# Patient Record
Sex: FEMALE | Race: WHITE | Employment: UNEMPLOYED | URBAN - METROPOLITAN AREA
[De-identification: names, ages, dates, MRNs, and addresses within clinical notes are randomized per-mention and may not be internally consistent; named-entity substitution may affect disease eponyms.]

---

## 2018-08-24 ENCOUNTER — APPOINTMENT (OUTPATIENT)
Dept: CT IMAGING | Age: 53
End: 2018-08-24
Payer: COMMERCIAL

## 2018-08-24 ENCOUNTER — HOSPITAL ENCOUNTER (EMERGENCY)
Age: 53
Discharge: HOME | End: 2018-08-25
Attending: EMERGENCY MEDICINE
Payer: COMMERCIAL

## 2018-08-24 DIAGNOSIS — I10 ESSENTIAL HYPERTENSION: ICD-10-CM

## 2018-08-24 DIAGNOSIS — N10 ACUTE PYELONEPHRITIS: Primary | ICD-10-CM

## 2018-08-24 DIAGNOSIS — R11.2 NON-INTRACTABLE VOMITING WITH NAUSEA, UNSPECIFIED VOMITING TYPE: ICD-10-CM

## 2018-08-24 DIAGNOSIS — Q60.3 HYPOPLASIA OF ONE KIDNEY: ICD-10-CM

## 2018-08-24 DIAGNOSIS — E27.8 ADRENAL CYST (HCC): ICD-10-CM

## 2018-08-24 LAB
A/G RATIO: 0.8 (ref 1.1–2.2)
ALBUMIN SERPL-MCNC: 3.5 G/DL (ref 3.4–5)
ALP BLD-CCNC: 112 U/L (ref 40–129)
ALT SERPL-CCNC: 12 U/L (ref 10–40)
ANION GAP SERPL CALCULATED.3IONS-SCNC: 14 MMOL/L (ref 3–16)
AST SERPL-CCNC: 14 U/L (ref 15–37)
BACTERIA: ABNORMAL /HPF
BASOPHILS ABSOLUTE: 0 K/UL (ref 0–0.2)
BASOPHILS RELATIVE PERCENT: 0.1 %
BILIRUB SERPL-MCNC: 0.4 MG/DL (ref 0–1)
BILIRUBIN URINE: NEGATIVE
BLOOD, URINE: ABNORMAL
BUN BLDV-MCNC: 15 MG/DL (ref 7–20)
CALCIUM SERPL-MCNC: 9.2 MG/DL (ref 8.3–10.6)
CHLORIDE BLD-SCNC: 98 MMOL/L (ref 99–110)
CLARITY: CLEAR
CO2: 24 MMOL/L (ref 21–32)
COLOR: YELLOW
CREAT SERPL-MCNC: 0.7 MG/DL (ref 0.6–1.1)
EOSINOPHILS ABSOLUTE: 0 K/UL (ref 0–0.6)
EOSINOPHILS RELATIVE PERCENT: 0.2 %
EPITHELIAL CELLS, UA: ABNORMAL /HPF
GFR AFRICAN AMERICAN: >60
GFR NON-AFRICAN AMERICAN: >60
GLOBULIN: 4.3 G/DL
GLUCOSE BLD-MCNC: 127 MG/DL (ref 70–99)
GLUCOSE URINE: NEGATIVE MG/DL
HCT VFR BLD CALC: 37.5 % (ref 36–48)
HEMOGLOBIN: 12.5 G/DL (ref 12–16)
KETONES, URINE: NEGATIVE MG/DL
LEUKOCYTE ESTERASE, URINE: ABNORMAL
LIPASE: 15 U/L (ref 13–60)
LYMPHOCYTES ABSOLUTE: 0.9 K/UL (ref 1–5.1)
LYMPHOCYTES RELATIVE PERCENT: 8.2 %
MAGNESIUM: 2.2 MG/DL (ref 1.8–2.4)
MCH RBC QN AUTO: 26.8 PG (ref 26–34)
MCHC RBC AUTO-ENTMCNC: 33.2 G/DL (ref 31–36)
MCV RBC AUTO: 80.6 FL (ref 80–100)
MICROSCOPIC EXAMINATION: YES
MONOCYTES ABSOLUTE: 1.2 K/UL (ref 0–1.3)
MONOCYTES RELATIVE PERCENT: 11.4 %
NEUTROPHILS ABSOLUTE: 8.4 K/UL (ref 1.7–7.7)
NEUTROPHILS RELATIVE PERCENT: 80.1 %
NITRITE, URINE: POSITIVE
PDW BLD-RTO: 17.5 % (ref 12.4–15.4)
PH UA: 7
PLATELET # BLD: 179 K/UL (ref 135–450)
PMV BLD AUTO: 9.3 FL (ref 5–10.5)
POTASSIUM REFLEX MAGNESIUM: 3.1 MMOL/L (ref 3.5–5.1)
PROTEIN UA: ABNORMAL MG/DL
RBC # BLD: 4.66 M/UL (ref 4–5.2)
RBC UA: ABNORMAL /HPF (ref 0–2)
SODIUM BLD-SCNC: 136 MMOL/L (ref 136–145)
SPECIFIC GRAVITY UA: <=1.005
TOTAL PROTEIN: 7.8 G/DL (ref 6.4–8.2)
URINE REFLEX TO CULTURE: YES
URINE TYPE: ABNORMAL
UROBILINOGEN, URINE: 1 E.U./DL
WBC # BLD: 10.5 K/UL (ref 4–11)
WBC UA: ABNORMAL /HPF (ref 0–5)

## 2018-08-24 PROCEDURE — 87086 URINE CULTURE/COLONY COUNT: CPT

## 2018-08-24 PROCEDURE — 6360000002 HC RX W HCPCS: Performed by: EMERGENCY MEDICINE

## 2018-08-24 PROCEDURE — S0028 INJECTION, FAMOTIDINE, 20 MG: HCPCS | Performed by: PHYSICIAN ASSISTANT

## 2018-08-24 PROCEDURE — 6360000002 HC RX W HCPCS: Performed by: PHYSICIAN ASSISTANT

## 2018-08-24 PROCEDURE — 87077 CULTURE AEROBIC IDENTIFY: CPT

## 2018-08-24 PROCEDURE — 2580000003 HC RX 258: Performed by: EMERGENCY MEDICINE

## 2018-08-24 PROCEDURE — 83690 ASSAY OF LIPASE: CPT

## 2018-08-24 PROCEDURE — 96375 TX/PRO/DX INJ NEW DRUG ADDON: CPT

## 2018-08-24 PROCEDURE — 2580000003 HC RX 258: Performed by: PHYSICIAN ASSISTANT

## 2018-08-24 PROCEDURE — 81001 URINALYSIS AUTO W/SCOPE: CPT

## 2018-08-24 PROCEDURE — 96365 THER/PROPH/DIAG IV INF INIT: CPT

## 2018-08-24 PROCEDURE — 85025 COMPLETE CBC W/AUTO DIFF WBC: CPT

## 2018-08-24 PROCEDURE — 80053 COMPREHEN METABOLIC PANEL: CPT

## 2018-08-24 PROCEDURE — 99284 EMERGENCY DEPT VISIT MOD MDM: CPT

## 2018-08-24 PROCEDURE — 2500000003 HC RX 250 WO HCPCS: Performed by: PHYSICIAN ASSISTANT

## 2018-08-24 PROCEDURE — 87186 SC STD MICRODIL/AGAR DIL: CPT

## 2018-08-24 PROCEDURE — 6370000000 HC RX 637 (ALT 250 FOR IP): Performed by: EMERGENCY MEDICINE

## 2018-08-24 PROCEDURE — 6360000004 HC RX CONTRAST MEDICATION: Performed by: EMERGENCY MEDICINE

## 2018-08-24 PROCEDURE — 74177 CT ABD & PELVIS W/CONTRAST: CPT

## 2018-08-24 PROCEDURE — 96361 HYDRATE IV INFUSION ADD-ON: CPT

## 2018-08-24 PROCEDURE — 83735 ASSAY OF MAGNESIUM: CPT

## 2018-08-24 RX ORDER — LISINOPRIL 10 MG/1
10 TABLET ORAL ONCE
Status: COMPLETED | OUTPATIENT
Start: 2018-08-24 | End: 2018-08-24

## 2018-08-24 RX ORDER — LISINOPRIL 10 MG/1
10 TABLET ORAL DAILY
Qty: 30 TABLET | Refills: 0 | Status: SHIPPED | OUTPATIENT
Start: 2018-08-24 | End: 2020-08-20

## 2018-08-24 RX ORDER — HYDROXYZINE PAMOATE 50 MG/1
50 CAPSULE ORAL ONCE
Status: COMPLETED | OUTPATIENT
Start: 2018-08-24 | End: 2018-08-24

## 2018-08-24 RX ORDER — ONDANSETRON 4 MG/1
4 TABLET, ORALLY DISINTEGRATING ORAL EVERY 8 HOURS PRN
Qty: 15 TABLET | Refills: 0 | Status: SHIPPED | OUTPATIENT
Start: 2018-08-24 | End: 2020-08-20

## 2018-08-24 RX ORDER — 0.9 % SODIUM CHLORIDE 0.9 %
1000 INTRAVENOUS SOLUTION INTRAVENOUS ONCE
Status: COMPLETED | OUTPATIENT
Start: 2018-08-24 | End: 2018-08-24

## 2018-08-24 RX ORDER — CEFDINIR 300 MG/1
300 CAPSULE ORAL 2 TIMES DAILY
Qty: 20 CAPSULE | Refills: 0 | Status: SHIPPED | OUTPATIENT
Start: 2018-08-24 | End: 2018-09-03

## 2018-08-24 RX ORDER — ONDANSETRON 2 MG/ML
4 INJECTION INTRAMUSCULAR; INTRAVENOUS ONCE
Status: COMPLETED | OUTPATIENT
Start: 2018-08-24 | End: 2018-08-24

## 2018-08-24 RX ADMIN — ONDANSETRON HYDROCHLORIDE 4 MG: 2 INJECTION, SOLUTION INTRAMUSCULAR; INTRAVENOUS at 21:44

## 2018-08-24 RX ADMIN — LISINOPRIL 10 MG: 10 TABLET ORAL at 23:36

## 2018-08-24 RX ADMIN — HYDROXYZINE PAMOATE 50 MG: 50 CAPSULE ORAL at 21:52

## 2018-08-24 RX ADMIN — SODIUM CHLORIDE 1000 ML: 9 INJECTION, SOLUTION INTRAVENOUS at 21:41

## 2018-08-24 RX ADMIN — IOPAMIDOL 75 ML: 755 INJECTION, SOLUTION INTRAVENOUS at 22:13

## 2018-08-24 RX ADMIN — CEFEPIME 2 G: 2 INJECTION, POWDER, FOR SOLUTION INTRAVENOUS at 23:33

## 2018-08-24 RX ADMIN — FAMOTIDINE 20 MG: 10 INJECTION, SOLUTION INTRAVENOUS at 21:44

## 2018-08-24 ASSESSMENT — PAIN DESCRIPTION - DESCRIPTORS: DESCRIPTORS: ACHING

## 2018-08-24 ASSESSMENT — PAIN SCALES - GENERAL: PAINLEVEL_OUTOF10: 9

## 2018-08-24 ASSESSMENT — PAIN DESCRIPTION - LOCATION: LOCATION: GENERALIZED

## 2018-08-24 ASSESSMENT — PAIN DESCRIPTION - PAIN TYPE: TYPE: ACUTE PAIN

## 2018-08-25 VITALS
SYSTOLIC BLOOD PRESSURE: 124 MMHG | DIASTOLIC BLOOD PRESSURE: 107 MMHG | BODY MASS INDEX: 25.11 KG/M2 | WEIGHT: 160 LBS | HEIGHT: 67 IN | RESPIRATION RATE: 16 BRPM | TEMPERATURE: 98.1 F | HEART RATE: 79 BPM | OXYGEN SATURATION: 100 %

## 2018-08-25 ASSESSMENT — ENCOUNTER SYMPTOMS
SORE THROAT: 0
DIARRHEA: 0
EYE DISCHARGE: 0
COUGH: 0
BACK PAIN: 0
ABDOMINAL PAIN: 0
VOMITING: 1
NAUSEA: 1

## 2018-08-25 NOTE — ED NOTES
Discharge instructions reviewed with pt. Pt verbalized understanding. Pt ambulated out of ED in stable condition. Pt left with a friend.       Mack Vieira, [place]  08/25/18 0708

## 2018-08-25 NOTE — ED NOTES
Pt ambulated to and from the bathroom in stable condition.       Lisa Bahena, [place]  08/24/18 05

## 2018-08-25 NOTE — ED PROVIDER NOTES
motion. Neck supple. Cardiovascular: Normal rate, regular rhythm, normal heart sounds and intact distal pulses. Exam reveals no gallop and no friction rub. No murmur heard. Pulmonary/Chest: Effort normal and breath sounds normal. No respiratory distress. She has no wheezes. Abdominal: Soft. Bowel sounds are normal. She exhibits no distension. There is no tenderness. There is no rebound and no guarding. Musculoskeletal: Normal range of motion. She exhibits no edema or tenderness. Lymphadenopathy:     She has no cervical adenopathy. Neurological: She is alert and oriented to person, place, and time. No cranial nerve deficit. Skin: Skin is warm and dry. No rash noted. Psychiatric: She has a normal mood and affect.        DIAGNOSTIC RESULTS   LABS:    Results for orders placed or performed during the hospital encounter of 08/24/18   CBC auto differential   Result Value Ref Range    WBC 10.5 4.0 - 11.0 K/uL    RBC 4.66 4.00 - 5.20 M/uL    Hemoglobin 12.5 12.0 - 16.0 g/dL    Hematocrit 37.5 36.0 - 48.0 %    MCV 80.6 80.0 - 100.0 fL    MCH 26.8 26.0 - 34.0 pg    MCHC 33.2 31.0 - 36.0 g/dL    RDW 17.5 (H) 12.4 - 15.4 %    Platelets 175 533 - 314 K/uL    MPV 9.3 5.0 - 10.5 fL    Neutrophils % 80.1 %    Lymphocytes % 8.2 %    Monocytes % 11.4 %    Eosinophils % 0.2 %    Basophils % 0.1 %    Neutrophils # 8.4 (H) 1.7 - 7.7 K/uL    Lymphocytes # 0.9 (L) 1.0 - 5.1 K/uL    Monocytes # 1.2 0.0 - 1.3 K/uL    Eosinophils # 0.0 0.0 - 0.6 K/uL    Basophils # 0.0 0.0 - 0.2 K/uL   Comprehensive Metabolic Panel w/ Reflex to MG   Result Value Ref Range    Sodium 136 136 - 145 mmol/L    Potassium reflex Magnesium 3.1 (L) 3.5 - 5.1 mmol/L    Chloride 98 (L) 99 - 110 mmol/L    CO2 24 21 - 32 mmol/L    Anion Gap 14 3 - 16    Glucose 127 (H) 70 - 99 mg/dL    BUN 15 7 - 20 mg/dL    CREATININE 0.7 0.6 - 1.1 mg/dL    GFR Non-African American >60 >60    GFR African American >60 >60    Calcium 9.2 8.3 - 10.6 mg/dL    Total Protein increased   urothelial enhancement and multifocal renal parenchymal hypo attenuation;   findings are most compatible with pyelonephritis. Follow-up to resolution is   recommended. Left nephrolithiasis. Simple appearing 3.5 cm right adnexal cyst.  Follow-up is recommended as   below. Morphology liver worrisome for chronic disease as above. There are also   findings worrisome for portal venous hypertension including splenomegaly and   venous collaterals. RECOMMENDATIONS:   Managing Incidental Adnexal Cystic Mass by CT or MR      Benign cyst:      Premenopausal (< or equal to 50 years if LMP unknown)      < or equal to 5 cm: no follow up      >5 cm: US in 6-12 weeks      > or equal to 10 cm: US promptly      Early postmenopausal (0-5 years after LMP)      < or equal to 3 cm: no follow up      3-5 cm: US in 6-12 weeks      >5 cm: US promptly      Late postmenopausal      < or equal to 3 cm: no follow up      >3 cm: US promptly      Reference:      Cammie Ayala et al. Managing Incidental Findings on Abdominal CT: White Paper of   the ACR Incidental Findings Committee. J Am Omar Radiol 2010;7:754-773               PROCEDURES   Unless otherwise noted below, none     Procedures    CRITICAL CARE TIME   Total critical care time today provided was 47 minutes. This excludes seperately billable procedures and family discussion time. Provided for acute pyelonephritis requiring intervention with concern for potential decompensation.       CONSULTS:  None      EMERGENCY DEPARTMENT COURSE and DIFFERENTIAL DIAGNOSIS/MDM:   Vitals:    Vitals:    08/24/18 2250 08/24/18 2310 08/24/18 2339 08/25/18 0014   BP:  (!) 198/106 (!) 196/150 (!) 124/107   Pulse: 83   79   Resp: 16   16   Temp:       TempSrc:       SpO2:  95% 99% 100%   Weight:       Height:           Patient was given the following medications:  Medications   0.9 % sodium chloride bolus (0 mLs Intravenous Stopped 8/24/18 2250)   ondansetron (ZOFRAN) injection 4 agree with discharging home to follow-up with their primary doctor. We also discussed returning to the Emergency Department immediately if new or worsening symptoms occur. We have discussed the symptoms which are most concerning (e.g., bloody stool, fever, changing or worsening pain, vomiting) that necessitate immediate return. The patient understands the importance of follow up and reasons to return. FINAL IMPRESSION      1. Acute pyelonephritis    2. Non-intractable vomiting with nausea, unspecified vomiting type    3. Essential hypertension    4. Adrenal cyst ([address]    5.  Hypoplasia of one kidney          DISPOSITION/PLAN   DISPOSITION Decision To Discharge 08/24/2018 11:41:03 PM      PATIENT REFERRED TO:  [address] ED  [address] [facility]  [phone]  Go to   If symptoms worsen      DISCHARGE MEDICATIONS:  Discharge Medication List as of 8/24/2018 11:53 PM      START taking these medications    Details   lisinopril (PRINIVIL;ZESTRIL) 10 MG tablet Take 1 tablet by mouth daily, Disp-30 tablet, R-0Normal      cefdinir (OMNICEF) 300 MG capsule Take 1 capsule by mouth 2 times daily for 10 days, Disp-20 capsule, R-0Normal      ondansetron (ZOFRAN ODT) 4 MG disintegrating tablet Take 1 tablet by mouth every 8 hours as needed for Nausea or Vomiting, Disp-15 tablet, R-0Normal             DISCONTINUED MEDICATIONS:  Discharge Medication List as of 8/24/2018 11:53 PM      STOP taking these medications       hydrochlorothiazide (HYDRODIURIL) 25 MG tablet Comments:   Reason for Stopping:         propranolol (INDERAL) 40 MG tablet Comments:   Reason for Stopping:         naproxen (NAPROSYN) 375 MG tablet Comments:   Reason for Stopping:                      (Please note that portions of this note were completed with a voice recognition program.  Efforts were made to edit the dictations but occasionally words are mis-transcribed.)    Isaiah Ruffin Julian Staley MD (electronically signed)              Giovanna West MD  08/25/18 2204

## 2018-08-27 LAB
ORGANISM: ABNORMAL
URINE CULTURE, ROUTINE: ABNORMAL
URINE CULTURE, ROUTINE: ABNORMAL

## 2018-11-05 ENCOUNTER — HOSPITAL ENCOUNTER (OUTPATIENT)
Dept: ULTRASOUND IMAGING | Age: 53
Discharge: HOME | End: 2018-11-05
Payer: COMMERCIAL

## 2018-11-05 DIAGNOSIS — N94.9 ADNEXAL CYST: ICD-10-CM

## 2018-11-05 PROCEDURE — 76830 TRANSVAGINAL US NON-OB: CPT

## 2020-08-20 ENCOUNTER — APPOINTMENT (OUTPATIENT)
Dept: GENERAL RADIOLOGY | Age: 55
DRG: 720 | End: 2020-08-20
Payer: COMMERCIAL

## 2020-08-20 ENCOUNTER — HOSPITAL ENCOUNTER (INPATIENT)
Age: 55
LOS: 4 days | Discharge: HOME | DRG: 720 | End: 2020-08-24
Attending: EMERGENCY MEDICINE | Admitting: UROLOGY
Payer: COMMERCIAL

## 2020-08-20 ENCOUNTER — ANESTHESIA (OUTPATIENT)
Dept: OPERATING ROOM | Age: 55
DRG: 720 | End: 2020-08-20
Payer: COMMERCIAL

## 2020-08-20 ENCOUNTER — ANESTHESIA EVENT (OUTPATIENT)
Dept: OPERATING ROOM | Age: 55
DRG: 720 | End: 2020-08-20
Payer: COMMERCIAL

## 2020-08-20 ENCOUNTER — APPOINTMENT (OUTPATIENT)
Dept: CT IMAGING | Age: 55
DRG: 720 | End: 2020-08-20
Payer: COMMERCIAL

## 2020-08-20 VITALS — DIASTOLIC BLOOD PRESSURE: 62 MMHG | OXYGEN SATURATION: 100 % | SYSTOLIC BLOOD PRESSURE: 116 MMHG

## 2020-08-20 PROBLEM — N39.0 SEPSIS DUE TO URINARY TRACT INFECTION (HCC): Status: ACTIVE | Noted: 2020-08-20

## 2020-08-20 PROBLEM — A41.9 SEPSIS DUE TO URINARY TRACT INFECTION (HCC): Status: ACTIVE | Noted: 2020-08-20

## 2020-08-20 LAB
A/G RATIO: 1 (ref 1.1–2.2)
ALBUMIN SERPL-MCNC: 4.2 G/DL (ref 3.4–5)
ALP BLD-CCNC: 163 U/L (ref 40–129)
ALT SERPL-CCNC: 43 U/L (ref 10–40)
ANION GAP SERPL CALCULATED.3IONS-SCNC: 16 MMOL/L (ref 3–16)
AST SERPL-CCNC: 56 U/L (ref 15–37)
BACTERIA: ABNORMAL /HPF
BASOPHILS ABSOLUTE: 0.1 K/UL (ref 0–0.2)
BASOPHILS RELATIVE PERCENT: 0.4 %
BILIRUB SERPL-MCNC: 0.6 MG/DL (ref 0–1)
BILIRUBIN URINE: NEGATIVE
BLOOD, URINE: ABNORMAL
BUN BLDV-MCNC: 22 MG/DL (ref 7–20)
CALCIUM SERPL-MCNC: 9.8 MG/DL (ref 8.3–10.6)
CHLORIDE BLD-SCNC: 100 MMOL/L (ref 99–110)
CLARITY: ABNORMAL
CO2: 20 MMOL/L (ref 21–32)
COLOR: YELLOW
CREAT SERPL-MCNC: 1.4 MG/DL (ref 0.6–1.1)
EOSINOPHILS ABSOLUTE: 0 K/UL (ref 0–0.6)
EOSINOPHILS RELATIVE PERCENT: 0 %
EPITHELIAL CELLS, UA: ABNORMAL /HPF (ref 0–5)
GFR AFRICAN AMERICAN: 47
GFR NON-AFRICAN AMERICAN: 39
GLOBULIN: 4.2 G/DL
GLUCOSE BLD-MCNC: 141 MG/DL (ref 70–99)
GLUCOSE URINE: NEGATIVE MG/DL
HCG QUALITATIVE: NEGATIVE
HCT VFR BLD CALC: 41.3 % (ref 36–48)
HEMOGLOBIN: 13 G/DL (ref 12–16)
KETONES, URINE: ABNORMAL MG/DL
LACTIC ACID, SEPSIS: 2.1 MMOL/L (ref 0.4–1.9)
LACTIC ACID, SEPSIS: 3.3 MMOL/L (ref 0.4–1.9)
LACTIC ACID, SEPSIS: 4.1 MMOL/L (ref 0.4–1.9)
LEUKOCYTE ESTERASE, URINE: ABNORMAL
LIPASE: 25 U/L (ref 13–60)
LYMPHOCYTES ABSOLUTE: 0.6 K/UL (ref 1–5.1)
LYMPHOCYTES RELATIVE PERCENT: 3.1 %
MCH RBC QN AUTO: 25.6 PG (ref 26–34)
MCHC RBC AUTO-ENTMCNC: 31.5 G/DL (ref 31–36)
MCV RBC AUTO: 81.2 FL (ref 80–100)
MICROSCOPIC EXAMINATION: YES
MONOCYTES ABSOLUTE: 1 K/UL (ref 0–1.3)
MONOCYTES RELATIVE PERCENT: 5.1 %
NEUTROPHILS ABSOLUTE: 17.1 K/UL (ref 1.7–7.7)
NEUTROPHILS RELATIVE PERCENT: 91.4 %
NITRITE, URINE: POSITIVE
PDW BLD-RTO: 17.1 % (ref 12.4–15.4)
PH UA: 5.5 (ref 5–8)
PLATELET # BLD: 141 K/UL (ref 135–450)
PMV BLD AUTO: 9.9 FL (ref 5–10.5)
POTASSIUM REFLEX MAGNESIUM: 3.6 MMOL/L (ref 3.5–5.1)
PROTEIN UA: 30 MG/DL
RBC # BLD: 5.08 M/UL (ref 4–5.2)
RBC UA: ABNORMAL /HPF (ref 0–4)
SODIUM BLD-SCNC: 136 MMOL/L (ref 136–145)
SPECIFIC GRAVITY UA: 1.01 (ref 1–1.03)
TOTAL PROTEIN: 8.4 G/DL (ref 6.4–8.2)
URINE REFLEX TO CULTURE: YES
URINE TYPE: ABNORMAL
UROBILINOGEN, URINE: 0.2 E.U./DL
WBC # BLD: 18.7 K/UL (ref 4–11)
WBC UA: ABNORMAL /HPF (ref 0–5)

## 2020-08-20 PROCEDURE — 83690 ASSAY OF LIPASE: CPT

## 2020-08-20 PROCEDURE — 74420 UROGRAPHY RTRGR +-KUB: CPT

## 2020-08-20 PROCEDURE — 7100000001 HC PACU RECOVERY - ADDTL 15 MIN: Performed by: UROLOGY

## 2020-08-20 PROCEDURE — 87088 URINE BACTERIA CULTURE: CPT

## 2020-08-20 PROCEDURE — 2580000003 HC RX 258: Performed by: INTERNAL MEDICINE

## 2020-08-20 PROCEDURE — 81001 URINALYSIS AUTO W/SCOPE: CPT

## 2020-08-20 PROCEDURE — 87086 URINE CULTURE/COLONY COUNT: CPT

## 2020-08-20 PROCEDURE — 3700000000 HC ANESTHESIA ATTENDED CARE: Performed by: UROLOGY

## 2020-08-20 PROCEDURE — 3600000014 HC SURGERY LEVEL 4 ADDTL 15MIN: Performed by: UROLOGY

## 2020-08-20 PROCEDURE — 6360000002 HC RX W HCPCS: Performed by: PHYSICIAN ASSISTANT

## 2020-08-20 PROCEDURE — 2709999900 HC NON-CHARGEABLE SUPPLY: Performed by: UROLOGY

## 2020-08-20 PROCEDURE — 0T778DZ DILATION OF LEFT URETER WITH INTRALUMINAL DEVICE, VIA NATURAL OR ARTIFICIAL OPENING ENDOSCOPIC: ICD-10-PCS | Performed by: UROLOGY

## 2020-08-20 PROCEDURE — 6370000000 HC RX 637 (ALT 250 FOR IP): Performed by: UROLOGY

## 2020-08-20 PROCEDURE — C1758 CATHETER, URETERAL: HCPCS | Performed by: UROLOGY

## 2020-08-20 PROCEDURE — 2700000000 HC OXYGEN THERAPY PER DAY

## 2020-08-20 PROCEDURE — 80053 COMPREHEN METABOLIC PANEL: CPT

## 2020-08-20 PROCEDURE — 3700000001 HC ADD 15 MINUTES (ANESTHESIA): Performed by: UROLOGY

## 2020-08-20 PROCEDURE — 2580000003 HC RX 258: Performed by: EMERGENCY MEDICINE

## 2020-08-20 PROCEDURE — 94761 N-INVAS EAR/PLS OXIMETRY MLT: CPT

## 2020-08-20 PROCEDURE — C1769 GUIDE WIRE: HCPCS | Performed by: UROLOGY

## 2020-08-20 PROCEDURE — 6360000002 HC RX W HCPCS: Performed by: ANESTHESIOLOGY

## 2020-08-20 PROCEDURE — 6360000002 HC RX W HCPCS: Performed by: EMERGENCY MEDICINE

## 2020-08-20 PROCEDURE — 2060000000 HC ICU INTERMEDIATE R&B

## 2020-08-20 PROCEDURE — 74176 CT ABD & PELVIS W/O CONTRAST: CPT

## 2020-08-20 PROCEDURE — 99285 EMERGENCY DEPT VISIT HI MDM: CPT

## 2020-08-20 PROCEDURE — 6360000002 HC RX W HCPCS: Performed by: INTERNAL MEDICINE

## 2020-08-20 PROCEDURE — C2617 STENT, NON-COR, TEM W/O DEL: HCPCS | Performed by: UROLOGY

## 2020-08-20 PROCEDURE — 84703 CHORIONIC GONADOTROPIN ASSAY: CPT

## 2020-08-20 PROCEDURE — 96365 THER/PROPH/DIAG IV INF INIT: CPT

## 2020-08-20 PROCEDURE — 87186 SC STD MICRODIL/AGAR DIL: CPT

## 2020-08-20 PROCEDURE — 93005 ELECTROCARDIOGRAM TRACING: CPT | Performed by: PHYSICIAN ASSISTANT

## 2020-08-20 PROCEDURE — 2500000003 HC RX 250 WO HCPCS: Performed by: PHYSICIAN ASSISTANT

## 2020-08-20 PROCEDURE — 3600000004 HC SURGERY LEVEL 4 BASE: Performed by: UROLOGY

## 2020-08-20 PROCEDURE — 36415 COLL VENOUS BLD VENIPUNCTURE: CPT

## 2020-08-20 PROCEDURE — 83605 ASSAY OF LACTIC ACID: CPT

## 2020-08-20 PROCEDURE — 2580000003 HC RX 258: Performed by: UROLOGY

## 2020-08-20 PROCEDURE — 96375 TX/PRO/DX INJ NEW DRUG ADDON: CPT

## 2020-08-20 PROCEDURE — 85025 COMPLETE CBC W/AUTO DIFF WBC: CPT

## 2020-08-20 PROCEDURE — 7100000000 HC PACU RECOVERY - FIRST 15 MIN: Performed by: UROLOGY

## 2020-08-20 PROCEDURE — 2580000003 HC RX 258: Performed by: ANESTHESIOLOGY

## 2020-08-20 PROCEDURE — 2580000003 HC RX 258: Performed by: PHYSICIAN ASSISTANT

## 2020-08-20 PROCEDURE — 87040 BLOOD CULTURE FOR BACTERIA: CPT

## 2020-08-20 PROCEDURE — 87150 DNA/RNA AMPLIFIED PROBE: CPT

## 2020-08-20 DEVICE — URETERAL STENT
Type: IMPLANTABLE DEVICE | Site: URETER | Status: FUNCTIONAL
Brand: CONTOUR™

## 2020-08-20 RX ORDER — MEPERIDINE HYDROCHLORIDE 25 MG/ML
12.5 INJECTION INTRAMUSCULAR; INTRAVENOUS; SUBCUTANEOUS EVERY 5 MIN PRN
Status: DISCONTINUED | OUTPATIENT
Start: 2020-08-20 | End: 2020-08-20 | Stop reason: HOSPADM

## 2020-08-20 RX ORDER — SODIUM CHLORIDE 9 MG/ML
INJECTION, SOLUTION INTRAVENOUS CONTINUOUS PRN
Status: DISCONTINUED | OUTPATIENT
Start: 2020-08-20 | End: 2020-08-20 | Stop reason: SDUPTHER

## 2020-08-20 RX ORDER — MORPHINE SULFATE 10 MG/ML
2 INJECTION, SOLUTION INTRAMUSCULAR; INTRAVENOUS EVERY 5 MIN PRN
Status: DISCONTINUED | OUTPATIENT
Start: 2020-08-20 | End: 2020-08-20 | Stop reason: HOSPADM

## 2020-08-20 RX ORDER — MAGNESIUM HYDROXIDE 1200 MG/15ML
LIQUID ORAL PRN
Status: DISCONTINUED | OUTPATIENT
Start: 2020-08-20 | End: 2020-08-20 | Stop reason: ALTCHOICE

## 2020-08-20 RX ORDER — OXYCODONE HYDROCHLORIDE AND ACETAMINOPHEN 5; 325 MG/1; MG/1
2 TABLET ORAL PRN
Status: DISCONTINUED | OUTPATIENT
Start: 2020-08-20 | End: 2020-08-20 | Stop reason: HOSPADM

## 2020-08-20 RX ORDER — SODIUM CHLORIDE 9 MG/ML
INJECTION, SOLUTION INTRAVENOUS CONTINUOUS
Status: DISCONTINUED | OUTPATIENT
Start: 2020-08-20 | End: 2020-08-23

## 2020-08-20 RX ORDER — 0.9 % SODIUM CHLORIDE 0.9 %
1000 INTRAVENOUS SOLUTION INTRAVENOUS ONCE
Status: COMPLETED | OUTPATIENT
Start: 2020-08-20 | End: 2020-08-20

## 2020-08-20 RX ORDER — OXYCODONE HYDROCHLORIDE AND ACETAMINOPHEN 5; 325 MG/1; MG/1
1 TABLET ORAL PRN
Status: DISCONTINUED | OUTPATIENT
Start: 2020-08-20 | End: 2020-08-20 | Stop reason: HOSPADM

## 2020-08-20 RX ORDER — LABETALOL HYDROCHLORIDE 5 MG/ML
5 INJECTION, SOLUTION INTRAVENOUS EVERY 10 MIN PRN
Status: DISCONTINUED | OUTPATIENT
Start: 2020-08-20 | End: 2020-08-20 | Stop reason: HOSPADM

## 2020-08-20 RX ORDER — ONDANSETRON 2 MG/ML
4 INJECTION INTRAMUSCULAR; INTRAVENOUS PRN
Status: DISCONTINUED | OUTPATIENT
Start: 2020-08-20 | End: 2020-08-20 | Stop reason: HOSPADM

## 2020-08-20 RX ORDER — PROMETHAZINE HYDROCHLORIDE 25 MG/ML
6.25 INJECTION, SOLUTION INTRAMUSCULAR; INTRAVENOUS
Status: DISCONTINUED | OUTPATIENT
Start: 2020-08-20 | End: 2020-08-20 | Stop reason: HOSPADM

## 2020-08-20 RX ORDER — PROPOFOL 10 MG/ML
INJECTION, EMULSION INTRAVENOUS PRN
Status: DISCONTINUED | OUTPATIENT
Start: 2020-08-20 | End: 2020-08-20 | Stop reason: SDUPTHER

## 2020-08-20 RX ORDER — SODIUM CHLORIDE 0.9 % (FLUSH) 0.9 %
10 SYRINGE (ML) INJECTION PRN
Status: DISCONTINUED | OUTPATIENT
Start: 2020-08-20 | End: 2020-08-24 | Stop reason: HOSPADM

## 2020-08-20 RX ORDER — MORPHINE SULFATE 10 MG/ML
1 INJECTION, SOLUTION INTRAMUSCULAR; INTRAVENOUS EVERY 5 MIN PRN
Status: DISCONTINUED | OUTPATIENT
Start: 2020-08-20 | End: 2020-08-20 | Stop reason: HOSPADM

## 2020-08-20 RX ORDER — 0.9 % SODIUM CHLORIDE 0.9 %
1500 INTRAVENOUS SOLUTION INTRAVENOUS ONCE
Status: COMPLETED | OUTPATIENT
Start: 2020-08-20 | End: 2020-08-20

## 2020-08-20 RX ORDER — ACETAMINOPHEN 325 MG/1
650 TABLET ORAL EVERY 6 HOURS PRN
Status: DISCONTINUED | OUTPATIENT
Start: 2020-08-20 | End: 2020-08-24 | Stop reason: HOSPADM

## 2020-08-20 RX ORDER — LIDOCAINE HYDROCHLORIDE 20 MG/ML
JELLY TOPICAL PRN
Status: DISCONTINUED | OUTPATIENT
Start: 2020-08-20 | End: 2020-08-20 | Stop reason: ALTCHOICE

## 2020-08-20 RX ORDER — ACETAMINOPHEN 650 MG/1
650 SUPPOSITORY RECTAL EVERY 6 HOURS PRN
Status: DISCONTINUED | OUTPATIENT
Start: 2020-08-20 | End: 2020-08-24 | Stop reason: HOSPADM

## 2020-08-20 RX ORDER — HEPARIN SODIUM 5000 [USP'U]/ML
5000 INJECTION, SOLUTION INTRAVENOUS; SUBCUTANEOUS EVERY 8 HOURS SCHEDULED
Status: DISCONTINUED | OUTPATIENT
Start: 2020-08-20 | End: 2020-08-24 | Stop reason: HOSPADM

## 2020-08-20 RX ORDER — MORPHINE SULFATE 4 MG/ML
4 INJECTION, SOLUTION INTRAMUSCULAR; INTRAVENOUS ONCE
Status: COMPLETED | OUTPATIENT
Start: 2020-08-20 | End: 2020-08-20

## 2020-08-20 RX ORDER — KETOROLAC TROMETHAMINE 30 MG/ML
15 INJECTION, SOLUTION INTRAMUSCULAR; INTRAVENOUS ONCE
Status: DISCONTINUED | OUTPATIENT
Start: 2020-08-20 | End: 2020-08-20

## 2020-08-20 RX ORDER — LORAZEPAM 2 MG/ML
0.5 INJECTION INTRAMUSCULAR ONCE
Status: COMPLETED | OUTPATIENT
Start: 2020-08-20 | End: 2020-08-20

## 2020-08-20 RX ORDER — ONDANSETRON 2 MG/ML
4 INJECTION INTRAMUSCULAR; INTRAVENOUS ONCE
Status: COMPLETED | OUTPATIENT
Start: 2020-08-20 | End: 2020-08-20

## 2020-08-20 RX ORDER — SODIUM CHLORIDE 0.9 % (FLUSH) 0.9 %
10 SYRINGE (ML) INJECTION EVERY 12 HOURS SCHEDULED
Status: DISCONTINUED | OUTPATIENT
Start: 2020-08-20 | End: 2020-08-24 | Stop reason: HOSPADM

## 2020-08-20 RX ORDER — DIPHENHYDRAMINE HYDROCHLORIDE 50 MG/ML
12.5 INJECTION INTRAMUSCULAR; INTRAVENOUS
Status: DISCONTINUED | OUTPATIENT
Start: 2020-08-20 | End: 2020-08-20 | Stop reason: HOSPADM

## 2020-08-20 RX ORDER — FENTANYL CITRATE 50 UG/ML
INJECTION, SOLUTION INTRAMUSCULAR; INTRAVENOUS PRN
Status: DISCONTINUED | OUTPATIENT
Start: 2020-08-20 | End: 2020-08-20 | Stop reason: SDUPTHER

## 2020-08-20 RX ORDER — HYDRALAZINE HYDROCHLORIDE 20 MG/ML
5 INJECTION INTRAMUSCULAR; INTRAVENOUS EVERY 10 MIN PRN
Status: DISCONTINUED | OUTPATIENT
Start: 2020-08-20 | End: 2020-08-20 | Stop reason: HOSPADM

## 2020-08-20 RX ADMIN — MORPHINE SULFATE 4 MG: 4 INJECTION INTRAVENOUS at 17:55

## 2020-08-20 RX ADMIN — SODIUM CHLORIDE 1000 ML: 9 INJECTION, SOLUTION INTRAVENOUS at 17:58

## 2020-08-20 RX ADMIN — HYDROMORPHONE HYDROCHLORIDE 0.5 MG: 1 INJECTION, SOLUTION INTRAMUSCULAR; INTRAVENOUS; SUBCUTANEOUS at 20:11

## 2020-08-20 RX ADMIN — SODIUM CHLORIDE: 9 INJECTION, SOLUTION INTRAVENOUS at 20:47

## 2020-08-20 RX ADMIN — ONDANSETRON HYDROCHLORIDE 4 MG: 2 INJECTION, SOLUTION INTRAMUSCULAR; INTRAVENOUS at 17:55

## 2020-08-20 RX ADMIN — SODIUM CHLORIDE 1500 ML: 9 INJECTION, SOLUTION INTRAVENOUS at 19:18

## 2020-08-20 RX ADMIN — Medication 10 ML: at 22:29

## 2020-08-20 RX ADMIN — PROPOFOL 200 MG: 10 INJECTION, EMULSION INTRAVENOUS at 20:52

## 2020-08-20 RX ADMIN — FENTANYL CITRATE 100 MCG: 50 INJECTION INTRAMUSCULAR; INTRAVENOUS at 20:50

## 2020-08-20 RX ADMIN — CEFEPIME HYDROCHLORIDE 2 G: 2 INJECTION, POWDER, FOR SOLUTION INTRAVENOUS at 19:21

## 2020-08-20 RX ADMIN — HEPARIN SODIUM 5000 UNITS: 5000 INJECTION INTRAVENOUS; SUBCUTANEOUS at 22:29

## 2020-08-20 RX ADMIN — SODIUM CHLORIDE: 9 INJECTION, SOLUTION INTRAVENOUS at 22:28

## 2020-08-20 RX ADMIN — LORAZEPAM 0.5 MG: 2 INJECTION INTRAMUSCULAR; INTRAVENOUS at 19:39

## 2020-08-20 ASSESSMENT — PAIN DESCRIPTION - FREQUENCY: FREQUENCY: CONTINUOUS

## 2020-08-20 ASSESSMENT — ENCOUNTER SYMPTOMS
DIARRHEA: 0
SHORTNESS OF BREATH: 0
CONSTIPATION: 0
NAUSEA: 1
EYE REDNESS: 0
SINUS PRESSURE: 0
RHINORRHEA: 0
SORE THROAT: 0
CHEST TIGHTNESS: 0
EYE DISCHARGE: 0
ABDOMINAL PAIN: 0
VOMITING: 0
SINUS PAIN: 0
COUGH: 0

## 2020-08-20 ASSESSMENT — PAIN DESCRIPTION - PAIN TYPE: TYPE: ACUTE PAIN;CHRONIC PAIN

## 2020-08-20 ASSESSMENT — PAIN DESCRIPTION - LOCATION: LOCATION: FLANK

## 2020-08-20 ASSESSMENT — PAIN SCALES - GENERAL
PAINLEVEL_OUTOF10: 0
PAINLEVEL_OUTOF10: 10
PAINLEVEL_OUTOF10: 10

## 2020-08-20 ASSESSMENT — PULMONARY FUNCTION TESTS
PIF_VALUE: 0
PIF_VALUE: 4
PIF_VALUE: 2
PIF_VALUE: 4
PIF_VALUE: 2
PIF_VALUE: 3
PIF_VALUE: 4
PIF_VALUE: 4
PIF_VALUE: 0
PIF_VALUE: 1
PIF_VALUE: 4
PIF_VALUE: 1
PIF_VALUE: 0
PIF_VALUE: 4
PIF_VALUE: 4
PIF_VALUE: 5
PIF_VALUE: 3
PIF_VALUE: 5
PIF_VALUE: 4
PIF_VALUE: 3
PIF_VALUE: 6
PIF_VALUE: 8
PIF_VALUE: 7
PIF_VALUE: 0

## 2020-08-20 ASSESSMENT — PAIN DESCRIPTION - PROGRESSION: CLINICAL_PROGRESSION: GRADUALLY WORSENING

## 2020-08-20 ASSESSMENT — PAIN DESCRIPTION - DESCRIPTORS: DESCRIPTORS: SHARP;STABBING

## 2020-08-20 ASSESSMENT — PAIN DESCRIPTION - ORIENTATION: ORIENTATION: LEFT

## 2020-08-20 ASSESSMENT — PAIN DESCRIPTION - ONSET: ONSET: PROGRESSIVE

## 2020-08-20 NOTE — ED PROVIDER NOTES
I independently examined and evaluated Farrukh Renteria. In brief, patient presenting for evaluation of flank pain. She denies any significant abdominal pain. She is nauseated. She states that she is very anxious has a history of anxiety. .    Focused exam revealed patient is in no acute distress. GCS 15. Abdomen soft, nontender nondistended. .    The Ekg interpreted by me shows  sinus tachycardia, ddnu=338  Axis is   Normal  QTc is  normal  Intervals and Durations are unremarkable. ST Segments: nonspecific changes  No significant change from prior EKG dated 7/28/15    Imaging:  Ct Abdomen Pelvis Wo Contrast    Result Date: 8/20/2020  EXAMINATION: CT OF THE ABDOMEN AND PELVIS WITHOUT CONTRAST 8/20/2020 5:40 pm TECHNIQUE: CT of the abdomen and pelvis was performed without the administration of intravenous contrast. Multiplanar reformatted images are provided for review. Dose modulation, iterative reconstruction, and/or weight based adjustment of the mA/kV was utilized to reduce the radiation dose to as low as reasonably achievable. COMPARISON: August 24, 2018 HISTORY: ORDERING SYSTEM PROVIDED HISTORY: flank pain TECHNOLOGIST PROVIDED HISTORY: If patient is on cardiac monitor and/or pulse ox, they may be taken off cardiac monitor and pulse ox, left on O2 if currently on. All monitors reattached when patient returns to room. Reason for exam:->flank pain Is the patient pregnant?->No Reason for Exam: Flank Pain (Pt states that her left kidney doesnt work and has not been able to control her pain at home. Pt states that pain started getting worse yesterday. Pt also endorses nausea.) FINDINGS: Lower Chest: No evidence of pneumonia or other acute findings. Organs: There is a 7 mm stone at the left UPJ. This causes moderate hydronephrosis. Perinephric stranding also present. GI/Bowel: No bowel obstruction or other non contrast acute process demonstrated. No evidence of colitis, diverticulitis or appendicitis. Sigmoid diverticulosis noted. Pelvis: Within the left side of the pelvis there is a mass still present, unchanged. This measures 6.2 by 4.4 cm transverse diameter on axial image 175. Along the cranial margin of this is a cystic oval-shaped 4.0 x 1.9 cm focus, also unchanged. Peritoneum/Retroperitoneum: No free air, free fluid or abscess. Bones/Soft Tissues: No fracture or other acute osseous process. 7 mm stone at the left UPJ causing moderate obstructive hydronephrosis     ED course: Patient presenting for evaluation of flank pain and noted to have a 7 mm proximal stone with obstruction as well as evidence of the infection with significant white blood cells in the urine, leukocytosis and a slightly elevated lactic at 2.1. Sepsis orders initiated including 30 mL/kg fluid bolus. She has mild MARY. Pain medicine given and she was feeling quite anxious about possibly staying and/or procedure therefore I did give a small amount of Ativan as well. Patient has essentially one functioning kidney. After discussion with urology, Dr. Anastasia Martin, decision made to take the patient to the OR for emergent stent placement. Patient will be admitted to the hospitalist for overall management and care was discussed with Dr. Susy العراقي who was in agreement of plan and will assume care for the patient as an inpatient following the OR. All diagnostic, treatment, and disposition decisions were made by myself in conjunction with the advanced practice provider. For all further details of the patient's emergency department visit, please see the advanced practice provider's documentation. Comment: Please note this report has been produced using speech recognition software and may contain errors related to that system including errors in grammar, punctuation, and spelling, as well as words and phrases that may be inappropriate.  If there are any questions or concerns please feel free to contact the dictating provider for

## 2020-08-20 NOTE — H&P
Hospital Medicine History & Physical      PCP: Monico Ewing MD    Date of Admission: 8/20/2020    Date of Service: Pt seen/examined on 8/20/2020 and Admitted to Inpatient with expected LOS greater than two midnights due to medical therapy. Chief Complaint: Left flank pain and nausea      History Of Present Illness:     48 y.o. female history of hypertension, UTI, hyperplasia right kidney who presented with left flank pain and nausea. The patient states yesterday she started experience worsening left flank pain radiating to lower abdomen. She had no relief with Tylenol, ibuprofen and BC powder. She denied fever/chills or diarrhea. She is very emotional at the bedside. She states she is anxious and stressed. The patient has not followed up with a PCP for 1 year. She stopped taking her blood pressure medication. She also stopped taking Prozac for depression. In ER, patient is afebrile, tachycardic at 144 bpm and hypertensive /112. Lab work was pertinent for creatinine 1.4, lactic acid 2.1, glucose 141, elevated LFTs, WBC 18.7.  UA consistent with UTI. CT Abdo/pelvis without contrast showed 7 mm stone at the left UPJ causing moderate obstructive hydronephrosis. Patient was taken emergently to the OR. Past Medical History:          Diagnosis Date    Depression     Headache(784.0)     Hypertension        Past Surgical History:          Procedure Laterality Date    OTHER SURGICAL HISTORY      CYSTOSCOPY URETERAL STENT INSERTION (Left Bladder)       Medications Prior to Admission:      Prior to Admission medications    Not on File       Allergies:  Patient has no known allergies. Social History:      The patient currently lives at home    TOBACCO:   reports that she has quit smoking. Her smoking use included cigarettes. She smoked 0.50 packs per day. She has never used smokeless tobacco.  ETOH:   reports no history of alcohol use.   E-Cigarettes Vaping or Juuling     Questions Responses Vaping Use     Start Date     Does device contain nicotine? Quit Date     Vaping Type             Family History:       Reviewed in detail and negative for DM, CAD, Cancer, CVA. Positive as follows:        Problem Relation Age of Onset    Diabetes Mother     Heart Disease Mother     High Blood Pressure Sister     Diabetes Sister     Heart Attack Sister        REVIEW OF SYSTEMS:   Pertinent positives as noted in the HPI. All other systems reviewed and negative. PHYSICAL EXAM PERFORMED:    /68   Pulse 94   Temp 97.9 °F (36.6 °C) (Oral)   Resp 18   Ht 5' 7\" (1.702 m)   Wt 165 lb (74.8 kg)   SpO2 97%   Breastfeeding No   BMI 25.84 kg/m²     General appearance:  No apparent distress, appears stated age and cooperative. HEENT:  Normal cephalic, atraumatic without obvious deformity. Pupils equal, round, and reactive to light. Extra ocular muscles intact. Conjunctivae/corneas clear. Neck: Supple, with full range of motion. No jugular venous distention. Trachea midline. Respiratory:  Normal respiratory effort. Scattered crackles at bases. No Wheezes/Rhonchi. Cardiovascular:  Regular rate and rhythm with normal S1/S2 without murmurs, rubs or gallops. Abdomen: Soft, non-tender, non-distended with normal bowel sounds. Musculoskeletal:  No clubbing, cyanosis or edema bilaterally. Full range of motion without deformity. Skin: Skin color, texture, turgor normal.  No rashes or lesions. Neurologic:  Neurovascularly intact without any focal sensory/motor deficits.  Cranial nerves: II-XII intact, grossly non-focal.  Psychiatric:  Alert and oriented, emotional  Capillary Refill: Brisk,< 3 seconds   Peripheral Pulses: +2 palpable, equal bilaterally       Labs:     Recent Labs     08/20/20  1706   WBC 18.7*   HGB 13.0   HCT 41.3        Recent Labs     08/20/20  1706      K 3.6      CO2 20*   BUN 22*   CREATININE 1.4*   CALCIUM 9.8     Recent Labs     08/20/20  1706   AST 56* ALT 43*   BILITOT 0.6   ALKPHOS 163*     No results for input(s): INR in the last 72 hours. No results for input(s): Nat Watson in the last 72 hours. Urinalysis:      Lab Results   Component Value Date    NITRU POSITIVE 08/20/2020    WBCUA  08/20/2020    BACTERIA 3+ 08/20/2020    RBCUA 3-4 08/20/2020    BLOODU LARGE 08/20/2020    SPECGRAV 1.015 08/20/2020    GLUCOSEU Negative 08/20/2020       Radiology:     EKG: Sinus tachycardia at 145 beats per minutes, no ST elevation or depression    FL RETROGRADE PYELOGRAM LEFT   Final Result   Intraprocedural fluoroscopic spot images as above. See separate procedure   report for more information. CT ABDOMEN PELVIS WO CONTRAST   Final Result   7 mm stone at the left UPJ causing moderate obstructive hydronephrosis             ASSESSMENT:    #Sepsis POA-Due to UTI  -Antibiotics for UTI  -IV hydration  -Antipyretics  -Follow-up blood and urine culture    #Complicated UTI  -IV ceftriaxone  -Follow-up urine culture    #7 mm UPJ kidney stone-associated with hydronephrosis  Status post stent placement by Urology 8/20/2020  -continue IV hydration  -Start Flomax  -Await stone analysis. Counseled patient on stone prevention diet.  -Appreciate Urology recommendations    #Acute kidney injury-due to obstruction of single functioning kidney  -Continue IV hydration  -Avoid NSAIDs  -Obstruction relieved by stent    #Anion gap metabolic acidosis-due to lactic acidosis  -Continue IV hydration and improvement of renal function  -Trend lactic acid    #Elevated LFTs-no mention of any liver pathology on CT Abdo/pelvis  -Check hepatitis panel and trend LFTs    #Hypertension  -BP initially elevated likely due to pain, improved postop. Initiate antihypertensives as needed.     #Postop hypoxia-likely due to anesthesia in patient with history of tobacco use  -Wean supplemental oxygen  -Repeat chest x-ray if hypoxia is not resolved by morning    #Anxiety  -Offered to start

## 2020-08-20 NOTE — ED PROVIDER NOTES
[address] ED  EMERGENCY DEPARTMENT ENCOUNTER        Pt Name: Altaf Hahn  MRN: [id number]  [place] 1965  Date of evaluation: 8/20/2020  Provider: Monet Main PA-C  PCP: Shweta Nelson MD  ED Attending: Robert Gorman      This patient was seen and evaluated by the attending physician. I have not independently evaluated this patient. CHIEF COMPLAINT       Chief Complaint   Patient presents with    Flank Pain     Pt states that her left kidney doesnt work and has not been able to control her pain at home. Pt states that pain started getting worse yesterday. Pt also endorses nausea. HISTORY OF PRESENT ILLNESS   (Location/Symptom, Timing/Onset, Context/Setting, Quality, Duration, Modifying Factors, Severity)  Note limiting factors. Altaf Hahn is a 48 y.o. 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 Onset was sudden yesterday evening. Pain is sharp and constant. Patient states pain radiates to lower abdomen and left lumbar region. Patient has tried tylenol, ibuprofen, and BC powder for pain with minimal relief. Patient denies fever, dysuria, hematuria, or increased frequency. 10/10 pain. Patient states she stopped taking her home meds for HTN because she wants to lower her BP in a \"healthy way and does not want to take medication for the rest of her life. \"    Nursing Notes were all reviewed and agreed with or any disagreements were addressed  in the HPI. REVIEW OF SYSTEMS  (2-9 systems for level 4, 10 or more for level 5)     Review of Systems   Constitutional: Positive for chills. Negative for fever. HENT: Negative. Negative for congestion, rhinorrhea, sinus pressure, sinus pain and sore throat. Eyes: Negative for discharge, redness and visual disturbance.    Respiratory: Negative for cough, chest tightness and shortness of breath. Cardiovascular: Negative for chest pain and palpitations. Gastrointestinal: Positive for nausea. Negative for abdominal pain, constipation, diarrhea and vomiting. Genitourinary: Negative for difficulty urinating, dysuria and frequency. Musculoskeletal: Negative. Left flank pain that radiates to lower abdomen and left lumbar region   Skin: Negative. Neurological: Negative. Negative for dizziness, weakness, numbness and headaches. Psychiatric/Behavioral: Negative. All other systems reviewed and are negative. Positivesand Pertinent negatives as per HPI. Except as noted above in the ROS, all other systems were reviewed and negative. PAST MEDICAL HISTORY     Past Medical History:   Diagnosis Date    Depression     Headache(784.0)     Hypertension          SURGICAL HISTORY     History reviewed. No pertinent surgical history. CURRENT MEDICATIONS       Previous Medications    No medications on file         ALLERGIES     Patient has no known allergies. FAMILY HISTORY     History reviewed. No pertinent family history.       SOCIAL HISTORY       Social History     Socioeconomic History    Marital status: Single     Spouse name: None    Number of children: None    Years of education: None    Highest education level: None   Occupational History    None   Social Needs    Financial resource strain: None    Food insecurity     Worry: None     Inability: None    Transportation needs     Medical: None     Non-medical: None   Tobacco Use    Smoking status: Former Smoker     Packs/day: 0.50     Types: Cigarettes    Smokeless tobacco: Never Used   Substance and Sexual Activity    Alcohol use: No    Drug use: No    Sexual activity: None   Lifestyle    Physical activity     Days per week: None     Minutes per session: None    Stress: None   Relationships    Social connections     Talks on phone: None     Gets together: None     Attends [employer] service: [phone]   LACTATE, SEPSIS - Abnormal; Notable for the following components:    Lactic Acid, Sepsis 2.1 (*)     All other components within normal limits    Narrative:     Performed at:  [facility] [address],  [place], [facility]   Phone [phone]   CULTURE, URINE   CULTURE, BLOOD 1   CULTURE, BLOOD 2   HCG, SERUM, QUALITATIVE    Narrative:     Performed at:  [facility] [address],  [place], [facility]   Phone [phone]   LIPASE    Narrative:     Performed at:  [facility] [address],  [place], [facility]   Phone [phone]   LACTATE, SEPSIS       All other labs were within normal range or notreturned as of this dictation. EKG: All EKG's are interpreted by the Emergency Department Physician who either signs or Co-signs this chart in the absence of a cardiologist.  Please see their note for interpretation of EKG.       RADIOLOGY:   Interpretation per the Radiologist below, if available at the time of this note:    CT ABDOMEN PELVIS WO CONTRAST   Final Result   7 mm stone at the left UPJ causing moderate obstructive hydronephrosis               CONSULTS:  IP CONSULT TO HOSPITALIST  IP CONSULT TO UROLOGY      EMERGENCY DEPARTMENT COURSE and DIFFERENTIAL DIAGNOSIS/MDM:   Vitals:    Vitals:    08/20/20 1733 08/20/20 1804 08/20/20 1834 08/20/20 1904   BP: (!) 145/105 (!) 161/115 (!) 163/101 (!) 181/110   Pulse: 134 123 117 119   Resp: 21 10 19 24   Temp:       TempSrc:       SpO2: 96% 98% 95% 96%   Weight:       Height:           Patient was given the following medications:  Medications   0.9 % sodium chloride bolus (1,000 mLs Intravenous New Bag 8/20/20 1758)   morphine sulfate (PF) injection 4 mg (4 mg Intravenous Given 8/20/20 1755)   ondansetron (ZOFRAN) injection 4 mg (4 mg Intravenous Given 8/20/20 1755)   cefepime (MAXIPIME) 2 g IVPB minibag (2 g Intravenous New Bag 8/20/20

## 2020-08-20 NOTE — ED TRIAGE NOTES
Pt states that she was born with a kidney condition and her right kidney doesn't work at all. Pt states that she \"has something wrong with the left kidney that should all be documented and the kidney barely works. \"  Pt states that the pain started last night and she has not been able to control the pain at home.

## 2020-08-21 LAB
A/G RATIO: 0.9 (ref 1.1–2.2)
ALBUMIN SERPL-MCNC: 2.9 G/DL (ref 3.4–5)
ALP BLD-CCNC: 96 U/L (ref 40–129)
ALT SERPL-CCNC: 29 U/L (ref 10–40)
ANION GAP SERPL CALCULATED.3IONS-SCNC: 12 MMOL/L (ref 3–16)
AST SERPL-CCNC: 39 U/L (ref 15–37)
BANDED NEUTROPHILS RELATIVE PERCENT: 16 % (ref 0–7)
BASOPHILS ABSOLUTE: 0 K/UL (ref 0–0.2)
BASOPHILS RELATIVE PERCENT: 0 %
BILIRUB SERPL-MCNC: 0.4 MG/DL (ref 0–1)
BUN BLDV-MCNC: 21 MG/DL (ref 7–20)
CALCIUM SERPL-MCNC: 8.1 MG/DL (ref 8.3–10.6)
CHLORIDE BLD-SCNC: 107 MMOL/L (ref 99–110)
CO2: 19 MMOL/L (ref 21–32)
CREAT SERPL-MCNC: 1.4 MG/DL (ref 0.6–1.1)
EKG ATRIAL RATE: 145 BPM
EKG DIAGNOSIS: NORMAL
EKG P AXIS: 69 DEGREES
EKG P-R INTERVAL: 130 MS
EKG Q-T INTERVAL: 282 MS
EKG QRS DURATION: 76 MS
EKG QTC CALCULATION (BAZETT): 438 MS
EKG R AXIS: 12 DEGREES
EKG T AXIS: 41 DEGREES
EKG VENTRICULAR RATE: 145 BPM
EOSINOPHILS ABSOLUTE: 0 K/UL (ref 0–0.6)
EOSINOPHILS RELATIVE PERCENT: 0 %
GFR AFRICAN AMERICAN: 47
GFR NON-AFRICAN AMERICAN: 39
GLOBULIN: 3.1 G/DL
GLUCOSE BLD-MCNC: 109 MG/DL (ref 70–99)
HAV IGM SER IA-ACNC: ABNORMAL
HCT VFR BLD CALC: 34.9 % (ref 36–48)
HEMOGLOBIN: 10.7 G/DL (ref 12–16)
HEPATITIS B CORE IGM ANTIBODY: ABNORMAL
HEPATITIS B SURFACE ANTIGEN INTERPRETATION: ABNORMAL
HEPATITIS C ANTIBODY INTERPRETATION: REACTIVE
LACTIC ACID, SEPSIS: 4.1 MMOL/L (ref 0.4–1.9)
LACTIC ACID: 2.1 MMOL/L (ref 0.4–2)
LACTIC ACID: 2.6 MMOL/L (ref 0.4–2)
LACTIC ACID: 2.7 MMOL/L (ref 0.4–2)
LYMPHOCYTES ABSOLUTE: 0.2 K/UL (ref 1–5.1)
LYMPHOCYTES RELATIVE PERCENT: 1 %
MCH RBC QN AUTO: 25.3 PG (ref 26–34)
MCHC RBC AUTO-ENTMCNC: 30.6 G/DL (ref 31–36)
MCV RBC AUTO: 82.9 FL (ref 80–100)
MONOCYTES ABSOLUTE: 0.5 K/UL (ref 0–1.3)
MONOCYTES RELATIVE PERCENT: 3 %
NEUTROPHILS ABSOLUTE: 14.4 K/UL (ref 1.7–7.7)
NEUTROPHILS RELATIVE PERCENT: 80 %
PDW BLD-RTO: 17.6 % (ref 12.4–15.4)
PLATELET # BLD: 87 K/UL (ref 135–450)
PLATELET SLIDE REVIEW: ABNORMAL
PMV BLD AUTO: 10.3 FL (ref 5–10.5)
POTASSIUM REFLEX MAGNESIUM: 3.7 MMOL/L (ref 3.5–5.1)
RBC # BLD: 4.21 M/UL (ref 4–5.2)
REPORT: NORMAL
SLIDE REVIEW: ABNORMAL
SODIUM BLD-SCNC: 138 MMOL/L (ref 136–145)
TOTAL PROTEIN: 6 G/DL (ref 6.4–8.2)
WBC # BLD: 15 K/UL (ref 4–11)

## 2020-08-21 PROCEDURE — 6370000000 HC RX 637 (ALT 250 FOR IP): Performed by: INTERNAL MEDICINE

## 2020-08-21 PROCEDURE — 93010 ELECTROCARDIOGRAM REPORT: CPT | Performed by: INTERNAL MEDICINE

## 2020-08-21 PROCEDURE — 80074 ACUTE HEPATITIS PANEL: CPT

## 2020-08-21 PROCEDURE — 2060000000 HC ICU INTERMEDIATE R&B

## 2020-08-21 PROCEDURE — 6360000002 HC RX W HCPCS: Performed by: PHYSICIAN ASSISTANT

## 2020-08-21 PROCEDURE — 99232 SBSQ HOSP IP/OBS MODERATE 35: CPT | Performed by: INTERNAL MEDICINE

## 2020-08-21 PROCEDURE — 2580000003 HC RX 258: Performed by: PHYSICIAN ASSISTANT

## 2020-08-21 PROCEDURE — 80053 COMPREHEN METABOLIC PANEL: CPT

## 2020-08-21 PROCEDURE — 85025 COMPLETE CBC W/AUTO DIFF WBC: CPT

## 2020-08-21 PROCEDURE — 6370000000 HC RX 637 (ALT 250 FOR IP): Performed by: PHYSICIAN ASSISTANT

## 2020-08-21 PROCEDURE — 83605 ASSAY OF LACTIC ACID: CPT

## 2020-08-21 PROCEDURE — 2580000003 HC RX 258: Performed by: INTERNAL MEDICINE

## 2020-08-21 PROCEDURE — 36415 COLL VENOUS BLD VENIPUNCTURE: CPT

## 2020-08-21 PROCEDURE — 6360000002 HC RX W HCPCS: Performed by: INTERNAL MEDICINE

## 2020-08-21 RX ORDER — NICOTINE 21 MG/24HR
1 PATCH, TRANSDERMAL 24 HOURS TRANSDERMAL DAILY
Status: DISCONTINUED | OUTPATIENT
Start: 2020-08-21 | End: 2020-08-24 | Stop reason: HOSPADM

## 2020-08-21 RX ORDER — ACETAMINOPHEN, ASPIRIN AND CAFFEINE 250; 250; 65 MG/1; MG/1; MG/1
1 TABLET, FILM COATED ORAL EVERY 6 HOURS PRN
Status: DISCONTINUED | OUTPATIENT
Start: 2020-08-21 | End: 2020-08-24 | Stop reason: HOSPADM

## 2020-08-21 RX ORDER — HYDROCODONE BITARTRATE AND ACETAMINOPHEN 5; 325 MG/1; MG/1
1 TABLET ORAL EVERY 6 HOURS PRN
Status: DISCONTINUED | OUTPATIENT
Start: 2020-08-21 | End: 2020-08-21

## 2020-08-21 RX ORDER — HYDROCODONE BITARTRATE AND ACETAMINOPHEN 5; 325 MG/1; MG/1
1 TABLET ORAL EVERY 6 HOURS PRN
Status: DISCONTINUED | OUTPATIENT
Start: 2020-08-21 | End: 2020-08-24 | Stop reason: HOSPADM

## 2020-08-21 RX ORDER — NICOTINE 21 MG/24HR
1 PATCH, TRANSDERMAL 24 HOURS TRANSDERMAL DAILY
Status: DISCONTINUED | OUTPATIENT
Start: 2020-08-21 | End: 2020-08-21

## 2020-08-21 RX ORDER — HYDROXYZINE PAMOATE 25 MG/1
25 CAPSULE ORAL 3 TIMES DAILY PRN
Status: DISCONTINUED | OUTPATIENT
Start: 2020-08-21 | End: 2020-08-24

## 2020-08-21 RX ORDER — TAMSULOSIN HYDROCHLORIDE 0.4 MG/1
0.4 CAPSULE ORAL DAILY
Status: DISCONTINUED | OUTPATIENT
Start: 2020-08-21 | End: 2020-08-24 | Stop reason: HOSPADM

## 2020-08-21 RX ORDER — ONDANSETRON 2 MG/ML
4 INJECTION INTRAMUSCULAR; INTRAVENOUS EVERY 6 HOURS PRN
Status: DISCONTINUED | OUTPATIENT
Start: 2020-08-21 | End: 2020-08-24 | Stop reason: HOSPADM

## 2020-08-21 RX ADMIN — ACETAMINOPHEN, ASPIRIN AND CAFFEINE 1 TABLET: 250; 250; 65 TABLET, FILM COATED ORAL at 22:27

## 2020-08-21 RX ADMIN — MEROPENEM 1 G: 1 INJECTION, POWDER, FOR SOLUTION INTRAVENOUS at 15:45

## 2020-08-21 RX ADMIN — HYDROCODONE BITARTRATE AND ACETAMINOPHEN 1 TABLET: 5; 325 TABLET ORAL at 03:56

## 2020-08-21 RX ADMIN — ONDANSETRON HYDROCHLORIDE 4 MG: 2 INJECTION, SOLUTION INTRAMUSCULAR; INTRAVENOUS at 02:16

## 2020-08-21 RX ADMIN — SODIUM CHLORIDE: 9 INJECTION, SOLUTION INTRAVENOUS at 05:24

## 2020-08-21 RX ADMIN — HYDROCODONE BITARTRATE AND ACETAMINOPHEN 1 TABLET: 5; 325 TABLET ORAL at 18:32

## 2020-08-21 RX ADMIN — ACETAMINOPHEN 650 MG: 325 TABLET ORAL at 08:38

## 2020-08-21 RX ADMIN — CEFTRIAXONE SODIUM 1 G: 1 INJECTION, POWDER, FOR SOLUTION INTRAMUSCULAR; INTRAVENOUS at 03:56

## 2020-08-21 RX ADMIN — ACETAMINOPHEN 650 MG: 325 TABLET ORAL at 21:04

## 2020-08-21 RX ADMIN — HEPARIN SODIUM 5000 UNITS: 5000 INJECTION INTRAVENOUS; SUBCUTANEOUS at 05:21

## 2020-08-21 RX ADMIN — MEROPENEM 1 G: 1 INJECTION, POWDER, FOR SOLUTION INTRAVENOUS at 21:04

## 2020-08-21 RX ADMIN — HYDROCODONE BITARTRATE AND ACETAMINOPHEN 1 TABLET: 5; 325 TABLET ORAL at 11:43

## 2020-08-21 RX ADMIN — SODIUM CHLORIDE: 9 INJECTION, SOLUTION INTRAVENOUS at 21:06

## 2020-08-21 RX ADMIN — Medication 10 ML: at 08:27

## 2020-08-21 RX ADMIN — HEPARIN SODIUM 5000 UNITS: 5000 INJECTION INTRAVENOUS; SUBCUTANEOUS at 15:45

## 2020-08-21 RX ADMIN — TAMSULOSIN HYDROCHLORIDE 0.4 MG: 0.4 CAPSULE ORAL at 02:16

## 2020-08-21 RX ADMIN — ONDANSETRON HYDROCHLORIDE 4 MG: 2 INJECTION, SOLUTION INTRAMUSCULAR; INTRAVENOUS at 08:26

## 2020-08-21 ASSESSMENT — PAIN DESCRIPTION - LOCATION
LOCATION: FLANK
LOCATION: FLANK

## 2020-08-21 ASSESSMENT — PAIN - FUNCTIONAL ASSESSMENT
PAIN_FUNCTIONAL_ASSESSMENT: ACTIVITIES ARE NOT PREVENTED
PAIN_FUNCTIONAL_ASSESSMENT: ACTIVITIES ARE NOT PREVENTED

## 2020-08-21 ASSESSMENT — PAIN DESCRIPTION - ORIENTATION
ORIENTATION: LEFT
ORIENTATION: LEFT

## 2020-08-21 ASSESSMENT — PAIN SCALES - GENERAL
PAINLEVEL_OUTOF10: 10
PAINLEVEL_OUTOF10: 7
PAINLEVEL_OUTOF10: 9
PAINLEVEL_OUTOF10: 8
PAINLEVEL_OUTOF10: 5
PAINLEVEL_OUTOF10: 7
PAINLEVEL_OUTOF10: 3
PAINLEVEL_OUTOF10: 7

## 2020-08-21 ASSESSMENT — PAIN DESCRIPTION - DESCRIPTORS
DESCRIPTORS: SHARP;SHOOTING
DESCRIPTORS: SHARP;SHOOTING

## 2020-08-21 ASSESSMENT — PAIN DESCRIPTION - PAIN TYPE
TYPE: ACUTE PAIN
TYPE: ACUTE PAIN

## 2020-08-21 ASSESSMENT — PAIN DESCRIPTION - PROGRESSION
CLINICAL_PROGRESSION: GRADUALLY WORSENING
CLINICAL_PROGRESSION: NOT CHANGED

## 2020-08-21 ASSESSMENT — PAIN DESCRIPTION - ONSET
ONSET: ON-GOING
ONSET: ON-GOING

## 2020-08-21 ASSESSMENT — PAIN DESCRIPTION - FREQUENCY
FREQUENCY: CONTINUOUS
FREQUENCY: CONTINUOUS

## 2020-08-21 NOTE — PROGRESS NOTES
Patient admitted to room 315 from Surgery. Patient oriented to room, call light, bed rails, phone, lights and bathroom. Patient instructed about the schedule of the day including: vital sign frequency, lab draws, possible tests, frequency of MD and staff rounds, daily weights, I &O's and prescribed diet. bed alarm in place, patient aware of placement and reason. Telemetry box in place, patient aware of placement and reason. Bed locked, in lowest position, side rails up 2/4, call light within reach. Recliner Assessment  Patient is able to demonstrated the ability to move from a reclining position to an upright position within the recliner. 4 Eyes Skin Assessment     The patient is being assess for   Admission    I agree that 2 RN's have performed a thorough Head to Toe Skin Assessment on the patient. ALL assessment sites listed below have been assessed. Areas assessed by both nurses:   [x]   Head, Face, and Ears   [x]   Shoulders, Back, and Chest, Abdomen  [x]   Arms, Elbows, and Hands   [x]   Coccyx, Sacrum, and Ischium  [x]   Legs, Feet, and Heels        Generalized rash on upper and lower body. Blanchable redness/ scabs on sacrum. Scabs scattered on lower back. Scabbed areas on toes. **SHARE this note so that the co-signing nurse is able to place an eSignature**    Co-signer eSignature: Electronically signed by Alma Ulrich RN on 8/21/20 at 1:04 AM EDT    Does the Patient have Skin Breakdown?   No          Emeka Prevention initiated:  Yes   Wound Care Orders initiated:  No      [facility] nurse consulted for Pressure Injury (Stage 3,4, Unstageable, DTI, NWPT, Complex wounds)and New or Established Ostomies:  No      Primary Nurse eSignature: Electronically signed by Paco Gómez RN on 8/21/20 at 12:59 AM EDT

## 2020-08-21 NOTE — PROGRESS NOTES
Shift assessment complete see doc flow sheet respirations easy and even. Patient denies any needs at this time.

## 2020-08-21 NOTE — PROGRESS NOTES
Progress Note    Admit Date:  8/20/2020    Subjective:  Ms. Vimal Aguirre feels better overall, still has left sided abdominal pain    Blood cx + E Coli     Objective:   Patient Vitals for the past 4 hrs:   BP Temp Temp src Pulse Resp SpO2   08/21/20 0838 (!) 148/62 98.4 °F (36.9 °C) Oral 93 19 98 %            Intake/Output Summary (Last 24 hours) at 8/21/2020 1210  Last data filed at 8/21/2020 1137  Gross per 24 hour   Intake 2846 ml   Output 1200 ml   Net 1646 ml       Physical Exam:    Gen: No distress. Alert. Eyes: PERRL. No sclera icterus. No conjunctival injection. ENT: No discharge. Pharynx clear. Neck: No JVD. Trachea midline. Resp: No accessory muscle use. No crackles. No wheezes. No rhonchi. CV: Regular rate. Regular rhythm. No murmur. No rub. No edema. Capillary Refill: Brisk,< 3 seconds   Peripheral Pulses: +2 palpable, equal bilaterally   GI: Non-tender. Non-distended. Normal bowel sounds. Skin: Warm and dry. No nodule on exposed extremities. No rash on exposed extremities. M/S: No cyanosis. No joint deformity. No clubbing. Neuro: Awake. Grossly nonfocal    Psych: Oriented x 3. No anxiety or agitation. I Moises Healy have reviewed the chart on [address] and personally interviewed and examined patient, reviewed the data (labs and imaging) and after discussion with my PA formulated the plan. Agree with note with the following edits. Physical exam:    BP (!) 148/62   Pulse 93   Temp 98.4 °F (36.9 °C) (Oral)   Resp 19   Ht 5' 7\" (1.702 m)   Wt 198 lb 4.8 oz (89.9 kg)   SpO2 98%   Breastfeeding No   BMI 31.06 kg/m²     Gen: No distress. Alert. Eyes: PERRL. No sclera icterus. No conjunctival injection. ENT: No discharge. Pharynx clear. Neck: Trachea midline. Normal thyroid. Resp: No accessory muscle use. No crackles. No wheezes. No rhonchi. No dullness on percussion. CV: Regular rate. Regular rhythm. No murmur or rub. No edema. GI: Non-tender. Non-distended. No masses. No organomegaly. Normal bowel sounds. No hernia. Left flank and CVA tenderness   Skin: Warm and dry. No nodule on exposed extremities. No rash on exposed extremities. Lymph: No cervical LAD. No supraclavicular LAD. M/S: No cyanosis. No joint deformity. No clubbing. Neuro: Awake. Moves all 4 extremities, non focal  Psych: Oriented x 3. No anxiety or agitation. ELIZABETH Mcleod      Scheduled Meds:   nicotine  1 patch Transdermal Daily    tamsulosin  0.4 mg Oral Daily    sodium chloride flush  10 mL Intravenous 2 times per day    heparin (porcine)  5,000 Units Subcutaneous 3 times per day    cefTRIAXone (ROCEPHIN) IV  1 g Intravenous Q24H       Continuous Infusions:   sodium chloride 150 mL/hr at 08/21/20 0524       PRN Meds:  HYDROcodone 5 mg - acetaminophen, ondansetron, hydrOXYzine, sodium chloride flush, acetaminophen **OR** acetaminophen      Data:  CBC:   Recent Labs     08/20/20  1706 08/21/20  0616   WBC 18.7* 15.0*   HGB 13.0 10.7*   HCT 41.3 34.9*   MCV 81.2 82.9    87*     BMP:   Recent Labs     08/20/20  1706 08/21/20  0616    138   K 3.6 3.7    107   CO2 20* 19*   BUN 22* 21*   CREATININE 1.4* 1.4*     LIVER PROFILE:   Recent Labs     08/20/20  1706 08/21/20  0616   AST 56* 39*   ALT 43* 29   LIPASE 25.0  --    BILITOT 0.6 0.4   ALKPHOS 163* 96     PT/INR: No results for input(s): PROTIME, INR in the last 72 hours. CULTURES  Blood 8/20: E coli, final sensitivity pending   Urine: pending        RADIOLOGY  FL RETROGRADE PYELOGRAM LEFT   Final Result   Intraprocedural fluoroscopic spot images as above. See separate procedure   report for more information.          CT ABDOMEN PELVIS WO CONTRAST   Final Result   7 mm stone at the left UPJ causing moderate obstructive hydronephrosis               Assessment/Plan:    #Sepsis POA-Due to complicated E Coli UTI and bacteremia   -Antibiotics for UTI  -IV hydration  -Antipyretics  - improving

## 2020-08-21 NOTE — BRIEF OP NOTE
Brief Postoperative Note      Patient: Homero Angel  YOB: 1965  MRN: [id number]    Date of Procedure: 8/20/2020    Pre-Op Diagnosis: left ureteral stone    Post-Op Diagnosis: Same       Procedure(s):  CYSTOSCOPY URETERAL STENT INSERTION    Surgeon(s):  Shital Love MD    Assistant:  * No surgical staff found *    Anesthesia: General    Estimated Blood Loss (mL): Minimal    Complications: None    Specimens:   * No specimens in log *    Implants:  Implant Name Type Inv. Item Serial No.  Lot No. LRB No. Used Action   STENT URET 2001 C3DNA PIGTL MULTI W/O [id number] - [id number] Stent:Urological STENT URET [facility] PIGTL MULTI W/O [id number] [place] SCI: [address] [place] [address] Left 1 Implanted         Drains: * No LDAs found *    Findings: UPJ stone, 6 X 24 JJ placed. PLAN:  Patient to be admitted to medicine, discharge when stable. She will need follow up in the office to schedule definitive care.     Electronically signed by Monalisa Jefferson MD on 8/20/2020 at 9:08 PM

## 2020-08-21 NOTE — PROGRESS NOTES
Urology Progress Note      Subjective:   Pt c/o left flank discomfort    Vitals:  BP (!) 148/62   Pulse 93   Temp 98.4 °F (36.9 °C) (Oral)   Resp 19   Ht 5' 7\" (1.702 m)   Wt 198 lb 4.8 oz (89.9 kg)   SpO2 98%   Breastfeeding No   BMI 31.06 kg/m²   Temp  Av.4 °F (36.9 °C)  Min: 97.2 °F (36.2 °C)  Max: 101.2 °F (38.4 °C)    Intake/Output Summary (Last 24 hours) at 2020 1126  Last data filed at 2020 2353  Gross per 24 hour   Intake 2846 ml   Output 800 ml   Net 2046 ml       Exam:     Labs:  WBC:    Lab Results   Component Value Date    WBC 15.0 2020     Hemoglobin/Hematocrit:    Lab Results   Component Value Date    HGB 10.7 2020    HCT 34.9 2020     BMP:    Lab Results   Component Value Date     2020    K 3.7 2020     2020    CO2 19 2020    BUN 21 2020    LABALBU 2.9 2020    CREATININE 1.4 2020    CALCIUM 8.1 2020    GFRAA 47 2020    LABGLOM 39 2020     PT/INR:  No results found for: PROTIME, INR  PTT:  No results found for: APTT[APTT    Urinalysis:     Urine Culture:  pending    Blood Culture:  pending    Antibiotic Therapy:  cefepime    Imaging:       Impression/Plan:   S/p left ureteral stent for solitary kidney  Cultures pending  Continue abx, supportive care    Nabil Zavala PA-C

## 2020-08-21 NOTE — ANESTHESIA PRE PROCEDURE
Department of Anesthesiology  Preprocedure Note       Name:  Caity Morrison   Age:  48 y.o.  :  1965                                          MRN:  [id number]         Date:  2020      Surgeon: Genny Briceño):  Denys Toure MD    Procedure: Procedure(s):  CYSTOSCOPY URETEROSCOPY POSSIBLE STONE MANIPULATION    Medications prior to admission:   Prior to Admission medications    Not on File       Current medications:    No current facility-administered medications for this encounter. No current outpatient medications on file. Allergies:  No Known Allergies    Problem List:    Patient Active Problem List   Diagnosis Code    Sepsis due to urinary tract infection ([facility] [address] A41.9, N39.0       Past Medical History:        Diagnosis Date    Depression     Headache(784.0)     Hypertension        Past Surgical History:  History reviewed. No pertinent surgical history. Social History:    Social History     Tobacco Use    Smoking status: Former Smoker     Packs/day: 0.50     Types: Cigarettes    Smokeless tobacco: Never Used   Substance Use Topics    Alcohol use:  No                                Counseling given: Not Answered      Vital Signs (Current):   Vitals:    20 1733 20 1804 20 1834 20 1904   BP: (!) 145/105 (!) 161/115 (!) 163/101 (!) 181/110   Pulse: 134 123 117 119   Resp: 21 10 19 24   Temp:       TempSrc:       SpO2: 96% 98% 95% 96%   Weight:       Height:                                                  BP Readings from Last 3 Encounters:   20 (!) 181/110   18 (!) 124/107       NPO Status: Time of last liquid consumption: 1714                        Time of last solid consumption: 1700                        Date of last liquid consumption: 20                        Date of last solid food consumption: 20    BMI:   Wt Readings from Last 3 Encounters:   20 165 lb (74.8 kg)   18 160 lb (72.6 kg)     Body mass index is 25.84 kg/m². CBC:   Lab Results   Component Value Date    WBC 18.7 08/20/2020    RBC 5.08 08/20/2020    HGB 13.0 08/20/2020    HCT 41.3 08/20/2020    MCV 81.2 08/20/2020    RDW 17.1 08/20/2020     08/20/2020       CMP:   Lab Results   Component Value Date     08/20/2020    K 3.6 08/20/2020     08/20/2020    CO2 20 08/20/2020    BUN 22 08/20/2020    CREATININE 1.4 08/20/2020    GFRAA 47 08/20/2020    AGRATIO 1.0 08/20/2020    LABGLOM 39 08/20/2020    GLUCOSE 141 08/20/2020    PROT 8.4 08/20/2020    CALCIUM 9.8 08/20/2020    BILITOT 0.6 08/20/2020    ALKPHOS 163 08/20/2020    AST 56 08/20/2020    ALT 43 08/20/2020       POC Tests: No results for input(s): POCGLU, POCNA, POCK, POCCL, POCBUN, POCHEMO, POCHCT in the last 72 hours. Coags: No results found for: PROTIME, INR, APTT    HCG (If Applicable): No results found for: PREGTESTUR, PREGSERUM, HCG, HCGQUANT     ABGs: No results found for: PHART, PO2ART, CRP5ARB, XER0OJT, BEART, M2OHRWIB     Type & Screen (If Applicable):  No results found for: LABABO, LABRH    Drug/Infectious Status (If Applicable):  No results found for: HIV, HEPCAB    COVID-19 Screening (If Applicable): No results found for: COVID19      Anesthesia Evaluation  Patient summary reviewed and Nursing notes reviewed  Airway: Mallampati: II  TM distance: >3 FB   Neck ROM: full  Mouth opening: < 3 FB Dental: normal exam         Pulmonary:Negative Pulmonary ROS and normal exam  breath sounds clear to auscultation                             Cardiovascular:    (+) hypertension:,         Rhythm: regular  Rate: normal                    Neuro/Psych:   (+) psychiatric history:depression/anxiety             GI/Hepatic/Renal:            ROS comment: Congenital single kidney. Endo/Other: Negative Endo/Other ROS                    Abdominal:           Vascular: negative vascular ROS.                                        Anesthesia Plan      general     ASA 2       Induction: intravenous. MIPS: Postoperative opioids intended and Prophylactic antiemetics administered. Anesthetic plan and risks discussed with patient. Plan discussed with CRNA.                   Celestino Molina MD   8/20/2020

## 2020-08-21 NOTE — H&P
PREOP H&P/ER CONSULT    8/20/2020  Isabel Choe    Reason for Consult:  Left UPJ stone, solitary kidney  Requesting Physician:  ER Physician      History Obtained From:  patient, electronic medical record    HISTORY OF PRESENT ILLNESS:                The patient is a 48 y.o. female who presents with left flank pain. She was seen in the ER and a CT showed a left UPJ stone and a small atrophic right kidney  There is a possibility of sepsis. Past Medical History:        Diagnosis Date    Depression     Headache(784.0)     Hypertension      Past Surgical History:    History reviewed. No pertinent surgical history. Current Medications:   No current facility-administered medications for this encounter. Allergies:  No Known Allergies  Social History:  Reviewed, non contributory    Family History:  Reviewed, non contributory      REVIEW OF SYSTEMS:    12 point ROS has been completed and reviewed    PHYSICAL EXAM:    VITALS:  BP (!) 143/72   Pulse 94   Temp 97.5 °F (36.4 °C) (Oral)   Resp 24   Ht 5' 7\" (1.702 m)   Wt 165 lb (74.8 kg)   SpO2 98%   BMI 25.84 kg/m²   H&N: Sclera normal, no masses, trachea midline, no bruit  CVS: Normal rate and rhythm, no murmurs or rubs, peripheral pulses equal, no clubbing or cyanosis. RESP: Breath sounds equal bilateral, few rhonchi. ABDO: Soft, non-tender, bowel sounds active, no organomegaly, no hernias. LYMPH:  No lymphadenopathy. Skin: Warm dry and intact. : No CVAT, normal external genitalia, no discharge. MSK: Grossly normal for patient  LYNNETTE: Grossly normal for patient  PSY: No acute changes noted in psychosocial assessment.     DATA:    CBC:   Lab Results   Component Value Date    WBC 18.7 08/20/2020    RBC 5.08 08/20/2020    HGB 13.0 08/20/2020    HCT 41.3 08/20/2020    MCV 81.2 08/20/2020    MCH 25.6 08/20/2020    MCHC 31.5 08/20/2020    RDW 17.1 08/20/2020     08/20/2020    MPV 9.9

## 2020-08-21 NOTE — FLOWSHEET NOTE
08/20/20 2312   Vitals   Temp 98 °F (36.7 °C)   Temp Source Oral   Pulse 91   Heart Rate Source Monitor   Resp 18   /75   MAP (mmHg) 88   BP Location Left Arm   BP Upper/Lower Upper   Level of Consciousness 0   MEWS Score 1   Oxygen Therapy   SpO2 96 %   O2 Device None (Room air)   Shift assessment completed-see flow sheet. Patient in bed awake,alert and oriented x4. Vitals WNL. 96% on 3L/min. Patient was 87-88% on RA, continuous pulse Ox placed and 3L placed on patient. Lung sounds diminished. HR Sinus tach on monitor. Medications given per order. Notified MD of Panic Lactic 4.1 and second one 3.3. Fluids infusing @ 150ml, will monitor. Strainer in bath room to strain urine. Patient denies any further needs at this time,will continue to monitor,call light within reach.

## 2020-08-22 LAB
A/G RATIO: 1 (ref 1.1–2.2)
ALBUMIN SERPL-MCNC: 2.7 G/DL (ref 3.4–5)
ALP BLD-CCNC: 86 U/L (ref 40–129)
ALT SERPL-CCNC: 24 U/L (ref 10–40)
ANION GAP SERPL CALCULATED.3IONS-SCNC: 9 MMOL/L (ref 3–16)
AST SERPL-CCNC: 26 U/L (ref 15–37)
BASOPHILS ABSOLUTE: 0 K/UL (ref 0–0.2)
BASOPHILS RELATIVE PERCENT: 0.1 %
BILIRUB SERPL-MCNC: 0.4 MG/DL (ref 0–1)
BUN BLDV-MCNC: 21 MG/DL (ref 7–20)
CALCIUM SERPL-MCNC: 7.7 MG/DL (ref 8.3–10.6)
CHLORIDE BLD-SCNC: 111 MMOL/L (ref 99–110)
CO2: 20 MMOL/L (ref 21–32)
CREAT SERPL-MCNC: 1.2 MG/DL (ref 0.6–1.1)
EOSINOPHILS ABSOLUTE: 0.1 K/UL (ref 0–0.6)
EOSINOPHILS RELATIVE PERCENT: 1.3 %
GFR AFRICAN AMERICAN: 57
GFR NON-AFRICAN AMERICAN: 47
GLOBULIN: 2.6 G/DL
GLUCOSE BLD-MCNC: 125 MG/DL (ref 70–99)
HCT VFR BLD CALC: 32.2 % (ref 36–48)
HEMOGLOBIN: 10.1 G/DL (ref 12–16)
LACTIC ACID: 1.1 MMOL/L (ref 0.4–2)
LACTIC ACID: 1.5 MMOL/L (ref 0.4–2)
LACTIC ACID: 1.6 MMOL/L (ref 0.4–2)
LACTIC ACID: 1.9 MMOL/L (ref 0.4–2)
LYMPHOCYTES ABSOLUTE: 0.5 K/UL (ref 1–5.1)
LYMPHOCYTES RELATIVE PERCENT: 5.8 %
MCH RBC QN AUTO: 25.8 PG (ref 26–34)
MCHC RBC AUTO-ENTMCNC: 31.4 G/DL (ref 31–36)
MCV RBC AUTO: 82 FL (ref 80–100)
MONOCYTES ABSOLUTE: 0.6 K/UL (ref 0–1.3)
MONOCYTES RELATIVE PERCENT: 7.3 %
NEUTROPHILS ABSOLUTE: 7.5 K/UL (ref 1.7–7.7)
NEUTROPHILS RELATIVE PERCENT: 85.5 %
ORGANISM: ABNORMAL
PDW BLD-RTO: 17.4 % (ref 12.4–15.4)
PLATELET # BLD: 70 K/UL (ref 135–450)
PMV BLD AUTO: 10.9 FL (ref 5–10.5)
POTASSIUM REFLEX MAGNESIUM: 3.9 MMOL/L (ref 3.5–5.1)
RBC # BLD: 3.92 M/UL (ref 4–5.2)
SODIUM BLD-SCNC: 140 MMOL/L (ref 136–145)
TOTAL PROTEIN: 5.3 G/DL (ref 6.4–8.2)
URINE CULTURE, ROUTINE: ABNORMAL
WBC # BLD: 8.8 K/UL (ref 4–11)

## 2020-08-22 PROCEDURE — 6360000002 HC RX W HCPCS: Performed by: PHYSICIAN ASSISTANT

## 2020-08-22 PROCEDURE — 85025 COMPLETE CBC W/AUTO DIFF WBC: CPT

## 2020-08-22 PROCEDURE — 2700000000 HC OXYGEN THERAPY PER DAY

## 2020-08-22 PROCEDURE — 87522 HEPATITIS C REVRS TRNSCRPJ: CPT

## 2020-08-22 PROCEDURE — 6370000000 HC RX 637 (ALT 250 FOR IP): Performed by: INTERNAL MEDICINE

## 2020-08-22 PROCEDURE — 94761 N-INVAS EAR/PLS OXIMETRY MLT: CPT

## 2020-08-22 PROCEDURE — 6370000000 HC RX 637 (ALT 250 FOR IP): Performed by: PHYSICIAN ASSISTANT

## 2020-08-22 PROCEDURE — 2580000003 HC RX 258: Performed by: INTERNAL MEDICINE

## 2020-08-22 PROCEDURE — 2060000000 HC ICU INTERMEDIATE R&B

## 2020-08-22 PROCEDURE — 6370000000 HC RX 637 (ALT 250 FOR IP): Performed by: HOSPITALIST

## 2020-08-22 PROCEDURE — 2580000003 HC RX 258: Performed by: PHYSICIAN ASSISTANT

## 2020-08-22 PROCEDURE — 6360000002 HC RX W HCPCS: Performed by: INTERNAL MEDICINE

## 2020-08-22 PROCEDURE — 80053 COMPREHEN METABOLIC PANEL: CPT

## 2020-08-22 PROCEDURE — 36415 COLL VENOUS BLD VENIPUNCTURE: CPT

## 2020-08-22 PROCEDURE — 2500000003 HC RX 250 WO HCPCS: Performed by: HOSPITALIST

## 2020-08-22 PROCEDURE — 6360000002 HC RX W HCPCS: Performed by: HOSPITALIST

## 2020-08-22 PROCEDURE — 83605 ASSAY OF LACTIC ACID: CPT

## 2020-08-22 RX ORDER — HYDRALAZINE HYDROCHLORIDE 20 MG/ML
10 INJECTION INTRAMUSCULAR; INTRAVENOUS EVERY 6 HOURS PRN
Status: DISCONTINUED | OUTPATIENT
Start: 2020-08-22 | End: 2020-08-24 | Stop reason: HOSPADM

## 2020-08-22 RX ORDER — CARVEDILOL 6.25 MG/1
6.25 TABLET ORAL 2 TIMES DAILY WITH MEALS
Status: DISCONTINUED | OUTPATIENT
Start: 2020-08-22 | End: 2020-08-23

## 2020-08-22 RX ADMIN — ONDANSETRON HYDROCHLORIDE 4 MG: 2 INJECTION, SOLUTION INTRAMUSCULAR; INTRAVENOUS at 10:09

## 2020-08-22 RX ADMIN — CARVEDILOL 6.25 MG: 6.25 TABLET, FILM COATED ORAL at 11:47

## 2020-08-22 RX ADMIN — SODIUM CHLORIDE: 9 INJECTION, SOLUTION INTRAVENOUS at 17:10

## 2020-08-22 RX ADMIN — MEROPENEM 1 G: 1 INJECTION, POWDER, FOR SOLUTION INTRAVENOUS at 04:44

## 2020-08-22 RX ADMIN — Medication 10 ML: at 22:25

## 2020-08-22 RX ADMIN — ONDANSETRON HYDROCHLORIDE 4 MG: 2 INJECTION, SOLUTION INTRAMUSCULAR; INTRAVENOUS at 22:25

## 2020-08-22 RX ADMIN — CARVEDILOL 6.25 MG: 6.25 TABLET, FILM COATED ORAL at 17:10

## 2020-08-22 RX ADMIN — HYDROCODONE BITARTRATE AND ACETAMINOPHEN 1 TABLET: 5; 325 TABLET ORAL at 11:19

## 2020-08-22 RX ADMIN — HYDRALAZINE HYDROCHLORIDE 10 MG: 20 INJECTION INTRAMUSCULAR; INTRAVENOUS at 13:25

## 2020-08-22 RX ADMIN — ACETAMINOPHEN 650 MG: 325 TABLET ORAL at 09:53

## 2020-08-22 RX ADMIN — MEROPENEM 1 G: 1 INJECTION, POWDER, FOR SOLUTION INTRAVENOUS at 22:25

## 2020-08-22 RX ADMIN — HYDROCODONE BITARTRATE AND ACETAMINOPHEN 1 TABLET: 5; 325 TABLET ORAL at 22:24

## 2020-08-22 RX ADMIN — ONDANSETRON HYDROCHLORIDE 4 MG: 2 INJECTION, SOLUTION INTRAMUSCULAR; INTRAVENOUS at 04:15

## 2020-08-22 RX ADMIN — MEROPENEM 1 G: 1 INJECTION, POWDER, FOR SOLUTION INTRAVENOUS at 13:27

## 2020-08-22 RX ADMIN — HYDROMORPHONE HYDROCHLORIDE 1 MG: 1 INJECTION, SOLUTION INTRAMUSCULAR; INTRAVENOUS; SUBCUTANEOUS at 17:07

## 2020-08-22 RX ADMIN — HYDROCODONE BITARTRATE AND ACETAMINOPHEN 1 TABLET: 5; 325 TABLET ORAL at 04:43

## 2020-08-22 RX ADMIN — HYDRALAZINE HYDROCHLORIDE 10 MG: 20 INJECTION INTRAMUSCULAR; INTRAVENOUS at 22:25

## 2020-08-22 RX ADMIN — TAMSULOSIN HYDROCHLORIDE 0.4 MG: 0.4 CAPSULE ORAL at 09:53

## 2020-08-22 RX ADMIN — SODIUM CHLORIDE: 9 INJECTION, SOLUTION INTRAVENOUS at 22:26

## 2020-08-22 ASSESSMENT — PAIN DESCRIPTION - DIRECTION
RADIATING_TOWARDS: NON

## 2020-08-22 ASSESSMENT — PAIN DESCRIPTION - ORIENTATION
ORIENTATION: LEFT
ORIENTATION: INNER
ORIENTATION: INNER
ORIENTATION: LEFT
ORIENTATION: LEFT

## 2020-08-22 ASSESSMENT — PAIN DESCRIPTION - FREQUENCY
FREQUENCY: CONTINUOUS

## 2020-08-22 ASSESSMENT — PAIN SCALES - GENERAL
PAINLEVEL_OUTOF10: 9
PAINLEVEL_OUTOF10: 7
PAINLEVEL_OUTOF10: 6
PAINLEVEL_OUTOF10: 7
PAINLEVEL_OUTOF10: 3
PAINLEVEL_OUTOF10: 3
PAINLEVEL_OUTOF10: 4
PAINLEVEL_OUTOF10: 7
PAINLEVEL_OUTOF10: 10
PAINLEVEL_OUTOF10: 7

## 2020-08-22 ASSESSMENT — PAIN - FUNCTIONAL ASSESSMENT

## 2020-08-22 ASSESSMENT — PAIN DESCRIPTION - PROGRESSION
CLINICAL_PROGRESSION: NOT CHANGED

## 2020-08-22 ASSESSMENT — PAIN DESCRIPTION - LOCATION
LOCATION: ABDOMEN
LOCATION: FLANK;ABDOMEN
LOCATION: ABDOMEN

## 2020-08-22 ASSESSMENT — PAIN DESCRIPTION - DESCRIPTORS
DESCRIPTORS: ACHING;SHARP
DESCRIPTORS: SHARP
DESCRIPTORS: SHARP;ACHING

## 2020-08-22 ASSESSMENT — PAIN DESCRIPTION - PAIN TYPE
TYPE: ACUTE PAIN

## 2020-08-22 ASSESSMENT — PAIN DESCRIPTION - ONSET
ONSET: ON-GOING

## 2020-08-22 NOTE — PROGRESS NOTES
Hospitalist Progress Note      PCP: Karla Renee MD    Date of Admission: 8/20/2020    Hospital Course: 48 y.o. female history of hypertension, UTI, hypoplasia right kidney who presented with left flank pain and nausea. sepsis      Subjective:       Medications:  Reviewed    Infusion Medications    sodium chloride 150 mL/hr at 08/21/20 2106     Scheduled Medications    carvedilol  6.25 mg Oral BID WC    nicotine  1 patch Transdermal Daily    tamsulosin  0.4 mg Oral Daily    meropenem  1 g Intravenous Q8H    sodium chloride flush  10 mL Intravenous 2 times per day    heparin (porcine)  5,000 Units Subcutaneous 3 times per day     PRN Meds: HYDROmorphone, hydrALAZINE, ondansetron, hydrOXYzine, HYDROcodone 5 mg - acetaminophen, aspirin-acetaminophen-caffeine, sodium chloride flush, acetaminophen **OR** acetaminophen      Intake/Output Summary (Last 24 hours) at 8/22/2020 1540  Last data filed at 8/22/2020 1249  Gross per 24 hour   Intake 0 ml   Output 1775 ml   Net -1775 ml       Physical Exam Performed:    BP (!) 176/91   Pulse 94   Temp 98.1 °F (36.7 °C) (Oral)   Resp 18   Ht 5' 7\" (1.702 m)   Wt 206 lb 4.8 oz (93.6 kg)   SpO2 96%   Breastfeeding No   BMI 32.31 kg/m²     General appearance: No apparent distress, appears stated age and cooperative. HEENT: Pupils equal, round, and reactive to light. Conjunctivae/corneas clear. Neck: Supple, with full range of motion. No jugular venous distention. Trachea midline. Respiratory:  Normal respiratory effort. Clear to auscultation, bilaterally without Rales/Wheezes/Rhonchi. Cardiovascular: Regular rate and rhythm with normal S1/S2 without murmurs, rubs or gallops. Abdomen: Soft, non-tender, non-distended with normal bowel sounds. Musculoskeletal: No clubbing, cyanosis or edema bilaterally. Full range of motion without deformity. Skin: Skin color, texture, turgor normal.  No rashes or lesions.   Neurologic:  Neurovascularly intact without any focal sensory/motor deficits. Cranial nerves: II-XII intact, grossly non-focal.  Psychiatric: Alert and oriented, thought content appropriate, normal insight  Capillary Refill: Brisk,< 3 seconds   Peripheral Pulses: +2 palpable, equal bilaterally       Labs:   Recent Labs     08/20/20  1706 08/21/20  0616 08/22/20  0656   WBC 18.7* 15.0* 8.8   HGB 13.0 10.7* 10.1*   HCT 41.3 34.9* 32.2*    87* 70*     Recent Labs     08/20/20  1706 08/21/20  0616 08/22/20  0656    138 140   K 3.6 3.7 3.9    107 111*   CO2 20* 19* 20*   BUN 22* 21* 21*   CREATININE 1.4* 1.4* 1.2*   CALCIUM 9.8 8.1* 7.7*     Recent Labs     08/20/20  1706 08/21/20  0616 08/22/20  0656   AST 56* 39* 26   ALT 43* 29 24   BILITOT 0.6 0.4 0.4   ALKPHOS 163* 96 86     Urinalysis:      Lab Results   Component Value Date    NITRU POSITIVE 08/20/2020    WBCUA  08/20/2020    BACTERIA 3+ 08/20/2020    RBCUA 3-4 08/20/2020    BLOODU LARGE 08/20/2020    SPECGRAV 1.015 08/20/2020    GLUCOSEU Negative 08/20/2020       Radiology:  FL RETROGRADE PYELOGRAM LEFT   Final Result   Intraprocedural fluoroscopic spot images as above. See separate procedure   report for more information. CT ABDOMEN PELVIS WO CONTRAST   Final Result   7 mm stone at the left UPJ causing moderate obstructive hydronephrosis                 Assessment/Plan:    Active Hospital Problems    Diagnosis    Complicated UTI (urinary tract infection) [N39.0]    MARY (acute kidney injury) ([facility] [address] [N17.9]    Bacteremia [R78.81]    Sepsis ([facility] [address] [A41.9]     Sepsis ut I due to obstructive stone s/p stone   atb  Fluid pain mx    htn    Pt was on bp med but has not taken  Or seen a doctor for 2 yrs  Start prn       ? hepc    RNA sent     Pelvic mass?  Fibroid ?presnt from last image on 2018     DVT Prophylaxis: lov  Diet: DIET CARDIAC;  Code Status: Full Code      Dispo - pcu    June Bernal MD

## 2020-08-22 NOTE — ANESTHESIA POSTPROCEDURE EVALUATION
Department of Anesthesiology  Postprocedure Note    Patient: Aaron Connolly  MRN: [id number]  YOB: 1965  Date of evaluation: 8/22/2020  Time:  10:01 AM     Procedure Summary     Date:  08/20/20 Room / Location:  [facility]    Anesthesia Start:  2047 Anesthesia Stop:  2119    Procedure:  CYSTOSCOPY URETERAL STENT INSERTION (Left Bladder) Diagnosis:  (left ureteral stone)    Surgeon:  Austen Tate MD Responsible Provider:  Marlon Neri MD    Anesthesia Type:  general ASA Status:  2          Anesthesia Type: general    Denise Phase I: Denise Score: 10    Denise Phase II:      Last vitals: Reviewed and per EMR flowsheets.        Anesthesia Post Evaluation    Patient location during evaluation: PACU  Patient participation: complete - patient participated  Level of consciousness: awake and alert  Pain score: 0  Airway patency: patent  Nausea & Vomiting: no nausea and no vomiting  Complications: no  Cardiovascular status: blood pressure returned to baseline  Respiratory status: acceptable  Hydration status: stable

## 2020-08-22 NOTE — PROGRESS NOTES
BP continues to be high, PRN hydralazine given, heparin held for platelets 84,670, coreg added for increased BP control.  Bob Loepz RN

## 2020-08-22 NOTE — PROGRESS NOTES
Pt states headache was not relieved with tylenol, believes she is getting a migraine, has had many before. MD notified. New order for fioricet. Medication given as ordered. Will reevaluate for effectiveness.

## 2020-08-22 NOTE — FLOWSHEET NOTE
08/21/20 1953   Vital Signs   Temp 100.6 °F (38.1 °C)   Temp Source Oral   Pulse 93   Heart Rate Source Monitor   Resp 20   /81   BP Location Right upper arm   BP Upper/Lower Upper   Oxygen Therapy   SpO2 96 %   O2 Device None (Room air)   VSS. When RN entered room to recheck temp temp was 98.2, patient given tylenol for headache 7/10. Nasal cannula added at 2L for desat to 86 when sleeping. No needs at this time. Will continue to monitor.

## 2020-08-22 NOTE — PROGRESS NOTES
Shift assessment complete, morning medications given, patient resting with  complaints of pain, Tylenol given and Zofran for nausea, rewrapped IV site and will administer stronger pain medication when soonest available, /95 will address with physician, patient reports stopping home dose of BP medication, will continue to monitor.  Ashwin Cowan RN

## 2020-08-22 NOTE — PLAN OF CARE
Problem: Skin Integrity:  Goal: Will show no infection signs and symptoms  Description: Will show no infection signs and symptoms  Outcome: Ongoing  Goal: Absence of new skin breakdown  Description: Absence of new skin breakdown  Outcome: Ongoing     Problem: Infection:  Goal: Will remain free from infection  Description: Will remain free from infection  Outcome: Ongoing     Problem: Safety:  Goal: Free from accidental physical injury  Description: Free from accidental physical injury  Outcome: Ongoing  Goal: Free from intentional harm  Description: Free from intentional harm  Outcome: Ongoing     Problem: Daily Care:  Goal: Daily care needs are met  Description: Daily care needs are met  Outcome: Ongoing     Problem: Pain:  Goal: Patient's pain/discomfort is manageable  Description: Patient's pain/discomfort is manageable  Outcome: Ongoing     Problem: Skin Integrity:  Goal: Skin integrity will stabilize  Description: Skin integrity will stabilize  Outcome: Ongoing     Problem: Discharge Planning:  Goal: Patients continuum of care needs are met  Description: Patients continuum of care needs are met  Outcome: Ongoing     Problem: Falls - Risk of:  Goal: Will remain free from falls  Description: Will remain free from falls  Outcome: Ongoing  Goal: Absence of physical injury  Description: Absence of physical injury  Outcome: Ongoing

## 2020-08-22 NOTE — PROGRESS NOTES
Report received from [facility], [facility]. Pt expresses no needs at this time. Care transferred.

## 2020-08-23 LAB
A/G RATIO: 0.8 (ref 1.1–2.2)
ALBUMIN SERPL-MCNC: 2.4 G/DL (ref 3.4–5)
ALP BLD-CCNC: 81 U/L (ref 40–129)
ALT SERPL-CCNC: 19 U/L (ref 10–40)
ANION GAP SERPL CALCULATED.3IONS-SCNC: 7 MMOL/L (ref 3–16)
AST SERPL-CCNC: 19 U/L (ref 15–37)
BASOPHILS ABSOLUTE: 0 K/UL (ref 0–0.2)
BASOPHILS RELATIVE PERCENT: 0.2 %
BILIRUB SERPL-MCNC: 0.4 MG/DL (ref 0–1)
BLOOD CULTURE, ROUTINE: ABNORMAL
BUN BLDV-MCNC: 15 MG/DL (ref 7–20)
CALCIUM SERPL-MCNC: 8 MG/DL (ref 8.3–10.6)
CHLORIDE BLD-SCNC: 106 MMOL/L (ref 99–110)
CO2: 23 MMOL/L (ref 21–32)
CREAT SERPL-MCNC: 0.9 MG/DL (ref 0.6–1.1)
CULTURE, BLOOD 2: ABNORMAL
CULTURE, BLOOD 2: ABNORMAL
EOSINOPHILS ABSOLUTE: 0.1 K/UL (ref 0–0.6)
EOSINOPHILS RELATIVE PERCENT: 1.1 %
GFR AFRICAN AMERICAN: >60
GFR NON-AFRICAN AMERICAN: >60
GLOBULIN: 3 G/DL
GLUCOSE BLD-MCNC: 120 MG/DL (ref 70–99)
HCT VFR BLD CALC: 32.5 % (ref 36–48)
HEMOGLOBIN: 10.2 G/DL (ref 12–16)
LACTIC ACID: 1.2 MMOL/L (ref 0.4–2)
LACTIC ACID: 1.6 MMOL/L (ref 0.4–2)
LYMPHOCYTES ABSOLUTE: 0.6 K/UL (ref 1–5.1)
LYMPHOCYTES RELATIVE PERCENT: 9.1 %
MAGNESIUM: 1.8 MG/DL (ref 1.8–2.4)
MCH RBC QN AUTO: 25.8 PG (ref 26–34)
MCHC RBC AUTO-ENTMCNC: 31.5 G/DL (ref 31–36)
MCV RBC AUTO: 81.7 FL (ref 80–100)
MONOCYTES ABSOLUTE: 0.6 K/UL (ref 0–1.3)
MONOCYTES RELATIVE PERCENT: 8.5 %
NEUTROPHILS ABSOLUTE: 5.6 K/UL (ref 1.7–7.7)
NEUTROPHILS RELATIVE PERCENT: 81.1 %
ORGANISM: ABNORMAL
PDW BLD-RTO: 17.3 % (ref 12.4–15.4)
PLATELET # BLD: 73 K/UL (ref 135–450)
PMV BLD AUTO: 9.9 FL (ref 5–10.5)
POTASSIUM REFLEX MAGNESIUM: 3.4 MMOL/L (ref 3.5–5.1)
RBC # BLD: 3.98 M/UL (ref 4–5.2)
SODIUM BLD-SCNC: 136 MMOL/L (ref 136–145)
TOTAL PROTEIN: 5.4 G/DL (ref 6.4–8.2)
WBC # BLD: 6.9 K/UL (ref 4–11)

## 2020-08-23 PROCEDURE — 83605 ASSAY OF LACTIC ACID: CPT

## 2020-08-23 PROCEDURE — 2580000003 HC RX 258: Performed by: INTERNAL MEDICINE

## 2020-08-23 PROCEDURE — 6360000002 HC RX W HCPCS: Performed by: HOSPITALIST

## 2020-08-23 PROCEDURE — 2500000003 HC RX 250 WO HCPCS: Performed by: HOSPITALIST

## 2020-08-23 PROCEDURE — 80053 COMPREHEN METABOLIC PANEL: CPT

## 2020-08-23 PROCEDURE — 85025 COMPLETE CBC W/AUTO DIFF WBC: CPT

## 2020-08-23 PROCEDURE — 6370000000 HC RX 637 (ALT 250 FOR IP): Performed by: PHYSICIAN ASSISTANT

## 2020-08-23 PROCEDURE — 36415 COLL VENOUS BLD VENIPUNCTURE: CPT

## 2020-08-23 PROCEDURE — 83735 ASSAY OF MAGNESIUM: CPT

## 2020-08-23 PROCEDURE — 2580000003 HC RX 258: Performed by: HOSPITALIST

## 2020-08-23 PROCEDURE — 6370000000 HC RX 637 (ALT 250 FOR IP): Performed by: INTERNAL MEDICINE

## 2020-08-23 PROCEDURE — 6370000000 HC RX 637 (ALT 250 FOR IP): Performed by: HOSPITALIST

## 2020-08-23 PROCEDURE — 6360000002 HC RX W HCPCS: Performed by: INTERNAL MEDICINE

## 2020-08-23 PROCEDURE — 2060000000 HC ICU INTERMEDIATE R&B

## 2020-08-23 PROCEDURE — 2580000003 HC RX 258: Performed by: PHYSICIAN ASSISTANT

## 2020-08-23 PROCEDURE — 6360000002 HC RX W HCPCS: Performed by: PHYSICIAN ASSISTANT

## 2020-08-23 RX ORDER — CARVEDILOL 6.25 MG/1
12.5 TABLET ORAL 2 TIMES DAILY WITH MEALS
Status: DISCONTINUED | OUTPATIENT
Start: 2020-08-23 | End: 2020-08-24 | Stop reason: HOSPADM

## 2020-08-23 RX ADMIN — SODIUM CHLORIDE: 9 INJECTION, SOLUTION INTRAVENOUS at 08:25

## 2020-08-23 RX ADMIN — CEFTRIAXONE 2 G: 2 INJECTION, POWDER, FOR SOLUTION INTRAMUSCULAR; INTRAVENOUS at 15:23

## 2020-08-23 RX ADMIN — CARVEDILOL 12.5 MG: 6.25 TABLET, FILM COATED ORAL at 17:46

## 2020-08-23 RX ADMIN — HYDROMORPHONE HYDROCHLORIDE 0.5 MG: 1 INJECTION, SOLUTION INTRAMUSCULAR; INTRAVENOUS; SUBCUTANEOUS at 10:44

## 2020-08-23 RX ADMIN — CARVEDILOL 6.25 MG: 6.25 TABLET, FILM COATED ORAL at 08:25

## 2020-08-23 RX ADMIN — HYDROMORPHONE HYDROCHLORIDE 1 MG: 1 INJECTION, SOLUTION INTRAMUSCULAR; INTRAVENOUS; SUBCUTANEOUS at 01:48

## 2020-08-23 RX ADMIN — ONDANSETRON HYDROCHLORIDE 4 MG: 2 INJECTION, SOLUTION INTRAMUSCULAR; INTRAVENOUS at 10:44

## 2020-08-23 RX ADMIN — Medication 10 ML: at 01:48

## 2020-08-23 RX ADMIN — HYDRALAZINE HYDROCHLORIDE 10 MG: 20 INJECTION INTRAMUSCULAR; INTRAVENOUS at 15:23

## 2020-08-23 RX ADMIN — HYDROMORPHONE HYDROCHLORIDE 0.5 MG: 1 INJECTION, SOLUTION INTRAMUSCULAR; INTRAVENOUS; SUBCUTANEOUS at 18:49

## 2020-08-23 RX ADMIN — Medication 10 ML: at 22:50

## 2020-08-23 RX ADMIN — TAMSULOSIN HYDROCHLORIDE 0.4 MG: 0.4 CAPSULE ORAL at 08:25

## 2020-08-23 RX ADMIN — MEROPENEM 1 G: 1 INJECTION, POWDER, FOR SOLUTION INTRAVENOUS at 06:39

## 2020-08-23 RX ADMIN — HYDRALAZINE HYDROCHLORIDE 10 MG: 20 INJECTION INTRAMUSCULAR; INTRAVENOUS at 23:02

## 2020-08-23 RX ADMIN — HYDROCODONE BITARTRATE AND ACETAMINOPHEN 1 TABLET: 5; 325 TABLET ORAL at 06:39

## 2020-08-23 RX ADMIN — HYDROCODONE BITARTRATE AND ACETAMINOPHEN 1 TABLET: 5; 325 TABLET ORAL at 15:19

## 2020-08-23 RX ADMIN — HYDROCODONE BITARTRATE AND ACETAMINOPHEN 1 TABLET: 5; 325 TABLET ORAL at 23:02

## 2020-08-23 ASSESSMENT — PAIN DESCRIPTION - PROGRESSION
CLINICAL_PROGRESSION: NOT CHANGED
CLINICAL_PROGRESSION: GRADUALLY WORSENING

## 2020-08-23 ASSESSMENT — PAIN DESCRIPTION - PAIN TYPE
TYPE: ACUTE PAIN

## 2020-08-23 ASSESSMENT — PAIN - FUNCTIONAL ASSESSMENT
PAIN_FUNCTIONAL_ASSESSMENT: PREVENTS OR INTERFERES SOME ACTIVE ACTIVITIES AND ADLS

## 2020-08-23 ASSESSMENT — PAIN DESCRIPTION - LOCATION
LOCATION: ABDOMEN;FLANK
LOCATION: FLANK;ABDOMEN
LOCATION: ABDOMEN;FLANK
LOCATION: ABDOMEN;FLANK

## 2020-08-23 ASSESSMENT — PAIN DESCRIPTION - ONSET
ONSET: ON-GOING

## 2020-08-23 ASSESSMENT — PAIN SCALES - GENERAL
PAINLEVEL_OUTOF10: 10
PAINLEVEL_OUTOF10: 0
PAINLEVEL_OUTOF10: 7
PAINLEVEL_OUTOF10: 3
PAINLEVEL_OUTOF10: 7
PAINLEVEL_OUTOF10: 10
PAINLEVEL_OUTOF10: 0
PAINLEVEL_OUTOF10: 7
PAINLEVEL_OUTOF10: 8

## 2020-08-23 ASSESSMENT — PAIN DESCRIPTION - ORIENTATION
ORIENTATION: LEFT

## 2020-08-23 ASSESSMENT — PAIN DESCRIPTION - FREQUENCY
FREQUENCY: CONTINUOUS
FREQUENCY: CONTINUOUS
FREQUENCY: INTERMITTENT
FREQUENCY: INTERMITTENT

## 2020-08-23 ASSESSMENT — PAIN DESCRIPTION - DESCRIPTORS
DESCRIPTORS: SHARP
DESCRIPTORS: ACHING;SHARP
DESCRIPTORS: ACHING;SHARP
DESCRIPTORS: SHARP;ACHING

## 2020-08-23 NOTE — PROGRESS NOTES
Patient:  Margy Sotelo  YOB: 1965   Date of Service:  08/23/20      Urology Attending Daily Progress Note    HPI: admitted with left flank pain. UTI, CT showed a left UPJ stone and a small atrophic right kidney   Chief complaint: \"I still get sharp pains at times but i'm better\"    ROS:   Pain is controlled with medications. Tolerating diet. No fevers. HTN issues still. Past Medical History:   Diagnosis Date    Depression     Headache(784.0)     Hypertension        No Known Allergies    Objective:    Patient Vitals for the past 8 hrs:   BP Temp Temp src Pulse Resp SpO2   08/23/20 1041 (!) 165/62 -- -- 77 -- --   08/23/20 0802 (!) 171/87 97.2 °F (36.2 °C) Oral 83 20 93 %     I/O last 3 completed shifts:   In: 6235 [P.O.:662; I.V.:5473; IV Piggyback:100]  Out: 2350 [Urine:2350]     Physical Exam:   Constitutional: pleasant patient in no acute distress, normal body habitus  Musculoskeletal: no digital cyanosis, head normocephalic  Psych: normal mood and affect, appropriately answers questions  Skin: exposed skin, stable, intact  Abdomen: soft, nondistended, no guarding   Genitourinary: stable bladder, nontender  Labs:     No results found for: PSA  No results found for: PSA, PSADIA  Recent Labs     08/23/20  0637 08/22/20  0656 08/21/20  0616   WBC 6.9 8.8 15.0*   HGB 10.2* 10.1* 10.7*   HCT 32.5* 32.2* 34.9*   MCV 81.7 82.0 82.9   PLT 73* 70* 87*     No results found for: LABA1C  Lab Results   Component Value Date    LABMICR YES 08/20/2020    CREATININE 0.9 08/23/2020     Lab Results   Component Value Date     08/23/2020    K 3.4 (L) 08/23/2020     08/23/2020    CO2 23 08/23/2020    BUN 15 08/23/2020    CREATININE 0.9 08/23/2020    GLUCOSE 120 (H) 08/23/2020    CALCIUM 8.0 (L) 08/23/2020    PROT 5.4 (L) 08/23/2020    LABALBU 2.4 (L) 08/23/2020    BILITOT 0.4 08/23/2020    ALKPHOS 81 08/23/2020    AST 19 08/23/2020    ALT 19 08/23/2020    LABGLOM >60 08/23/2020    GFRAA >60 08/23/2020    AGRATIO 0.8 (L) 08/23/2020    GLOB 3.0 08/23/2020     Lab Results   Component Value Date    CREATININE 0.9 08/23/2020    CREATININE 1.2 (H) 08/22/2020    CREATININE 1.4 (H) 08/21/2020     Lab Results   Component Value Date    COLORU Yellow 08/20/2020    NITRU POSITIVE 08/20/2020    GLUCOSEU Negative 08/20/2020    KETUA TRACE 08/20/2020    UROBILINOGEN 0.2 08/20/2020    BILIRUBINUR Negative 08/20/2020        Radiology:  \"Imaging was independently reviewed by myself and I agree with the radiology interpretation  CT showed a left UPJ stone and a small atrophic right kidney     Assessment:  48 y.o. female who is admitted with left flank pain andnephrolithiasis s/p cysto/stent Thursday 8/20  MARY  UTI prior to admit - e.coli (resist to bactrim and quinolone)    Plan:  -cont abx, supportive care  -outpatient stone surgery in 1-2 weeks  -encourage incentive spirometer use   -increase level of ambulation   -sequential compression devices for DVT prophylaxis  -stool softeners to minimize post-operative constipation      Yusef Lau MD  Office: [phone]

## 2020-08-23 NOTE — PLAN OF CARE
Problem: Skin Integrity:  Goal: Will show no infection signs and symptoms  Description: Will show no infection signs and symptoms  Outcome: Ongoing  Note: Pt remain free of skin breakdown during hospital stay. Pt encouraged to self turn frequently. Will assess skin per shift or as needed. Problem: Infection:  Goal: Will remain free from infection  Description: Will remain free from infection  Outcome: Ongoing     Problem: Safety:  Goal: Free from accidental physical injury  Description: Free from accidental physical injury  Outcome: Ongoing  Goal: Free from intentional harm  Description: Free from intentional harm  Outcome: Ongoing     Problem: Daily Care:  Goal: Daily care needs are met  Description: Daily care needs are met  Outcome: Ongoing     Problem: Pain:  Goal: Patient's pain/discomfort is manageable  Description: Patient's pain/discomfort is manageable  Outcome: Ongoing  Note: Pt will use pain scale 0-10 appropriately. Will assess for pain frequently. Will medicate with pain meds as ordered.         Problem: Falls - Risk of:  Goal: Will remain free from falls  Description: Will remain free from falls  Outcome: Ongoing

## 2020-08-23 NOTE — PROGRESS NOTES
Hospitalist Progress Note      PCP: Jacqueline Segundo MD    Date of Admission: 8/20/2020    Hospital Course: 48 y.o. female history of hypertension, UTI, hypoplasia right kidney who presented with left flank pain and nausea. sepsis      Subjective:       Medications:  Reviewed    Infusion Medications     Scheduled Medications    carvedilol  12.5 mg Oral BID WC    nicotine  1 patch Transdermal Daily    tamsulosin  0.4 mg Oral Daily    meropenem  1 g Intravenous Q8H    sodium chloride flush  10 mL Intravenous 2 times per day    heparin (porcine)  5,000 Units Subcutaneous 3 times per day     PRN Meds: HYDROmorphone, hydrALAZINE, ondansetron, hydrOXYzine, HYDROcodone 5 mg - acetaminophen, aspirin-acetaminophen-caffeine, sodium chloride flush, acetaminophen **OR** acetaminophen      Intake/Output Summary (Last 24 hours) at 8/23/2020 1458  Last data filed at 8/23/2020 4350  Gross per 24 hour   Intake 6235 ml   Output 2150 ml   Net 4085 ml       Physical Exam Performed:    BP (!) 165/62   Pulse 77   Temp 97.2 °F (36.2 °C) (Oral)   Resp 20   Ht 5' 7\" (1.702 m)   Wt 206 lb 1.6 oz (93.5 kg)   SpO2 93%   Breastfeeding No   BMI 32.28 kg/m²     General appearance: No apparent distress, appears stated age and cooperative. HEENT: Pupils equal, round, and reactive to light. Conjunctivae/corneas clear. Neck: Supple, with full range of motion. No jugular venous distention. Trachea midline. Respiratory:  Normal respiratory effort. Clear to auscultation, bilaterally without Rales/Wheezes/Rhonchi. Cardiovascular: Regular rate and rhythm with normal S1/S2 without murmurs, rubs or gallops. Abdomen: Soft, non-tender, non-distended with normal bowel sounds. Musculoskeletal: No clubbing, cyanosis or edema bilaterally. Full range of motion without deformity. Skin: Skin color, texture, turgor normal.  No rashes or lesions. Neurologic:  Neurovascularly intact without any focal sensory/motor deficits.  Cranial nerves: Expect dc soon     Marcus Storm MD

## 2020-08-23 NOTE — PROGRESS NOTES
Shift assessment complete. See flow sheet. Patient complains of continued flank/abdominal pain; norco given with scheduled meds. Hydralazine given for /96. Respirations easy and even. Pt states no additional needs. Call light and bedside table in reach. Will follow.

## 2020-08-23 NOTE — PLAN OF CARE
Problem: Safety:  Goal: Free from accidental physical injury  Description: Free from accidental physical injury  8/23/2020 1130 by Fransico Campos RN  Outcome: Ongoing  8/23/2020 0439 by Gabriella Gonzalez RN  Outcome: Ongoing     Problem: Pain:  Goal: Patient's pain/discomfort is manageable  Description: Patient's pain/discomfort is manageable  8/23/2020 1130 by Fransico Campos RN  Outcome: Ongoing  8/23/2020 0439 by Gabriella Gonzalez RN  Outcome: Ongoing  Note: Pt will use pain scale 0-10 appropriately. Will assess for pain frequently. Will medicate with pain meds as ordered.

## 2020-08-23 NOTE — PROGRESS NOTES
Spoke with DR Tonio Lawler r/t platelets and heparin order received  Hold heparin until platelets above 212 k notified pharmacy.

## 2020-08-24 VITALS
SYSTOLIC BLOOD PRESSURE: 148 MMHG | BODY MASS INDEX: 32.35 KG/M2 | WEIGHT: 206.1 LBS | RESPIRATION RATE: 16 BRPM | HEART RATE: 80 BPM | HEIGHT: 67 IN | DIASTOLIC BLOOD PRESSURE: 68 MMHG | OXYGEN SATURATION: 90 % | TEMPERATURE: 98.4 F

## 2020-08-24 LAB
A/G RATIO: 0.8 (ref 1.1–2.2)
ALBUMIN SERPL-MCNC: 2.4 G/DL (ref 3.4–5)
ALP BLD-CCNC: 85 U/L (ref 40–129)
ALT SERPL-CCNC: 17 U/L (ref 10–40)
ANION GAP SERPL CALCULATED.3IONS-SCNC: 8 MMOL/L (ref 3–16)
ANISOCYTOSIS: ABNORMAL
AST SERPL-CCNC: 17 U/L (ref 15–37)
BANDED NEUTROPHILS RELATIVE PERCENT: 1 % (ref 0–7)
BASOPHILS ABSOLUTE: 0 K/UL (ref 0–0.2)
BASOPHILS RELATIVE PERCENT: 0 %
BILIRUB SERPL-MCNC: 0.3 MG/DL (ref 0–1)
BUN BLDV-MCNC: 13 MG/DL (ref 7–20)
CALCIUM SERPL-MCNC: 8 MG/DL (ref 8.3–10.6)
CHLORIDE BLD-SCNC: 107 MMOL/L (ref 99–110)
CO2: 22 MMOL/L (ref 21–32)
CREAT SERPL-MCNC: 0.9 MG/DL (ref 0.6–1.1)
EOSINOPHILS ABSOLUTE: 0.1 K/UL (ref 0–0.6)
EOSINOPHILS RELATIVE PERCENT: 1 %
GFR AFRICAN AMERICAN: >60
GFR NON-AFRICAN AMERICAN: >60
GLOBULIN: 3 G/DL
GLUCOSE BLD-MCNC: 107 MG/DL (ref 70–99)
HCT VFR BLD CALC: 32.1 % (ref 36–48)
HEMOGLOBIN: 10.1 G/DL (ref 12–16)
HYPOCHROMIA: ABNORMAL
LYMPHOCYTES ABSOLUTE: 1.1 K/UL (ref 1–5.1)
LYMPHOCYTES RELATIVE PERCENT: 19 %
MAGNESIUM: 1.9 MG/DL (ref 1.8–2.4)
MCH RBC QN AUTO: 24.9 PG (ref 26–34)
MCHC RBC AUTO-ENTMCNC: 31.4 G/DL (ref 31–36)
MCV RBC AUTO: 79.3 FL (ref 80–100)
MONOCYTES ABSOLUTE: 0.2 K/UL (ref 0–1.3)
MONOCYTES RELATIVE PERCENT: 4 %
NEUTROPHILS ABSOLUTE: 4.3 K/UL (ref 1.7–7.7)
NEUTROPHILS RELATIVE PERCENT: 75 %
PDW BLD-RTO: 16.8 % (ref 12.4–15.4)
PLATELET # BLD: 88 K/UL (ref 135–450)
PLATELET SLIDE REVIEW: ABNORMAL
PMV BLD AUTO: 10.2 FL (ref 5–10.5)
POTASSIUM REFLEX MAGNESIUM: 3 MMOL/L (ref 3.5–5.1)
RBC # BLD: 4.04 M/UL (ref 4–5.2)
SLIDE REVIEW: ABNORMAL
SODIUM BLD-SCNC: 137 MMOL/L (ref 136–145)
TOTAL PROTEIN: 5.4 G/DL (ref 6.4–8.2)
WBC # BLD: 5.6 K/UL (ref 4–11)

## 2020-08-24 PROCEDURE — 6360000002 HC RX W HCPCS: Performed by: HOSPITALIST

## 2020-08-24 PROCEDURE — 6360000002 HC RX W HCPCS: Performed by: INTERNAL MEDICINE

## 2020-08-24 PROCEDURE — 6370000000 HC RX 637 (ALT 250 FOR IP): Performed by: INTERNAL MEDICINE

## 2020-08-24 PROCEDURE — 6370000000 HC RX 637 (ALT 250 FOR IP): Performed by: PHYSICIAN ASSISTANT

## 2020-08-24 PROCEDURE — 2580000003 HC RX 258: Performed by: INTERNAL MEDICINE

## 2020-08-24 PROCEDURE — 80053 COMPREHEN METABOLIC PANEL: CPT

## 2020-08-24 PROCEDURE — 36415 COLL VENOUS BLD VENIPUNCTURE: CPT

## 2020-08-24 PROCEDURE — 6370000000 HC RX 637 (ALT 250 FOR IP): Performed by: HOSPITALIST

## 2020-08-24 PROCEDURE — 99238 HOSP IP/OBS DSCHRG MGMT 30/<: CPT | Performed by: INTERNAL MEDICINE

## 2020-08-24 PROCEDURE — 85025 COMPLETE CBC W/AUTO DIFF WBC: CPT

## 2020-08-24 PROCEDURE — 83735 ASSAY OF MAGNESIUM: CPT

## 2020-08-24 RX ORDER — CARVEDILOL 12.5 MG/1
12.5 TABLET ORAL 2 TIMES DAILY WITH MEALS
Qty: 60 TABLET | Refills: 0 | Status: ON HOLD | OUTPATIENT
Start: 2020-08-24 | End: 2020-11-22

## 2020-08-24 RX ORDER — TAMSULOSIN HYDROCHLORIDE 0.4 MG/1
0.4 CAPSULE ORAL DAILY
Qty: 15 CAPSULE | Refills: 0 | Status: ON HOLD | OUTPATIENT
Start: 2020-08-24 | End: 2020-11-22

## 2020-08-24 RX ORDER — SULFAMETHOXAZOLE AND TRIMETHOPRIM 800; 160 MG/1; MG/1
1 TABLET ORAL 2 TIMES DAILY
Qty: 20 TABLET | Refills: 0 | Status: SHIPPED | OUTPATIENT
Start: 2020-08-24 | End: 2020-09-03

## 2020-08-24 RX ORDER — AMLODIPINE BESYLATE 5 MG/1
5 TABLET ORAL DAILY
Status: DISCONTINUED | OUTPATIENT
Start: 2020-08-24 | End: 2020-08-24 | Stop reason: HOSPADM

## 2020-08-24 RX ORDER — ONDANSETRON 4 MG/1
4 TABLET, ORALLY DISINTEGRATING ORAL EVERY 8 HOURS PRN
Qty: 15 TABLET | Refills: 0 | Status: ON HOLD | OUTPATIENT
Start: 2020-08-24 | End: 2020-11-22

## 2020-08-24 RX ORDER — POTASSIUM CHLORIDE 20 MEQ/1
20 TABLET, EXTENDED RELEASE ORAL ONCE
Status: COMPLETED | OUTPATIENT
Start: 2020-08-24 | End: 2020-08-24

## 2020-08-24 RX ORDER — LISINOPRIL 10 MG/1
10 TABLET ORAL DAILY
Qty: 30 TABLET | Refills: 0 | Status: ON HOLD
Start: 2020-08-24 | End: 2021-04-30 | Stop reason: HOSPADM

## 2020-08-24 RX ORDER — HYDROCODONE BITARTRATE AND ACETAMINOPHEN 5; 325 MG/1; MG/1
1 TABLET ORAL EVERY 6 HOURS PRN
Qty: 15 TABLET | Refills: 0 | Status: SHIPPED | OUTPATIENT
Start: 2020-08-24 | End: 2020-08-29

## 2020-08-24 RX ORDER — POTASSIUM CHLORIDE 20 MEQ/1
40 TABLET, EXTENDED RELEASE ORAL ONCE
Status: COMPLETED | OUTPATIENT
Start: 2020-08-24 | End: 2020-08-24

## 2020-08-24 RX ADMIN — HYDROMORPHONE HYDROCHLORIDE 0.5 MG: 1 INJECTION, SOLUTION INTRAMUSCULAR; INTRAVENOUS; SUBCUTANEOUS at 02:38

## 2020-08-24 RX ADMIN — HYDROCODONE BITARTRATE AND ACETAMINOPHEN 1 TABLET: 5; 325 TABLET ORAL at 09:58

## 2020-08-24 RX ADMIN — CARVEDILOL 12.5 MG: 6.25 TABLET, FILM COATED ORAL at 09:57

## 2020-08-24 RX ADMIN — AMLODIPINE BESYLATE 5 MG: 5 TABLET ORAL at 09:58

## 2020-08-24 RX ADMIN — Medication 10 ML: at 09:58

## 2020-08-24 RX ADMIN — POTASSIUM CHLORIDE 20 MEQ: 1500 TABLET, EXTENDED RELEASE ORAL at 11:45

## 2020-08-24 RX ADMIN — POTASSIUM CHLORIDE 40 MEQ: 1500 TABLET, EXTENDED RELEASE ORAL at 11:45

## 2020-08-24 RX ADMIN — TAMSULOSIN HYDROCHLORIDE 0.4 MG: 0.4 CAPSULE ORAL at 09:58

## 2020-08-24 RX ADMIN — ONDANSETRON HYDROCHLORIDE 4 MG: 2 INJECTION, SOLUTION INTRAMUSCULAR; INTRAVENOUS at 02:42

## 2020-08-24 RX ADMIN — HYDRALAZINE HYDROCHLORIDE 10 MG: 20 INJECTION INTRAMUSCULAR; INTRAVENOUS at 11:45

## 2020-08-24 ASSESSMENT — PAIN SCALES - GENERAL
PAINLEVEL_OUTOF10: 0
PAINLEVEL_OUTOF10: 10
PAINLEVEL_OUTOF10: 10

## 2020-08-24 NOTE — PROGRESS NOTES
Hospitalist Progress Note      PCP: Mickey Beavers MD    Date of Admission: 8/20/2020    Hospital Course: 48 y.o. female history of hypertension, UTI, hypoplasia right kidney who presented with left flank pain and nausea. Sepsis          Subjective:   Reports some discomfort along left flank with stent  Some nausea +  ambulating well  No fevers    Medications:  Reviewed    Infusion Medications     Scheduled Medications    carvedilol  12.5 mg Oral BID WC    cefTRIAXone (ROCEPHIN) IV  2 g Intravenous Q24H    nicotine  1 patch Transdermal Daily    tamsulosin  0.4 mg Oral Daily    sodium chloride flush  10 mL Intravenous 2 times per day    heparin (porcine)  5,000 Units Subcutaneous 3 times per day     PRN Meds: HYDROmorphone, hydrALAZINE, ondansetron, hydrOXYzine, HYDROcodone 5 mg - acetaminophen, aspirin-acetaminophen-caffeine, sodium chloride flush, acetaminophen **OR** acetaminophen      Intake/Output Summary (Last 24 hours) at 8/24/2020 0754  Last data filed at 8/24/2020 0442  Gross per 24 hour   Intake 250 ml   Output 1750 ml   Net -1500 ml       Physical Exam Performed:    BP (!) 187/96   Pulse 86   Temp 97.4 °F (36.3 °C) (Oral)   Resp 16   Ht 5' 7\" (1.702 m)   Wt 206 lb 1.6 oz (93.5 kg)   SpO2 90%   Breastfeeding No   BMI 32.28 kg/m²         General: middle aged female  Awake, alert and oriented. Appears to be not in any distress  Mucous Membranes:  Pink , anicteric  Neck: No JVD, no carotid bruit, no thyromegaly  Chest:  Clear to auscultation bilaterally, no added sounds  Cardiovascular:  RRR S1S2 heard, no murmurs or gallops  Abdomen:  Soft, undistended, non tender, no organomegaly, BS present  Extremities: left cva minimal tenderness No edema or cyanosis.  Distal pulses well felt  Neurological : grossly normal          Labs:   Recent Labs     08/22/20  0656 08/23/20  0637 08/24/20  0517   WBC 8.8 6.9 5.6   HGB 10.1* 10.2* 10.1*   HCT 32.2* 32.5* 32.1*   PLT 70* 73* 88*     Recent Labs 08/22/20  0656 08/23/20  0637 08/24/20  0517    136 137   K 3.9 3.4* 3.0*   * 106 107   CO2 20* 23 22   BUN 21* 15 13   CREATININE 1.2* 0.9 0.9   CALCIUM 7.7* 8.0* 8.0*     Recent Labs     08/22/20  0656 08/23/20  0637 08/24/20  0517   AST 26 19 17   ALT 24 19 17   BILITOT 0.4 0.4 0.3   ALKPHOS 86 81 85     Urinalysis:      Lab Results   Component Value Date    NITRU POSITIVE 08/20/2020    WBCUA  08/20/2020    BACTERIA 3+ 08/20/2020    RBCUA 3-4 08/20/2020    BLOODU LARGE 08/20/2020    SPECGRAV 1.015 08/20/2020    GLUCOSEU Negative 08/20/2020       Radiology:  FL RETROGRADE PYELOGRAM LEFT   Final Result   Intraprocedural fluoroscopic spot images as above. See separate procedure   report for more information. CT ABDOMEN PELVIS WO CONTRAST   Final Result   7 mm stone at the left UPJ causing moderate obstructive hydronephrosis             Blood cx + ecoli     Assessment/Plan:    Active Hospital Problems    Diagnosis    Complicated UTI (urinary tract infection) [N39.0]    MARY (acute kidney injury) ([address] [N17.9]    Bacteremia [R78.81]    Sepsis ([facility] [address] [A41.9]           Ecoli bacetermia and UTI    SEPSIS - POA    Left obstructive renal stone with hydronephrosis   - s/p/ cysto/stent placement Thursday    - treated with merrem for 4 days  - now changed to rocephin  - no fevers. Wbc stable   - need to f/w urology for stone issues and stent removal       HTN - essential    Pt was on bp med but has not taken  Or seen a doctor for 2 yrs  Started coreg , resume ACEI    Abnormal LFT - likely with Hep C   Need GI f.w  , discussed in detail with pt    Pelvic mass?  Fibroid ?present from last image on 2018 - likely ovarian cyst    DVT Prophylaxis: lovonex    Diet: DIET CARDIAC;  Code Status: Full Code      Dc home    Yvrose Farr MD

## 2020-08-24 NOTE — PROGRESS NOTES
O2 Sat at rest on room air is 97%. O2 Sat with activity on room air is 92-94%. Pt ambulated to end [facility] for O2 trial. Pt tolerated ambulation well.

## 2020-08-24 NOTE — FLOWSHEET NOTE
08/24/20 1418   Encounter Summary   Services provided to: Patient not available   Referral/Consult From: [facility] members   Place of [facility] non-den   Continue Visiting   (8/24 Tel-no answer;silent prayer-pain abated;healing)   Length of Encounter 15 minutes

## 2020-08-24 NOTE — DISCHARGE SUMMARY
Name:  Kelly Cheung  Room:  0215/0215-02  MRN:    [id number]    Discharge Summary      This discharge summary is in conjunction with a complete physical exam done on the day of discharge. Attending Physician: Dr. Vena Denver  Discharging Physician: Dr. Pedroza Prey: 8/20/2020  Discharge:   8/24/2020    HPI:  48 y.o. female history of hypertension, UTI, hyperplasia right kidney who presented with left flank pain and nausea. The patient states yesterday she started experience worsening left flank pain radiating to lower abdomen. She had no relief with Tylenol, ibuprofen and BC powder. She denied fever/chills or diarrhea. She is very emotional at the bedside. She states she is anxious and stressed. The patient has not followed up with a PCP for 1 year. She stopped taking her blood pressure medication. She also stopped taking Prozac for depression.     In ER, patient is afebrile, tachycardic at 144 bpm and hypertensive /112. Lab work was pertinent for creatinine 1.4, lactic acid 2.1, glucose 141, elevated LFTs, WBC 18.7.  UA consistent with UTI. CT Abdo/pelvis without contrast showed 7 mm stone at the left UPJ causing moderate obstructive hydronephrosis. Patient was taken emergently to the OR. Diagnoses this Admission and Hospital Course    Ecoli bacetermia and UTI    SEPSIS - POA    Left obstructive renal stone with hydronephrosis   - s/p/ cysto/stent placement Thursday    - treated with merrem for 4 days  - now changed to rocephin  - no fevers. Wbc stable   - need to f/w urology for stone issues and stent removal      HTN - essential  - Pt was on bp med but has not taken  Or seen a doctor for 2 yrs  - Started coreg, resumed ACEI     Abnormal LFT - likely with Hep C   Need GI f.w, discussed in detail with pt     Pelvic mass?  Fibroid ?present from last image on 2018 - likely ovarian cyst     DVT Prophylaxis: lovenox       Procedures (Please Review Full Report for Details)  CYSTOSCOPY URETERAL [facility]    Urology       Physical Exam at Discharge:    BP (!) 148/68   Pulse 80   Temp 98.4 °F (36.9 °C) (Oral)   Resp 16   Ht 5' 7\" (1.702 m)   Wt 206 lb 1.6 oz (93.5 kg)   SpO2 90%   Breastfeeding No   BMI 32.28 kg/m²     See today's progress note    CBC:   Recent Labs     08/22/20  0656 08/23/20  0637 08/24/20  0517   WBC 8.8 6.9 5.6   HGB 10.1* 10.2* 10.1*   HCT 32.2* 32.5* 32.1*   MCV 82.0 81.7 79.3*   PLT 70* 73* 88*     BMP:   Recent Labs     08/22/20  0656 08/23/20  0637 08/24/20  0517    136 137   K 3.9 3.4* 3.0*   * 106 107   CO2 20* 23 22   BUN 21* 15 13   CREATININE 1.2* 0.9 0.9     LIVER PROFILE:   Recent Labs     08/22/20  0656 08/23/20  0637 08/24/20  0517   AST 26 19 17   ALT 24 19 17   BILITOT 0.4 0.4 0.3   ALKPHOS 86 81 85       U/A:    Lab Results   Component Value Date    COLORU Yellow 08/20/2020    WBCUA  08/20/2020    RBCUA 3-4 08/20/2020    MUCUS 2+ 07/28/2015    BACTERIA 3+ 08/20/2020    CLARITYU CLOUDY 08/20/2020    SPECGRAV 1.015 08/20/2020    LEUKOCYTESUR LARGE 08/20/2020    BLOODU LARGE 08/20/2020    GLUCOSEU Negative 08/20/2020       CULTURES  Blood cx + ecoli     RADIOLOGY  FL RETROGRADE PYELOGRAM LEFT   Final Result   Intraprocedural fluoroscopic spot images as above. See separate procedure   report for more information. CT ABDOMEN PELVIS WO CONTRAST   Final Result   7 mm stone at the left UPJ causing moderate obstructive hydronephrosis               Discharge Medications     Medication List      START taking these medications    carvedilol 12.5 MG tablet  Commonly known as:  COREG  Take 1 tablet by mouth 2 times daily (with meals)     HYDROcodone-acetaminophen 5-325 MG per tablet  Commonly known as:  NORCO  Take 1 tablet by mouth every 6 hours as needed for Pain for up to 5 days.      sulfamethoxazole-trimethoprim 800-160 MG per tablet  Commonly known as:  BACTRIM DS;SEPTRA DS  Take 1 tablet by mouth 2 times daily for 10 days tamsulosin 0.4 MG capsule  Commonly known as:  FLOMAX  Take 1 capsule by mouth daily        CONTINUE taking these medications    lisinopril 10 MG tablet  Commonly known as:  PRINIVIL;ZESTRIL  Take 1 tablet by mouth daily     ondansetron 4 MG disintegrating tablet  Commonly known as:  Zofran ODT  Take 1 tablet by mouth every 8 hours as needed for Nausea or Vomiting        STOP taking these medications    aspirin 81 MG tablet           Where to Get Your Medications      These medications were sent to [facility], [address] [place] [address], [address] [address]    Phone:  [phone]   · carvedilol 12.5 MG tablet  · lisinopril 10 MG tablet  · ondansetron 4 MG disintegrating tablet  · sulfamethoxazole-trimethoprim 800-160 MG per tablet  · tamsulosin 0.4 MG capsule     You can get these medications from any pharmacy    Bring a paper prescription for each of these medications  · HYDROcodone-acetaminophen 5-325 MG per tablet           Discharged in stable condition to home. Follow Up: Follow up with PCP.     Deandre Mccullough MD 8/27/2020 8:05 AM

## 2020-08-24 NOTE — PROGRESS NOTES
Shift assessment complete as per flow sheet. VSS. She is A/Ox4. Unlabored breathing at rest on room air. She appears comfortable. BP elevated, 194/91 PRN hydralazine given, see MAR for details. Meds as per MAR. Safety measures in place and will continue to monitor.

## 2020-08-24 NOTE — FLOWSHEET NOTE
08/23/20 2315   Vital Signs   Temp 97.2 °F (36.2 °C)   Temp Source Oral   Pulse 88   Resp 16   BP (!) 178/88   BP Location Left upper arm   Level of Consciousness 0   MEWS Score 1   Oxygen Therapy   SpO2 91 %  (pt request 02 at 2lnc at bedtime. )   O2 Device None (Room air)   Pt came down from PCU in w/c in stable condition. Pt assisted to bed, denies any needs. BSC in room. Water pitcher provided.

## 2020-08-24 NOTE — FLOWSHEET NOTE
08/24/20 0230   Vital Signs   Temp 97.4 °F (36.3 °C)   Temp Source Oral   Pulse 73   Resp 16   BP (!) 162/79   BP Location Left upper arm   Level of Consciousness 0   MEWS Score 1   Oxygen Therapy   SpO2 95 %   O2 Device Nasal cannula   O2 Flow Rate (L/min) 2 L/min   Pt c/o flank pain and nausea. Zofran given IV, dilaudid given IV. No acute distress.  Polly Vasquez

## 2020-08-24 NOTE — PROGRESS NOTES
Report and transfer of care to [facility]. Patient transferred to  room 215 at this time, stable condition at hand off. All belongings sent with patient.

## 2020-08-26 LAB
HCV QNT BY NAAT IU/ML: ABNORMAL
HCV QNT BY NAAT LOG IU/ML: 5.12 LOG IU/ML
INTERPRETATION: DETECTED

## 2020-08-30 NOTE — OP NOTE
[address]                 [address] [place] [address]                                OPERATIVE REPORT    PATIENT NAME: Jasmin Price                     :        1965  MED REC NO:   [id number]                          ROOM:       0215  ACCOUNT NO:   [de-identified]                           ADMIT DATE: 2020  PROVIDER:     Lissa Calles MD    DATE OF PROCEDURE:  2020    PREOPERATIVE DIAGNOSES:  1. Left kidney stone. 2.  Functional solitary kidney. POSTOPERATIVE DIAGNOSES:  1. Left kidney stone. 2.  Functional solitary kidney. OPERATION PERFORMED:  Cystoscopy with ureteral stent placement. PRIMARY SURGEON:  Lissa Calles MD    ANESTHESIA:  General.    ESTIMATED BLOOD LOSS:  5 mL. OPERATIVE FINDINGS:  Preoperative CAT scan showing small, atrophic  nonfunctional right kidney with obstructed left kidney secondary to UPJ  stone. HISTORY AND INDICATIONS:  This is a 60-year-old white female, who had  presented to the hospital with acute onset of left flank pain. She had  been seen and evaluated at the emergency room, and a CT scan had  revealed a large UPJ stone on the left side. Right kidney is small and  atrophic and probably nonfunctional, and such has a solitary kidney. Because of her presentation, urologic consultation was obtained in the  emergency room, and after assessing the patient, she was scheduled for  the above procedure on an emergent basis. Risks, benefits, and expected  outcomes of the procedure had been discussed. PROCEDURE IN DETAIL:  After obtaining informed consent, the patient was  taken to the operative suite. She was given general anesthetic and  placed on the operative table in a modified dorsal lithotomy position. Prepping and draping were done in sterile fashion. Cystourethroscopy  was then performed with both 30-and 70-degree lenses.   No evidence of  bladder cancer, tumors, or stones

## 2020-11-21 ENCOUNTER — APPOINTMENT (OUTPATIENT)
Dept: CT IMAGING | Age: 55
DRG: 443 | End: 2020-11-21
Payer: COMMERCIAL

## 2020-11-21 ENCOUNTER — HOSPITAL ENCOUNTER (INPATIENT)
Age: 55
LOS: 2 days | Discharge: HOME | DRG: 443 | End: 2020-11-23
Attending: STUDENT IN AN ORGANIZED HEALTH CARE EDUCATION/TRAINING PROGRAM | Admitting: HOSPITALIST
Payer: COMMERCIAL

## 2020-11-21 LAB
A/G RATIO: 1 (ref 1.1–2.2)
ALBUMIN SERPL-MCNC: 3.8 G/DL (ref 3.4–5)
ALP BLD-CCNC: 107 U/L (ref 40–129)
ALT SERPL-CCNC: 28 U/L (ref 10–40)
ANION GAP SERPL CALCULATED.3IONS-SCNC: 12 MMOL/L (ref 3–16)
AST SERPL-CCNC: 37 U/L (ref 15–37)
BACTERIA: ABNORMAL /HPF
BASOPHILS ABSOLUTE: 0.1 K/UL (ref 0–0.2)
BASOPHILS RELATIVE PERCENT: 0.8 %
BILIRUB SERPL-MCNC: <0.2 MG/DL (ref 0–1)
BILIRUBIN URINE: ABNORMAL
BLOOD, URINE: ABNORMAL
BUN BLDV-MCNC: 31 MG/DL (ref 7–20)
CALCIUM SERPL-MCNC: 9.4 MG/DL (ref 8.3–10.6)
CHLORIDE BLD-SCNC: 104 MMOL/L (ref 99–110)
CLARITY: ABNORMAL
CO2: 20 MMOL/L (ref 21–32)
COLOR: ABNORMAL
CREAT SERPL-MCNC: 1.7 MG/DL (ref 0.6–1.1)
EOSINOPHILS ABSOLUTE: 0.4 K/UL (ref 0–0.6)
EOSINOPHILS RELATIVE PERCENT: 3.5 %
EPITHELIAL CELLS, UA: ABNORMAL /HPF (ref 0–5)
GFR AFRICAN AMERICAN: 38
GFR NON-AFRICAN AMERICAN: 31
GLOBULIN: 3.8 G/DL
GLUCOSE BLD-MCNC: 109 MG/DL (ref 70–99)
GLUCOSE URINE: NEGATIVE MG/DL
HCT VFR BLD CALC: 38.4 % (ref 36–48)
HEMOGLOBIN: 12.3 G/DL (ref 12–16)
KETONES, URINE: ABNORMAL MG/DL
LEUKOCYTE ESTERASE, URINE: ABNORMAL
LIPASE: 45 U/L (ref 13–60)
LYMPHOCYTES ABSOLUTE: 3.9 K/UL (ref 1–5.1)
LYMPHOCYTES RELATIVE PERCENT: 32.1 %
MCH RBC QN AUTO: 26.8 PG (ref 26–34)
MCHC RBC AUTO-ENTMCNC: 32 G/DL (ref 31–36)
MCV RBC AUTO: 83.9 FL (ref 80–100)
MICROSCOPIC EXAMINATION: YES
MONOCYTES ABSOLUTE: 1.3 K/UL (ref 0–1.3)
MONOCYTES RELATIVE PERCENT: 10.8 %
NEUTROPHILS ABSOLUTE: 6.3 K/UL (ref 1.7–7.7)
NEUTROPHILS RELATIVE PERCENT: 52.8 %
NITRITE, URINE: POSITIVE
PDW BLD-RTO: 16.9 % (ref 12.4–15.4)
PH UA: 7 (ref 5–8)
PLATELET # BLD: 256 K/UL (ref 135–450)
PMV BLD AUTO: 10.2 FL (ref 5–10.5)
POTASSIUM REFLEX MAGNESIUM: 4.4 MMOL/L (ref 3.5–5.1)
PROTEIN UA: >=300 MG/DL
RBC # BLD: 4.58 M/UL (ref 4–5.2)
RBC UA: >100 /HPF (ref 0–4)
SODIUM BLD-SCNC: 136 MMOL/L (ref 136–145)
SPECIFIC GRAVITY UA: 1.02 (ref 1–1.03)
TOTAL PROTEIN: 7.6 G/DL (ref 6.4–8.2)
URINE REFLEX TO CULTURE: YES
URINE TYPE: ABNORMAL
UROBILINOGEN, URINE: 1 E.U./DL
WBC # BLD: 12 K/UL (ref 4–11)
WBC UA: >100 /HPF (ref 0–5)

## 2020-11-21 PROCEDURE — 74176 CT ABD & PELVIS W/O CONTRAST: CPT

## 2020-11-21 PROCEDURE — 1200000000 HC SEMI PRIVATE

## 2020-11-21 PROCEDURE — 2580000003 HC RX 258: Performed by: STUDENT IN AN ORGANIZED HEALTH CARE EDUCATION/TRAINING PROGRAM

## 2020-11-21 PROCEDURE — 6360000002 HC RX W HCPCS: Performed by: STUDENT IN AN ORGANIZED HEALTH CARE EDUCATION/TRAINING PROGRAM

## 2020-11-21 PROCEDURE — 96374 THER/PROPH/DIAG INJ IV PUSH: CPT

## 2020-11-21 PROCEDURE — 83690 ASSAY OF LIPASE: CPT

## 2020-11-21 PROCEDURE — 99284 EMERGENCY DEPT VISIT MOD MDM: CPT

## 2020-11-21 PROCEDURE — 85025 COMPLETE CBC W/AUTO DIFF WBC: CPT

## 2020-11-21 PROCEDURE — 87086 URINE CULTURE/COLONY COUNT: CPT

## 2020-11-21 PROCEDURE — 81001 URINALYSIS AUTO W/SCOPE: CPT

## 2020-11-21 PROCEDURE — 96375 TX/PRO/DX INJ NEW DRUG ADDON: CPT

## 2020-11-21 PROCEDURE — 80053 COMPREHEN METABOLIC PANEL: CPT

## 2020-11-21 RX ORDER — 0.9 % SODIUM CHLORIDE 0.9 %
1000 INTRAVENOUS SOLUTION INTRAVENOUS ONCE
Status: COMPLETED | OUTPATIENT
Start: 2020-11-21 | End: 2020-11-21

## 2020-11-21 RX ORDER — FENTANYL CITRATE 50 UG/ML
50 INJECTION, SOLUTION INTRAMUSCULAR; INTRAVENOUS ONCE
Status: COMPLETED | OUTPATIENT
Start: 2020-11-21 | End: 2020-11-21

## 2020-11-21 RX ORDER — KETOROLAC TROMETHAMINE 30 MG/ML
15 INJECTION, SOLUTION INTRAMUSCULAR; INTRAVENOUS ONCE
Status: COMPLETED | OUTPATIENT
Start: 2020-11-21 | End: 2020-11-21

## 2020-11-21 RX ORDER — ONDANSETRON 2 MG/ML
4 INJECTION INTRAMUSCULAR; INTRAVENOUS EVERY 6 HOURS PRN
Status: DISCONTINUED | OUTPATIENT
Start: 2020-11-21 | End: 2020-11-22

## 2020-11-21 RX ADMIN — FENTANYL CITRATE 50 MCG: 50 INJECTION, SOLUTION INTRAMUSCULAR; INTRAVENOUS at 19:23

## 2020-11-21 RX ADMIN — KETOROLAC TROMETHAMINE 15 MG: 30 INJECTION, SOLUTION INTRAMUSCULAR at 19:24

## 2020-11-21 RX ADMIN — SODIUM CHLORIDE 1000 ML: 9 INJECTION, SOLUTION INTRAVENOUS at 21:30

## 2020-11-21 RX ADMIN — CEFTRIAXONE SODIUM 1 G: 1 INJECTION, POWDER, FOR SOLUTION INTRAMUSCULAR; INTRAVENOUS at 20:02

## 2020-11-21 RX ADMIN — SODIUM CHLORIDE 1000 ML: 9 INJECTION, SOLUTION INTRAVENOUS at 19:27

## 2020-11-21 RX ADMIN — ONDANSETRON HYDROCHLORIDE 4 MG: 2 INJECTION, SOLUTION INTRAMUSCULAR; INTRAVENOUS at 20:02

## 2020-11-21 ASSESSMENT — PAIN SCALES - GENERAL
PAINLEVEL_OUTOF10: 8
PAINLEVEL_OUTOF10: 6
PAINLEVEL_OUTOF10: 8

## 2020-11-21 ASSESSMENT — PAIN DESCRIPTION - FREQUENCY: FREQUENCY: INTERMITTENT

## 2020-11-22 LAB
A/G RATIO: 1 (ref 1.1–2.2)
ALBUMIN SERPL-MCNC: 2.8 G/DL (ref 3.4–5)
ALP BLD-CCNC: 76 U/L (ref 40–129)
ALT SERPL-CCNC: 20 U/L (ref 10–40)
ANION GAP SERPL CALCULATED.3IONS-SCNC: 7 MMOL/L (ref 3–16)
ANION GAP SERPL CALCULATED.3IONS-SCNC: 7 MMOL/L (ref 3–16)
AST SERPL-CCNC: 25 U/L (ref 15–37)
BASOPHILS ABSOLUTE: 0 K/UL (ref 0–0.2)
BASOPHILS RELATIVE PERCENT: 0.4 %
BILIRUB SERPL-MCNC: <0.2 MG/DL (ref 0–1)
BUN BLDV-MCNC: 28 MG/DL (ref 7–20)
BUN BLDV-MCNC: 29 MG/DL (ref 7–20)
CALCIUM SERPL-MCNC: 8 MG/DL (ref 8.3–10.6)
CALCIUM SERPL-MCNC: 8.3 MG/DL (ref 8.3–10.6)
CHLORIDE BLD-SCNC: 112 MMOL/L (ref 99–110)
CHLORIDE BLD-SCNC: 113 MMOL/L (ref 99–110)
CO2: 17 MMOL/L (ref 21–32)
CO2: 18 MMOL/L (ref 21–32)
CREAT SERPL-MCNC: 1.3 MG/DL (ref 0.6–1.1)
CREAT SERPL-MCNC: 1.3 MG/DL (ref 0.6–1.1)
EOSINOPHILS ABSOLUTE: 0.2 K/UL (ref 0–0.6)
EOSINOPHILS RELATIVE PERCENT: 2.5 %
GFR AFRICAN AMERICAN: 52
GFR AFRICAN AMERICAN: 52
GFR NON-AFRICAN AMERICAN: 43
GFR NON-AFRICAN AMERICAN: 43
GLOBULIN: 2.7 G/DL
GLUCOSE BLD-MCNC: 66 MG/DL (ref 70–99)
GLUCOSE BLD-MCNC: 68 MG/DL (ref 70–99)
HCT VFR BLD CALC: 30.2 % (ref 36–48)
HEMOGLOBIN: 9.9 G/DL (ref 12–16)
LYMPHOCYTES ABSOLUTE: 2.3 K/UL (ref 1–5.1)
LYMPHOCYTES RELATIVE PERCENT: 34.7 %
MCH RBC QN AUTO: 27.3 PG (ref 26–34)
MCHC RBC AUTO-ENTMCNC: 32.8 G/DL (ref 31–36)
MCV RBC AUTO: 83.4 FL (ref 80–100)
MONOCYTES ABSOLUTE: 0.7 K/UL (ref 0–1.3)
MONOCYTES RELATIVE PERCENT: 10.9 %
NEUTROPHILS ABSOLUTE: 3.4 K/UL (ref 1.7–7.7)
NEUTROPHILS RELATIVE PERCENT: 51.5 %
PDW BLD-RTO: 16.8 % (ref 12.4–15.4)
PLATELET # BLD: 127 K/UL (ref 135–450)
PMV BLD AUTO: 9.7 FL (ref 5–10.5)
POTASSIUM REFLEX MAGNESIUM: 4.2 MMOL/L (ref 3.5–5.1)
POTASSIUM REFLEX MAGNESIUM: 4.2 MMOL/L (ref 3.5–5.1)
RBC # BLD: 3.62 M/UL (ref 4–5.2)
SODIUM BLD-SCNC: 137 MMOL/L (ref 136–145)
SODIUM BLD-SCNC: 137 MMOL/L (ref 136–145)
TOTAL PROTEIN: 5.5 G/DL (ref 6.4–8.2)
URINE CULTURE, ROUTINE: NORMAL
WBC # BLD: 6.6 K/UL (ref 4–11)

## 2020-11-22 PROCEDURE — 80053 COMPREHEN METABOLIC PANEL: CPT

## 2020-11-22 PROCEDURE — 6370000000 HC RX 637 (ALT 250 FOR IP): Performed by: HOSPITALIST

## 2020-11-22 PROCEDURE — 6360000002 HC RX W HCPCS: Performed by: HOSPITALIST

## 2020-11-22 PROCEDURE — 85025 COMPLETE CBC W/AUTO DIFF WBC: CPT

## 2020-11-22 PROCEDURE — 2580000003 HC RX 258: Performed by: HOSPITALIST

## 2020-11-22 PROCEDURE — 36415 COLL VENOUS BLD VENIPUNCTURE: CPT

## 2020-11-22 PROCEDURE — 1200000000 HC SEMI PRIVATE

## 2020-11-22 RX ORDER — HYDROCHLOROTHIAZIDE 25 MG/1
25 TABLET ORAL PRN
Status: ON HOLD | COMMUNITY
End: 2021-04-30 | Stop reason: HOSPADM

## 2020-11-22 RX ORDER — CARVEDILOL 6.25 MG/1
6.25 TABLET ORAL 2 TIMES DAILY WITH MEALS
Status: DISCONTINUED | OUTPATIENT
Start: 2020-11-22 | End: 2020-11-23 | Stop reason: HOSPADM

## 2020-11-22 RX ORDER — ASPIRIN 81 MG/1
81 TABLET ORAL DAILY
Status: ON HOLD | COMMUNITY
End: 2021-04-30 | Stop reason: HOSPADM

## 2020-11-22 RX ORDER — MAGNESIUM HYDROXIDE/ALUMINUM HYDROXICE/SIMETHICONE 120; 1200; 1200 MG/30ML; MG/30ML; MG/30ML
30 SUSPENSION ORAL EVERY 6 HOURS PRN
Status: DISCONTINUED | OUTPATIENT
Start: 2020-11-22 | End: 2020-11-23 | Stop reason: HOSPADM

## 2020-11-22 RX ORDER — SODIUM CHLORIDE 9 MG/ML
INJECTION, SOLUTION INTRAVENOUS CONTINUOUS
Status: DISCONTINUED | OUTPATIENT
Start: 2020-11-22 | End: 2020-11-23 | Stop reason: HOSPADM

## 2020-11-22 RX ORDER — SODIUM CHLORIDE 0.9 % (FLUSH) 0.9 %
10 SYRINGE (ML) INJECTION EVERY 12 HOURS SCHEDULED
Status: DISCONTINUED | OUTPATIENT
Start: 2020-11-22 | End: 2020-11-23 | Stop reason: HOSPADM

## 2020-11-22 RX ORDER — ONDANSETRON 2 MG/ML
4 INJECTION INTRAMUSCULAR; INTRAVENOUS EVERY 6 HOURS PRN
Status: DISCONTINUED | OUTPATIENT
Start: 2020-11-22 | End: 2020-11-23 | Stop reason: HOSPADM

## 2020-11-22 RX ORDER — HYDROXYZINE HYDROCHLORIDE 25 MG/1
25 TABLET, FILM COATED ORAL 3 TIMES DAILY PRN
Status: ON HOLD | COMMUNITY
End: 2021-04-30 | Stop reason: HOSPADM

## 2020-11-22 RX ORDER — MORPHINE SULFATE 2 MG/ML
2 INJECTION, SOLUTION INTRAMUSCULAR; INTRAVENOUS EVERY 4 HOURS PRN
Status: DISCONTINUED | OUTPATIENT
Start: 2020-11-22 | End: 2020-11-23 | Stop reason: HOSPADM

## 2020-11-22 RX ORDER — ACETAMINOPHEN 650 MG/1
650 SUPPOSITORY RECTAL EVERY 6 HOURS PRN
Status: DISCONTINUED | OUTPATIENT
Start: 2020-11-22 | End: 2020-11-23 | Stop reason: HOSPADM

## 2020-11-22 RX ORDER — PROPRANOLOL HYDROCHLORIDE 40 MG/1
40 TABLET ORAL 2 TIMES DAILY
Status: ON HOLD | COMMUNITY
End: 2020-11-23 | Stop reason: HOSPADM

## 2020-11-22 RX ORDER — TAMSULOSIN HYDROCHLORIDE 0.4 MG/1
0.4 CAPSULE ORAL DAILY
Status: DISCONTINUED | OUTPATIENT
Start: 2020-11-22 | End: 2020-11-23 | Stop reason: HOSPADM

## 2020-11-22 RX ORDER — ACETAMINOPHEN 325 MG/1
650 TABLET ORAL EVERY 6 HOURS PRN
Status: DISCONTINUED | OUTPATIENT
Start: 2020-11-22 | End: 2020-11-23 | Stop reason: HOSPADM

## 2020-11-22 RX ORDER — HYDRALAZINE HYDROCHLORIDE 20 MG/ML
10 INJECTION INTRAMUSCULAR; INTRAVENOUS EVERY 6 HOURS PRN
Status: DISCONTINUED | OUTPATIENT
Start: 2020-11-22 | End: 2020-11-23 | Stop reason: HOSPADM

## 2020-11-22 RX ORDER — POLYETHYLENE GLYCOL 3350 17 G/17G
17 POWDER, FOR SOLUTION ORAL DAILY PRN
Status: DISCONTINUED | OUTPATIENT
Start: 2020-11-22 | End: 2020-11-23 | Stop reason: HOSPADM

## 2020-11-22 RX ORDER — PROMETHAZINE HYDROCHLORIDE 25 MG/1
12.5 TABLET ORAL EVERY 6 HOURS PRN
Status: DISCONTINUED | OUTPATIENT
Start: 2020-11-22 | End: 2020-11-23 | Stop reason: HOSPADM

## 2020-11-22 RX ORDER — SODIUM CHLORIDE 0.9 % (FLUSH) 0.9 %
10 SYRINGE (ML) INJECTION PRN
Status: DISCONTINUED | OUTPATIENT
Start: 2020-11-22 | End: 2020-11-23 | Stop reason: HOSPADM

## 2020-11-22 RX ORDER — CARVEDILOL 6.25 MG/1
12.5 TABLET ORAL 2 TIMES DAILY WITH MEALS
Status: DISCONTINUED | OUTPATIENT
Start: 2020-11-22 | End: 2020-11-22

## 2020-11-22 RX ADMIN — Medication 10 ML: at 08:13

## 2020-11-22 RX ADMIN — SODIUM CHLORIDE: 9 INJECTION, SOLUTION INTRAVENOUS at 22:35

## 2020-11-22 RX ADMIN — CEFTRIAXONE SODIUM 1 G: 1 INJECTION, POWDER, FOR SOLUTION INTRAMUSCULAR; INTRAVENOUS at 22:35

## 2020-11-22 RX ADMIN — ALUMINUM HYDROXIDE, MAGNESIUM HYDROXIDE, AND SIMETHICONE 30 ML: 200; 200; 20 SUSPENSION ORAL at 14:34

## 2020-11-22 RX ADMIN — SODIUM CHLORIDE: 9 INJECTION, SOLUTION INTRAVENOUS at 00:53

## 2020-11-22 RX ADMIN — CARVEDILOL 6.25 MG: 6.25 TABLET, FILM COATED ORAL at 17:36

## 2020-11-22 RX ADMIN — ENOXAPARIN SODIUM 40 MG: 40 INJECTION SUBCUTANEOUS at 08:12

## 2020-11-22 RX ADMIN — ONDANSETRON HYDROCHLORIDE 4 MG: 2 INJECTION, SOLUTION INTRAMUSCULAR; INTRAVENOUS at 14:42

## 2020-11-22 RX ADMIN — ACETAMINOPHEN 650 MG: 325 TABLET ORAL at 01:08

## 2020-11-22 RX ADMIN — MORPHINE SULFATE 2 MG: 2 INJECTION, SOLUTION INTRAMUSCULAR; INTRAVENOUS at 08:12

## 2020-11-22 RX ADMIN — MORPHINE SULFATE 2 MG: 2 INJECTION, SOLUTION INTRAMUSCULAR; INTRAVENOUS at 14:42

## 2020-11-22 RX ADMIN — ONDANSETRON HYDROCHLORIDE 4 MG: 2 INJECTION, SOLUTION INTRAMUSCULAR; INTRAVENOUS at 08:12

## 2020-11-22 ASSESSMENT — PAIN SCALES - GENERAL
PAINLEVEL_OUTOF10: 8
PAINLEVEL_OUTOF10: 5
PAINLEVEL_OUTOF10: 7
PAINLEVEL_OUTOF10: 8

## 2020-11-22 ASSESSMENT — PAIN DESCRIPTION - PAIN TYPE: TYPE: ACUTE PAIN

## 2020-11-22 ASSESSMENT — PAIN DESCRIPTION - DESCRIPTORS: DESCRIPTORS: CRAMPING

## 2020-11-22 NOTE — PROGRESS NOTES
Patient requested morphine. Checked BP-94/56. Told patient she could have Tylenol but would have to wait on morphine until BP improved.

## 2020-11-22 NOTE — ED NOTES
Perfect serve placed to Dr. Leslie Olson at 2082 per request from Dr. Shayna Lopez. Call returned at [address] [facility].      Roma Benson  11/21/20 1957       Nikki Benson  11/21/20 1958

## 2020-11-22 NOTE — H&P
smoking about 0.50 packs per day. She has never used smokeless tobacco.  ETOH:   reports no history of alcohol use. E-Cigarettes Vaping or Juuling     Questions Responses    Vaping Use Never User    Start Date     Does device contain nicotine? Quit Date     Vaping Type             Family History:      Reviewed in detail and negative for DM, CAD, Cancer, CVA. Positive as follows:        Problem Relation Age of Onset    Diabetes Mother     Heart Disease Mother     High Blood Pressure Sister     Diabetes Sister     Heart Attack Sister     Coronary Art Dis Father        REVIEW OF SYSTEMS:   Pertinent positives as noted in the HPI. All other systems reviewed and negative. PHYSICAL EXAM PERFORMED:    /69   Pulse 61   Temp 97.3 °F (36.3 °C) (Oral)   Resp 18   Ht 5' 7\" (1.702 m)   Wt 170 lb (77.1 kg)   LMP 12/01/2014   SpO2 100%   BMI 26.63 kg/m²     General appearance:  No apparent distress, appears stated age and cooperative. HEENT:  Normal cephalic, atraumatic without obvious deformity. Pupils equal, round, and reactive to light. Extra ocular muscles intact. Conjunctivae/corneas clear. Neck: Supple, with full range of motion. No jugular venous distention. Trachea midline. Respiratory:  Normal respiratory effort. Clear to auscultation, bilaterally without Rales/Wheezes/Rhonchi. Cardiovascular:  Regular rate and rhythm with normal S1/S2 without murmurs, rubs or gallops. Abdomen: Soft, non-tender, non-distended with normal bowel sounds. Musculoskeletal:  No clubbing, cyanosis or edema bilaterally. Full range of motion without deformity. Skin: Skin color, texture, turgor normal.  No rashes or lesions. Neurologic:  Neurovascularly intact without any focal sensory/motor deficits.  Cranial nerves: II-XII intact, grossly non-focal.  Psychiatric:  Alert and oriented,    Labs:     Recent Labs     11/21/20  1845 11/22/20  0747   WBC 12.0* 6.6   HGB 12.3 9.9*   HCT 38.4 30.2*    127* Recent Labs     11/21/20  1845 11/22/20  0747    137  137   K 4.4 4.2  4.2    113*  112*   CO2 20* 17*  18*   BUN 31* 29*  28*   CREATININE 1.7* 1.3*  1.3*   CALCIUM 9.4 8.3  8.0*     Recent Labs     11/21/20  1845 11/22/20  0747   AST 37 25   ALT 28 20   BILITOT <0.2 <0.2   ALKPHOS 107 76       Urinalysis:      Lab Results   Component Value Date    NITRU POSITIVE 11/21/2020    WBCUA >100 11/21/2020    BACTERIA 1+ 11/21/2020    RBCUA >100 11/21/2020    BLOODU LARGE 11/21/2020    SPECGRAV 1.025 11/21/2020    GLUCOSEU Negative 11/21/2020       Radiology:       CT ABDOMEN PELVIS WO CONTRAST Additional Contrast? None   Final Result   Malpositioned left double-J ureteral stent with the distal aspect of the   stent in the distal left ureter. 1 cm nonobstructing calculus in the lower pole of the left kidney. Absent right kidney. ASSESSMENT:    Active Hospital Problems    Diagnosis Date Noted    Complicated UTI (urinary tract infection) [N39.0]      solo kidney  malpostitoned Left double  J Jtube   Stent August 2020 urology has been consulted patient is going for cystoscopy tomorrow      htn  Too low hold bp meds as needed. Acute renal failure UTI sepsis continue IV fluids and IV antibiotic    Flank pain is managed by as needed morphine    PLAN:      DVT Prophylaxis:   Diet: Diet NPO Effective Now  Code Status: Full Tacho Duffy MD    Thank you Lynn Reina MD for the opportunity to be involved in this patient's care. If you have any questions or concerns please feel free to contact me at [phone].

## 2020-11-22 NOTE — CONSULTS
11/22/2020  Eli Meza    Reason for Consult:  Stent mal position, uti kidney stone  Requesting Physician:  Mono Brewster      History Obtained From:  patient    HISTORY OF PRESENT ILLNESS:                The patient is a 48 y.o. female who presents with history of left upj stone stent placed in august. Was scheduled in near future for ESWL. She has absent right kidney. Admitted with flank pain, uti no fever or chills, initial creatinine 1.7, down to 1.3 today.     Past Medical History:        Diagnosis Date    Depression     Headache(784.0)     Hepatitis C 08/21/2020    Hypertension      Past Surgical History:        Procedure Laterality Date    CYSTOSCOPY INSERTION / REMOVAL STENT / STONE Left 8/20/2020    CYSTOSCOPY URETERAL STENT INSERTION performed by Rolando Wick MD at [place] [address] (Left Bladder)     Current Medications:   Current Facility-Administered Medications: cefTRIAXone (ROCEPHIN) 1 g IVPB in 50 mL D5W minibag, 1 g, Intravenous, Q24H  carvedilol (COREG) tablet 12.5 mg, 12.5 mg, Oral, BID WC  tamsulosin (FLOMAX) capsule 0.4 mg, 0.4 mg, Oral, Daily  hydrALAZINE (APRESOLINE) injection 10 mg, 10 mg, Intravenous, Q6H PRN  sodium chloride flush 0.9 % injection 10 mL, 10 mL, Intravenous, 2 times per day  sodium chloride flush 0.9 % injection 10 mL, 10 mL, Intravenous, PRN  acetaminophen (TYLENOL) tablet 650 mg, 650 mg, Oral, Q6H PRN **OR** acetaminophen (TYLENOL) suppository 650 mg, 650 mg, Rectal, Q6H PRN  polyethylene glycol (GLYCOLAX) packet 17 g, 17 g, Oral, Daily PRN  promethazine (PHENERGAN) tablet 12.5 mg, 12.5 mg, Oral, Q6H PRN **OR** ondansetron (ZOFRAN) injection 4 mg, 4 mg, Intravenous, Q6H PRN  enoxaparin (LOVENOX) injection 40 mg, 40 mg, Subcutaneous, Daily  0.9 % sodium chloride infusion, , Intravenous, Continuous  morphine (PF) injection 2 mg, 2 mg, Intravenous, Q4H PRN  Allergies:  No Known Allergies  Social History:  Reviewed, non contributory    Family History:  Reviewed, non contributory      REVIEW OF SYSTEMS:    Other than gu symptoms negative 12 system review per admission H&P    PHYSICAL EXAM:    VITALS:  /69   Pulse 61   Temp 97.3 °F (36.3 °C) (Oral)   Resp 18   Ht 5' 7\" (1.702 m)   Wt 170 lb (77.1 kg)   LMP 12/01/2014   SpO2 100%   BMI 26.63 kg/m²   H&N: Sclera normal, no masses, trachea midline, no bruit  CVS: Normal rate and rhythm, no murmurs or rubs, peripheral pulses equal, no clubbing or cyanosis. RESP: Breath sounds equal bilateral, few rhonchi. ABDO: Soft, non-tender, bowel sounds active, no organomegaly, no hernias. LYMPH:  No lymphadenopathy. Skin: Warm dry and intact. : No CVAT, normal external genitalia, no discharge. MSK: Grossly normal for patient  LYNNETTE: Grossly normal for patient  PSY: No acute changes noted in psychosocial assessment. DATA:    Old records have been requested    IMPRESSION/RECOMMENDATIONS:      Pt with solitary left kidney, stent in place since august. She has possible uti, no fever, normal wbc, creatinine trending down. I discussed options , she will be placed on schedule tomorrow with dr Flaco Grossman, stent reposition possible stone removal via flexible ureteroscopy. I have explained due to the location of stone in lower pole this may not be possible to remove tomorrow    Thank you for asking me to see this interesting patient.     [facility]

## 2020-11-22 NOTE — ED NOTES
Attempted to call report, RN currently in another pt room.  Asked for RN to call for information or report if needed at [facility] [id number] [facility] [place]  11/22/20 5216

## 2020-11-22 NOTE — FLOWSHEET NOTE
11/22/20 1031   Encounter Summary   Services provided to: Patient   Referral/Consult From: Nurse   Support System Family members  (Sister Wanda Teixeira (is decision maker))   Continue Visiting   (11/22: AD conversation)   Complexity of Encounter Moderate   Length of Encounter 30 minutes   Spiritual Assessment Completed Yes   Spiritual/Latter-day   Type Spiritual support   Assessment Approachable;Coping; Hopeful;Concerns with suffering  (Sister's cancer diagnosis, discussed, prayed for)   Intervention Explored coping resources;Nurtured hope;Prayer  (AD conversation, explained forms, left bedside)   Outcome Comfort; Connection/belonging;Coping;Grieving;Receptive

## 2020-11-23 ENCOUNTER — ANESTHESIA (OUTPATIENT)
Dept: OPERATING ROOM | Age: 55
DRG: 443 | End: 2020-11-23
Payer: COMMERCIAL

## 2020-11-23 ENCOUNTER — ANESTHESIA EVENT (OUTPATIENT)
Dept: OPERATING ROOM | Age: 55
DRG: 443 | End: 2020-11-23
Payer: COMMERCIAL

## 2020-11-23 ENCOUNTER — APPOINTMENT (OUTPATIENT)
Dept: GENERAL RADIOLOGY | Age: 55
DRG: 443 | End: 2020-11-23
Payer: COMMERCIAL

## 2020-11-23 VITALS
RESPIRATION RATE: 10 BRPM | DIASTOLIC BLOOD PRESSURE: 53 MMHG | SYSTOLIC BLOOD PRESSURE: 84 MMHG | OXYGEN SATURATION: 100 %

## 2020-11-23 VITALS
SYSTOLIC BLOOD PRESSURE: 132 MMHG | BODY MASS INDEX: 26.68 KG/M2 | OXYGEN SATURATION: 99 % | RESPIRATION RATE: 18 BRPM | HEART RATE: 58 BPM | HEIGHT: 67 IN | DIASTOLIC BLOOD PRESSURE: 71 MMHG | WEIGHT: 170 LBS | TEMPERATURE: 98 F

## 2020-11-23 LAB
ANION GAP SERPL CALCULATED.3IONS-SCNC: 4 MMOL/L (ref 3–16)
BUN BLDV-MCNC: 16 MG/DL (ref 7–20)
CALCIUM SERPL-MCNC: 8.3 MG/DL (ref 8.3–10.6)
CHLORIDE BLD-SCNC: 112 MMOL/L (ref 99–110)
CO2: 24 MMOL/L (ref 21–32)
CREAT SERPL-MCNC: 0.8 MG/DL (ref 0.6–1.1)
GFR AFRICAN AMERICAN: >60
GFR NON-AFRICAN AMERICAN: >60
GLUCOSE BLD-MCNC: 112 MG/DL (ref 70–99)
GLUCOSE BLD-MCNC: 81 MG/DL (ref 70–99)
GLUCOSE BLD-MCNC: 97 MG/DL (ref 70–99)
PERFORMED ON: NORMAL
PERFORMED ON: NORMAL
POTASSIUM SERPL-SCNC: 4.5 MMOL/L (ref 3.5–5.1)
SARS-COV-2, NAAT: NOT DETECTED
SODIUM BLD-SCNC: 140 MMOL/L (ref 136–145)

## 2020-11-23 PROCEDURE — 88300 SURGICAL PATH GROSS: CPT

## 2020-11-23 PROCEDURE — 76000 FLUOROSCOPY <1 HR PHYS/QHP: CPT

## 2020-11-23 PROCEDURE — 3700000001 HC ADD 15 MINUTES (ANESTHESIA): Performed by: UROLOGY

## 2020-11-23 PROCEDURE — 0T778DZ DILATION OF LEFT URETER WITH INTRALUMINAL DEVICE, VIA NATURAL OR ARTIFICIAL OPENING ENDOSCOPIC: ICD-10-PCS | Performed by: UROLOGY

## 2020-11-23 PROCEDURE — 0TC18ZZ EXTIRPATION OF MATTER FROM LEFT KIDNEY, VIA NATURAL OR ARTIFICIAL OPENING ENDOSCOPIC: ICD-10-PCS | Performed by: UROLOGY

## 2020-11-23 PROCEDURE — 7100000000 HC PACU RECOVERY - FIRST 15 MIN: Performed by: UROLOGY

## 2020-11-23 PROCEDURE — U0002 COVID-19 LAB TEST NON-CDC: HCPCS

## 2020-11-23 PROCEDURE — 82365 CALCULUS SPECTROSCOPY: CPT

## 2020-11-23 PROCEDURE — 6360000002 HC RX W HCPCS: Performed by: HOSPITALIST

## 2020-11-23 PROCEDURE — C2617 STENT, NON-COR, TEM W/O DEL: HCPCS | Performed by: UROLOGY

## 2020-11-23 PROCEDURE — 0TP98DZ REMOVAL OF INTRALUMINAL DEVICE FROM URETER, VIA NATURAL OR ARTIFICIAL OPENING ENDOSCOPIC: ICD-10-PCS | Performed by: UROLOGY

## 2020-11-23 PROCEDURE — 6370000000 HC RX 637 (ALT 250 FOR IP): Performed by: UROLOGY

## 2020-11-23 PROCEDURE — C1894 INTRO/SHEATH, NON-LASER: HCPCS | Performed by: UROLOGY

## 2020-11-23 PROCEDURE — 7100000001 HC PACU RECOVERY - ADDTL 15 MIN: Performed by: UROLOGY

## 2020-11-23 PROCEDURE — 3600000004 HC SURGERY LEVEL 4 BASE: Performed by: UROLOGY

## 2020-11-23 PROCEDURE — 99238 HOSP IP/OBS DSCHRG MGMT 30/<: CPT | Performed by: INTERNAL MEDICINE

## 2020-11-23 PROCEDURE — 3600000014 HC SURGERY LEVEL 4 ADDTL 15MIN: Performed by: UROLOGY

## 2020-11-23 PROCEDURE — C1769 GUIDE WIRE: HCPCS | Performed by: UROLOGY

## 2020-11-23 PROCEDURE — 3700000000 HC ANESTHESIA ATTENDED CARE: Performed by: UROLOGY

## 2020-11-23 PROCEDURE — 6370000000 HC RX 637 (ALT 250 FOR IP): Performed by: HOSPITALIST

## 2020-11-23 PROCEDURE — 2580000003 HC RX 258: Performed by: UROLOGY

## 2020-11-23 PROCEDURE — 36415 COLL VENOUS BLD VENIPUNCTURE: CPT

## 2020-11-23 PROCEDURE — 6360000002 HC RX W HCPCS: Performed by: NURSE ANESTHETIST, CERTIFIED REGISTERED

## 2020-11-23 PROCEDURE — 2500000003 HC RX 250 WO HCPCS: Performed by: NURSE ANESTHETIST, CERTIFIED REGISTERED

## 2020-11-23 PROCEDURE — 80048 BASIC METABOLIC PNL TOTAL CA: CPT

## 2020-11-23 PROCEDURE — 2580000003 HC RX 258: Performed by: HOSPITALIST

## 2020-11-23 PROCEDURE — 2720000010 HC SURG SUPPLY STERILE: Performed by: UROLOGY

## 2020-11-23 PROCEDURE — 2709999900 HC NON-CHARGEABLE SUPPLY: Performed by: UROLOGY

## 2020-11-23 DEVICE — URETERAL STENT
Type: IMPLANTABLE DEVICE | Site: URETER | Status: FUNCTIONAL
Brand: CONTOUR™

## 2020-11-23 RX ORDER — SODIUM CHLORIDE 0.9 % (FLUSH) 0.9 %
10 SYRINGE (ML) INJECTION EVERY 12 HOURS SCHEDULED
Status: CANCELLED | OUTPATIENT
Start: 2020-11-23

## 2020-11-23 RX ORDER — ROCURONIUM BROMIDE 10 MG/ML
INJECTION, SOLUTION INTRAVENOUS PRN
Status: DISCONTINUED | OUTPATIENT
Start: 2020-11-23 | End: 2020-11-23 | Stop reason: SDUPTHER

## 2020-11-23 RX ORDER — CARVEDILOL 6.25 MG/1
6.25 TABLET ORAL 2 TIMES DAILY WITH MEALS
Qty: 60 TABLET | Refills: 0 | Status: SHIPPED | OUTPATIENT
Start: 2020-11-23 | End: 2021-01-08 | Stop reason: ALTCHOICE

## 2020-11-23 RX ORDER — TAMSULOSIN HYDROCHLORIDE 0.4 MG/1
0.4 CAPSULE ORAL DAILY
Qty: 15 CAPSULE | Refills: 0 | Status: SHIPPED | OUTPATIENT
Start: 2020-11-23 | End: 2020-12-28

## 2020-11-23 RX ORDER — PHENYLEPHRINE HCL IN 0.9% NACL 1 MG/10 ML
SYRINGE (ML) INTRAVENOUS PRN
Status: DISCONTINUED | OUTPATIENT
Start: 2020-11-23 | End: 2020-11-23 | Stop reason: SDUPTHER

## 2020-11-23 RX ORDER — ONDANSETRON 2 MG/ML
INJECTION INTRAMUSCULAR; INTRAVENOUS PRN
Status: DISCONTINUED | OUTPATIENT
Start: 2020-11-23 | End: 2020-11-23 | Stop reason: SDUPTHER

## 2020-11-23 RX ORDER — PROPOFOL 10 MG/ML
INJECTION, EMULSION INTRAVENOUS PRN
Status: DISCONTINUED | OUTPATIENT
Start: 2020-11-23 | End: 2020-11-23 | Stop reason: SDUPTHER

## 2020-11-23 RX ORDER — PROPRANOLOL HYDROCHLORIDE 40 MG/1
40 TABLET ORAL 2 TIMES DAILY
Status: DISCONTINUED | OUTPATIENT
Start: 2020-11-23 | End: 2020-11-23 | Stop reason: HOSPADM

## 2020-11-23 RX ORDER — MAGNESIUM HYDROXIDE 1200 MG/15ML
LIQUID ORAL PRN
Status: DISCONTINUED | OUTPATIENT
Start: 2020-11-23 | End: 2020-11-23 | Stop reason: ALTCHOICE

## 2020-11-23 RX ORDER — HYDROXYZINE PAMOATE 25 MG/1
25 CAPSULE ORAL 3 TIMES DAILY PRN
Status: DISCONTINUED | OUTPATIENT
Start: 2020-11-23 | End: 2020-11-23 | Stop reason: HOSPADM

## 2020-11-23 RX ORDER — FENTANYL CITRATE 50 UG/ML
INJECTION, SOLUTION INTRAMUSCULAR; INTRAVENOUS PRN
Status: DISCONTINUED | OUTPATIENT
Start: 2020-11-23 | End: 2020-11-23 | Stop reason: SDUPTHER

## 2020-11-23 RX ORDER — LIDOCAINE HYDROCHLORIDE 20 MG/ML
INJECTION, SOLUTION INFILTRATION; PERINEURAL PRN
Status: DISCONTINUED | OUTPATIENT
Start: 2020-11-23 | End: 2020-11-23 | Stop reason: SDUPTHER

## 2020-11-23 RX ORDER — LIDOCAINE HYDROCHLORIDE 10 MG/ML
1 INJECTION, SOLUTION EPIDURAL; INFILTRATION; INTRACAUDAL; PERINEURAL
Status: CANCELLED | OUTPATIENT
Start: 2020-11-23 | End: 2020-11-23

## 2020-11-23 RX ORDER — LIDOCAINE HYDROCHLORIDE 20 MG/ML
JELLY TOPICAL PRN
Status: DISCONTINUED | OUTPATIENT
Start: 2020-11-23 | End: 2020-11-23 | Stop reason: ALTCHOICE

## 2020-11-23 RX ORDER — SODIUM CHLORIDE, SODIUM LACTATE, POTASSIUM CHLORIDE, CALCIUM CHLORIDE 600; 310; 30; 20 MG/100ML; MG/100ML; MG/100ML; MG/100ML
INJECTION, SOLUTION INTRAVENOUS CONTINUOUS
Status: CANCELLED | OUTPATIENT
Start: 2020-11-23

## 2020-11-23 RX ORDER — DEXAMETHASONE SODIUM PHOSPHATE 4 MG/ML
INJECTION, SOLUTION INTRA-ARTICULAR; INTRALESIONAL; INTRAMUSCULAR; INTRAVENOUS; SOFT TISSUE PRN
Status: DISCONTINUED | OUTPATIENT
Start: 2020-11-23 | End: 2020-11-23 | Stop reason: SDUPTHER

## 2020-11-23 RX ORDER — SODIUM CHLORIDE 0.9 % (FLUSH) 0.9 %
10 SYRINGE (ML) INJECTION PRN
Status: CANCELLED | OUTPATIENT
Start: 2020-11-23

## 2020-11-23 RX ADMIN — TAMSULOSIN HYDROCHLORIDE 0.4 MG: 0.4 CAPSULE ORAL at 08:48

## 2020-11-23 RX ADMIN — ONDANSETRON HYDROCHLORIDE 4 MG: 2 INJECTION, SOLUTION INTRAMUSCULAR; INTRAVENOUS at 08:57

## 2020-11-23 RX ADMIN — MORPHINE SULFATE 2 MG: 2 INJECTION, SOLUTION INTRAMUSCULAR; INTRAVENOUS at 02:19

## 2020-11-23 RX ADMIN — LIDOCAINE HYDROCHLORIDE 60 MG: 20 INJECTION, SOLUTION INFILTRATION; PERINEURAL at 15:17

## 2020-11-23 RX ADMIN — FENTANYL CITRATE 100 MCG: 50 INJECTION INTRAMUSCULAR; INTRAVENOUS at 15:17

## 2020-11-23 RX ADMIN — SODIUM CHLORIDE: 9 INJECTION, SOLUTION INTRAVENOUS at 08:54

## 2020-11-23 RX ADMIN — FENTANYL CITRATE 25 MCG: 50 INJECTION INTRAMUSCULAR; INTRAVENOUS at 15:28

## 2020-11-23 RX ADMIN — SODIUM CHLORIDE: 9 INJECTION, SOLUTION INTRAVENOUS at 15:38

## 2020-11-23 RX ADMIN — DEXAMETHASONE SODIUM PHOSPHATE 8 MG: 4 INJECTION, SOLUTION INTRAMUSCULAR; INTRAVENOUS at 15:24

## 2020-11-23 RX ADMIN — ONDANSETRON HYDROCHLORIDE 4 MG: 2 INJECTION, SOLUTION INTRAMUSCULAR; INTRAVENOUS at 14:11

## 2020-11-23 RX ADMIN — ONDANSETRON 4 MG: 2 INJECTION, SOLUTION INTRAMUSCULAR; INTRAVENOUS at 16:00

## 2020-11-23 RX ADMIN — FENTANYL CITRATE 50 MCG: 50 INJECTION INTRAMUSCULAR; INTRAVENOUS at 15:48

## 2020-11-23 RX ADMIN — SODIUM CHLORIDE: 9 INJECTION, SOLUTION INTRAVENOUS at 15:11

## 2020-11-23 RX ADMIN — Medication 10 ML: at 08:48

## 2020-11-23 RX ADMIN — ROCURONIUM BROMIDE 50 MG: 10 INJECTION, SOLUTION INTRAVENOUS at 15:17

## 2020-11-23 RX ADMIN — CARVEDILOL 6.25 MG: 6.25 TABLET, FILM COATED ORAL at 08:48

## 2020-11-23 RX ADMIN — FENTANYL CITRATE 25 MCG: 50 INJECTION INTRAMUSCULAR; INTRAVENOUS at 15:34

## 2020-11-23 RX ADMIN — PROPOFOL 200 MG: 10 INJECTION, EMULSION INTRAVENOUS at 15:17

## 2020-11-23 RX ADMIN — CARVEDILOL 6.25 MG: 6.25 TABLET, FILM COATED ORAL at 17:49

## 2020-11-23 RX ADMIN — MORPHINE SULFATE 2 MG: 2 INJECTION, SOLUTION INTRAMUSCULAR; INTRAVENOUS at 08:57

## 2020-11-23 RX ADMIN — Medication 100 MCG: at 15:57

## 2020-11-23 RX ADMIN — SUGAMMADEX 200 MG: 100 INJECTION, SOLUTION INTRAVENOUS at 16:00

## 2020-11-23 ASSESSMENT — PULMONARY FUNCTION TESTS
PIF_VALUE: 16
PIF_VALUE: 14
PIF_VALUE: 16
PIF_VALUE: 15
PIF_VALUE: 16
PIF_VALUE: 17
PIF_VALUE: 16
PIF_VALUE: 15
PIF_VALUE: 14
PIF_VALUE: 16
PIF_VALUE: 19
PIF_VALUE: 18
PIF_VALUE: 16
PIF_VALUE: 15
PIF_VALUE: 20
PIF_VALUE: 16
PIF_VALUE: 16
PIF_VALUE: 10
PIF_VALUE: 16
PIF_VALUE: 16
PIF_VALUE: 1
PIF_VALUE: 16
PIF_VALUE: 1
PIF_VALUE: 15
PIF_VALUE: 15
PIF_VALUE: 16
PIF_VALUE: 15
PIF_VALUE: 16
PIF_VALUE: 17
PIF_VALUE: 15
PIF_VALUE: 16
PIF_VALUE: 4
PIF_VALUE: 17
PIF_VALUE: 15
PIF_VALUE: 2
PIF_VALUE: 15
PIF_VALUE: 15
PIF_VALUE: 16
PIF_VALUE: 16
PIF_VALUE: 15

## 2020-11-23 ASSESSMENT — PAIN DESCRIPTION - PAIN TYPE
TYPE: ACUTE PAIN
TYPE: ACUTE PAIN

## 2020-11-23 ASSESSMENT — PAIN SCALES - GENERAL
PAINLEVEL_OUTOF10: 0
PAINLEVEL_OUTOF10: 8
PAINLEVEL_OUTOF10: 8
PAINLEVEL_OUTOF10: 0
PAINLEVEL_OUTOF10: 0

## 2020-11-23 ASSESSMENT — PAIN DESCRIPTION - LOCATION
LOCATION: BACK;FLANK
LOCATION: ABDOMEN

## 2020-11-23 ASSESSMENT — PAIN DESCRIPTION - ORIENTATION
ORIENTATION: RIGHT;LEFT
ORIENTATION: RIGHT;LEFT

## 2020-11-23 ASSESSMENT — PAIN DESCRIPTION - FREQUENCY: FREQUENCY: CONTINUOUS

## 2020-11-23 ASSESSMENT — PAIN DESCRIPTION - ONSET: ONSET: ON-GOING

## 2020-11-23 ASSESSMENT — PAIN DESCRIPTION - DESCRIPTORS: DESCRIPTORS: ACHING

## 2020-11-23 ASSESSMENT — PAIN DESCRIPTION - PROGRESSION: CLINICAL_PROGRESSION: GRADUALLY WORSENING

## 2020-11-23 ASSESSMENT — LIFESTYLE VARIABLES: SMOKING_STATUS: 1

## 2020-11-23 NOTE — PROGRESS NOTES
Resting in bed awake. C/o abdominal pain rate as 8/10. Am assessment complete. Alert and oriented. Call light in reach. Patient is able to demonstrate the ability to move from a reclining position to an upright position within the recliner.

## 2020-11-23 NOTE — PROGRESS NOTES
D/c instructions given to pt with prescriptions sent to home pharmacy. Verbalized understanding of instructions. Called for ride to come and . Will call nurse when ride arrives.

## 2020-11-23 NOTE — PROGRESS NOTES
Report given @ bedside to [facility] , for transferral of care. Pt resting in bed. Call light in reach.

## 2020-11-23 NOTE — PROGRESS NOTES
Spoke with Jean Kirkpatrick RN aware that patient needs rapid Covid test prior to surgery, order placed in Epic. Mike Ewing

## 2020-11-23 NOTE — PROGRESS NOTES
Spoke with Mara on [address], report given. Patient sitting up in bed. Vitals stable. Patient denies any pain.

## 2020-11-23 NOTE — FLOWSHEET NOTE
11/22/20 1916   Vital Signs   Temp 97.5 °F (36.4 °C)   Temp Source Oral   Pulse 60   Heart Rate Source Monitor   Resp 16   BP (!) 149/78   BP Location Left upper arm   BP Upper/Lower Upper   Patient Position Semi fowlers   Level of Consciousness 0   MEWS Score 1   Oxygen Therapy   SpO2 99 %   O2 Device None (Room air)   Pt A/O x 4 assessment completed. PM meds given. Pt denies pain or needs at this time. Pt informed of NPO status.  Call light within reach

## 2020-11-23 NOTE — ANESTHESIA PRE PROCEDURE
Department of Anesthesiology  Preprocedure Note       Name:  Stephanie Rodgers   Age:  48 y.o.  :  1965                                          MRN:  [id number]         Date:  2020      Surgeon:  Solomon Ellis MD    Procedure:  CYSTOSCOPY, LEFT  FLEXIBLE URETEROSCOPY , POSSIBLE STENT EXCHANGE    HPI:   48 y. o.female with hx of kidney stones, solitary left kidney presents following stent placement in August for diffuse low back and diffuse abdominal discomfort. Patient has a 1 cm nonobstructing left kidney stone. States her pain is being controlled with medication. Denies nausea, vomiting     CT ABD/Pelvis: The right kidney is absent. There is a nonobstructing 1 cm calculus in the lower pole of the left kidney. There is a left double-J ureteral stent. The proximal aspect of the stent is in the left lower pole collecting system. The distal aspect of the stent is coiled in the distal left ureter posterior to the uterus. [id number]  Sinus tachycardia 130; Nonspecific ST abnormality; When compared with ECG of 2015: Vent.  rate has increased BY 59 BPM    Medications prior to admission:    aspirin 81 MG EC tablet Take 81 mg by mouth daily   hydroCHLOROthiazide (HYDRODIURIL) 25 MG  Take 25 mg by mouth daily   hydrOXYzine (ATARAX) 25 MG tablet Take 25 mg by mouth 3 times daily as needed for Itching   propranolol (INDERAL) 40 MG tablet Take 40 mg by mouth 2 times daily   lisinopril (PRINIVIL;ZESTRIL) 10 MG tablet Take 1 tablet by mouth daily     Current medications:     cefTRIAXone (ROCEPHIN)   1 g Intravenous Q24H    tamsulosin (FLOMAX) capsule 0.4 mg  0.4 mg Oral Daily    hydrALAZINE (APRESOLINE) injection 10 mg  10 mg Intravenous Q6H PRN    acetaminophen (TYLENOL) tablet 650 mg  650 mg Oral Q6H PRN       acetaminophen (TYLENOL) suppository 650 mg  650 mg Rectal Q6H PRN    polyethylene glycol (GLYCOLAX) packet 17 g  17 g Oral Daily PRN    promethazine (PHENERGAN) tablet 12.5 mg  12.5 mg Oral Q6H PRN       ondansetron (ZOFRAN) injection 4 mg  4 mg Intravenous Q6H PRN    enoxaparin (LOVENOX) injection 40 mg  40 mg Subcutaneous Daily    morphine (PF) injection 2 mg  2 mg Intravenous Q4H PRN    carvedilol (COREG) tablet 6.25 mg  6.25 mg Oral BID WC    aluminum & magnesium hydroxide-simethicone (MAALOX) 200-200-20 MG/5ML suspension   30 mL Oral Q6H PRN     Allergies:  No Known Allergies    Problem List:     Septicemia (HCC) C12.8    Complicated UTI (urinary tract infection) N39.0    MARY (acute kidney injury) ([facility] [address] N17.9    Bacteremia R78.81    Kidney stone N20.0    Left flank pain R10.9     Past Medical History:     Depression     Headache(784.0)     Hepatitis C 08/21/2020    Hypertension      Past Surgical History:     CYSTOSCOPY INSERTION / REMOVAL STENT / STONE Left 8/20/2020    CYSTOSCOPY URETERAL STENT INSERTION performed by Alex Simental MD at [place] [address] (Left Bladder)     Social History:     Smoking status: Current Every Day Smoker     Packs/day: 0.50     Types: Cigarettes    Smokeless tobacco: Never Used   Substance Use Topics    Alcohol use: No     Vital Signs (Current):   BP: 146/81  Pulse: 67    Resp: 16  SpO2: 97    Temp: 97 °F (36.1 °C)    Height: 5' 7\" (1.702 m)  (11/21/20)  Weight: 170 lb (77.1 kg)  (11/21/20)    BMI: 26.7             11/22/20 1916 11/23/20 0219 11/23/20 0728   BP: (!) 149/78 136/62 129/69   Pulse: 60 55 60   Resp: 16 16 16   Temp: 97.5 °F (36.4 °C) 96.9 °F (36.1 °C) 97.6 °F (36.4 °C)   SpO2: 99% 99% 98%     BP Readings from Last 3 Encounters:   11/23/20 129/69   08/24/20 (!) 148/68   08/20/20 116/62     NPO Status: >8 hrs                         BMI:   Wt Readings from Last 3 Encounters:   11/21/20 170 lb (77.1 kg)   08/23/20 206 lb 1.6 oz (93.5 kg)   08/24/18 160 lb (72.6 kg)     Body mass index is 26.63 kg/m².     CBC:    WBC 6.6 11/22/2020    HGB 9.9 11/22/2020 HCT 30.2 11/22/2020     11/22/2020     CMP:     11/22/2020    K 4.2 11/22/2020     11/22/2020    CO2 18 11/22/2020    BUN 29 11/22/2020    CREATININE 1.3 11/22/2020    GFRAA 52 11/22/2020    GLUCOSE 68 11/22/2020    PROT 5.5 11/22/2020    CALCIUM 8.0 11/22/2020    BILITOT <0.2 11/22/2020    ALKPHOS 76 11/22/2020    AST 25 11/22/2020    ALT 20 11/22/2020     POC Tests:      11/23/20 0728   POCGLU 81     COVID-19 Screening (If Applicable): COVID19 Not Detected 11/23/2020     Anesthesia Evaluation  Patient summary reviewed and Nursing notes reviewed  Airway: Mallampati: II  TM distance: >3 FB   Neck ROM: full  Mouth opening: > = 3 FB Dental:          Pulmonary: breath sounds clear to auscultation  (+) current smoker    (-) COPD, asthma, recent URI, sleep apnea and wheezes                           Cardiovascular:  Exercise tolerance: good (>4 METS),   (+) hypertension:,     (-) past MI, CABG/stent,  angina,  GASTON and murmur    ECG reviewed  Rhythm: regular  Rate: normal                    Neuro/Psych:   (+) psychiatric history:depression/anxiety             GI/Hepatic/Renal:   (+) GERD: well controlled, hepatitis: C, liver disease:, renal disease: kidney stones and CRI,          ROS comment: Renal: See HPI. Endo/Other:        (-) diabetes mellitus, hypothyroidism, arthritis               Abdominal:           Vascular:     - DVT and PE. Anesthesia Plan      general     ASA 3       Induction: intravenous. MIPS: Prophylactic antiemetics administered. Anesthetic plan and risks discussed with patient. Plan discussed with CRNA.             Ce Hinds MD

## 2020-11-23 NOTE — PROGRESS NOTES
Returned to room via bed in stable condition. Alert and oriented. Up to bsc with standby assist. Pt voided 100 cc of blood tinged urine. Dinner ordered. No complaints or needs at present time. Call light in reach.

## 2020-11-23 NOTE — ANESTHESIA POSTPROCEDURE EVALUATION
Department of Anesthesiology  Postprocedure Note    Patient: Yoselin Curiel  MRN: [id number]  YOB: 1965  Date of evaluation: 11/23/2020    Procedure Summary     Date:  11/23/20 Room / Location:  [place] [address] / [facility]    Anesthesia Start:  1514 Anesthesia Stop:  1709    Procedure:  CYSTOSCOPY, LEFT RIGID AND FLEXIBLE URETEROSCOPY, HOLMIUM LASER LITHOTRIPSY, STONE EXTRACTION, STENT EXCHANGE, REMOVAL MIGRATED STENT (Left ) Diagnosis:       Left ureteral calculus      (Left ureteral calculus)    Surgeon:  Micheline Martinez MD Responsible Provider:  Kell Hrerera MD    Anesthesia Type:  general ASA Status:  3        Anesthesia Type: general    Denise Phase I: Denise Score: 10    Denise Phase II:      Last vitals: Reviewed and per EMR flowsheets.      Anesthesia Post Evaluation   Anesthetic Problems: no   Cardiovascular System Stable: yes  Respiratory Function: Airway Patent yes  ETT no  Ventilator no  Level of consciousness: awake, alert and oriented  Post-op pain: adequate analgesia  Hydration Adequate: yes  Nausea/Vomiting:no  Other Issues:     Giuseppe Lozoya MD

## 2020-11-23 NOTE — PROGRESS NOTES
Progress Note    Admit Date:  11/21/2020    55F with solitary left kidney with left ureteral stent in place presents with flank pain and abdominal discomfort, nausea and vomiting. CT showed a malpositioned left ureteral stent. Uro planning for cysto today    Subjective:  Ms. Bill Harper feels better overall. Objective:   Vitals:    11/22/20 1916 11/23/20 0219 11/23/20 0728 11/23/20 0850   BP: (!) 149/78 136/62 129/69    Pulse: 60 55 60 60   Resp: 16 16 16    Temp: 97.5 °F (36.4 °C) 96.9 °F (36.1 °C) 97.6 °F (36.4 °C)    TempSrc: Oral Oral Oral    SpO2: 99% 99% 98%    Weight:       Height:             Intake/Output Summary (Last 24 hours) at 11/23/2020 1205  Last data filed at 11/23/2020 0853  Gross per 24 hour   Intake 2821.96 ml   Output --   Net 2821.96 ml       Physical Exam:    Gen: No distress. Alert. Eyes: PERRL. No sclera icterus. No conjunctival injection. ENT: No discharge. Pharynx clear. Neck: No JVD. Trachea midline. Resp: No accessory muscle use. No crackles. No wheezes. No rhonchi. CV: Regular rate. Regular rhythm. No murmur. No rub. No edema. GI: Non-tender. Non-distended. Normal bowel sounds. Skin: Warm and dry. No nodule on exposed extremities. No rash on exposed extremities. M/S: No cyanosis. No joint deformity. No clubbing. Neuro: Awake. Grossly nonfocal    Psych: Oriented x 3. No anxiety or agitation. I Berenice Barreto have reviewed the chart on [employer] and personally interviewed and examined patient, reviewed the data (labs and imaging) and after discussion with my PA formulated the plan. Agree with note with the following edits. Physical exam:    /69   Pulse 60   Temp 97.6 °F (36.4 °C) (Oral)   Resp 16   Ht 5' 7\" (1.702 m)   Wt 170 lb (77.1 kg)   LMP 12/01/2014   SpO2 98%   BMI 26.63 kg/m²     Gen: No distress. Alert. Eyes: PERRL. No sclera icterus. No conjunctival injection. ENT: No discharge. Pharynx clear.    Neck: Trachea midline. Normal thyroid. Resp: No accessory muscle use. No crackles. No wheezes. No rhonchi. No dullness on percussion. CV: Regular rate. Regular rhythm. No murmur or rub. No edema. GI: Non-tender. Non-distended. No masses. No organomegaly. Normal bowel sounds. No hernia. Skin: Warm and dry. No nodule on exposed extremities. No rash on exposed extremities. Lymph: No cervical LAD. No supraclavicular LAD. M/S: No cyanosis. No joint deformity. No clubbing. Neuro: Awake. Moves all 4 extremities, non focal  Psych: Oriented x 3. No anxiety or agitation. ELIZABETH Mcleod        Scheduled Meds:   cefTRIAXone (ROCEPHIN) IV  1 g Intravenous Q24H    tamsulosin  0.4 mg Oral Daily    sodium chloride flush  10 mL Intravenous 2 times per day    enoxaparin  40 mg Subcutaneous Daily    carvedilol  6.25 mg Oral BID WC       Continuous Infusions:   sodium chloride 100 mL/hr at 11/23/20 0854       PRN Meds:  hydrALAZINE, sodium chloride flush, acetaminophen **OR** acetaminophen, polyethylene glycol, promethazine **OR** ondansetron, morphine, aluminum & magnesium hydroxide-simethicone      Data:  CBC:   Recent Labs     11/21/20 1845 11/22/20  0747   WBC 12.0* 6.6   HGB 12.3 9.9*   HCT 38.4 30.2*   MCV 83.9 83.4    127*     BMP:   Recent Labs     11/21/20 1845 11/22/20  0747    137  137   K 4.4 4.2  4.2    113*  112*   CO2 20* 17*  18*   BUN 31* 29*  28*   CREATININE 1.7* 1.3*  1.3*     LIVER PROFILE:   Recent Labs     11/21/20 1845 11/22/20  0747   AST 37 25   ALT 28 20   LIPASE 45.0  --    BILITOT <0.2 <0.2   ALKPHOS 107 76     PT/INR: No results for input(s): PROTIME, INR in the last 72 hours. CULTURES  Urine: NGTD     RADIOLOGY  CT ABDOMEN PELVIS WO CONTRAST Additional Contrast? None   Final Result   Malpositioned left double-J ureteral stent with the distal aspect of the   stent in the distal left ureter.       1 cm nonobstructing calculus in the lower pole of the left kidney. Absent right kidney. Assessment/Plan:    #Displaced left JJ ureteral stent  #Nonobstructing 1 cm stone left kidney  #Solitary left kidney   - seen by uro- to OR today for stent exchange   - planned for ESWL in December   - pain control  - urine cx is NG- continue Rocephin for now with procedure planned  - cont flomax     #MARY  - Baseline Crt ~0.9  - Suspect multifactorial related to misplaced ureteral stent, GI losses, low BP  - Crt 1.7 on admit-> 1.3  - Cont IVF and BP monitoring, URO for stent exchange as above   - HCTZ held    #NAGMA  - CO2 21-> 17  - she is on IV NS. Repeat BMP now, if remains acidotic will change fluids to add bicarb    #HTN  - coreg continued with holding parameters  - HCTZ held     #Normocytic anemia   - suspect h/h drop since admit is dilutional  - Hb 9.9 today consistent with CBC 8/2020  - needs OP anemia work up with PCP- she stated understanding     #Thrombocytopenia  - CBC daily     #Migraine Headaches  - home propranolol held    DVT Prophylaxis: Lovenox   Diet: Diet NPO, After Midnight  Code Status: Full Code    Bren Walter PA-C  11/23/2020 12:13 PM      1. Abdominal pain. Displaced left ureteral stent. Cystoscopy and stent exchange today. 2.  Acute kidney injury and non-anion gap metabolic acidosis. Continue IV fluids. Follow creatinine levels closely. KIRTI Mcleod.

## 2020-11-23 NOTE — PROGRESS NOTES
Urology Attending Progress Note      Subjective: Patient states feels \"fine today\"    48 y. o.female with hx of kidney stones, solitary left kidney presents following stent placement in August for diffuse low back and diffuse abdominal discomfort. Patient has a 1 cm nonobstructing left kidney stone. States her pain is being controlled with medication. Denies nausea, vomiting    Vitals:  /69   Pulse 60   Temp 97.6 °F (36.4 °C) (Oral)   Resp 16   Ht 5' 7\" (1.702 m)   Wt 170 lb (77.1 kg)   LMP 2014   SpO2 98%   BMI 26.63 kg/m²   Temp  Av.5 °F (36.4 °C)  Min: 96.9 °F (36.1 °C)  Max: 97.8 °F (36.6 °C)    Intake/Output Summary (Last 24 hours) at 2020 9406  Last data filed at 2020 1708  Gross per 24 hour   Intake 2185.96 ml   Output --   Net 2185.96 ml       Exam:    Well developed, well nourished in no acute distress   Orientated to time and place   Neck is supple, trachea is midline   Respiratory effort is normal without distress   Cardiovascular show no extremity swelling   Abdomen soft, nontender, nondistended, no guarding. No masses or hernias are palpated.     Skin warm and dry, exposed skin shows no abnormal lesions, rashes   Musculoskeletal no digital cyanosis, head normocephalic   Psych shows normal mood and affect, alert and appropriately answers questions   No CVAT    Labs:  WBC:    Lab Results   Component Value Date    WBC 6.6 2020     Hemoglobin/Hematocrit:    Lab Results   Component Value Date    HGB 9.9 2020    HCT 30.2 2020     BMP:    Lab Results   Component Value Date     2020     2020    K 4.2 2020    K 4.2 2020     2020     2020    CO2 18 2020    CO2 17 2020    BUN 28 2020    BUN 29 2020    LABALBU 2.8 2020    CREATININE 1.3 2020    CREATININE 1.3 2020    CALCIUM 8.0 2020    CALCIUM 8.3 2020    GFRAA 52 2020    GFRAA 52 11/22/2020    LABGLOM 43 11/22/2020    LABGLOM 43 11/22/2020     PT/INR:  No results found for: PROTIME, INR  PTT:  No results found for: APTT[APTT    Urine Culture:  No growth    Antibiotic Therapy:  Rocephin    Imaging:   Impression    Malpositioned left double-J ureteral stent with the distal aspect of the    stent in the distal left ureter.         1 cm nonobstructing calculus in the lower pole of the left kidney.         Absent right kidney. Impression/Plan:   48 y. o.female with hx of kidney stones, solitary kidney has 1 cm nonobstructing left kidney stone. Patient had stent placed in August. Returned to hospital for diffuse low back and abdominal pain that has been progressively becomign worse over the last month. Patient has been able to urinate. Creatinine was 1.3 yesterday down from 1.7 the day before.  CT abd/pelv showed left stent displacement.     -Patient is NPO  -Add on for uretero possible stent exchange  -She is scheduled to have ESWL in early December      [place], [place]

## 2020-11-23 NOTE — PROGRESS NOTES
Bedside report given to [facility]  pt in stable condition no needs at this time.  Call light within reach

## 2020-11-23 NOTE — PROGRESS NOTES
Dr Corina Cabrera at bedside talking with patient. Patient is alert and orientedx3, resp even and unlabored. Patient denies any pain at this time. Will continue to monitor.

## 2020-11-23 NOTE — BRIEF OP NOTE
Brief Postoperative Note      Patient: Man Soto  YOB: 1965  MRN: [id number]    Date of Procedure: 11/23/2020    Pre-Op Diagnosis: Left ureteral calculus, migrated left ureteral stent    Post-Op Diagnosis: Same       Procedure(s):  CYSTOSCOPY, LEFT RIGID AND FLEXIBLE URETEROSCOPY, HOLMIUM LASER LITHOTRIPSY, STONE EXTRACTION, STENT EXCHANGE, REMOVAL MIGRATED STENT    Surgeon(s):  Shady Nichole MD    Assistant:  * No surgical staff found *    Anesthesia: General    Estimated Blood Loss (mL): Minimal    Complications: None    Specimens:   ID Type Source Tests Collected by Time Destination   A :  Stone (Calculus) Kidney SURGICAL PATHOLOGY Shady Nichole MD 11/23/2020 1545        Implants:  Implant Name Type Inv.  Item Serial No.  Lot No. LRB No. Used Action   STENT URET 6FR L24CM PERCFLX HYDR+ SFT PGTL TAPR TIP W/O  STENT URET 6FR L24CM PERCFLX HYDR+ SFT PGTL TAPR TIP W/O  [employer] UROLOGY- [id number] Left 1 Implanted         Drains:   Ureteral Catheter Left ureter  (Active)       Findings: Migrated left ureteral stent, lower pole stone 9 mm     Electronically signed by Wilbert Young MD on 11/23/2020 at 4:06 PM

## 2020-11-24 NOTE — ED PROVIDER NOTES
Coronary Art Dis Father         Social History     Socioeconomic History    Marital status: Single     Spouse name: None    Number of children: None    Years of education: None    Highest education level: None   Occupational History    None   Social Needs    Financial resource strain: None    Food insecurity     Worry: None     Inability: None    Transportation needs     Medical: None     Non-medical: None   Tobacco Use    Smoking status: Current Every Day Smoker     Packs/day: 0.50     Types: Cigarettes    Smokeless tobacco: Never Used   Substance and Sexual Activity    Alcohol use: No    Drug use: No    Sexual activity: Not Currently   Lifestyle    Physical activity     Days per week: None     Minutes per session: None    Stress: None   Relationships    Social connections     Talks on phone: None     Gets together: None     Attends Jain service: None     Active member of club or organization: None     Attends meetings of clubs or organizations: None     Relationship status: None    Intimate partner violence     Fear of current or ex partner: None     Emotionally abused: None     Physically abused: None     Forced sexual activity: None   Other Topics Concern    None   Social History Narrative    None        Review of Systems   10 total systems reviewed and found to be negative unless otherwise noted in HPI     Physical Exam   /71   Pulse 58   Temp 98 °F (36.7 °C) (Oral)   Resp 18   Ht 5' 7\" (1.702 m)   Wt 170 lb (77.1 kg)   LMP 12/01/2014   SpO2 99%   BMI 26.63 kg/m²      CONSTITUTIONAL: Well appearing, in acute distress   HEAD: atraumatic, normocephalic   EYES: PERRL, No injection, discharge or scleral icterus. ENT: Moist mucous membranes. NECK: Normal ROM, NO LAD   CARDIOVASCULAR: Regular rate and rhythm. No murmurs or gallop. PULMONARY/CHEST: Airway patent. No retractions. Breath sounds clear with good air entry bilaterally.    ABDOMEN: Soft, Non-distended and non-tender, without guarding or rebound. SKIN: Acyanotic, warm, dry   MUSCULOSKELETAL: No swelling, tenderness or deformity   NEUROLOGICAL: Awake and oriented x 3. Pulses intact. Grossly nonfocal   Nursing note and vitals reviewed.      ED Course & Medical Decision Making   Medications   0.9 % sodium chloride bolus (0 mLs Intravenous Stopped 11/21/20 2111)   ketorolac (TORADOL) injection 15 mg (15 mg Intravenous Given 11/21/20 1924)   fentaNYL (SUBLIMAZE) injection 50 mcg (50 mcg Intravenous Given 11/21/20 1923)   0.9 % sodium chloride bolus (0 mLs Intravenous Stopped 11/21/20 2353)      Labs Reviewed   CBC WITH AUTO DIFFERENTIAL - Abnormal; Notable for the following components:       Result Value    WBC 12.0 (*)     RDW 16.9 (*)     All other components within normal limits    Narrative:     Performed at:  [facility],  [facility]   Phone [phone]   COMPREHENSIVE METABOLIC PANEL W/ REFLEX TO MG FOR LOW K - Abnormal; Notable for the following components:    CO2 20 (*)     Glucose 109 (*)     BUN 31 (*)     CREATININE 1.7 (*)     GFR Non- 31 (*)     GFR African American 38 (*)     Albumin/Globulin Ratio 1.0 (*)     All other components within normal limits    Narrative:     Performed at:  [facility],  [employer]   Phone [phone]   URINE RT REFLEX TO CULTURE - Abnormal; Notable for the following components:    Color, UA RED (*)     Clarity, UA SL CLOUDY (*)     Bilirubin Urine MODERATE (*)     Ketones, Urine TRACE (*)     Blood, Urine LARGE (*)     Protein, UA >=300 (*)     Nitrite, Urine POSITIVE (*)     Leukocyte Esterase, Urine LARGE (*)     All other components within normal limits    Narrative:     Performed at:  [facility],  [employer]   Phone [phone]   MICROSCOPIC URINALYSIS - Abnormal; Notable for the following components:    WBC, UA >100 (*)     RBC, UA >100 (*)     Epithelial Cells, UA 6-10 (*)     Bacteria, UA 1+ (*)     All other components within normal limits    Narrative:     Performed at:  [facility] [address],  [employer]   Phone [phone]   BASIC METABOLIC PANEL W/ REFLEX TO MG FOR LOW K - Abnormal; Notable for the following components:    Chloride 112 (*)     CO2 18 (*)     Glucose 68 (*)     BUN 28 (*)     CREATININE 1.3 (*)     GFR Non- 43 (*)     GFR  52 (*)     Calcium 8.0 (*)     All other components within normal limits    Narrative:     Performed at:  [facility] [address],  [employer]   Phone [phone]   CBC WITH AUTO DIFFERENTIAL - Abnormal; Notable for the following components:    RBC 3.62 (*)     Hemoglobin 9.9 (*)     Hematocrit 30.2 (*)     RDW 16.8 (*)     Platelets 291 (*)     All other components within normal limits    Narrative:     Performed at:  [facility] [address],  [employer]   Phone [phone]   COMPREHENSIVE METABOLIC PANEL W/ REFLEX TO MG FOR LOW K - Abnormal; Notable for the following components:    Chloride 113 (*)     CO2 17 (*)     Glucose 66 (*)     BUN 29 (*)     CREATININE 1.3 (*)     GFR Non- 43 (*)     GFR  52 (*)     Total Protein 5.5 (*)     Alb 2.8 (*)     Albumin/Globulin Ratio 1.0 (*)     All other components within normal limits    Narrative:     Performed at:  [facility]) - [facility],  [email or web address] [employer]   Phone [phone]   BASIC METABOLIC PANEL - Abnormal; Notable for the following components:    Chloride 112 (*)     Glucose 112 (*)     All other components within normal limits    Narrative:     Performed at:  [facility]) - [facility], [place], [facility]   Phone [phone]   CULTURE, URINE    Narrative:     ORDER#: [id number]                          ORDERED BY: Luana Dunn  SOURCE: Urine Clean Catch                  COLLECTED:  11/21/20 18:45  ANTIBIOTICS AT LATANYA.:                      RECEIVED :  11/21/20 19:06  Performed at:  [facility]  [address] [facility], [place] [address]   Phone [phone]   STONE ANALYSIS   LIPASE    Narrative:     Performed at:  [facility] [address],  [place], [facility]   Phone [phone]    Narrative:     Performed at:  [facility] [address],  [place], [facility]   Phone [phone]   CBC WITH AUTO DIFFERENTIAL   URINE RT REFLEX TO CULTURE   SURGICAL PATHOLOGY   SURGICAL PATHOLOGY   POCT GLUCOSE    Narrative:     Performed at:  [facility] [address],  [place], [facility]   Phone [phone]   POCT GLUCOSE    Narrative:     Performed at:  [facility] [address],  [place], [facility]   Phone [phone]      FL LESS THAN 1 HOUR   Final Result   Intraprocedural fluoroscopic spot images as above. Correlate with procedural report. CT ABDOMEN PELVIS WO CONTRAST Additional Contrast? None   Final Result   Malpositioned left double-J ureteral stent with the distal aspect of the   stent in the distal left ureter. 1 cm nonobstructing calculus in the lower pole of the left kidney. Absent right kidney. Ct Abdomen Pelvis Wo Contrast Additional Contrast? None    Result Date: 11/21/2020  EXAMINATION: CT OF THE ABDOMEN AND PELVIS WITHOUT CONTRAST 11/21/2020 7:07 pm TECHNIQUE: CT of the abdomen and pelvis was performed without the administration of intravenous contrast. Multiplanar reformatted images are provided for review.  Dose modulation, iterative reconstruction, 1 Hour    Result Date: 11/23/2020  EXAMINATION: SPOT FLUOROSCOPIC IMAGES 11/23/2020 4:16 pm TECHNIQUE: Fluoroscopy was provided by the radiology department for procedure. Radiologist was not present during examination. FLUOROSCOPY DOSE AND TYPE OR TIME AND EXPOSURES: 65 seconds fluoroscopy time, 28 spot images of the left flank COMPARISON: None HISTORY: ORDERING SYSTEM PROVIDED HISTORY: stent placement TECHNOLOGIST PROVIDED HISTORY: Reason for exam:->left stent placement Reason for Exam: left stent placement Acuity: Acute Type of Exam: Initial Intraprocedural imaging. FINDINGS: Presumed images left flank (no markers) demonstrate sequential left ureter guidewire placement followed by ureteroscopy, stone removal and double-J left ureter stent placement. Intraprocedural fluoroscopic spot images as above. Correlate with procedural report. PROCEDURES:   Procedures    ASSESSMENT AND PLAN:  Salty Zavala is a 48 y.o. female presenting with left flank pain. Exam was unremarkable apart from the fact that she was in discomfort. Vitals within normal limits. Nonetheless I did obtain a CT scan to evaluate for stent versus stone. CT showed mild placement of the stent. Labs also obtained with UA concerning for urinary tract infection. As of this findings I did consult with urology who recommended patient be admitted for possible surgical procedure the next day. He was consulted and patient was admitted to their service for further evaluation and treatment. She was given a dose of ceftriaxone in the emergency room. ClINICAL IMPRESSION:  1. Left ureteral calculus          DISPOSITION Admitted 11/21/2020 08:57:42 PM   -Findings and recommendations explained to patient. She expressed understanding and agreed with the plan.   Frandy Corral MD (electronically

## 2020-11-24 NOTE — OP NOTE
[address]                 [address] [address]                                OPERATIVE REPORT    PATIENT NAME: Tyler Martin                     :        1965  MED REC NO:   [id number]                          ROOM:       0221  ACCOUNT NO:   [de-identified]                           ADMIT DATE: 2020  PROVIDER:     Troy Francisco MD    DATE OF PROCEDURE:  2020    PREOPERATIVE DIAGNOSES:  1. Left renal calculus. 2.  Left hydronephrosis and renal colic. 3.  Migration of left double-J ureteral stent. POSTOPERATIVE DIAGNOSES:  1. Left renal calculus. 2.  Left hydronephrosis and renal colic. 3.  Migration of left double-J ureteral stent. OPERATIONS PERFORMED:  1. Flexible ureteroscopy with holmium laser lithotripsy and partial  stone extraction. 2.  Rigid ureteroscopy with extraction of migrated ureteral stent. 3.  Cystourethroscopy with placement of 6 x 24 double-J ureteral stent. PRIMARY SURGEON:  Troy Francisco MD    ANESTHESIA:  General.    OPERATIVE FINDINGS:  1.  Large stone in lower pole of left kidney. 2.  Migration of left ureteral stent into the distal ureter. ESTIMATED BLOOD LOSS:  5 mL. HISTORY AND INDICATIONS: A 60-year-old white female who presented to the  hospital with worsening abdominal and flank pain. She has a known  history of kidney stones and last August had undergone a cystoscopy with  stent placement. She had been seen in the office last week and was  scheduled for a trial of left ESWL. The patient has basically a  functional solitary kidney on the left with a small atrophic kidney on  the right. As part of her workup, she had undergone another CAT scan  prior to admission and this showed the stone to be in the lower pole of  the left kidney with some hydronephrosis, but also migration of the  ureteral stent into the distal ureter.   Options were discussed with the  patient and she elected to proceed forth with today's procedure. Risks,  benefits, and expected outcomes of the procedure have been discussed. DETAILS OF THE PROCEDURE:  After obtaining informed consent, the patient  was taken to the operative suite. She was given a general anesthetic  and placed on the operative table in a modified dorsal lithotomy  position. Prepping and draping was done in a sterile fashion. Cystourethroscopy was then performed. The bladder itself was intact  with no evidence of any bladder cancer, tumors, or stones. Both  ureteral orifices were orthotopic with good efflux from each ureteral  orifice identified. At this point under fluoroscopic guidance, a Glidewire was then placed  into the left ureteral orifice beyond the level of her stent and again  there was good efflux of urine. This was advanced into the patient's  renal pelvis. A rigid ureteroscope was then slid alongside the  Glidewire and the end of the double-J stent was identified. Using a  [employer] flat wire basket, the stent was engaged with the basket and then  extracted using this technique out of the patient's kidney. Over the top of the existing Glidewire, an access sheath was then placed  up into the patient's renal pelvis. The flexible ureteroscope was then  advanced through the access sheath into the patient's renal pelvis and  was able to be manipulated into the lower pole of the kidney where the  stone was identified. Using a holmium laser, the stone was then  fractured into multiple smaller pieces and a nitinol zero-tipped basket  used through the flexible ureteroscope was able to extract several of  these out of the patient's kidney with these fragments being sent for  pathologic analysis. At this point with the stone fractured, the Glidewire was then placed  back through the access sheath and confirmed to be in the patient's  renal pelvis.   A 6 x 24 double-J stent was then placed over top of the  Glidewire once it had been back loaded into the cystoscope. An adequate  curl was noted in the patient's renal pelvis as well as her bladder  indicating appropriate placement of the stent. The patient's bladder  was then drained and lidocaine jelly was applied. She was taken back to  the postop recovery room in stable condition. Postoperative consultation was done with the patient after she had  recovered. She understood that post discharge, she would need to follow  up in the office in the next 10-14 days and will need to be scheduled  for a cystourethroscopy and stent removal in the next few weeks. She  understands that this is important to maintain the integrity and  function of her left kidney, which per CAT scan is likely the majority  of her kidney function at this point. The patient voiced an  understanding of these issues and will call the office post discharge to  arrange followup visit, evaluation, and further surgical procedures.         Merna Banks MD    D: 11/24/2020 9:45:48       T: 11/24/2020 9:50:54     [email or web address]  Job#: [id number]     Doc#: [id number]    CC:

## 2020-11-28 LAB
CALCULI COMPOSITION: NORMAL
MASS: 26 MG
STONE DESCRIPTION: NORMAL
STONE NUMBER: 4
STONE SIZE: NORMAL MM

## 2020-11-30 NOTE — PROGRESS NOTES
Physician Progress Note      PATIENTDonjeanna Jurado  CSN #:                  [id number]  :                       1965  ADMIT DATE:       2020 6:31 PM  [address] [place] DATE:        2020 7:20 PM  RESPONDING  PROVIDER #:        Jared Peralta MD          QUERY TEXT:    Dear Dr. Artie Lozano,  Patient admitted with left hydronephrosis, renal colic, and migration of left   double-J ureteral stent. Documentation reflects Sepsis in the h/p. If   possible, please document in the progress notes and discharge summary if   Sepsis was: The medical record reflects the following:  Risk Factors: renal colic, stent migration  Clinical Indicators: per h/p: Patient is diagnosed to have acute renal failure   and sepsis, UTI, per PN on : urine cx is NG- continue Rocephin for now   with procedure planned, wbc-12  Treatment: Rocephin IV, 2000cc NS bolus    Thank you for your assistance,  Nicki Louise RN,BSN,CCDS,CRCR  Options provided:  -- Sepsis and UTI ruled out after study  -- Sepsis and UTI treated and resolved  -- Other - I will add my own diagnosis  -- Disagree - Not applicable / Not valid  -- Disagree - Clinically unable to determine / Unknown  -- Refer to Clinical Documentation Reviewer    PROVIDER RESPONSE TEXT:    Sepsis and UTI ruled out after study. Query created by: Te Nascimento on 2020 7:47 AM      QUERY TEXT:    Dear Dr. Artie Lozano,  Pt admitted with Left hydronephrosis and renal colic . Pt noted to have   migration of left double-J ureteral stent. If possible, please document in progress notes and discharge summary the   relationship, if any, between left hydronephrosis and migration of left   double-J stent.     The medical record reflects the following:  Risk Factors: Left renal calculus, Left hydronephrosis and renal colic  Clinical Indicators: Migration of left ureteral stent into the distal ureter  Treatment: holmium laser lithotripsy and partial stone

## 2020-11-30 NOTE — DISCHARGE SUMMARY
Name:  Mallory Lawson  Room:  0221/0221-02  MRN:    [id number]    Discharge Summary      This discharge summary is in conjunction with a complete physical exam done on the day of discharge. Attending Physician: ELIZABETH Mcleod Discharging Physician: KIRTI Mcleod. Admit: 11/21/2020  Discharge:  11/23/2020    Diagnoses this Admission    Active Problems:    Complicated UTI (urinary tract infection)    Renal calculi    Generalized abdominal pain  Resolved Problems:    * No resolved hospital problems. *          Procedures (Please Review Full Report for Details)     RADIOLOGY  CT ABDOMEN PELVIS WO CONTRAST Additional Contrast? None   Final Result   Malpositioned left double-J ureteral stent with the distal aspect of the   stent in the distal left ureter.       1 cm nonobstructing calculus in the lower pole of the left kidney.       Absent right kidney. CYSTOSCOPY, LEFT RIGID AND FLEXIBLE URETEROSCOPY, HOLMIUM LASER LITHOTRIPSY, STONE EXTRACTION, STENT EXCHANGE, REMOVAL MIGRATED STENT     Consults    Urology     HPI:  48 y. o. female history of hypertension, UTI, hypoplasia right kidney who presented with left flank pain and nausea.    Patient is diagnosed to have acute renal failure and sepsis  CT scan shows malposition of J stent ureter. Patient was admitted here in August with obstructing stone she had a stent placement at that time. For the last 1 month she has been having more pain and progressively gotten worse.   Past medical history significant for hypertension           Hospital Course  #Displaced left JJ ureteral stent  #Nonobstructing 1 cm stone left kidney  #Solitary left kidney   - seen by uro-  OR today for stent exchange - done   - planned for ESWL in December   - pain control  - urine cx is NG  - cont flomax      #MARY  - Baseline Crt ~0.9  - Suspect multifactorial related to misplaced ureteral stent, GI losses, low BP  - Crt 1.7 on admit-> 1.3  - Cont IVF and BP monitoring, URO for stent exchange as above   - HCTZ held     #NAGMA  - CO2 21-> 17  - she is on IV NS. Repeat BMP now, if remains acidotic will change fluids to add bicarb  Resolved      #HTN  - coreg continued with holding parameters  - HCTZ held      #Normocytic anemia   - suspect h/h drop since admit is dilutional  - Hb 9.9 today consistent with CBC 8/2020  - needs OP anemia work up with PCP- she stated understanding      #Thrombocytopenia  - CBC daily      #Migraine Headaches  - home propranolol held       Discharge Medications     Medication List      CHANGE how you take these medications    carvedilol 6.25 MG tablet  Commonly known as:  COREG  Take 1 tablet by mouth 2 times daily (with meals)  What changed:    · medication strength  · how much to take        CONTINUE taking these medications    aspirin 81 MG EC tablet     hydroCHLOROthiazide 25 MG tablet  Commonly known as:  HYDRODIURIL     hydrOXYzine 25 MG tablet  Commonly known as:  ATARAX     lisinopril 10 MG tablet  Commonly known as:  PRINIVIL;ZESTRIL  Take 1 tablet by mouth daily     tamsulosin 0.4 MG capsule  Commonly known as:  FLOMAX  Take 1 capsule by mouth daily        STOP taking these medications    propranolol 40 MG tablet  Commonly known as:  INDERAL           Where to Get Your Medications      These medications were sent to [id number], [place] - [address] [facility] - P [phone] - F [phone]  [address] [place] [address]    Phone:  [phone]   · carvedilol 6.25 MG tablet  · tamsulosin 0.4 MG capsule           Discharge Condition/Location: Stable    Follow Up: Follow up with PCP.     Total time spent on discharge is 35 minutes      [facility] [place] PAVITHRA 11/30/2020 11:45 AM

## 2020-12-28 ENCOUNTER — APPOINTMENT (OUTPATIENT)
Dept: CT IMAGING | Age: 55
End: 2020-12-28
Payer: COMMERCIAL

## 2020-12-28 ENCOUNTER — HOSPITAL ENCOUNTER (EMERGENCY)
Age: 55
Discharge: HOME | End: 2020-12-28
Payer: COMMERCIAL

## 2020-12-28 VITALS
WEIGHT: 170 LBS | OXYGEN SATURATION: 96 % | TEMPERATURE: 97.6 F | DIASTOLIC BLOOD PRESSURE: 77 MMHG | RESPIRATION RATE: 18 BRPM | SYSTOLIC BLOOD PRESSURE: 168 MMHG | BODY MASS INDEX: 26.63 KG/M2 | HEART RATE: 59 BPM

## 2020-12-28 LAB
ANION GAP SERPL CALCULATED.3IONS-SCNC: 7 MMOL/L (ref 3–16)
BACTERIA: ABNORMAL /HPF
BASOPHILS ABSOLUTE: 0.1 K/UL (ref 0–0.2)
BASOPHILS RELATIVE PERCENT: 1 %
BILIRUBIN URINE: NEGATIVE
BLOOD, URINE: ABNORMAL
BUN BLDV-MCNC: 12 MG/DL (ref 7–20)
CALCIUM SERPL-MCNC: 9.1 MG/DL (ref 8.3–10.6)
CHLORIDE BLD-SCNC: 103 MMOL/L (ref 99–110)
CLARITY: CLEAR
CO2: 25 MMOL/L (ref 21–32)
COLOR: YELLOW
CREAT SERPL-MCNC: 0.9 MG/DL (ref 0.6–1.1)
EOSINOPHILS ABSOLUTE: 0.1 K/UL (ref 0–0.6)
EOSINOPHILS RELATIVE PERCENT: 1.4 %
GFR AFRICAN AMERICAN: >60
GFR NON-AFRICAN AMERICAN: >60
GLUCOSE BLD-MCNC: 104 MG/DL (ref 70–99)
GLUCOSE URINE: NEGATIVE MG/DL
HCT VFR BLD CALC: 32.5 % (ref 36–48)
HEMOGLOBIN: 10.3 G/DL (ref 12–16)
KETONES, URINE: NEGATIVE MG/DL
LEUKOCYTE ESTERASE, URINE: ABNORMAL
LYMPHOCYTES ABSOLUTE: 1.7 K/UL (ref 1–5.1)
LYMPHOCYTES RELATIVE PERCENT: 19.3 %
MAGNESIUM: 2.1 MG/DL (ref 1.8–2.4)
MCH RBC QN AUTO: 25.4 PG (ref 26–34)
MCHC RBC AUTO-ENTMCNC: 31.6 G/DL (ref 31–36)
MCV RBC AUTO: 80.3 FL (ref 80–100)
MICROSCOPIC EXAMINATION: YES
MONOCYTES ABSOLUTE: 1.2 K/UL (ref 0–1.3)
MONOCYTES RELATIVE PERCENT: 14.4 %
NEUTROPHILS ABSOLUTE: 5.4 K/UL (ref 1.7–7.7)
NEUTROPHILS RELATIVE PERCENT: 63.9 %
NITRITE, URINE: POSITIVE
PDW BLD-RTO: 16.3 % (ref 12.4–15.4)
PH UA: 7 (ref 5–8)
PLATELET # BLD: 165 K/UL (ref 135–450)
PMV BLD AUTO: 10 FL (ref 5–10.5)
POTASSIUM REFLEX MAGNESIUM: 3.3 MMOL/L (ref 3.5–5.1)
PROTEIN UA: 100 MG/DL
RBC # BLD: 4.05 M/UL (ref 4–5.2)
RBC UA: >100 /HPF (ref 0–4)
SODIUM BLD-SCNC: 135 MMOL/L (ref 136–145)
SPECIFIC GRAVITY UA: 1.02 (ref 1–1.03)
URINE REFLEX TO CULTURE: YES
URINE TYPE: ABNORMAL
UROBILINOGEN, URINE: 1 E.U./DL
WBC # BLD: 8.5 K/UL (ref 4–11)
WBC UA: >100 /HPF (ref 0–5)

## 2020-12-28 PROCEDURE — 87077 CULTURE AEROBIC IDENTIFY: CPT

## 2020-12-28 PROCEDURE — 96365 THER/PROPH/DIAG IV INF INIT: CPT

## 2020-12-28 PROCEDURE — 83735 ASSAY OF MAGNESIUM: CPT

## 2020-12-28 PROCEDURE — 2580000003 HC RX 258: Performed by: PHYSICIAN ASSISTANT

## 2020-12-28 PROCEDURE — 96375 TX/PRO/DX INJ NEW DRUG ADDON: CPT

## 2020-12-28 PROCEDURE — 87086 URINE CULTURE/COLONY COUNT: CPT

## 2020-12-28 PROCEDURE — 85025 COMPLETE CBC W/AUTO DIFF WBC: CPT

## 2020-12-28 PROCEDURE — 87186 SC STD MICRODIL/AGAR DIL: CPT

## 2020-12-28 PROCEDURE — 99283 EMERGENCY DEPT VISIT LOW MDM: CPT

## 2020-12-28 PROCEDURE — 6360000002 HC RX W HCPCS: Performed by: PHYSICIAN ASSISTANT

## 2020-12-28 PROCEDURE — 74176 CT ABD & PELVIS W/O CONTRAST: CPT

## 2020-12-28 PROCEDURE — 81001 URINALYSIS AUTO W/SCOPE: CPT

## 2020-12-28 PROCEDURE — 80048 BASIC METABOLIC PNL TOTAL CA: CPT

## 2020-12-28 RX ORDER — ONDANSETRON 2 MG/ML
4 INJECTION INTRAMUSCULAR; INTRAVENOUS ONCE
Status: COMPLETED | OUTPATIENT
Start: 2020-12-28 | End: 2020-12-28

## 2020-12-28 RX ORDER — CEPHALEXIN 500 MG/1
500 CAPSULE ORAL 4 TIMES DAILY
Qty: 28 CAPSULE | Refills: 0 | Status: SHIPPED | OUTPATIENT
Start: 2020-12-28 | End: 2021-01-04

## 2020-12-28 RX ORDER — HYDROCODONE BITARTRATE AND ACETAMINOPHEN 5; 325 MG/1; MG/1
1 TABLET ORAL EVERY 6 HOURS PRN
Qty: 12 TABLET | Refills: 0 | Status: SHIPPED | OUTPATIENT
Start: 2020-12-28 | End: 2020-12-31

## 2020-12-28 RX ORDER — TAMSULOSIN HYDROCHLORIDE 0.4 MG/1
0.4 CAPSULE ORAL DAILY
Qty: 5 CAPSULE | Refills: 0 | Status: SHIPPED | OUTPATIENT
Start: 2020-12-28 | End: 2021-01-08 | Stop reason: ALTCHOICE

## 2020-12-28 RX ORDER — SODIUM CHLORIDE, SODIUM LACTATE, POTASSIUM CHLORIDE, CALCIUM CHLORIDE 600; 310; 30; 20 MG/100ML; MG/100ML; MG/100ML; MG/100ML
1000 INJECTION, SOLUTION INTRAVENOUS ONCE
Status: COMPLETED | OUTPATIENT
Start: 2020-12-28 | End: 2020-12-28

## 2020-12-28 RX ORDER — MORPHINE SULFATE 4 MG/ML
8 INJECTION, SOLUTION INTRAMUSCULAR; INTRAVENOUS ONCE
Status: COMPLETED | OUTPATIENT
Start: 2020-12-28 | End: 2020-12-28

## 2020-12-28 RX ADMIN — SODIUM CHLORIDE, POTASSIUM CHLORIDE, SODIUM LACTATE AND CALCIUM CHLORIDE 1000 ML: 600; 310; 30; 20 INJECTION, SOLUTION INTRAVENOUS at 17:34

## 2020-12-28 RX ADMIN — MORPHINE SULFATE 8 MG: 4 INJECTION INTRAVENOUS at 17:34

## 2020-12-28 RX ADMIN — CEFTRIAXONE SODIUM 1 G: 1 INJECTION, POWDER, FOR SOLUTION INTRAMUSCULAR; INTRAVENOUS at 17:52

## 2020-12-28 RX ADMIN — ONDANSETRON HYDROCHLORIDE 4 MG: 2 INJECTION, SOLUTION INTRAMUSCULAR; INTRAVENOUS at 17:34

## 2020-12-28 ASSESSMENT — ENCOUNTER SYMPTOMS
VOMITING: 0
NAUSEA: 0
EYE PAIN: 0
BACK PAIN: 0
SORE THROAT: 0
SHORTNESS OF BREATH: 0
ABDOMINAL PAIN: 0
COUGH: 0

## 2020-12-28 ASSESSMENT — PAIN SCALES - GENERAL
PAINLEVEL_OUTOF10: 0
PAINLEVEL_OUTOF10: 8

## 2020-12-28 NOTE — ED NOTES
1807- Perfect serve sent to Dr. rBianna Lutz      Call was returned by Dr. Brianna Lutz and spoke to [place] [address]  12/28/20 7613

## 2020-12-28 NOTE — ED TRIAGE NOTES
Chief Complaint   Patient presents with    Nephrolithiasis     has a kidney stone and a stent. feeling pressure in vagina and pelvis.      Preferred Pharmacy:  [id number] [facility] outpatient pharmacy  Primary Care Provider:  Dr. David Caruso person:  Celeste Mohr  Phone number:  [phone]  Relationship:  Sister

## 2020-12-29 NOTE — ED PROVIDER NOTES
[address] ED  EMERGENCY DEPARTMENT ENCOUNTER        Pt Name: Indigo Mason  MRN: [id number]  [place] 1965  Date of evaluation: 12/28/2020  Provider: JAGRUTI Medina  PCP: Willa Vasquez MD    ADY. I have evaluated this patient. My supervising physician was available for consultation. CHIEF COMPLAINT       Chief Complaint   Patient presents with    Nephrolithiasis     has a kidney stone and a stent. feeling pressure in vagina and pelvis. HISTORY OF PRESENT ILLNESS   (Location, Timing/Onset, Context/Setting, Quality, Duration, Modifying Factors, Severity, Associated Signs and Symptoms)  Note limiting factors. Indigo Mason is a 48 y.o. female presents the emergency department with concern for kidney stone. Patient reports that she has been treated for a left-sided kidney stone, had a stent placement in November by Dr. Janis Loera. She has continued to have pain of her left flank that will radiate to her lower abdomen, pelvis, vagina consistent with her prior kidney stones. She only has a solitary functional kidney, was born with a nonfunctional right kidney. She was scheduled to see her urologist on 12/22, however she was unable to make this appointment due to transportation issues. She is concerned that her pain persists, she is using Tylenol and ibuprofen over-the-counter. She denies fever, dysuria, urinary frequency, nausea, vomiting, chest pain, shortness of breath, vaginal bleeding or discharge. She would like to have her stent removed as she believes it is the cause of her pain. Nursing Notes were all reviewed and agreed with or any disagreements were addressed in the HPI. REVIEW OF SYSTEMS    (2-9 systems for level 4, 10 or more for level 5)     Review of Systems   Constitutional: Negative for fever. HENT: Negative for sore throat. Eyes: Negative for pain and visual disturbance. Respiratory: Negative for cough and shortness of breath. Cardiovascular: Negative for chest pain. Gastrointestinal: Negative for abdominal pain, nausea and vomiting. Genitourinary: Positive for flank pain. Negative for dysuria, frequency, vaginal bleeding and vaginal discharge. Musculoskeletal: Negative for back pain and neck pain. Skin: Negative for rash. Neurological: Negative for dizziness, weakness, numbness and headaches. Psychiatric/Behavioral: Negative for confusion. Positives and Pertinent negatives as per HPI. Except as noted above in the ROS, all other systems were reviewed and negative.        PAST MEDICAL HISTORY     Past Medical History:   Diagnosis Date    Depression     Headache(784.0)     Hepatitis C 08/21/2020    Hypertension          SURGICAL HISTORY     Past Surgical History:   Procedure Laterality Date    CYSTOSCOPY INSERTION / REMOVAL STENT / STONE Left 8/20/2020    CYSTOSCOPY URETERAL STENT INSERTION performed by Diandra Sandoval MD at [place] [address] (Left Bladder)    OTHER SURGICAL HISTORY  11/23/2020    CYSTOSCOPY, LEFT RIGID AND FLEXIBLE URETEROSCOPY, HOLMIUM LASER LITHOTRIPSY, STONE EXTRACTION, STENT EXCHANGE (Left )     URETEROSCOPY Left 11/23/2020    CYSTOSCOPY, LEFT RIGID AND FLEXIBLE URETEROSCOPY, HOLMIUM LASER LITHOTRIPSY, STONE EXTRACTION, STENT EXCHANGE, REMOVAL MIGRATED STENT performed by Diandra Sandoval MD at [address] [place] [address]       Discharge Medication List as of 12/28/2020  7:03 PM      CONTINUE these medications which have NOT CHANGED    Details   carvedilol (COREG) 6.25 MG tablet Take 1 tablet by mouth 2 times daily (with meals), Disp-60 tablet,R-0Normal      aspirin 81 MG EC tablet Take 81 mg by mouth dailyHistorical Med      hydroCHLOROthiazide (HYDRODIURIL) 25 MG tablet Take 25 mg by mouth dailyHistorical Med hydrOXYzine (ATARAX) 25 MG tablet Take 25 mg by mouth 3 times daily as needed for ItchingHistorical Med      lisinopril (PRINIVIL;ZESTRIL) 10 MG tablet Take 1 tablet by mouth daily, Disp-30 tablet,R-0Normal               ALLERGIES     Patient has no known allergies. FAMILYHISTORY       Family History   Problem Relation Age of Onset    Diabetes Mother     Heart Disease Mother     High Blood Pressure Sister     Diabetes Sister     Heart Attack Sister     Coronary Art Dis Father           SOCIAL HISTORY       Social History     Tobacco Use    Smoking status: Current Every Day Smoker     Packs/day: 0.50     Types: Cigarettes    Smokeless tobacco: Never Used   Substance Use Topics    Alcohol use: No    Drug use: No       SCREENINGS    Dunnell Coma Scale  Eye Opening: Spontaneous  Best Verbal Response: Oriented  Best Motor Response: Obeys commands  D[place]ell Coma Scale Score: 15        PHYSICAL EXAM    (up to 7 for level 4, 8 or more for level 5)     ED Triage Vitals [12/28/20 1622]   BP Temp Temp Source Pulse Resp SpO2 Height Weight   (!) 195/98 97.6 °F (36.4 °C) Oral 74 18 99 % -- 170 lb (77.1 kg)       Physical Exam  Vitals signs reviewed. Constitutional:       Appearance: She is not diaphoretic. HENT:      Nose: No congestion or rhinorrhea. Eyes:      General: No scleral icterus. Conjunctiva/sclera: Conjunctivae normal.   Neck:      Musculoskeletal: Normal range of motion and neck supple. Cardiovascular:      Rate and Rhythm: Normal rate and regular rhythm. Pulses: Normal pulses. Heart sounds: Normal heart sounds. No murmur. No friction rub. No gallop. Pulmonary:      Effort: Pulmonary effort is normal. No respiratory distress. Breath sounds: Normal breath sounds. No stridor. No wheezing, rhonchi or rales. Abdominal:      General: There is no distension. Palpations: Abdomen is soft. Tenderness: There is no abdominal tenderness. There is no right CVA tenderness, left CVA tenderness, guarding or rebound. Musculoskeletal: Normal range of motion. Skin:     General: Skin is warm and dry. Capillary Refill: Capillary refill takes less than 2 seconds. Neurological:      General: No focal deficit present. Mental Status: She is alert and oriented to person, place, and time. Sensory: No sensory deficit. Motor: No weakness.       Gait: Gait normal.   Psychiatric:         Mood and Affect: Mood normal.         Behavior: Behavior normal.         DIAGNOSTIC RESULTS   LABS:    Labs Reviewed   URINE RT REFLEX TO CULTURE - Abnormal; Notable for the following components:       Result Value    Blood, Urine LARGE (*)     Protein,  (*)     Nitrite, Urine POSITIVE (*)     Leukocyte Esterase, Urine LARGE (*)     All other components within normal limits    Narrative:     Performed at:  [facility],  [place] [facility]   Phone [phone]   CBC WITH AUTO DIFFERENTIAL - Abnormal; Notable for the following components:    Hemoglobin 10.3 (*)     Hematocrit 32.5 (*)     MCH 25.4 (*)     RDW 16.3 (*)     All other components within normal limits    Narrative:     Performed at:  [place] [address],  [place] [facility]   Phone [phone]   BASIC METABOLIC PANEL W/ REFLEX TO MG FOR LOW K - Abnormal; Notable for the following components:    Sodium 135 (*)     Potassium reflex Magnesium 3.3 (*)     Glucose 104 (*)     All other components within normal limits    Narrative:     Performed at:  [place] [address],  [employer] [facility]   Phone [phone]   MICROSCOPIC URINALYSIS - Abnormal; Notable for the following components:    WBC, UA >100 (*)     RBC, UA >100 (*)     Bacteria, UA 4+ (*)     All other components within normal limits EXAMINATION: CT OF THE ABDOMEN AND PELVIS WITHOUT CONTRAST 12/28/2020 4:58 pm TECHNIQUE: CT of the abdomen and pelvis was performed without the administration of intravenous contrast. Multiplanar reformatted images are provided for review. Dose modulation, iterative reconstruction, and/or weight based adjustment of the mA/kV was utilized to reduce the radiation dose to as low as reasonably achievable. COMPARISON: 11/21/2020 HISTORY: ORDERING SYSTEM PROVIDED HISTORY: Left nephrolithiasis. Solitary functional kidney TECHNOLOGIST PROVIDED HISTORY: Reason for exam:->Left nephrolithiasis. Solitary functional kidney Additional Contrast?->None Is the patient pregnant?->No Reason for Exam: left flank/LLQ pain. h/o kidney stones, had stent inserted on november 23rd. Additional signs and symptoms: right kidney not working FINDINGS: Lower Chest: The lung bases are clear. The visualized heart is unremarkable. Organs: The liver is homogeneous and normal in attenuation. The gallbladder, pancreas, spleen and adrenal glands are unremarkable. The right kidney is absent. There is a left ureteral stent with normal position. There is mild left pelvicaliectasis. 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 There is a nonobstructing 4 mm calculus in the inferior pole of the left kidney, previously measuring 10 mm. GI/Bowel: The stomach, small bowel and appendix are unremarkable. There is diverticulosis of the descending and sigmoid colon, with a acute inflammatory changes. Pelvis: The ureteral stent is normally positioned in the urinary bladder. No bladder stones are seen. The uterus is unremarkable. No pelvic masses are identified. Peritoneum/Retroperitoneum: There is moderate aortic calcification. No aneurysm or adenopathy is identified. Bones/Soft Tissues: No acute osseous injury is seen. The abdominal wall is unremarkable. Left ureteral stent is in situ. There is stable, mild pelvicaliectasis. Nonobstructing 4 mm stone in the inferior pole of the left kidney is decreased in size.            PROCEDURES   Unless otherwise noted below, none     Procedures    CRITICAL CARE TIME   N/A    CONSULTS:  None      EMERGENCY DEPARTMENT COURSE and DIFFERENTIAL DIAGNOSIS/MDM:   Vitals:    Vitals:    12/28/20 1622 12/28/20 1808 12/28/20 1906   BP: (!) 195/98 (!) 156/100 (!) 168/77   Pulse: 74 67 59   Resp: 18     Temp: 97.6 °F (36.4 °C)     TempSrc: Oral     SpO2: 99% 96% 96%   Weight: 170 lb (77.1 kg)         Patient was given the following medications:  Medications   lactated ringers infusion 1,000 mL (0 mLs Intravenous Stopped 12/28/20 1834)   morphine (PF) injection 8 mg (8 mg Intravenous Given 12/28/20 1734)   ondansetron (ZOFRAN) injection 4 mg (4 mg Intravenous Given 12/28/20 1734)   cefTRIAXone (ROCEPHIN) 1 g IVPB in 50 mL D5W minibag (0 g Intravenous Stopped 12/28/20 1822) 16-year-old female presents the emergency department for left flank pain consistent with prior kidney stones. As she has a solitary functional kidney, I did order CT abdomen pelvis without contrast for further assessment. Labs with no signs of leukocytosis or other symptoms concerning for sepsis. No renal impairment. CT notable for 4 mm stone inferior pole of left kidney. No signs of obstruction. UA with positive nitrates and leuk esterase, reviewed her previous urine cultures which are susceptible to both Rocephin and Keflex. Discussed with on-call urologist Dr. Joli Collet, was in agreement with plan for discharge with Keflex and recommended Flomax. He stated that she can call the office tomorrow to likely be seen by the PA in the next 1 to 2 days. Patient given prescription for [facility]. Instructed to return the emergency room for new or worsening symptoms including but not limited to fever, worsening flank or abdominal pain, severe nausea or vomiting, chest pain, shortness of breath, any other symptoms she is concerned about. Verbal and written discharge instructions and return precautions given. FINAL IMPRESSION      1. Kidney stone    2.  Acute cystitis with hematuria          DISPOSITION/PLAN   DISPOSITION Decision To Discharge 12/28/2020 07:03:57 PM      PAT[facility]:  [facility] [address]  [phone]  Call in 1 day  Call tomorrow to schedule follow-up appointment, ideally to be seen within 1 week      DISCHARGE MEDICATIONS:  Discharge Medication List as of 12/28/2020  7:03 PM      START taking these medications    Details   cephALEXin (KEFLEX) 500 MG capsule Take 1 capsule by mouth 4 times daily for 7 days, Disp-28 capsule, R-0Normal HYDROcodone-acetaminophen (NORCO) 5-325 MG per tablet Take 1 tablet by mouth every 6 hours as needed for Pain for up to 3 days. Intended supply: 3 days.  Take lowest dose possible to manage pain, Disp-12 tablet, R-0Normal             DISCONTINUED MEDICATIONS:  Discharge Medication List as of 12/28/2020  7:03 PM                 (Please note that portions of this note were completed with a voice recognition program.  Efforts were made to edit the dictations but occasionally words are mis-transcribed.)    JAGRUTI Lopez (electronically signed)         JAGRUTI Lopez  12/28/20 5129

## 2020-12-29 NOTE — ED NOTES
Saline lock removed intact. IV site looked unremarkable. Pressure dressing applied. Discharge instructions reviewed with Ms. Chacorta Dudley. She verbalized understanding. Copy of discharge instructions and prescriptions given. Ms. Chacorta Dudley was discharged to home in good condition per personal vehicle, friend/family driving. She exited the ED without difficulty.         Jarred Mayorga, RN  12/28/20 6068

## 2020-12-30 LAB
ORGANISM: ABNORMAL
URINE CULTURE, ROUTINE: ABNORMAL
URINE CULTURE, ROUTINE: ABNORMAL

## 2021-01-06 ENCOUNTER — HOSPITAL ENCOUNTER (OUTPATIENT)
Age: 56
Discharge: HOME | End: 2021-01-06
Payer: COMMERCIAL

## 2021-01-06 LAB — SARS-COV-2: NOT DETECTED

## 2021-01-06 PROCEDURE — U0003 INFECTIOUS AGENT DETECTION BY NUCLEIC ACID (DNA OR RNA); SEVERE ACUTE RESPIRATORY SYNDROME CORONAVIRUS 2 (SARS-COV-2) (CORONAVIRUS DISEASE [COVID-19]), AMPLIFIED PROBE TECHNIQUE, MAKING USE OF HIGH THROUGHPUT TECHNOLOGIES AS DESCRIBED BY CMS-2020-01-R: HCPCS

## 2021-01-08 RX ORDER — PROPRANOLOL HYDROCHLORIDE 40 MG/1
40 TABLET ORAL 2 TIMES DAILY
Status: ON HOLD | COMMUNITY
End: 2021-04-30 | Stop reason: HOSPADM

## 2021-01-08 NOTE — PROGRESS NOTES

## 2021-01-08 NOTE — PROGRESS NOTES
Preoperative Screening for Elective Surgery/Invasive Procedures While COVID-19 present in the community    ? Have you had any of the following symptoms?no  o Fever, chills  o Cough  o Shortness of breath  o Muscle aches/pain  o Diarrhea  o Abdominal pain, nausea, vomiting  o Loss or decrease in taste and / or smell  ? Risk of Exposure  o Have you recently been hospitalized for COVID-19 or flu-like illness, if so when?no  o Recently diagnosed with COVID-19, if so when?no  o Recently tested for COVID-19, if so when?yes 1/6/21 for surgery  o Have you been in close contact with a person or family member who currently has or recently had COVID-23? If yes, when and in what context?no  o Do you live with anybody who in the last 14 days has had fever, chills, shortness of breath, muscle aches, flu-like illness?no  o Do you have any close contacts or family members who are currently in the hospital for COVID-19 or flu-like illness? If yes, assess recent close contact with this person. no    Indicate if the patient has a positive screen by answering yes to one or more of the above questions. Patients who test positive or screen positive prior to surgery or on the day of surgery should be evaluated in conjunction with the surgeon/proceduralist/anesthesiologist to determine the urgency of the procedure.

## 2021-01-11 ENCOUNTER — HOSPITAL ENCOUNTER (OUTPATIENT)
Age: 56
Setting detail: OUTPATIENT SURGERY
Discharge: HOME | End: 2021-01-11
Attending: UROLOGY | Admitting: UROLOGY
Payer: COMMERCIAL

## 2021-01-11 ENCOUNTER — APPOINTMENT (OUTPATIENT)
Dept: GENERAL RADIOLOGY | Age: 56
End: 2021-01-11
Attending: UROLOGY
Payer: COMMERCIAL

## 2021-01-11 ENCOUNTER — ANESTHESIA (OUTPATIENT)
Dept: OPERATING ROOM | Age: 56
End: 2021-01-11
Payer: COMMERCIAL

## 2021-01-11 ENCOUNTER — ANESTHESIA EVENT (OUTPATIENT)
Dept: OPERATING ROOM | Age: 56
End: 2021-01-11
Payer: COMMERCIAL

## 2021-01-11 VITALS
WEIGHT: 172 LBS | TEMPERATURE: 97.6 F | DIASTOLIC BLOOD PRESSURE: 87 MMHG | SYSTOLIC BLOOD PRESSURE: 152 MMHG | RESPIRATION RATE: 18 BRPM | HEART RATE: 86 BPM | BODY MASS INDEX: 27 KG/M2 | HEIGHT: 67 IN | OXYGEN SATURATION: 100 %

## 2021-01-11 VITALS — SYSTOLIC BLOOD PRESSURE: 151 MMHG | OXYGEN SATURATION: 100 % | DIASTOLIC BLOOD PRESSURE: 76 MMHG

## 2021-01-11 DIAGNOSIS — N20.0 RENAL CALCULI: Primary | ICD-10-CM

## 2021-01-11 PROCEDURE — 6360000002 HC RX W HCPCS: Performed by: NURSE ANESTHETIST, CERTIFIED REGISTERED

## 2021-01-11 PROCEDURE — C1769 GUIDE WIRE: HCPCS | Performed by: UROLOGY

## 2021-01-11 PROCEDURE — 2580000003 HC RX 258: Performed by: ANESTHESIOLOGY

## 2021-01-11 PROCEDURE — 7100000011 HC PHASE II RECOVERY - ADDTL 15 MIN: Performed by: UROLOGY

## 2021-01-11 PROCEDURE — 88300 SURGICAL PATH GROSS: CPT

## 2021-01-11 PROCEDURE — 82365 CALCULUS SPECTROSCOPY: CPT

## 2021-01-11 PROCEDURE — 3600000014 HC SURGERY LEVEL 4 ADDTL 15MIN: Performed by: UROLOGY

## 2021-01-11 PROCEDURE — 7100000000 HC PACU RECOVERY - FIRST 15 MIN: Performed by: UROLOGY

## 2021-01-11 PROCEDURE — 2709999900 HC NON-CHARGEABLE SUPPLY: Performed by: UROLOGY

## 2021-01-11 PROCEDURE — C2617 STENT, NON-COR, TEM W/O DEL: HCPCS | Performed by: UROLOGY

## 2021-01-11 PROCEDURE — 7100000010 HC PHASE II RECOVERY - FIRST 15 MIN: Performed by: UROLOGY

## 2021-01-11 PROCEDURE — 3600000004 HC SURGERY LEVEL 4 BASE: Performed by: UROLOGY

## 2021-01-11 PROCEDURE — 2720000010 HC SURG SUPPLY STERILE: Performed by: UROLOGY

## 2021-01-11 PROCEDURE — 6360000002 HC RX W HCPCS: Performed by: ANESTHESIOLOGY

## 2021-01-11 PROCEDURE — 3700000000 HC ANESTHESIA ATTENDED CARE: Performed by: UROLOGY

## 2021-01-11 PROCEDURE — 6370000000 HC RX 637 (ALT 250 FOR IP): Performed by: UROLOGY

## 2021-01-11 PROCEDURE — 74420 UROGRAPHY RTRGR +-KUB: CPT

## 2021-01-11 PROCEDURE — 6360000002 HC RX W HCPCS: Performed by: UROLOGY

## 2021-01-11 PROCEDURE — 2500000003 HC RX 250 WO HCPCS: Performed by: NURSE ANESTHETIST, CERTIFIED REGISTERED

## 2021-01-11 PROCEDURE — 2500000003 HC RX 250 WO HCPCS: Performed by: ANESTHESIOLOGY

## 2021-01-11 PROCEDURE — 3700000001 HC ADD 15 MINUTES (ANESTHESIA): Performed by: UROLOGY

## 2021-01-11 DEVICE — URETERAL STENT
Type: IMPLANTABLE DEVICE | Status: FUNCTIONAL
Brand: CONTOUR™

## 2021-01-11 RX ORDER — LIDOCAINE HYDROCHLORIDE 20 MG/ML
INJECTION, SOLUTION INFILTRATION; PERINEURAL PRN
Status: DISCONTINUED | OUTPATIENT
Start: 2021-01-11 | End: 2021-01-11 | Stop reason: SDUPTHER

## 2021-01-11 RX ORDER — LIDOCAINE HYDROCHLORIDE 20 MG/ML
JELLY TOPICAL PRN
Status: DISCONTINUED | OUTPATIENT
Start: 2021-01-11 | End: 2021-01-11 | Stop reason: ALTCHOICE

## 2021-01-11 RX ORDER — PHENAZOPYRIDINE HYDROCHLORIDE 200 MG/1
200 TABLET, FILM COATED ORAL 3 TIMES DAILY PRN
Qty: 15 TABLET | Refills: 0 | Status: SHIPPED | OUTPATIENT
Start: 2021-01-11 | End: 2021-01-16

## 2021-01-11 RX ORDER — ONDANSETRON 2 MG/ML
INJECTION INTRAMUSCULAR; INTRAVENOUS PRN
Status: DISCONTINUED | OUTPATIENT
Start: 2021-01-11 | End: 2021-01-11 | Stop reason: SDUPTHER

## 2021-01-11 RX ORDER — LIDOCAINE HYDROCHLORIDE 10 MG/ML
0.3 INJECTION, SOLUTION EPIDURAL; INFILTRATION; INTRACAUDAL; PERINEURAL
Status: DISCONTINUED | OUTPATIENT
Start: 2021-01-11 | End: 2021-01-11 | Stop reason: HOSPADM

## 2021-01-11 RX ORDER — OXYCODONE HYDROCHLORIDE AND ACETAMINOPHEN 5; 325 MG/1; MG/1
0.5 TABLET ORAL EVERY 4 HOURS PRN
Qty: 10 TABLET | Refills: 0 | Status: SHIPPED | OUTPATIENT
Start: 2021-01-11 | End: 2021-01-16

## 2021-01-11 RX ORDER — METOCLOPRAMIDE HYDROCHLORIDE 5 MG/ML
10 INJECTION INTRAMUSCULAR; INTRAVENOUS ONCE
Status: COMPLETED | OUTPATIENT
Start: 2021-01-11 | End: 2021-01-11

## 2021-01-11 RX ORDER — FENTANYL CITRATE 50 UG/ML
INJECTION, SOLUTION INTRAMUSCULAR; INTRAVENOUS PRN
Status: DISCONTINUED | OUTPATIENT
Start: 2021-01-11 | End: 2021-01-11 | Stop reason: SDUPTHER

## 2021-01-11 RX ORDER — ONDANSETRON 2 MG/ML
4 INJECTION INTRAMUSCULAR; INTRAVENOUS ONCE
Status: COMPLETED | OUTPATIENT
Start: 2021-01-11 | End: 2021-01-11

## 2021-01-11 RX ORDER — SODIUM CHLORIDE, SODIUM LACTATE, POTASSIUM CHLORIDE, CALCIUM CHLORIDE 600; 310; 30; 20 MG/100ML; MG/100ML; MG/100ML; MG/100ML
INJECTION, SOLUTION INTRAVENOUS CONTINUOUS
Status: DISCONTINUED | OUTPATIENT
Start: 2021-01-11 | End: 2021-01-11 | Stop reason: HOSPADM

## 2021-01-11 RX ORDER — GLYCOPYRROLATE 0.2 MG/ML
INJECTION INTRAMUSCULAR; INTRAVENOUS PRN
Status: DISCONTINUED | OUTPATIENT
Start: 2021-01-11 | End: 2021-01-11 | Stop reason: SDUPTHER

## 2021-01-11 RX ORDER — SODIUM CHLORIDE 0.9 % (FLUSH) 0.9 %
10 SYRINGE (ML) INJECTION EVERY 12 HOURS SCHEDULED
Status: DISCONTINUED | OUTPATIENT
Start: 2021-01-11 | End: 2021-01-11 | Stop reason: HOSPADM

## 2021-01-11 RX ORDER — PROPOFOL 10 MG/ML
INJECTION, EMULSION INTRAVENOUS PRN
Status: DISCONTINUED | OUTPATIENT
Start: 2021-01-11 | End: 2021-01-11 | Stop reason: SDUPTHER

## 2021-01-11 RX ORDER — SULFAMETHOXAZOLE AND TRIMETHOPRIM 400; 80 MG/1; MG/1
1 TABLET ORAL 2 TIMES DAILY
Qty: 8 TABLET | Refills: 0 | Status: SHIPPED | OUTPATIENT
Start: 2021-01-11 | End: 2021-01-15

## 2021-01-11 RX ORDER — SODIUM CHLORIDE 0.9 % (FLUSH) 0.9 %
10 SYRINGE (ML) INJECTION PRN
Status: DISCONTINUED | OUTPATIENT
Start: 2021-01-11 | End: 2021-01-11 | Stop reason: HOSPADM

## 2021-01-11 RX ADMIN — PROPOFOL 200 MG: 10 INJECTION, EMULSION INTRAVENOUS at 13:43

## 2021-01-11 RX ADMIN — GLYCOPYRROLATE 0.2 MG: 0.2 INJECTION, SOLUTION INTRAMUSCULAR; INTRAVENOUS at 14:15

## 2021-01-11 RX ADMIN — Medication 2 G: at 13:30

## 2021-01-11 RX ADMIN — METOCLOPRAMIDE 10 MG: 5 INJECTION, SOLUTION INTRAMUSCULAR; INTRAVENOUS at 13:00

## 2021-01-11 RX ADMIN — SODIUM CHLORIDE, POTASSIUM CHLORIDE, SODIUM LACTATE AND CALCIUM CHLORIDE: 600; 310; 30; 20 INJECTION, SOLUTION INTRAVENOUS at 11:57

## 2021-01-11 RX ADMIN — FAMOTIDINE 20 MG: 10 INJECTION, SOLUTION INTRAVENOUS at 12:57

## 2021-01-11 RX ADMIN — FENTANYL CITRATE 100 MCG: 50 INJECTION INTRAMUSCULAR; INTRAVENOUS at 13:43

## 2021-01-11 RX ADMIN — ONDANSETRON 4 MG: 2 INJECTION, SOLUTION INTRAMUSCULAR; INTRAVENOUS at 13:43

## 2021-01-11 RX ADMIN — ONDANSETRON HYDROCHLORIDE 4 MG: 2 INJECTION, SOLUTION INTRAMUSCULAR; INTRAVENOUS at 12:58

## 2021-01-11 RX ADMIN — SODIUM CHLORIDE, POTASSIUM CHLORIDE, SODIUM LACTATE AND CALCIUM CHLORIDE: 600; 310; 30; 20 INJECTION, SOLUTION INTRAVENOUS at 13:41

## 2021-01-11 RX ADMIN — LIDOCAINE HYDROCHLORIDE 100 MG: 20 INJECTION, SOLUTION INFILTRATION; PERINEURAL at 13:43

## 2021-01-11 ASSESSMENT — PULMONARY FUNCTION TESTS
PIF_VALUE: 16
PIF_VALUE: 13
PIF_VALUE: 2
PIF_VALUE: 17
PIF_VALUE: 0
PIF_VALUE: 0
PIF_VALUE: 14
PIF_VALUE: 16
PIF_VALUE: 13
PIF_VALUE: 14
PIF_VALUE: 2
PIF_VALUE: 5
PIF_VALUE: 2
PIF_VALUE: 17
PIF_VALUE: 0
PIF_VALUE: 16
PIF_VALUE: 14
PIF_VALUE: 2
PIF_VALUE: 1
PIF_VALUE: 16
PIF_VALUE: 5
PIF_VALUE: 17
PIF_VALUE: 17
PIF_VALUE: 16
PIF_VALUE: 2

## 2021-01-11 NOTE — H&P
Dr. Jessica Rendon Same Day Surgery H&P     1/11/2021  Segundo Moulton    Reason for Surgery:  Left ureteral stone, left stent  Requesting Physician:  ER/Fam       History Obtained From:  patient, electronic medical record    HISTORY OF PRESENT ILLNESS:                The patient is a 48 y.o. female who presents with left flank pain and stones. I am taking the patient to surgery for evaluation and care of this problem. Past Medical History:        Diagnosis Date    Depression     Headache(784.0)     Hepatitis C 08/21/2020    Hypertension      Past Surgical History:        Procedure Laterality Date    CYSTOSCOPY INSERTION / REMOVAL STENT / STONE Left 8/20/2020    CYSTOSCOPY URETERAL STENT INSERTION performed by Drake Brooks MD at [place] [address] (Left Bladder)    OTHER SURGICAL HISTORY  11/23/2020    CYSTOSCOPY, LEFT RIGID AND FLEXIBLE URETEROSCOPY, HOLMIUM LASER LITHOTRIPSY, STONE EXTRACTION, STENT EXCHANGE (Left )     URETEROSCOPY Left 11/23/2020    CYSTOSCOPY, LEFT RIGID AND FLEXIBLE URETEROSCOPY, HOLMIUM LASER LITHOTRIPSY, STONE EXTRACTION, STENT EXCHANGE, REMOVAL MIGRATED STENT performed by Drake Brooks MD at [facility] OR     Current Medications:   Prior to Admission medications    Medication Sig Start Date End Date Taking?  Authorizing Provider   propranolol (INDERAL) 40 MG tablet Take 40 mg by mouth 2 times daily   Yes Historical Provider, MD   aspirin 81 MG EC tablet Take 81 mg by mouth daily   Yes Historical Provider, MD   hydroCHLOROthiazide (HYDRODIURIL) 25 MG tablet Take 25 mg by mouth as needed    Yes Historical Provider, MD   hydrOXYzine (ATARAX) 25 MG tablet Take 25 mg by mouth 3 times daily as needed for Itching   Yes Historical Provider, MD   lisinopril (PRINIVIL;ZESTRIL) 10 MG tablet Take 1 tablet by mouth daily 8/24/20  Yes Ginny Louis MD Allergies:  No Known Allergies  Social History:  Reviewed, non contributory    Family History:  Reviewed, non contributory      REVIEW OF SYSTEMS:    12 point ROS was already completed and this has been reviewed. The pertinent positive findings include left flank pain. PHYSICAL EXAM:    VITALS:  BP (!) 163/61   Pulse 82   Temp 97.3 °F (36.3 °C) (Temporal)   Resp 14   Ht 5' 7\" (1.702 m)   Wt 172 lb (78 kg)   LMP 12/01/2014   SpO2 100%   BMI 26.94 kg/m²   H&N: Sclera normal, no masses, trachea midline, no bruit  CVS: Normal rate and rhythm, no murmurs or rubs, peripheral pulses equal, no clubbing or cyanosis. RESP: Breath sounds equal bilateral, few rhonchi. ABDO: Soft, non-tender, bowel sounds active, no organomegaly, no hernias. LYMPH:  No lymphadenopathy. Skin: Warm dry and intact. : No CVAT, normal external genitalia, no discharge. MSK: Grossly normal for patient  LYNNETTE: Grossly normal for patient  PSY: No acute changes noted in psychosocial assessment.      DATA:    CBC:   Lab Results   Component Value Date    WBC 8.5 12/28/2020    RBC 4.05 12/28/2020    HGB 10.3 12/28/2020    HCT 32.5 12/28/2020    MCV 80.3 12/28/2020    MCH 25.4 12/28/2020    MCHC 31.6 12/28/2020    RDW 16.3 12/28/2020     12/28/2020    MPV 10.0 12/28/2020     BMP:    Lab Results   Component Value Date     12/28/2020    K 3.3 12/28/2020     12/28/2020    CO2 25 12/28/2020    BUN 12 12/28/2020    LABALBU 2.8 11/22/2020    CREATININE 0.9 12/28/2020    CALCIUM 9.1 12/28/2020    GFRAA >60 12/28/2020    LABGLOM >60 12/28/2020    GLUCOSE 104 12/28/2020     U/A:    Lab Results   Component Value Date    COLORU Yellow 12/28/2020    PROTEINU 100 12/28/2020    PHUR 7.0 12/28/2020    LABCAST 0-1 Hyaline 07/28/2015    WBCUA >100 12/28/2020    RBCUA >100 12/28/2020    MUCUS 2+ 07/28/2015    BACTERIA 4+ 12/28/2020    CLARITYU Clear 12/28/2020    SPECGRAV 1.025 12/28/2020    LEUKOCYTESUR LARGE 12/28/2020 UROBILINOGEN 1.0 12/28/2020    BILIRUBINUR Negative 12/28/2020    BLOODU LARGE 12/28/2020    GLUCOSEU Negative 12/28/2020       IMPRESSION/RECOMMENDATIONS:   Patient will be taken to surgery for definitive care for the presenting problem(s). The patient has been informed of the goals, risks and benefits of the procedure. Informed consent has been obtained and all of the patient's questions were answered to their satisfaction. The patient wishes to proceed with the planned surgery.       Manish Peters M.D.

## 2021-01-11 NOTE — ANESTHESIA PRE PROCEDURE
12/28/2020    CO2 25 12/28/2020    BUN 12 12/28/2020    CREATININE 0.9 12/28/2020    GFRAA >60 12/28/2020    AGRATIO 1.0 11/22/2020    LABGLOM >60 12/28/2020    GLUCOSE 104 12/28/2020    PROT 5.5 11/22/2020    CALCIUM 9.1 12/28/2020    BILITOT <0.2 11/22/2020    ALKPHOS 76 11/22/2020    AST 25 11/22/2020    ALT 20 11/22/2020       POC Tests: No results for input(s): POCGLU, POCNA, POCK, POCCL, POCBUN, POCHEMO, POCHCT in the last 72 hours. Coags: No results found for: PROTIME, INR, APTT    HCG (If Applicable): No results found for: PREGTESTUR, PREGSERUM, HCG, HCGQUANT     ABGs: No results found for: PHART, PO2ART, BYB3KHU, BEV4FPK, BEART, F0TCXHNZ     Type & Screen (If Applicable):  No results found for: LABABO, LABRH    Drug/Infectious Status (If Applicable):  No results found for: HIV, HEPCAB    COVID-19 Screening (If Applicable):   Lab Results   Component Value Date    COVID19 Not Detected 01/06/2021         Anesthesia Evaluation  Patient summary reviewed and Nursing notes reviewed no history of anesthetic complications:   Airway: Mallampati: II     Neck ROM: full   Dental:          Pulmonary:Negative Pulmonary ROS and normal exam                               Cardiovascular:Negative CV ROS    (+) hypertension:,                   Neuro/Psych:   Negative Neuro/Psych ROS  (+) headaches:, psychiatric history:            GI/Hepatic/Renal: Neg GI/Hepatic/Renal ROS  (+) hepatitis: C, liver disease:, renal disease: kidney stones,      (-) hiatal hernia and GERD       Endo/Other: Negative Endo/Other ROS                    Abdominal:           Vascular:                                        Anesthesia Plan      general     ASA 2     (I discussed with the patient the risks and benefits of PIV, general anesthesia, IV Narcotics, PACU. All questions were answered the patient agrees with the plan and wishes to proceed.  )  Induction: intravenous.                   Pre-Operative Diagnosis: KIDNEY STONE 48 y.o.   BMI:  Body mass index is 26.94 kg/m².      Vitals:    01/08/21 1033 01/11/21 1152   BP:  (!) 163/61   Pulse:  82   Resp:  14   Temp:  97.3 °F (36.3 °C)   TempSrc:  Temporal   SpO2:  100%   Weight: 172 lb (78 kg) 172 lb (78 kg)   Height: 5' 7\" (1.702 m) 5' 7\" (1.702 m)       No Known Allergies    Social History     Tobacco Use    Smoking status: Current Every Day Smoker     Packs/day: 0.50     Types: Cigarettes    Smokeless tobacco: Never Used   Substance Use Topics    Alcohol use: No       LABS:    CBC  Lab Results   Component Value Date/Time    WBC 8.5 12/28/2020 04:35 PM    HGB 10.3 (L) 12/28/2020 04:35 PM    HCT 32.5 (L) 12/28/2020 04:35 PM     12/28/2020 04:35 PM     RENAL  Lab Results   Component Value Date/Time     (L) 12/28/2020 04:35 PM    K 3.3 (L) 12/28/2020 04:35 PM     12/28/2020 04:35 PM    CO2 25 12/28/2020 04:35 PM    BUN 12 12/28/2020 04:35 PM    CREATININE 0.9 12/28/2020 04:35 PM    GLUCOSE 104 (H) 12/28/2020 04:35 PM     COAGS  No results found for: PROTIME, INR, APTT      Juan Askew MD   1/11/2021

## 2021-01-11 NOTE — PRE-PROCEDURE INSTRUCTIONS
22. Patient/Caregiver verbalizes understanding of Phase I and Phase II process. 23.  Patient pain level is established preoperatively using age appropriate pain scale. 24.  The patient will move to fall risk upon sedation- during and through the recovery phase. Interventions- orient the patient to the environment, especially the location of the bathroom; provide treaded socks/non-skid footwear; demonstrate and teach back use of the nurse's call system; instruct the patient to call for help before getting out of bed; lock all movable equipment before transferring patient; keep bed in lowest position possible.  25.  Other:

## 2021-01-11 NOTE — ANESTHESIA POSTPROCEDURE EVALUATION
Department of Anesthesiology  Postprocedure Note    Patient: Henry Fowler  MRN: [id number]  YOB: 1965  Date of evaluation: 1/11/2021  Time:  3:54 PM     Procedure Summary     Date: 01/11/21 Room / Location: [place] [address] / [facility]    Anesthesia Start: 4387 Anesthesia Stop: 4209    Procedure: CYSTOSCOPY, DIAGNOSTIC LEFT URETEROSCOPY WITH HOLMIUM LASER LITHOTRIPSY, LEFT STENT EXCHANGE (Left Bladder) Diagnosis: (KIDNEY STONE)    Surgeons: Fred Elizabeth MD Responsible Provider: Jodi Galloway MD    Anesthesia Type: general ASA Status: 2          Anesthesia Type: general    Denise Phase I: Denise Score: 10    Denise Phase II: Denise Score: 10    Last vitals: Reviewed and per EMR flowsheets.        Anesthesia Post Evaluation    Comments: Postoperative Anesthesia Note    Name:    Henry Fowler  MRN:      [id number]    Patient Vitals in the past 12 hrs:  01/11/21 1458, BP:(!) 152/87, Temp:97.6 °F (36.4 °C), Temp src:Infrared, Pulse:86, Resp:18, SpO2:100 %  01/11/21 1452, BP:(!) 152/84, Temp:97.4 °F (36.3 °C), Temp src:Infrared, Pulse:82, Resp:18, SpO2:100 %  01/11/21 1445, BP:(!) 155/82, Temp:97.3 °F (36.3 °C), Temp src:Infrared, Pulse:80, Resp:18, SpO2:100 %  01/11/21 1440, BP:(!) 145/83, Temp:97.3 °F (36.3 °C), Temp src:Infrared, Pulse:97, Resp:18, SpO2:100 %  01/11/21 1435, BP:(!) 144/80, Temp:97.4 °F (36.3 °C), Temp src:Infrared, Pulse:108, Resp:18, SpO2:100 %  01/11/21 1152, BP:(!) 163/61, Temp:97.3 °F (36.3 °C), Temp src:Temporal, Pulse:82, Resp:14, SpO2:100 %, Height:5' 7\" (1.702 m), Weight:172 lb (78 kg)     LABS:    CBC  Lab Results       Component                Value               Date/Time                  WBC                      8.5                 12/28/2020 04:35 PM        HGB                      10.3 (L)            12/28/2020 04:35 PM        HCT                      32.5 (L)            12/28/2020 04:35 PM PLT                      165                 12/28/2020 04:35 PM   RENAL  Lab Results       Component                Value               Date/Time                  NA                       135 (L)             12/28/2020 04:35 PM        K                        3.3 (L)             12/28/2020 04:35 PM        CL                       103                 12/28/2020 04:35 PM        CO2                      25                  12/28/2020 04:35 PM        BUN                      12                  12/28/2020 04:35 PM        CREATININE               0.9                 12/28/2020 04:35 PM        GLUCOSE                  104 (H)             12/28/2020 04:35 PM   COAGS  No results found for: PROTIME, INR, APTT    Intake & Output:  @54LRAR@    Nausea & Vomiting:  No    Level of Consciousness:  Awake    Pain Assessment:  Adequate analgesia    Anesthesia Complications:  No apparent anesthetic complications    SUMMARY      Vital signs stable  OK to discharge from Stage I post anesthesia care.   Care transferred from Anesthesiology department on discharge from perioperative area

## 2021-01-11 NOTE — PROGRESS NOTES
Spoke with friend ariel regarding discharge instructions. Patient dressing self, no signs or symptoms of distress noted. Patient tolerating oral fluids and crackers.

## 2021-01-11 NOTE — BRIEF OP NOTE
Brief Postoperative Note      Patient: Melissa Hanson  YOB: 1965  MRN: [id number]    Date of Procedure: 1/11/2021    Pre-Op Diagnosis: KIDNEY STONE    Post-Op Diagnosis: Same       Procedure(s):  CYSTOSCOPY, DIAGNOSTIC LEFT URETEROSCOPY WITH HOLMIUM LASER LITHOTRIPSY, LEFT STENT EXCHANGE    Surgeon(s):  Sofie Nicholson MD    Assistant:  * No surgical staff found *    Anesthesia: General    Estimated Blood Loss (mL): Minimal    Complications: None    Specimens:   ID Type Source Tests Collected by Time Destination   A : LEFT URETERAL STONES Tissue Tissue SURGICAL PATHOLOGY Sofie Nicholson MD 1/11/2021 1408        Implants:  Implant Name Type Inv. Item Serial No.  Lot No. LRB No. Used Action   STENT URET 6FR L24CM PERCFLX HYDR+ SFT PGTL TAPR TIP W/O  STENT URET 6FR L24CM PERCFLX HYDR+ SFT PGTL TAPR TIP W/O  [employer]- [id number] Left 1 Implanted         Drains:   Ureteral Catheter Left ureter  (Active)       Findings: Complete tube of calcified stone around left stent, 10 cm in length. PLAN:  Patient to have a cysto and stent removal with retrograde in 2 weeks.     Electronically signed by So Man MD on 1/11/2021 at 2:36 PM

## 2021-01-12 NOTE — OP NOTE
[address]                 [address] [place] [address]                                OPERATIVE REPORT    PATIENT NAME: Zahida Starr                     :        1965  MED REC NO:   [id number]                          ROOM:  ACCOUNT NO:   [de-identified]                           ADMIT DATE: 2021  PROVIDER:     Marcial Hatfield MD    DATE OF PROCEDURE:  2021    PREOPERATIVE DIAGNOSES:  Left renal colic, left hydronephrosis, left  double-J stent. POSTOPERATIVE DIAGNOSES:  1.  Large ureteral calculus. 2.  Impacted left ureteral stent. OPERATIONS PERFORMED:  1. Cystoscopy with left ureteroscopy and holmium laser  lithotripsy/stone extraction of large distal ureteral stone. 2.  Cystoscopy with stent change. PRIMARY SURGEON:  Marcial Hatfield MD    ANESTHESIA:  General.    ESTIMATED BLOOD LOSS:  10 mL. OPERATIVE FINDINGS:  1. Calcified left ureteral stent with secondary large calcification  encompassing distal left ureter. 2.  Hydronephrosis. Estimated degree of difficulty for this case greater than 50% of normal  ureteroscopy, laser lithotripsy and stone extraction. HISTORY AND INDICATIONS:  This is a 76-year-old white female who last  year had presented with a large stone obstructing the left kidney and  had undergone the surgical extraction and stent placement. She had been  lost to followup and had presented recently with worsening pain and  hydronephrosis. CT scan had been done in 2020 showing the stent to  be in good position with small stones identified in the lower pole of  the left kidney. Options were discussed with the patient concerning  treatment and she elected to proceed forth with today's procedure. The  risks, benefits and expected outcomes of the procedure have been  discussed.     PROCEDURE IN DETAIL:  After obtaining informed consent, the patient was 6 x 24 double-J stent. This was done using the cystoscope and  fluoroscopic guidance. With the stent in proper position, the strings  were removed. The patient's bladder was drained and sterile material  was sent for analysis. Overall, she tolerated the procedure well. She  was brought to recovery room in stable condition. Postoperative consultation was done with the patient directly after she  had recovered from her anesthesia. The issues of the side effects of  the indwelling stent, which had been in there for several months and the  need for extensive ureteroscopy and laser lithotripsy were discussed  with the patient. Along with this, it was the decision on my part to  place another double-J stent to allow her ureter to heel. The plan is  to see the patient back in approximately 1 week in the office, but also  to schedule her for a cystoscopy with stent removal in approximately 2-3  weeks.           Piyush Win MD    D: 01/11/2021 18:10:06       T: 01/11/2021 18:14:22     [email or web address]  Job#: [id number]     Doc#: [id number]    CC:

## 2021-01-13 LAB
CALCULI COMPOSITION: NORMAL
MASS: 508 MG
STONE DESCRIPTION: NORMAL
STONE NUMBER: NORMAL
STONE SIZE: NORMAL MM

## 2021-01-22 ENCOUNTER — HOSPITAL ENCOUNTER (OUTPATIENT)
Age: 56
Discharge: HOME | End: 2021-01-22
Payer: COMMERCIAL

## 2021-01-22 PROCEDURE — U0003 INFECTIOUS AGENT DETECTION BY NUCLEIC ACID (DNA OR RNA); SEVERE ACUTE RESPIRATORY SYNDROME CORONAVIRUS 2 (SARS-COV-2) (CORONAVIRUS DISEASE [COVID-19]), AMPLIFIED PROBE TECHNIQUE, MAKING USE OF HIGH THROUGHPUT TECHNOLOGIES AS DESCRIBED BY CMS-2020-01-R: HCPCS

## 2021-01-22 PROCEDURE — U0002 COVID-19 LAB TEST NON-CDC: HCPCS

## 2021-01-23 LAB — SARS-COV-2, PCR: NOT DETECTED

## 2021-01-25 NOTE — PROGRESS NOTES
PRE OP INSTRUCTION SHEET   1. Do not eat or drink anything after 12 midnight  prior to surgery. This includes no water, chewing gum or mints. 2. Take the following pills will a small sip of water (see MAR)                                        3. Aspirin, Ibuprofen, Advil, Naproxen, Vitamin E, fish oil and other Anti-inflammatory products should be stopped for 5 days before surgery or as directed by your physician. 4. Check with your Doctor regarding stopping Plavix, Coumadin, Lovenox, Fragmin or other blood thinners   5. Do not smoke, and do not drink any alcoholic beverages 24 hours prior to surgery. This includes NA Beer. 6. You may brush your teeth and gargle the morning of surgery. DO NOT SWALLOW WATER   7. You MUST make arrangements for a responsible adult to take you home after your surgery. You will not be allowed to leave alone or drive yourself home. It is strongly suggested someone stay with you the first 24 hrs. Your surgery will be cancelled if you do not have a ride home. 8. A parent/legal guardian must accompany a child scheduled for surgery and plan to stay at the hospital until the child is discharged. Please do not bring other children with you. 9. Please wear simple, loose fitting clothing to the hospital.  Дмитрий Liang not bring valuables (money, credit cards, checkbooks, etc.) Do not wear any makeup (including no eye makeup) or nail polish on your fingers or toes. 10. DO NOT wear any jewelry or piercings on day of surgery. All body piercing jewelry must be removed. 11. If you have dentures,glasses, or contacts they will be removed before going to the OR; we will provide you a container. 12. Please see your family doctor/and cardiologist for a history & physical and/or concerning medications. Bring any test results/reports from your physician's office.  Have history and labs faxed to [phone] 13. Remember to bring Blood Bank bracelet on the day of surgery. 14. If you have a Living Will and Durable Power of  for Healthcare, please bring in a copy. 13. Notify your Surgeon if you develop any illness between now and surgery  time, cough, cold, fever, sore throat, nausea, vomiting, etc.  Please notify your surgeon if you experience dizziness, shortness of breath or blurred vision between now & the time of your surgery   16. DO NOT shave your operative site 96 hours prior to surgery. For face & neck surgery, men may use an electric razor 48 hours prior to surgery. 17. Shower with _x__Antibacterial soap (x_chlorhexidine for total joint  Pt's) shower two times before surgery.(the morning of and the night before. 18. To provide excellent care visitors will be limited to one in the room at any given time.   Please call pre admission testing if you any further questions [phone] or [phone]

## 2021-01-26 ENCOUNTER — ANESTHESIA EVENT (OUTPATIENT)
Dept: OPERATING ROOM | Age: 56
End: 2021-01-26
Payer: COMMERCIAL

## 2021-01-27 ENCOUNTER — HOSPITAL ENCOUNTER (OUTPATIENT)
Age: 56
Setting detail: OUTPATIENT SURGERY
Discharge: HOME | End: 2021-01-27
Attending: UROLOGY | Admitting: UROLOGY
Payer: COMMERCIAL

## 2021-01-27 ENCOUNTER — ANESTHESIA (OUTPATIENT)
Dept: OPERATING ROOM | Age: 56
End: 2021-01-27
Payer: COMMERCIAL

## 2021-01-27 VITALS
RESPIRATION RATE: 19 BRPM | HEIGHT: 67 IN | TEMPERATURE: 97.3 F | HEART RATE: 54 BPM | SYSTOLIC BLOOD PRESSURE: 145 MMHG | BODY MASS INDEX: 27 KG/M2 | WEIGHT: 172 LBS | OXYGEN SATURATION: 100 % | DIASTOLIC BLOOD PRESSURE: 61 MMHG

## 2021-01-27 VITALS
OXYGEN SATURATION: 91 % | SYSTOLIC BLOOD PRESSURE: 128 MMHG | RESPIRATION RATE: 20 BRPM | DIASTOLIC BLOOD PRESSURE: 58 MMHG

## 2021-01-27 DIAGNOSIS — N20.0 RENAL CALCULI: Primary | ICD-10-CM

## 2021-01-27 PROCEDURE — 7100000010 HC PHASE II RECOVERY - FIRST 15 MIN: Performed by: UROLOGY

## 2021-01-27 PROCEDURE — 3600000004 HC SURGERY LEVEL 4 BASE: Performed by: UROLOGY

## 2021-01-27 PROCEDURE — 6360000002 HC RX W HCPCS: Performed by: ANESTHESIOLOGY

## 2021-01-27 PROCEDURE — 3700000001 HC ADD 15 MINUTES (ANESTHESIA): Performed by: UROLOGY

## 2021-01-27 PROCEDURE — 3600000014 HC SURGERY LEVEL 4 ADDTL 15MIN: Performed by: UROLOGY

## 2021-01-27 PROCEDURE — 2580000003 HC RX 258: Performed by: ANESTHESIOLOGY

## 2021-01-27 PROCEDURE — 6370000000 HC RX 637 (ALT 250 FOR IP): Performed by: UROLOGY

## 2021-01-27 PROCEDURE — 6360000002 HC RX W HCPCS: Performed by: NURSE ANESTHETIST, CERTIFIED REGISTERED

## 2021-01-27 PROCEDURE — 2709999900 HC NON-CHARGEABLE SUPPLY: Performed by: UROLOGY

## 2021-01-27 PROCEDURE — 3700000000 HC ANESTHESIA ATTENDED CARE: Performed by: UROLOGY

## 2021-01-27 PROCEDURE — 6360000002 HC RX W HCPCS: Performed by: UROLOGY

## 2021-01-27 PROCEDURE — 2500000003 HC RX 250 WO HCPCS: Performed by: NURSE ANESTHETIST, CERTIFIED REGISTERED

## 2021-01-27 PROCEDURE — 2580000003 HC RX 258: Performed by: UROLOGY

## 2021-01-27 PROCEDURE — 7100000011 HC PHASE II RECOVERY - ADDTL 15 MIN: Performed by: UROLOGY

## 2021-01-27 PROCEDURE — 2580000003 HC RX 258: Performed by: NURSE ANESTHETIST, CERTIFIED REGISTERED

## 2021-01-27 RX ORDER — SODIUM CHLORIDE 0.9 % (FLUSH) 0.9 %
10 SYRINGE (ML) INJECTION PRN
Status: DISCONTINUED | OUTPATIENT
Start: 2021-01-27 | End: 2021-01-27 | Stop reason: HOSPADM

## 2021-01-27 RX ORDER — SODIUM CHLORIDE 0.9 % (FLUSH) 0.9 %
10 SYRINGE (ML) INJECTION EVERY 12 HOURS SCHEDULED
Status: DISCONTINUED | OUTPATIENT
Start: 2021-01-27 | End: 2021-01-27 | Stop reason: HOSPADM

## 2021-01-27 RX ORDER — LABETALOL HYDROCHLORIDE 5 MG/ML
5 INJECTION, SOLUTION INTRAVENOUS EVERY 10 MIN PRN
Status: DISCONTINUED | OUTPATIENT
Start: 2021-01-27 | End: 2021-01-27 | Stop reason: HOSPADM

## 2021-01-27 RX ORDER — CEFAZOLIN SODIUM 2 G/50ML
SOLUTION INTRAVENOUS PRN
Status: DISCONTINUED | OUTPATIENT
Start: 2021-01-27 | End: 2021-01-27 | Stop reason: SDUPTHER

## 2021-01-27 RX ORDER — SODIUM CHLORIDE, SODIUM LACTATE, POTASSIUM CHLORIDE, CALCIUM CHLORIDE 600; 310; 30; 20 MG/100ML; MG/100ML; MG/100ML; MG/100ML
INJECTION, SOLUTION INTRAVENOUS CONTINUOUS PRN
Status: DISCONTINUED | OUTPATIENT
Start: 2021-01-27 | End: 2021-01-27 | Stop reason: SDUPTHER

## 2021-01-27 RX ORDER — OXYCODONE HYDROCHLORIDE AND ACETAMINOPHEN 5; 325 MG/1; MG/1
2 TABLET ORAL PRN
Status: DISCONTINUED | OUTPATIENT
Start: 2021-01-27 | End: 2021-01-27 | Stop reason: HOSPADM

## 2021-01-27 RX ORDER — LIDOCAINE HYDROCHLORIDE 20 MG/ML
JELLY TOPICAL PRN
Status: DISCONTINUED | OUTPATIENT
Start: 2021-01-27 | End: 2021-01-27 | Stop reason: ALTCHOICE

## 2021-01-27 RX ORDER — PROPOFOL 10 MG/ML
INJECTION, EMULSION INTRAVENOUS PRN
Status: DISCONTINUED | OUTPATIENT
Start: 2021-01-27 | End: 2021-01-27 | Stop reason: SDUPTHER

## 2021-01-27 RX ORDER — ONDANSETRON 2 MG/ML
4 INJECTION INTRAMUSCULAR; INTRAVENOUS PRN
Status: DISCONTINUED | OUTPATIENT
Start: 2021-01-27 | End: 2021-01-27 | Stop reason: HOSPADM

## 2021-01-27 RX ORDER — PHENAZOPYRIDINE HYDROCHLORIDE 200 MG/1
200 TABLET, FILM COATED ORAL 3 TIMES DAILY PRN
Qty: 15 TABLET | Refills: 0 | Status: SHIPPED | OUTPATIENT
Start: 2021-01-27 | End: 2021-02-01

## 2021-01-27 RX ORDER — PROMETHAZINE HYDROCHLORIDE 25 MG/ML
6.25 INJECTION, SOLUTION INTRAMUSCULAR; INTRAVENOUS
Status: DISCONTINUED | OUTPATIENT
Start: 2021-01-27 | End: 2021-01-27 | Stop reason: HOSPADM

## 2021-01-27 RX ORDER — OXYCODONE HYDROCHLORIDE AND ACETAMINOPHEN 5; 325 MG/1; MG/1
1 TABLET ORAL PRN
Status: DISCONTINUED | OUTPATIENT
Start: 2021-01-27 | End: 2021-01-27 | Stop reason: HOSPADM

## 2021-01-27 RX ORDER — SODIUM CHLORIDE, SODIUM LACTATE, POTASSIUM CHLORIDE, CALCIUM CHLORIDE 600; 310; 30; 20 MG/100ML; MG/100ML; MG/100ML; MG/100ML
INJECTION, SOLUTION INTRAVENOUS CONTINUOUS
Status: DISCONTINUED | OUTPATIENT
Start: 2021-01-27 | End: 2021-01-27 | Stop reason: HOSPADM

## 2021-01-27 RX ORDER — DIPHENHYDRAMINE HYDROCHLORIDE 50 MG/ML
12.5 INJECTION INTRAMUSCULAR; INTRAVENOUS
Status: DISCONTINUED | OUTPATIENT
Start: 2021-01-27 | End: 2021-01-27 | Stop reason: HOSPADM

## 2021-01-27 RX ORDER — MAGNESIUM HYDROXIDE 1200 MG/15ML
LIQUID ORAL PRN
Status: DISCONTINUED | OUTPATIENT
Start: 2021-01-27 | End: 2021-01-27 | Stop reason: ALTCHOICE

## 2021-01-27 RX ORDER — MORPHINE SULFATE 10 MG/ML
1 INJECTION, SOLUTION INTRAMUSCULAR; INTRAVENOUS EVERY 5 MIN PRN
Status: DISCONTINUED | OUTPATIENT
Start: 2021-01-27 | End: 2021-01-27 | Stop reason: HOSPADM

## 2021-01-27 RX ORDER — MEPERIDINE HYDROCHLORIDE 25 MG/ML
12.5 INJECTION INTRAMUSCULAR; INTRAVENOUS; SUBCUTANEOUS EVERY 5 MIN PRN
Status: DISCONTINUED | OUTPATIENT
Start: 2021-01-27 | End: 2021-01-27 | Stop reason: HOSPADM

## 2021-01-27 RX ORDER — MORPHINE SULFATE 10 MG/ML
2 INJECTION, SOLUTION INTRAMUSCULAR; INTRAVENOUS EVERY 5 MIN PRN
Status: DISCONTINUED | OUTPATIENT
Start: 2021-01-27 | End: 2021-01-27 | Stop reason: HOSPADM

## 2021-01-27 RX ORDER — HYDRALAZINE HYDROCHLORIDE 20 MG/ML
5 INJECTION INTRAMUSCULAR; INTRAVENOUS EVERY 10 MIN PRN
Status: DISCONTINUED | OUTPATIENT
Start: 2021-01-27 | End: 2021-01-27 | Stop reason: HOSPADM

## 2021-01-27 RX ORDER — SULFAMETHOXAZOLE AND TRIMETHOPRIM 400; 80 MG/1; MG/1
1 TABLET ORAL 2 TIMES DAILY
Qty: 8 TABLET | Refills: 0 | Status: SHIPPED | OUTPATIENT
Start: 2021-01-27 | End: 2021-01-31

## 2021-01-27 RX ORDER — LIDOCAINE HYDROCHLORIDE 10 MG/ML
1 INJECTION, SOLUTION EPIDURAL; INFILTRATION; INTRACAUDAL; PERINEURAL
Status: DISCONTINUED | OUTPATIENT
Start: 2021-01-27 | End: 2021-01-27 | Stop reason: HOSPADM

## 2021-01-27 RX ORDER — LIDOCAINE HYDROCHLORIDE 20 MG/ML
INJECTION, SOLUTION INFILTRATION; PERINEURAL PRN
Status: DISCONTINUED | OUTPATIENT
Start: 2021-01-27 | End: 2021-01-27 | Stop reason: SDUPTHER

## 2021-01-27 RX ORDER — OXYCODONE HYDROCHLORIDE AND ACETAMINOPHEN 5; 325 MG/1; MG/1
0.5 TABLET ORAL EVERY 4 HOURS PRN
Qty: 10 TABLET | Refills: 0 | Status: SHIPPED | OUTPATIENT
Start: 2021-01-27 | End: 2021-02-01

## 2021-01-27 RX ORDER — ONDANSETRON 2 MG/ML
4 INJECTION INTRAMUSCULAR; INTRAVENOUS ONCE
Status: COMPLETED | OUTPATIENT
Start: 2021-01-27 | End: 2021-01-27

## 2021-01-27 RX ADMIN — LIDOCAINE HYDROCHLORIDE 5 MG: 20 INJECTION, SOLUTION INFILTRATION; PERINEURAL at 14:04

## 2021-01-27 RX ADMIN — CEFAZOLIN SODIUM 2 G: 2 SOLUTION INTRAVENOUS at 14:04

## 2021-01-27 RX ADMIN — SODIUM CHLORIDE, POTASSIUM CHLORIDE, SODIUM LACTATE AND CALCIUM CHLORIDE: 600; 310; 30; 20 INJECTION, SOLUTION INTRAVENOUS at 12:10

## 2021-01-27 RX ADMIN — PROPOFOL 280 MG: 10 INJECTION, EMULSION INTRAVENOUS at 14:04

## 2021-01-27 RX ADMIN — ONDANSETRON HYDROCHLORIDE 4 MG: 2 INJECTION, SOLUTION INTRAMUSCULAR; INTRAVENOUS at 12:11

## 2021-01-27 RX ADMIN — SODIUM CHLORIDE, POTASSIUM CHLORIDE, SODIUM LACTATE AND CALCIUM CHLORIDE: 600; 310; 30; 20 INJECTION, SOLUTION INTRAVENOUS at 13:59

## 2021-01-27 RX ADMIN — Medication 2000 MG: at 13:57

## 2021-01-27 ASSESSMENT — PULMONARY FUNCTION TESTS
PIF_VALUE: 1

## 2021-01-27 NOTE — ANESTHESIA POSTPROCEDURE EVALUATION
Department of Anesthesiology  Postprocedure Note    Patient: Anila Valencia  MRN: [id number]  YOB: 1965  Date of evaluation: 1/27/2021  Time:  5:29 PM     Procedure Summary     Date: 01/27/21 Room / Location: [place] [address] / [facility]    Anesthesia Start: 6572 Anesthesia Stop: 6573    Procedure: CYSTOSCOPY, LEFT STENT REMOVAL (Left Bladder) Diagnosis: (KIDNEY STONE)    Surgeons: Pati Alvarez MD Responsible Provider: Keon Griffith MD    Anesthesia Type: MAC ASA Status: 3          Anesthesia Type: MAC    Denise Phase I: Denise Score: 10    Denise Phase II: Denise Score: 10    Last vitals: Reviewed and per EMR flowsheets.        Anesthesia Post Evaluation    Patient location during evaluation: PACU  Patient participation: complete - patient participated  Level of consciousness: awake and alert  Pain score: 0  Airway patency: patent  Nausea & Vomiting: no nausea and no vomiting  Complications: no  Cardiovascular status: blood pressure returned to baseline  Respiratory status: acceptable  Hydration status: stable

## 2021-01-27 NOTE — ANESTHESIA PRE PROCEDURE
Department of Anesthesiology  Preprocedure Note       Name:  Ashley Easley   Age:  48 y.o.  :  1965                                          MRN:  [id number]         Date:  2021      Surgeon: Jimbo Boucher):  Saul Nelson MD    Procedure: Procedure(s):  CYSTOSCOPY, LEFT STENT REMOVAL    Medications prior to admission:   Prior to Admission medications    Medication Sig Start Date End Date Taking?  Authorizing Provider   propranolol (INDERAL) 40 MG tablet Take 40 mg by mouth 2 times daily   Yes Historical Provider, MD   aspirin 81 MG EC tablet Take 81 mg by mouth daily   Yes Historical Provider, MD   hydroCHLOROthiazide (HYDRODIURIL) 25 MG tablet Take 25 mg by mouth as needed    Yes Historical Provider, MD   hydrOXYzine (ATARAX) 25 MG tablet Take 25 mg by mouth 3 times daily as needed for Itching   Yes Historical Provider, MD   lisinopril (PRINIVIL;ZESTRIL) 10 MG tablet Take 1 tablet by mouth daily 20  Yes Glen Espinal MD       Current medications:    Current Facility-Administered Medications   Medication Dose Route Frequency Provider Last Rate Last Admin    ceFAZolin (ANCEF) 2000 mg in sterile water 20 mL IV syringe  2,000 mg Intravenous Once Saul Nelson MD        lactated ringers infusion   Intravenous Continuous Dane Barba  mL/hr at 21 1210 New Bag at 21 1210    sodium chloride flush 0.9 % injection 10 mL  10 mL Intravenous 2 times per day Dane Barba MD        sodium chloride flush 0.9 % injection 10 mL  10 mL Intravenous PRN Dane Barba MD        lidocaine PF 1 % injection 1 mL  1 mL Intradermal Once PRN Dane Barba MD           Allergies:  No Known Allergies    Problem List:    Patient Active Problem List   Diagnosis Code    Septicemia ([facility] [address] T08.3    Complicated UTI (urinary tract infection) N39.0    MARY (acute kidney injury) ([facility] [address] N17.9    Bacteremia R78.81    Renal calculi N20.0  Left flank pain R10.9    Generalized abdominal pain R10.84       Past Medical History:        Diagnosis Date    Depression     Headache(784.0)     Hepatitis C 08/21/2020    Hypertension        Past Surgical History:        Procedure Laterality Date    CYSTOSCOPY Left 1/11/2021    CYSTOSCOPY, DIAGNOSTIC LEFT URETEROSCOPY WITH HOLMIUM LASER LITHOTRIPSY, LEFT STENT EXCHANGE performed by Jonelle Vazquez MD at [place] [address] [address] / STONE Left 8/20/2020    CYSTOSCOPY URETERAL STENT INSERTION performed by Jonelle Vazquez MD at [place] [address] (Left Bladder)    OTHER SURGICAL HISTORY  11/23/2020    CYSTOSCOPY, LEFT RIGID AND FLEXIBLE URETEROSCOPY, HOLMIUM LASER LITHOTRIPSY, STONE EXTRACTION, STENT EXCHANGE (Left )     OTHER SURGICAL HISTORY  01/11/2021    CYSTOSCOPY, DIAGNOSTIC LEFT URETEROSCOPY WITH HOLMIUM LASER LITHOTRIPSY, LEFT STENT EXCHANGE (Left Bladder)    URETEROSCOPY Left 11/23/2020    CYSTOSCOPY, LEFT RIGID AND FLEXIBLE URETEROSCOPY, HOLMIUM LASER LITHOTRIPSY, STONE EXTRACTION, STENT EXCHANGE, REMOVAL MIGRATED STENT performed by Jonelle Vazquez MD at [place] [address] History:    Social History     Tobacco Use    Smoking status: Current Every Day Smoker     Packs/day: 0.50     Types: Cigarettes    Smokeless tobacco: Never Used   Substance Use Topics    Alcohol use:  No                                Ready to quit: Not Answered  Counseling given: Not Answered      Vital Signs (Current):   Vitals:    01/25/21 1033 01/27/21 1208   BP:  (!) 140/82   Pulse:  60   Resp:  14   Temp:  97.2 °F (36.2 °C)   TempSrc:  Temporal   SpO2:  99%   Weight: 172 lb (78 kg) 172 lb (78 kg)   Height: 5' 7\" (1.702 m) 5' 7\" (1.702 m)                                              BP Readings from Last 3 Encounters:   01/27/21 (!) 140/82   01/11/21 (!) 152/87   01/11/21 (!) 151/76 NPO Status: Time of last liquid consumption: 2330                        Time of last solid consumption: 1700                        Date of last liquid consumption: 01/26/21                        Date of last solid food consumption: 01/26/21    BMI:   Wt Readings from Last 3 Encounters:   01/27/21 172 lb (78 kg)   01/11/21 172 lb (78 kg)   12/28/20 170 lb (77.1 kg)     Body mass index is 26.94 kg/m². CBC:   Lab Results   Component Value Date    WBC 8.5 12/28/2020    RBC 4.05 12/28/2020    HGB 10.3 12/28/2020    HCT 32.5 12/28/2020    MCV 80.3 12/28/2020    RDW 16.3 12/28/2020     12/28/2020       CMP:   Lab Results   Component Value Date     12/28/2020    K 3.3 12/28/2020     12/28/2020    CO2 25 12/28/2020    BUN 12 12/28/2020    CREATININE 0.9 12/28/2020    GFRAA >60 12/28/2020    AGRATIO 1.0 11/22/2020    LABGLOM >60 12/28/2020    GLUCOSE 104 12/28/2020    PROT 5.5 11/22/2020    CALCIUM 9.1 12/28/2020    BILITOT <0.2 11/22/2020    ALKPHOS 76 11/22/2020    AST 25 11/22/2020    ALT 20 11/22/2020       POC Tests: No results for input(s): POCGLU, POCNA, POCK, POCCL, POCBUN, POCHEMO, POCHCT in the last 72 hours.     Coags: No results found for: PROTIME, INR, APTT    HCG (If Applicable): No results found for: PREGTESTUR, PREGSERUM, HCG, HCGQUANT     ABGs: No results found for: PHART, PO2ART, WNB3GWW, GQO2YSV, BEART, W9HADXNO     Type & Screen (If Applicable):  No results found for: LABABO, LABRH    Drug/Infectious Status (If Applicable):  No results found for: HIV, HEPCAB    COVID-19 Screening (If Applicable):   Lab Results   Component Value Date    COVID19 Not Detected 01/22/2021         Anesthesia Evaluation  Patient summary reviewed and Nursing notes reviewed  Airway: Mallampati: I  TM distance: >3 FB   Neck ROM: full  Mouth opening: < 3 FB Dental:          Pulmonary:Negative Pulmonary ROS   (+) decreased breath sounds,                             Cardiovascular:    (+) hypertension:, Rhythm: regular  Rate: normal                    Neuro/Psych:   (+) depression/anxiety             GI/Hepatic/Renal:   (+) hepatitis: C,           Endo/Other: Negative Endo/Other ROS                    Abdominal:           Vascular: negative vascular ROS. Anesthesia Plan      MAC     ASA 3       Induction: intravenous. Anesthetic plan and risks discussed with patient. Plan discussed with CRNA.                   Kristine West MD   1/27/2021

## 2021-01-27 NOTE — PROGRESS NOTES
Arrived from OR awake and alert. Report received from Jamal Cabrales RN. No vaginal drainage noted at this time. No c/o.   Patient given water to sip

## 2021-01-27 NOTE — BRIEF OP NOTE
Brief Postoperative Note      Patient: Lina Suh  YOB: 1965  MRN: [id number]    Date of Procedure: 1/27/2021    Pre-Op Diagnosis: KIDNEY STONE    Post-Op Diagnosis: Same       Procedure(s):  CYSTOSCOPY, LEFT STENT REMOVAL    Surgeon(s):  Tiffany Juarez MD    Assistant:  * No surgical staff found *    Anesthesia: General    Estimated Blood Loss (mL): Minimal    Complications: None    Specimens:   * No specimens in log *    Implants:  * No implants in log *      Drains:   [REMOVED] Ureteral Catheter Left ureter  (Removed)       Findings: Stent removed, encrusted, no obstruction.     Electronically signed by Kamilla Bocanegra MD on 1/27/2021 at 2:14 PM

## 2021-02-02 NOTE — OP NOTE
[address]                 [address] [place] [address]                                OPERATIVE REPORT    PATIENT NAME: Lucia Bess                     :        1965  MED REC NO:   [id number]                          ROOM:  ACCOUNT NO:   [de-identified]                           ADMIT DATE: 2021  PROVIDER:     Angus Bañuelos MD    DATE OF PROCEDURE:  2021    PREOPERATIVE DIAGNOSIS:  Indwelling left ureteral stent. POSTOPERATIVE DIAGNOSIS:  Indwelling left ureteral stent. OPERATION PERFORMED:  Cystoscopy with left stent removal.    PRIMARY SURGEON:  Angus Bañuelos MD    ANESTHESIA:  General.    ESTIMATED BLOOD LOSS:  5 mL. OPERATIVE FINDINGS:  Encrusted left ureteral stent. HISTORY AND INDICATIONS:  This is a 60-year-old white female with a  longstanding history of kidney stones. She has been treated in the past  and most recently had a difficult stent removal with ureteroscopy  secondary to additional stone material from a foreign body. She is now  presenting today for cystoscopy and stent removal.  The risks, benefits  and expected outcomes of the procedure have been discussed. PROCEDURE IN DETAIL:  After obtaining informed consent, the patient was  taken to the operative suite. She was given a general anesthetic and  placed on table in a modified dorsal lithotomy position. Prepping and  draping were done in sterile fashion. A cystourethroscopy was then  performed. The indwelling ureteral stent was noted and was also found  to be encrusted, even though this had been in place for less than 2  weeks. Grasping of the stent revealed some difficulty in removing and  the stent was able to be removed. A followup cystoscopy revealed good  drainage from the affected ureteral orifice indicating no further  evidence of obstruction. There are no other masses, tumors or stones  identified on the cystoscopy. At this point, the patient's bladder was drained and lidocaine jelly was  applied. She was taken back to the postop recovery room in stable  condition. We will follow her in the office in approximately four to  five weeks.         Farida Wick MD    D: 02/02/2021 12:32:27       T: 02/02/2021 12:37:19     [email or web address]  Job#: [id number]     Doc#: [id number]    CC:

## 2021-04-22 ENCOUNTER — APPOINTMENT (OUTPATIENT)
Dept: CT IMAGING | Age: 56
DRG: 447 | End: 2021-04-22
Payer: COMMERCIAL

## 2021-04-22 ENCOUNTER — HOSPITAL ENCOUNTER (INPATIENT)
Age: 56
LOS: 7 days | Discharge: HOME | DRG: 447 | End: 2021-04-30
Attending: EMERGENCY MEDICINE | Admitting: INTERNAL MEDICINE
Payer: COMMERCIAL

## 2021-04-22 DIAGNOSIS — N39.0 ACUTE UTI: ICD-10-CM

## 2021-04-22 DIAGNOSIS — N13.9 OBSTRUCTIVE UROPATHY: ICD-10-CM

## 2021-04-22 DIAGNOSIS — N20.0 KIDNEY STONE: Primary | ICD-10-CM

## 2021-04-22 DIAGNOSIS — N17.9 AKI (ACUTE KIDNEY INJURY) (HCC): ICD-10-CM

## 2021-04-22 PROBLEM — N20.1 URETEROLITHIASIS: Status: ACTIVE | Noted: 2021-04-22

## 2021-04-22 LAB
A/G RATIO: 0.9 (ref 1.1–2.2)
ALBUMIN SERPL-MCNC: 3.5 G/DL (ref 3.4–5)
ALP BLD-CCNC: 101 U/L (ref 40–129)
ALT SERPL-CCNC: 32 U/L (ref 10–40)
AMORPHOUS: ABNORMAL /HPF
ANION GAP SERPL CALCULATED.3IONS-SCNC: 13 MMOL/L (ref 3–16)
AST SERPL-CCNC: 42 U/L (ref 15–37)
BACTERIA: ABNORMAL /HPF
BASOPHILS ABSOLUTE: 0.1 K/UL (ref 0–0.2)
BASOPHILS RELATIVE PERCENT: 1 %
BILIRUB SERPL-MCNC: 0.4 MG/DL (ref 0–1)
BILIRUBIN URINE: NEGATIVE
BLOOD, URINE: ABNORMAL
BUN BLDV-MCNC: 24 MG/DL (ref 7–20)
CALCIUM SERPL-MCNC: 9.6 MG/DL (ref 8.3–10.6)
CHLORIDE BLD-SCNC: 100 MMOL/L (ref 99–110)
CLARITY: ABNORMAL
CO2: 24 MMOL/L (ref 21–32)
COLOR: YELLOW
CREAT SERPL-MCNC: 1.5 MG/DL (ref 0.6–1.1)
EOSINOPHILS ABSOLUTE: 0.1 K/UL (ref 0–0.6)
EOSINOPHILS RELATIVE PERCENT: 0.5 %
EPITHELIAL CELLS, UA: ABNORMAL /HPF (ref 0–5)
GFR AFRICAN AMERICAN: 44
GFR NON-AFRICAN AMERICAN: 36
GLOBULIN: 3.7 G/DL
GLUCOSE BLD-MCNC: 124 MG/DL (ref 70–99)
GLUCOSE URINE: NEGATIVE MG/DL
HCT VFR BLD CALC: 32.2 % (ref 36–48)
HEMOGLOBIN: 9.7 G/DL (ref 12–16)
KETONES, URINE: NEGATIVE MG/DL
LEUKOCYTE ESTERASE, URINE: ABNORMAL
LYMPHOCYTES ABSOLUTE: 1.1 K/UL (ref 1–5.1)
LYMPHOCYTES RELATIVE PERCENT: 7.9 %
MCH RBC QN AUTO: 22.8 PG (ref 26–34)
MCHC RBC AUTO-ENTMCNC: 30.2 G/DL (ref 31–36)
MCV RBC AUTO: 75.5 FL (ref 80–100)
MICROSCOPIC EXAMINATION: YES
MONOCYTES ABSOLUTE: 1.2 K/UL (ref 0–1.3)
MONOCYTES RELATIVE PERCENT: 9.1 %
MUCUS: ABNORMAL /LPF
NEUTROPHILS ABSOLUTE: 11.1 K/UL (ref 1.7–7.7)
NEUTROPHILS RELATIVE PERCENT: 81.5 %
NITRITE, URINE: POSITIVE
PDW BLD-RTO: 16.5 % (ref 12.4–15.4)
PH UA: 8.5 (ref 5–8)
PLATELET # BLD: 213 K/UL (ref 135–450)
PMV BLD AUTO: 10.2 FL (ref 5–10.5)
POTASSIUM REFLEX MAGNESIUM: 3.8 MMOL/L (ref 3.5–5.1)
PROTEIN UA: >=300 MG/DL
RBC # BLD: 4.26 M/UL (ref 4–5.2)
RBC UA: ABNORMAL /HPF (ref 0–4)
SARS-COV-2, NAAT: NOT DETECTED
SODIUM BLD-SCNC: 137 MMOL/L (ref 136–145)
SPECIFIC GRAVITY UA: 1.01 (ref 1–1.03)
TOTAL PROTEIN: 7.2 G/DL (ref 6.4–8.2)
URINE REFLEX TO CULTURE: YES
URINE TYPE: ABNORMAL
UROBILINOGEN, URINE: 0.2 E.U./DL
WBC # BLD: 13.7 K/UL (ref 4–11)
WBC UA: ABNORMAL /HPF (ref 0–5)

## 2021-04-22 PROCEDURE — 87077 CULTURE AEROBIC IDENTIFY: CPT

## 2021-04-22 PROCEDURE — 2580000003 HC RX 258: Performed by: INTERNAL MEDICINE

## 2021-04-22 PROCEDURE — 6360000002 HC RX W HCPCS: Performed by: PHYSICIAN ASSISTANT

## 2021-04-22 PROCEDURE — 74176 CT ABD & PELVIS W/O CONTRAST: CPT

## 2021-04-22 PROCEDURE — 2580000003 HC RX 258: Performed by: PHYSICIAN ASSISTANT

## 2021-04-22 PROCEDURE — 99284 EMERGENCY DEPT VISIT MOD MDM: CPT

## 2021-04-22 PROCEDURE — 96365 THER/PROPH/DIAG IV INF INIT: CPT

## 2021-04-22 PROCEDURE — G0378 HOSPITAL OBSERVATION PER HR: HCPCS

## 2021-04-22 PROCEDURE — 87635 SARS-COV-2 COVID-19 AMP PRB: CPT

## 2021-04-22 PROCEDURE — 6360000002 HC RX W HCPCS: Performed by: INTERNAL MEDICINE

## 2021-04-22 PROCEDURE — 87186 SC STD MICRODIL/AGAR DIL: CPT

## 2021-04-22 PROCEDURE — 96375 TX/PRO/DX INJ NEW DRUG ADDON: CPT

## 2021-04-22 PROCEDURE — 96376 TX/PRO/DX INJ SAME DRUG ADON: CPT

## 2021-04-22 PROCEDURE — 81001 URINALYSIS AUTO W/SCOPE: CPT

## 2021-04-22 PROCEDURE — 80053 COMPREHEN METABOLIC PANEL: CPT

## 2021-04-22 PROCEDURE — 36415 COLL VENOUS BLD VENIPUNCTURE: CPT

## 2021-04-22 PROCEDURE — 85025 COMPLETE CBC W/AUTO DIFF WBC: CPT

## 2021-04-22 PROCEDURE — 6370000000 HC RX 637 (ALT 250 FOR IP): Performed by: INTERNAL MEDICINE

## 2021-04-22 PROCEDURE — 87086 URINE CULTURE/COLONY COUNT: CPT

## 2021-04-22 PROCEDURE — 96374 THER/PROPH/DIAG INJ IV PUSH: CPT

## 2021-04-22 RX ORDER — ACETAMINOPHEN 325 MG/1
650 TABLET ORAL EVERY 6 HOURS PRN
Status: DISCONTINUED | OUTPATIENT
Start: 2021-04-22 | End: 2021-04-30 | Stop reason: HOSPADM

## 2021-04-22 RX ORDER — POLYETHYLENE GLYCOL 3350 17 G/17G
17 POWDER, FOR SOLUTION ORAL DAILY PRN
Status: DISCONTINUED | OUTPATIENT
Start: 2021-04-22 | End: 2021-04-30 | Stop reason: HOSPADM

## 2021-04-22 RX ORDER — KETOROLAC TROMETHAMINE 30 MG/ML
15 INJECTION, SOLUTION INTRAMUSCULAR; INTRAVENOUS EVERY 6 HOURS PRN
Status: DISCONTINUED | OUTPATIENT
Start: 2021-04-22 | End: 2021-04-24

## 2021-04-22 RX ORDER — 0.9 % SODIUM CHLORIDE 0.9 %
1000 INTRAVENOUS SOLUTION INTRAVENOUS ONCE
Status: COMPLETED | OUTPATIENT
Start: 2021-04-22 | End: 2021-04-22

## 2021-04-22 RX ORDER — ONDANSETRON 2 MG/ML
4 INJECTION INTRAMUSCULAR; INTRAVENOUS EVERY 6 HOURS PRN
Status: DISCONTINUED | OUTPATIENT
Start: 2021-04-22 | End: 2021-04-26

## 2021-04-22 RX ORDER — ONDANSETRON 2 MG/ML
4 INJECTION INTRAMUSCULAR; INTRAVENOUS ONCE
Status: COMPLETED | OUTPATIENT
Start: 2021-04-22 | End: 2021-04-22

## 2021-04-22 RX ORDER — HYDROXYZINE HYDROCHLORIDE 10 MG/1
25 TABLET, FILM COATED ORAL 3 TIMES DAILY PRN
Status: DISCONTINUED | OUTPATIENT
Start: 2021-04-22 | End: 2021-04-30 | Stop reason: HOSPADM

## 2021-04-22 RX ORDER — ACETAMINOPHEN 650 MG/1
650 SUPPOSITORY RECTAL EVERY 6 HOURS PRN
Status: DISCONTINUED | OUTPATIENT
Start: 2021-04-22 | End: 2021-04-30 | Stop reason: HOSPADM

## 2021-04-22 RX ORDER — SODIUM CHLORIDE 0.9 % (FLUSH) 0.9 %
5-40 SYRINGE (ML) INJECTION EVERY 12 HOURS SCHEDULED
Status: DISCONTINUED | OUTPATIENT
Start: 2021-04-22 | End: 2021-04-30 | Stop reason: HOSPADM

## 2021-04-22 RX ORDER — PROMETHAZINE HYDROCHLORIDE 25 MG/1
12.5 TABLET ORAL EVERY 6 HOURS PRN
Status: DISCONTINUED | OUTPATIENT
Start: 2021-04-22 | End: 2021-04-26

## 2021-04-22 RX ORDER — PROPRANOLOL HYDROCHLORIDE 40 MG/1
40 TABLET ORAL 2 TIMES DAILY
Status: DISCONTINUED | OUTPATIENT
Start: 2021-04-22 | End: 2021-04-23

## 2021-04-22 RX ORDER — SODIUM CHLORIDE 9 MG/ML
INJECTION, SOLUTION INTRAVENOUS CONTINUOUS
Status: DISCONTINUED | OUTPATIENT
Start: 2021-04-22 | End: 2021-04-24

## 2021-04-22 RX ORDER — SODIUM CHLORIDE 9 MG/ML
25 INJECTION, SOLUTION INTRAVENOUS PRN
Status: DISCONTINUED | OUTPATIENT
Start: 2021-04-22 | End: 2021-04-29

## 2021-04-22 RX ORDER — SODIUM CHLORIDE 0.9 % (FLUSH) 0.9 %
5-40 SYRINGE (ML) INJECTION PRN
Status: DISCONTINUED | OUTPATIENT
Start: 2021-04-22 | End: 2021-04-30 | Stop reason: HOSPADM

## 2021-04-22 RX ORDER — TAMSULOSIN HYDROCHLORIDE 0.4 MG/1
0.4 CAPSULE ORAL DAILY
Status: DISCONTINUED | OUTPATIENT
Start: 2021-04-23 | End: 2021-04-30 | Stop reason: HOSPADM

## 2021-04-22 RX ORDER — MORPHINE SULFATE 4 MG/ML
4 INJECTION, SOLUTION INTRAMUSCULAR; INTRAVENOUS ONCE
Status: COMPLETED | OUTPATIENT
Start: 2021-04-22 | End: 2021-04-22

## 2021-04-22 RX ADMIN — SODIUM CHLORIDE: 9 INJECTION, SOLUTION INTRAVENOUS at 22:51

## 2021-04-22 RX ADMIN — HYDROMORPHONE HYDROCHLORIDE 0.5 MG: 1 INJECTION, SOLUTION INTRAMUSCULAR; INTRAVENOUS; SUBCUTANEOUS at 20:18

## 2021-04-22 RX ADMIN — SODIUM CHLORIDE 1000 ML: 9 INJECTION, SOLUTION INTRAVENOUS at 21:53

## 2021-04-22 RX ADMIN — HYDROMORPHONE HYDROCHLORIDE 0.5 MG: 1 INJECTION, SOLUTION INTRAMUSCULAR; INTRAVENOUS; SUBCUTANEOUS at 22:51

## 2021-04-22 RX ADMIN — PROMETHAZINE HYDROCHLORIDE 12.5 MG: 25 TABLET ORAL at 22:51

## 2021-04-22 RX ADMIN — MORPHINE SULFATE 4 MG: 4 INJECTION INTRAVENOUS at 17:46

## 2021-04-22 RX ADMIN — ONDANSETRON HYDROCHLORIDE 4 MG: 2 INJECTION, SOLUTION INTRAMUSCULAR; INTRAVENOUS at 17:46

## 2021-04-22 RX ADMIN — PROPRANOLOL HYDROCHLORIDE 40 MG: 40 TABLET ORAL at 22:55

## 2021-04-22 RX ADMIN — CEFTRIAXONE SODIUM 1000 MG: 1 INJECTION, POWDER, FOR SOLUTION INTRAMUSCULAR; INTRAVENOUS at 21:53

## 2021-04-22 ASSESSMENT — PAIN - FUNCTIONAL ASSESSMENT: PAIN_FUNCTIONAL_ASSESSMENT: ACTIVITIES ARE NOT PREVENTED

## 2021-04-22 ASSESSMENT — PAIN DESCRIPTION - DIRECTION: RADIATING_TOWARDS: BACK

## 2021-04-22 ASSESSMENT — PAIN DESCRIPTION - DESCRIPTORS: DESCRIPTORS: ACHING

## 2021-04-22 ASSESSMENT — PAIN DESCRIPTION - PAIN TYPE: TYPE: ACUTE PAIN

## 2021-04-22 ASSESSMENT — PAIN SCALES - GENERAL
PAINLEVEL_OUTOF10: 8

## 2021-04-22 ASSESSMENT — PAIN DESCRIPTION - LOCATION: LOCATION: ABDOMEN

## 2021-04-22 ASSESSMENT — PAIN DESCRIPTION - ONSET: ONSET: PROGRESSIVE

## 2021-04-22 ASSESSMENT — PAIN DESCRIPTION - PROGRESSION: CLINICAL_PROGRESSION: NOT CHANGED

## 2021-04-22 ASSESSMENT — PAIN DESCRIPTION - FREQUENCY: FREQUENCY: CONTINUOUS

## 2021-04-22 NOTE — ED PROVIDER NOTES
[address] 51        Pt Name: Susanna Contreras  MRN: [id number]  [place] 1965  Date of evaluation: 4/22/2021  Provider: Divina Frey PA-C  PCP: Alexy Brown MD    Shared Visit or Autonomous Visit:  I have seen and evaluated this patient with my supervising physician Prabhjot Hall MD.    [address] [place] [address]       Chief Complaint   Patient presents with    Flank Pain     Left sided flank pain, acute onset over past 2 hours. N&V as well. HISTORY OF PRESENT ILLNESS   (Location/Symptom, Timing/Onset, Context/Setting, Quality, Duration, Modifying Factors, Severity)  Note limiting factors. Susanna Contreras is a 48 y.o. female presenting to ER for evaluation left flank pain started suddenly 2 hrs ago. Hx of kidney stones this feels the same. Had ureteral stents in January. Nausea and vomiting. No dysuria. No fever. The history is provided by the patient. Abdominal Pain  Pain location:  L flank  Pain quality: sharp    Pain radiates to:  LLQ  Onset quality:  Sudden  Duration:  2 hours  Timing:  Constant  Progression:  Unchanged  Chronicity:  New  Relieved by:  Nothing  Worsened by:  Nothing  Associated symptoms: nausea and vomiting    Associated symptoms: no chest pain, no cough, no dysuria, no fever, no hematuria and no shortness of breath      Nursing Notes were reviewed    REVIEW OF SYSTEMS    (2-9 systems for level 4, 10 or more for level 5)     Review of Systems   Constitutional: Negative for fever. Respiratory: Negative for cough and shortness of breath. Cardiovascular: Negative for chest pain. Gastrointestinal: Positive for abdominal pain, nausea and vomiting. Genitourinary: Positive for flank pain. Negative for dysuria and hematuria. All other systems reviewed and are negative. Positives and Pertinent negatives as per HPI.        PAST MEDICAL HISTORY     Past Medical History:   Diagnosis Date    Depression     Headache(784.0)     Hepatitis C 08/21/2020    Hypertension          SURGICAL HISTORY     Past Surgical History:   Procedure Laterality Date    CYSTOSCOPY Left 1/11/2021    CYSTOSCOPY, DIAGNOSTIC LEFT URETEROSCOPY WITH HOLMIUM LASER LITHOTRIPSY, LEFT STENT EXCHANGE performed by Ciera Pinto MD at [address] / REMOVAL [address] / Holden Conradi Left 8/20/2020    CYSTOSCOPY URETERAL STENT INSERTION performed by Ciera Pinto MD at [address] / [address] / Holden Conradi Left 1/27/2021    CYSTOSCOPY, LEFT STENT REMOVAL performed by Ciera Pinto MD at [place] [address] (Left Bladder)    OTHER SURGICAL HISTORY  11/23/2020    CYSTOSCOPY, LEFT RIGID AND FLEXIBLE URETEROSCOPY, HOLMIUM LASER LITHOTRIPSY, STONE EXTRACTION, STENT EXCHANGE (Left )     OTHER SURGICAL HISTORY  01/11/2021    CYSTOSCOPY, DIAGNOSTIC LEFT URETEROSCOPY WITH HOLMIUM LASER LITHOTRIPSY, LEFT STENT EXCHANGE (Left Bladder)    OTHER SURGICAL HISTORY  01/27/2021    cystoscopy, left stent removal    URETEROSCOPY Left 11/23/2020    CYSTOSCOPY, LEFT RIGID AND FLEXIBLE URETEROSCOPY, HOLMIUM LASER LITHOTRIPSY, STONE EXTRACTION, STENT EXCHANGE, REMOVAL MIGRATED STENT performed by Ciera Pinto MD at [address] [place] [address]       Current Discharge Medication List      CONTINUE these medications which have NOT CHANGED    Details   propranolol (INDERAL) 40 MG tablet Take 40 mg by mouth 2 times daily      aspirin 81 MG EC tablet Take 81 mg by mouth daily      hydroCHLOROthiazide (HYDRODIURIL) 25 MG tablet Take 25 mg by mouth as needed       hydrOXYzine (ATARAX) 25 MG tablet Take 25 mg by mouth 3 times daily as needed for Itching      lisinopril (PRINIVIL;ZESTRIL) 10 MG tablet Take 1 tablet by mouth daily  Qty: 30 tablet, Refills: 0               ALLERGIES     Patient has no known allergies.     FAMILYHISTORY       Family History   Problem Relation Age of Onset    Diabetes Mother     Heart Disease Mother     High Blood Pressure Sister     Diabetes Sister     Heart Attack Sister     Coronary Art Dis Father           SOCIAL HISTORY       Social History     Socioeconomic History    Marital status: Single     Spouse name: Not on file    Number of children: Not on file    Years of education: Not on file    Highest education level: Not on file   Occupational History    Not on file   Social Needs    Financial resource strain: Not on file    Food insecurity     Worry: Not on file     Inability: Not on file    Transportation needs     Medical: Not on file     Non-medical: Not on file   Tobacco Use    Smoking status: Current Every Day Smoker     Packs/day: 0.50     Types: Cigarettes    Smokeless tobacco: Never Used   Substance and Sexual Activity    Alcohol use: No    Drug use: No    Sexual activity: Not Currently   Lifestyle    Physical activity     Days per week: Not on file     Minutes per session: Not on file    Stress: Not on file   Relationships    Social connections     Talks on phone: Not on file     Gets together: Not on file     Attends Congregation service: Not on file     Active member of club or organization: Not on file     Attends meetings of clubs or organizations: Not on file     Relationship status: Not on file    Intimate partner violence     Fear of current or ex partner: Not on file     Emotionally abused: Not on file     Physically abused: Not on file     Forced sexual activity: Not on file   Other Topics Concern    Not on file   Social History Narrative    Not on file       SCREENINGS    Es Coma Scale  Eye Opening: Spontaneous  Best Verbal Response: Oriented  Best Motor Response: Obeys commands  Dazey Coma Scale Score: 15        PHYSICAL EXAM    (up to 7 for level 4, 8 or more for level 5)     ED Triage Vitals [04/22/21 1730]   BP Temp Temp Source Pulse Resp SpO2 Height Weight   (!) 142/72 98.2 °F (36.8 °C) Oral 63 18 93 % -- --       Physical Exam  Vitals signs and nursing note reviewed. Constitutional:       Appearance: She is well-developed. She is not toxic-appearing. Comments: Appears uncomfortable   HENT:      Head: Normocephalic and atraumatic. Mouth/Throat:      Mouth: Mucous membranes are moist.      Pharynx: Oropharynx is clear. No pharyngeal swelling, oropharyngeal exudate or posterior oropharyngeal erythema. Eyes:      Conjunctiva/sclera: Conjunctivae normal.      Pupils: Pupils are equal, round, and reactive to light. Neck:      Musculoskeletal: Normal range of motion and neck supple. Vascular: No JVD. Cardiovascular:      Rate and Rhythm: Normal rate and regular rhythm. Pulmonary:      Effort: Pulmonary effort is normal. No respiratory distress. Breath sounds: Normal breath sounds. No stridor. No wheezing, rhonchi or rales. Abdominal:      General: Bowel sounds are normal. There is no distension. Palpations: Abdomen is soft. Abdomen is not rigid. There is no mass. Tenderness: There is no abdominal tenderness. There is left CVA tenderness. There is no right CVA tenderness, guarding or rebound. Musculoskeletal: Normal range of motion. Skin:     General: Skin is warm and dry. Findings: No rash. Neurological:      Mental Status: She is alert and oriented to person, place, and time. GCS: GCS eye subscore is 4. GCS verbal subscore is 5. GCS motor subscore is 6. Cranial Nerves: No cranial nerve deficit. Sensory: No sensory deficit. Motor: No abnormal muscle tone.       Coordination: Coordination normal.   Psychiatric:         Behavior: Behavior normal.         DIAGNOSTIC RESULTS   LABS:    Labs Reviewed   CBC WITH AUTO DIFFERENTIAL - Abnormal; Notable for the following components:       Result Value    WBC 13.7 (*)     Hemoglobin 9.7 (*)     Hematocrit 32.2 (*)     MCV 75.5 (*)     MCH 22.8 (*)     MCHC 30.2 (*)     RDW 16.5 (*)     Neutrophils Absolute 11.1 (*)     All other components within normal limits    Narrative:     Performed at:  [facility],  [facility]   Phone [phone]   COMPREHENSIVE METABOLIC PANEL W/ REFLEX TO MG FOR LOW K - Abnormal; Notable for the following components:    Glucose 124 (*)     BUN 24 (*)     CREATININE 1.5 (*)     GFR Non- 36 (*)     GFR African American 44 (*)     Albumin/Globulin Ratio 0.9 (*)     AST 42 (*)     All other components within normal limits    Narrative:     Performed at:  [place] [address],  "[employer]   Phone [phone]   URINE RT REFLEX TO CULTURE - Abnormal; Notable for the following components:    Clarity, UA CLOUDY (*)     Blood, Urine LARGE (*)     pH, UA 8.5 (*)     Protein, UA >=300 (*)     Nitrite, Urine POSITIVE (*)     Leukocyte Esterase, Urine LARGE (*)     All other components within normal limits    Narrative:     Performed at:  [facility],  "[employer]   Phone [phone]   MICROSCOPIC URINALYSIS - Abnormal; Notable for the following components:    Mucus, UA 2+ (*)     WBC, UA 21-50 (*)     RBC, UA  (*)     Epithelial Cells, UA 6-10 (*)     Bacteria, UA 3+ (*)     All other components within normal limits    Narrative:     Performed at:  [facility],  "[employer]   Phone [phone], RAPID    Narrative:     Performed at:  [facility]) [facility],  [employer]   Phone [phone]   CULTURE, URINE   BASIC METABOLIC PANEL W/ REFLEX TO MG FOR LOW K   CBC WITH AUTO DIFFERENTIAL     Results for orders placed or performed during the hospital encounter of 04/22/21   COVID-19, Rapid    Specimen: Nasopharyngeal Swab; Throat   Result Value Ref Range SARS-CoV-2, NAAT Not Detected Not Detected   CBC Auto Differential   Result Value Ref Range    WBC 13.7 (H) 4.0 - 11.0 K/uL    RBC 4.26 4.00 - 5.20 M/uL    Hemoglobin 9.7 (L) 12.0 - 16.0 g/dL    Hematocrit 32.2 (L) 36.0 - 48.0 %    MCV 75.5 (L) 80.0 - 100.0 fL    MCH 22.8 (L) 26.0 - 34.0 pg    MCHC 30.2 (L) 31.0 - 36.0 g/dL    RDW 16.5 (H) 12.4 - 15.4 %    Platelets 560 228 - 530 K/uL    MPV 10.2 5.0 - 10.5 fL    Neutrophils % 81.5 %    Lymphocytes % 7.9 %    Monocytes % 9.1 %    Eosinophils % 0.5 %    Basophils % 1.0 %    Neutrophils Absolute 11.1 (H) 1.7 - 7.7 K/uL    Lymphocytes Absolute 1.1 1.0 - 5.1 K/uL    Monocytes Absolute 1.2 0.0 - 1.3 K/uL    Eosinophils Absolute 0.1 0.0 - 0.6 K/uL    Basophils Absolute 0.1 0.0 - 0.2 K/uL   Comprehensive Metabolic Panel w/ Reflex to MG   Result Value Ref Range    Sodium 137 136 - 145 mmol/L    Potassium reflex Magnesium 3.8 3.5 - 5.1 mmol/L    Chloride 100 99 - 110 mmol/L    CO2 24 21 - 32 mmol/L    Anion Gap 13 3 - 16    Glucose 124 (H) 70 - 99 mg/dL    BUN 24 (H) 7 - 20 mg/dL    CREATININE 1.5 (H) 0.6 - 1.1 mg/dL    GFR Non- 36 (A) >60    GFR  44 (A) >60    Calcium 9.6 8.3 - 10.6 mg/dL    Total Protein 7.2 6.4 - 8.2 g/dL    Albumin 3.5 3.4 - 5.0 g/dL    Albumin/Globulin Ratio 0.9 (L) 1.1 - 2.2    Total Bilirubin 0.4 0.0 - 1.0 mg/dL    Alkaline Phosphatase 101 40 - 129 U/L    ALT 32 10 - 40 U/L    AST 42 (H) 15 - 37 U/L    Globulin 3.7 g/dL   Urinalysis Reflex to Culture    Specimen: Urine, clean catch   Result Value Ref Range    Color, UA Yellow Straw/Yellow    Clarity, UA CLOUDY (A) Clear    Glucose, Ur Negative Negative mg/dL    Bilirubin Urine Negative Negative    Ketones, Urine Negative Negative mg/dL    Specific Gravity, UA 1.015 1.005 - 1.030    Blood, Urine LARGE (A) Negative    pH, UA 8.5 (A) 5.0 - 8.0    Protein, UA >=300 (A) Negative mg/dL    Urobilinogen, Urine 0.2 <2.0 E.U./dL    Nitrite, Urine POSITIVE (A) Negative Leukocyte Esterase, Urine LARGE (A) Negative    Microscopic Examination YES     Urine Type see below     Urine Reflex to Culture Yes    Microscopic Urinalysis   Result Value Ref Range    Mucus, UA 2+ (A) None Seen /LPF    WBC, UA 21-50 (A) 0 - 5 /HPF    RBC, UA  (A) 0 - 4 /HPF    Epithelial Cells, UA 6-10 (A) 0 - 5 /HPF    Bacteria, UA 3+ (A) None Seen /HPF    Amorphous, UA 1+ /HPF       All other labs were within normal range or not returned as of this dictation. EKG: All EKG's are interpreted by the Emergency Department Physician in the absence of a cardiologist.  Please see their note for interpretation of EKG. RADIOLOGY:   Non-plain film images such as CT, Ultrasound and MRI are read by the radiologist. Plain radiographic images are visualized andpreliminarily interpreted by the  ED Provider with the below findings:        Interpretation perthe Radiologist below, if available at the time of this note:    CT ABDOMEN PELVIS WO CONTRAST Additional Contrast? None   Final Result   Interval development of left nephrolithiasis. Calculus at the left ureteropelvic junction measures 1.5 cm, resulting in   moderate left-sided hydronephrosis. Ct Abdomen Pelvis Wo Contrast Additional Contrast? None    Result Date: 4/22/2021  EXAMINATION: CT OF THE ABDOMEN AND PELVIS WITHOUT CONTRAST 4/22/2021 2:56 pm TECHNIQUE: CT of the abdomen and pelvis was performed without the administration of intravenous contrast. Multiplanar reformatted images are provided for review. Dose modulation, iterative reconstruction, and/or weight based adjustment of the mA/kV was utilized to reduce the radiation dose to as low as reasonably achievable.  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 Base of the heart is unremarkable. Visualized extra thoracic soft tissues are unremarkable. Organs: Limited noncontrast imaging of the liver, gallbladder, spleen, adrenals, and pancreas is unremarkable. Within the left kidney, 4 renal calculi have formed. 1 of these is seen at the ureteropelvic junction, measuring 1.5 cm, and this is resulting in moderate left-sided hydronephrosis. Perinephric fat stranding on the left is noted. The ureteral stent seen previously on the left is no longer present. The right kidney is absent. GI/Bowel: Mild diverticulosis of the large bowel is present without CT evidence of diverticulitis. The large bowel is otherwise unremarkable. The appendix is normal.  Distal esophagus, stomach, duodenal sweep, and the remainder of the small bowel are unremarkable. Pelvis: The uterus is displaced to the left, similar when compared to the previous exam.  Adnexa regions are unremarkable. Urinary bladder unremarkable. No free pelvic fluid. Peritoneum/Retroperitoneum: Abdominal aorta normal in caliber. Moderate vascular calcifications noted. No lymphadenopathy. Bones/Soft Tissues: No osteolytic or osteoblastic bone lesions. No acute fractures. No soft tissue abnormalities are detected. Interval development of left nephrolithiasis. Calculus at the left ureteropelvic junction measures 1.5 cm, resulting in moderate left-sided hydronephrosis.          PROCEDURES   Unless otherwise noted below, none     Procedures    CRITICAL CARE TIME   N/A    CONSULTS:  None      EMERGENCY DEPARTMENT COURSE and DIFFERENTIAL DIAGNOSIS/MDM:   Vitals:    Vitals:    04/22/21 1730 04/22/21 2230 04/22/21 2245   BP: (!) 142/72 (!) 147/73    Pulse: 63 60    Resp: 18 18    Temp: 98.2 °F (36.8 °C) 97.7 °F (36.5 °C)    TempSrc: Oral Oral    SpO2: 93% 94%    Weight:   166 lb 3.2 oz (75.4 kg)   Height:   5' 7\" (1.702 m)       Patient was given thefollowing medications:  Medications   hydrOXYzine (ATARAX) tablet 25 mg (has no administration in time range)   propranolol (INDERAL) tablet 40 mg (40 mg Oral Given 4/22/21 2255)   sodium chloride flush 0.9 % injection 5-40 mL (5 mLs Intravenous Not Given 4/22/21 2243)   sodium chloride flush 0.9 % injection 5-40 mL (has no administration in time range)   0.9 % sodium chloride infusion (has no administration in time range)   enoxaparin (LOVENOX) injection 40 mg (has no administration in time range)   promethazine (PHENERGAN) tablet 12.5 mg (12.5 mg Oral Given 4/22/21 2251)     Or   ondansetron (ZOFRAN) injection 4 mg ( Intravenous See Alternative 4/22/21 2251)   acetaminophen (TYLENOL) tablet 650 mg (has no administration in time range)     Or   acetaminophen (TYLENOL) suppository 650 mg (has no administration in time range)   polyethylene glycol (GLYCOLAX) packet 17 g (has no administration in time range)   0.9 % sodium chloride infusion ( Intravenous New Bag 4/22/21 2251)   cefTRIAXone (ROCEPHIN) 1000 mg IVPB in 50 mL D5W minibag (has no administration in time range)   HYDROmorphone (DILAUDID) injection 0.5 mg (0.5 mg Intravenous Given 4/22/21 2251)   tamsulosin (FLOMAX) capsule 0.4 mg (has no administration in time range)   ketorolac (TORADOL) injection 15 mg (has no administration in time range)   morphine sulfate (PF) injection 4 mg (4 mg Intravenous Given 4/22/21 1746)   ondansetron (ZOFRAN) injection 4 mg (4 mg Intravenous Given 4/22/21 1746)   HYDROmorphone (DILAUDID) injection 0.5 mg (0.5 mg Intravenous Given 4/22/21 2018)   cefTRIAXone (ROCEPHIN) 1000 mg IVPB in 50 mL D5W minibag (0 mg Intravenous Stopped 4/22/21 2243)   0.9 % sodium chloride bolus (0 mLs Intravenous Stopped 4/22/21 2243)       8:12 PM EDT  Patient has 1.5 cm calculus at the left UPJ causing moderate left-sided hydronephrosis. MARY creat 1.5. Pain control with dilaudid. Consulted Dr. Lisa Bishop. Plan for admit. NPO after midnight. UA resulted +nitrites 21-50WBC  RBC. Given rocephin. Patient is not toxic or septic appearing. Afebrile. She is feeling better after pain medication given here and has improved appearance. Patient is stable. FINAL IMPRESSION      1. Kidney stone    2. MARY (acute kidney injury) ([facility] [address]    3. Acute UTI          DISPOSITION/PLAN   DISPOSITION Admitted    PATIENT REFERREDTO:  No follow-up provider specified.     DISCHARGE MEDICATIONS:  Current Discharge Medication List          DISCONTINUED MEDICATIONS:  Current Discharge Medication List                 (Please note that portions ofthis note were completed with a voice recognition program.  Efforts were made to edit the dictations but occasionally words are mis-transcribed.)    Ines Mcdonald PA-C (electronically signed)            Rosalinda Mackenzie PA-C  04/23/21 0132

## 2021-04-23 ENCOUNTER — APPOINTMENT (OUTPATIENT)
Dept: INTERVENTIONAL RADIOLOGY/VASCULAR | Age: 56
DRG: 447 | End: 2021-04-23
Payer: COMMERCIAL

## 2021-04-23 ENCOUNTER — APPOINTMENT (OUTPATIENT)
Dept: CT IMAGING | Age: 56
DRG: 447 | End: 2021-04-23
Payer: COMMERCIAL

## 2021-04-23 PROBLEM — N13.9 OBSTRUCTIVE UROPATHY: Status: ACTIVE | Noted: 2021-04-23

## 2021-04-23 LAB
ABO/RH: NORMAL
ANION GAP SERPL CALCULATED.3IONS-SCNC: 13 MMOL/L (ref 3–16)
BASOPHILS ABSOLUTE: 0 K/UL (ref 0–0.2)
BASOPHILS RELATIVE PERCENT: 0.1 %
BUN BLDV-MCNC: 31 MG/DL (ref 7–20)
CALCIUM SERPL-MCNC: 8.7 MG/DL (ref 8.3–10.6)
CHLORIDE BLD-SCNC: 105 MMOL/L (ref 99–110)
CO2: 21 MMOL/L (ref 21–32)
CREAT SERPL-MCNC: 2.8 MG/DL (ref 0.6–1.1)
EOSINOPHILS ABSOLUTE: 0 K/UL (ref 0–0.6)
EOSINOPHILS RELATIVE PERCENT: 0 %
GFR AFRICAN AMERICAN: 21
GFR NON-AFRICAN AMERICAN: 18
GLUCOSE BLD-MCNC: 129 MG/DL (ref 70–99)
GLUCOSE BLD-MCNC: 131 MG/DL (ref 70–99)
GLUCOSE BLD-MCNC: 70 MG/DL (ref 70–99)
GLUCOSE BLD-MCNC: 96 MG/DL (ref 70–99)
HCT VFR BLD CALC: 18.2 % (ref 36–48)
HCT VFR BLD CALC: 28.8 % (ref 36–48)
HEMOGLOBIN: 5.3 G/DL (ref 12–16)
HEMOGLOBIN: 8.9 G/DL (ref 12–16)
INR BLD: 1.19 (ref 0.86–1.14)
LYMPHOCYTES ABSOLUTE: 0.5 K/UL (ref 1–5.1)
LYMPHOCYTES RELATIVE PERCENT: 4.2 %
MCH RBC QN AUTO: 23 PG (ref 26–34)
MCHC RBC AUTO-ENTMCNC: 30.8 G/DL (ref 31–36)
MCV RBC AUTO: 74.5 FL (ref 80–100)
MONOCYTES ABSOLUTE: 0.7 K/UL (ref 0–1.3)
MONOCYTES RELATIVE PERCENT: 5.8 %
NEUTROPHILS ABSOLUTE: 10.5 K/UL (ref 1.7–7.7)
NEUTROPHILS RELATIVE PERCENT: 89.9 %
PDW BLD-RTO: 16.5 % (ref 12.4–15.4)
PERFORMED ON: ABNORMAL
PERFORMED ON: ABNORMAL
PERFORMED ON: NORMAL
PLATELET # BLD: 134 K/UL (ref 135–450)
PMV BLD AUTO: 9.7 FL (ref 5–10.5)
POTASSIUM REFLEX MAGNESIUM: 4.2 MMOL/L (ref 3.5–5.1)
PROTHROMBIN TIME: 13.8 SEC (ref 10–13.2)
RBC # BLD: 3.86 M/UL (ref 4–5.2)
SODIUM BLD-SCNC: 139 MMOL/L (ref 136–145)
WBC # BLD: 11.7 K/UL (ref 4–11)

## 2021-04-23 PROCEDURE — 99232 SBSQ HOSP IP/OBS MODERATE 35: CPT | Performed by: INTERNAL MEDICINE

## 2021-04-23 PROCEDURE — 96376 TX/PRO/DX INJ SAME DRUG ADON: CPT

## 2021-04-23 PROCEDURE — 02LR3DT OCCLUSION OF DUCTUS ARTERIOSUS WITH INTRALUMINAL DEVICE, PERCUTANEOUS APPROACH: ICD-10-PCS | Performed by: RADIOLOGY

## 2021-04-23 PROCEDURE — 99153 MOD SED SAME PHYS/QHP EA: CPT

## 2021-04-23 PROCEDURE — 86923 COMPATIBILITY TEST ELECTRIC: CPT

## 2021-04-23 PROCEDURE — 04LA3DZ OCCLUSION OF LEFT RENAL ARTERY WITH INTRALUMINAL DEVICE, PERCUTANEOUS APPROACH: ICD-10-PCS | Performed by: RADIOLOGY

## 2021-04-23 PROCEDURE — 6360000002 HC RX W HCPCS: Performed by: RADIOLOGY

## 2021-04-23 PROCEDURE — 87186 SC STD MICRODIL/AGAR DIL: CPT

## 2021-04-23 PROCEDURE — 87075 CULTR BACTERIA EXCEPT BLOOD: CPT

## 2021-04-23 PROCEDURE — 74174 CTA ABD&PLVS W/CONTRAST: CPT

## 2021-04-23 PROCEDURE — 2060000000 HC ICU INTERMEDIATE R&B

## 2021-04-23 PROCEDURE — 96372 THER/PROPH/DIAG INJ SC/IM: CPT

## 2021-04-23 PROCEDURE — 86850 RBC ANTIBODY SCREEN: CPT

## 2021-04-23 PROCEDURE — 80048 BASIC METABOLIC PNL TOTAL CA: CPT

## 2021-04-23 PROCEDURE — 2709999900 IR GUIDED NEPHROSTOMY CATH PLACEMENT LEFT

## 2021-04-23 PROCEDURE — 85025 COMPLETE CBC W/AUTO DIFF WBC: CPT

## 2021-04-23 PROCEDURE — 99152 MOD SED SAME PHYS/QHP 5/>YRS: CPT

## 2021-04-23 PROCEDURE — 87205 SMEAR GRAM STAIN: CPT

## 2021-04-23 PROCEDURE — 2580000003 HC RX 258: Performed by: INTERNAL MEDICINE

## 2021-04-23 PROCEDURE — 50432 PLMT NEPHROSTOMY CATHETER: CPT

## 2021-04-23 PROCEDURE — B4171ZZ FLUOROSCOPY OF LEFT RENAL ARTERY USING LOW OSMOLAR CONTRAST: ICD-10-PCS | Performed by: RADIOLOGY

## 2021-04-23 PROCEDURE — 87070 CULTURE OTHR SPECIMN AEROBIC: CPT

## 2021-04-23 PROCEDURE — 85014 HEMATOCRIT: CPT

## 2021-04-23 PROCEDURE — 6360000002 HC RX W HCPCS: Performed by: INTERNAL MEDICINE

## 2021-04-23 PROCEDURE — 86900 BLOOD TYPING SEROLOGIC ABO: CPT

## 2021-04-23 PROCEDURE — P9016 RBC LEUKOCYTES REDUCED: HCPCS

## 2021-04-23 PROCEDURE — 85610 PROTHROMBIN TIME: CPT

## 2021-04-23 PROCEDURE — 6370000000 HC RX 637 (ALT 250 FOR IP): Performed by: INTERNAL MEDICINE

## 2021-04-23 PROCEDURE — 6360000004 HC RX CONTRAST MEDICATION: Performed by: INTERNAL MEDICINE

## 2021-04-23 PROCEDURE — 0T9130Z DRAINAGE OF LEFT KIDNEY WITH DRAINAGE DEVICE, PERCUTANEOUS APPROACH: ICD-10-PCS | Performed by: RADIOLOGY

## 2021-04-23 PROCEDURE — 2000000000 HC ICU R&B

## 2021-04-23 PROCEDURE — 86901 BLOOD TYPING SEROLOGIC RH(D): CPT

## 2021-04-23 PROCEDURE — 87077 CULTURE AEROBIC IDENTIFY: CPT

## 2021-04-23 PROCEDURE — 36415 COLL VENOUS BLD VENIPUNCTURE: CPT

## 2021-04-23 PROCEDURE — 0T913ZZ DRAINAGE OF LEFT KIDNEY, PERCUTANEOUS APPROACH: ICD-10-PCS | Performed by: RADIOLOGY

## 2021-04-23 PROCEDURE — 85018 HEMOGLOBIN: CPT

## 2021-04-23 RX ORDER — SODIUM CHLORIDE 9 MG/ML
INJECTION, SOLUTION INTRAVENOUS PRN
Status: DISCONTINUED | OUTPATIENT
Start: 2021-04-23 | End: 2021-04-29

## 2021-04-23 RX ORDER — 0.9 % SODIUM CHLORIDE 0.9 %
1000 INTRAVENOUS SOLUTION INTRAVENOUS ONCE
Status: COMPLETED | OUTPATIENT
Start: 2021-04-23 | End: 2021-04-23

## 2021-04-23 RX ORDER — MIDAZOLAM HYDROCHLORIDE 5 MG/ML
INJECTION INTRAMUSCULAR; INTRAVENOUS
Status: COMPLETED | OUTPATIENT
Start: 2021-04-23 | End: 2021-04-23

## 2021-04-23 RX ORDER — FENTANYL CITRATE 50 UG/ML
INJECTION, SOLUTION INTRAMUSCULAR; INTRAVENOUS
Status: COMPLETED | OUTPATIENT
Start: 2021-04-23 | End: 2021-04-23

## 2021-04-23 RX ORDER — SODIUM CHLORIDE 9 MG/ML
INJECTION, SOLUTION INTRAVENOUS
Status: DISPENSED
Start: 2021-04-23 | End: 2021-04-24

## 2021-04-23 RX ORDER — DIMETHICONE, OXYBENZONE, AND PADIMATE O 2; 2.5; 6.6 G/100G; G/100G; G/100G
STICK TOPICAL
Status: COMPLETED
Start: 2021-04-23 | End: 2021-04-24

## 2021-04-23 RX ADMIN — SODIUM CHLORIDE 1000 ML: 9 INJECTION, SOLUTION INTRAVENOUS at 13:58

## 2021-04-23 RX ADMIN — FENTANYL CITRATE 25 MCG: 50 INJECTION, SOLUTION INTRAMUSCULAR; INTRAVENOUS at 14:55

## 2021-04-23 RX ADMIN — ONDANSETRON HYDROCHLORIDE 4 MG: 2 INJECTION, SOLUTION INTRAMUSCULAR; INTRAVENOUS at 17:47

## 2021-04-23 RX ADMIN — MIDAZOLAM HYDROCHLORIDE 0.5 MG: 5 INJECTION INTRAMUSCULAR; INTRAVENOUS at 15:04

## 2021-04-23 RX ADMIN — ONDANSETRON HYDROCHLORIDE 4 MG: 2 INJECTION, SOLUTION INTRAMUSCULAR; INTRAVENOUS at 08:15

## 2021-04-23 RX ADMIN — SODIUM CHLORIDE 1000 ML: 9 INJECTION, SOLUTION INTRAVENOUS at 19:48

## 2021-04-23 RX ADMIN — IOPAMIDOL 75 ML: 755 INJECTION, SOLUTION INTRAVENOUS at 23:13

## 2021-04-23 RX ADMIN — CEFTRIAXONE SODIUM 1000 MG: 1 INJECTION, POWDER, FOR SOLUTION INTRAMUSCULAR; INTRAVENOUS at 22:27

## 2021-04-23 RX ADMIN — TAMSULOSIN HYDROCHLORIDE 0.4 MG: 0.4 CAPSULE ORAL at 08:09

## 2021-04-23 RX ADMIN — FENTANYL CITRATE 25 MCG: 50 INJECTION, SOLUTION INTRAMUSCULAR; INTRAVENOUS at 15:04

## 2021-04-23 RX ADMIN — SODIUM CHLORIDE, PRESERVATIVE FREE 10 ML: 5 INJECTION INTRAVENOUS at 08:09

## 2021-04-23 RX ADMIN — FENTANYL CITRATE 25 MCG: 50 INJECTION, SOLUTION INTRAMUSCULAR; INTRAVENOUS at 16:08

## 2021-04-23 RX ADMIN — MIDAZOLAM HYDROCHLORIDE 0.5 MG: 5 INJECTION INTRAMUSCULAR; INTRAVENOUS at 14:55

## 2021-04-23 RX ADMIN — MIDAZOLAM HYDROCHLORIDE 0.5 MG: 5 INJECTION INTRAMUSCULAR; INTRAVENOUS at 15:56

## 2021-04-23 RX ADMIN — SODIUM CHLORIDE: 9 INJECTION, SOLUTION INTRAVENOUS at 10:03

## 2021-04-23 RX ADMIN — PROPRANOLOL HYDROCHLORIDE 40 MG: 40 TABLET ORAL at 08:09

## 2021-04-23 RX ADMIN — MIDAZOLAM HYDROCHLORIDE 0.5 MG: 5 INJECTION INTRAMUSCULAR; INTRAVENOUS at 15:31

## 2021-04-23 RX ADMIN — HYDROMORPHONE HYDROCHLORIDE 0.5 MG: 1 INJECTION, SOLUTION INTRAMUSCULAR; INTRAVENOUS; SUBCUTANEOUS at 08:10

## 2021-04-23 RX ADMIN — ENOXAPARIN SODIUM 40 MG: 40 INJECTION SUBCUTANEOUS at 08:09

## 2021-04-23 RX ADMIN — FENTANYL CITRATE 25 MCG: 50 INJECTION, SOLUTION INTRAMUSCULAR; INTRAVENOUS at 15:31

## 2021-04-23 RX ADMIN — HYDROMORPHONE HYDROCHLORIDE 0.5 MG: 1 INJECTION, SOLUTION INTRAMUSCULAR; INTRAVENOUS; SUBCUTANEOUS at 17:23

## 2021-04-23 RX ADMIN — MIDAZOLAM HYDROCHLORIDE 0.5 MG: 5 INJECTION INTRAMUSCULAR; INTRAVENOUS at 15:21

## 2021-04-23 RX ADMIN — FENTANYL CITRATE 25 MCG: 50 INJECTION, SOLUTION INTRAMUSCULAR; INTRAVENOUS at 15:21

## 2021-04-23 RX ADMIN — SODIUM CHLORIDE: 9 INJECTION, SOLUTION INTRAVENOUS at 16:57

## 2021-04-23 RX ADMIN — SODIUM CHLORIDE 1000 ML: 9 INJECTION, SOLUTION INTRAVENOUS at 22:27

## 2021-04-23 RX ADMIN — FENTANYL CITRATE 25 MCG: 50 INJECTION, SOLUTION INTRAMUSCULAR; INTRAVENOUS at 15:56

## 2021-04-23 RX ADMIN — SODIUM CHLORIDE 1000 ML: 9 INJECTION, SOLUTION INTRAVENOUS at 21:18

## 2021-04-23 ASSESSMENT — PAIN SCALES - GENERAL
PAINLEVEL_OUTOF10: 7
PAINLEVEL_OUTOF10: 8
PAINLEVEL_OUTOF10: 5
PAINLEVEL_OUTOF10: 5
PAINLEVEL_OUTOF10: 6
PAINLEVEL_OUTOF10: 6

## 2021-04-23 ASSESSMENT — ENCOUNTER SYMPTOMS
VOMITING: 1
ABDOMINAL PAIN: 1
COUGH: 0
NAUSEA: 1
SHORTNESS OF BREATH: 0

## 2021-04-23 NOTE — FLOWSHEET NOTE
04/23/21 0746   Vital Signs   Temp 98.7 °F (37.1 °C)   Temp Source Oral   Pulse 86   Heart Rate Source Monitor   Resp 16   /69   BP Location Left upper arm   Patient Position Semi fowlers   Level of Consciousness Alert (0)   MEWS Score 1   Patient Currently in Pain Yes   Oxygen Therapy   O2 Device None (Room air)   AM assessment completed, see flow sheet. Pt is alert and oriented. Vital signs are WNL. Respirations are even & easy. No complaints voiced. Pt denies needs at this time. SR up x 2, and bed in low position. Call light is within reach.

## 2021-04-23 NOTE — ED PROVIDER NOTES
ED Attending Attestation Note    This patient was seen by the advance practice provider. I have seen and examined the patient. I agree with the workup, evaluation, management, and diagnosis. The care plan has been discussed. My assessment reveals 48 y.o. female presenting with left flank pain. First the focused exam notable for left CVA tenderness. No anterior abdominal tenderness, rebound, or guarding. Patient found to have very large left ureteral calculus, 1.5 cm, with resultant ipsilateral hydronephrosis. Urinalysis concerning for infection. Ceftriaxone ordered. Admit to hospital medicine for further evaluation and treatment. Urology consult. CT ABDOMEN PELVIS WO CONTRAST Additional Contrast? None   Final Result   Interval development of left nephrolithiasis. Calculus at the left ureteropelvic junction measures 1.5 cm, resulting in   moderate left-sided hydronephrosis. For further details of the patient's emergency department visit, please see the advanced practice provider's documentation. Arlene Mahoney MD     This report has been produced using speech recognition software and may contain errors related to that system including errors in grammar, punctuation, and spelling, as well as words and phrases that may be inappropriate. If there are any questions or concerns please feel free to contact the dictating provider for clarification.        Arlene Mahoney MD  04/22/21 1689

## 2021-04-23 NOTE — BRIEF OP NOTE
Brief Postoperative Note      Patient: Sofiya Huffman  YOB: 1965  MRN: [id number]    Date of Procedure:  4/23/2021    Pre-Op Diagnosis: Nephrolithiasis with obstruction, hypotension    Post-Op Diagnosis: Same    * Surgery not found *    Assistant:  * Surgery not found *    Anesthesia: Moderate sedation    Estimated Blood Loss (mL): 15 cc    Complications: None    Specimens:   Sent sample of urine for analysis    Implants:  * No surgical log found *      Drains:   Nephrostomy 1 Left 8 fr (Active)   Site Assessment Red 04/23/21 1606   Dressing Status Clean;Dry; Intact 04/23/21 1606   Dressing Type Split gauze 04/23/21 1606   Dressing Change Due 04/30/21 04/23/21 1606   Urine Color Yellow 04/23/21 1606   Urine Appearance Cloudy 04/23/21 1606       Findings: Left 8 Fr tube placed via upper pole intercostal approach. Draining serosanguinous urine. Pigtail in renal pelvis. Filling defects in left collecting system consistent with calculi see on on CT.     Electronically signed by Miguelito Singh MD on 4/23/2021 at 4:20 PM +suprapubic catheter with surrounding erythema

## 2021-04-23 NOTE — PROGRESS NOTES
4 Eyes Skin Assessment     The patient is being assess for   Admission    I agree that 2 RN's have performed a thorough Head to Toe Skin Assessment on the patient. ALL assessment sites listed below have been assessed. Areas assessed by both nurses:   [x]   Head, Face, and Ears   [x]   Shoulders, Back, and Chest, Abdomen  [x]   Arms, Elbows, and Hands   [x]   Coccyx, Sacrum, and Ischium  [x]   Legs, Feet, and Heels        No areas noted upon assessment. Co-signer eSignature: {Esignature:[id number]}    Does the Patient have Skin Breakdown?   No          Emeka Prevention initiated:  No   Wound Care Orders initiated:  No      WOC nurse consulted for Pressure Injury (Stage 3,4, Unstageable, DTI, NWPT, Complex wounds)and New or Established Ostomies:  No      Primary Nurse eSignature: Electronically signed by Yumiko Hamilton RN on 4/23/21 at 12:11 AM EDT

## 2021-04-23 NOTE — PRE SEDATION
Sedation Pre-Procedure Note    Patient Name: Rosalio Sandhu   YOB: 1965  Room/Bed: 0236/0236-01  Medical Record Number: [id number]  Date: 4/23/2021   Time: 2:44 PM       Indication:  Obstructing left sided renal calculi    Consent: I have discussed with the patient and/or the patient representative the indication, alternatives, and the possible risks and/or complications of the planned procedure and the anesthesia methods. The patient and/or patient representative appear to understand and agree to proceed. Vital Signs:   Vitals:    04/23/21 1335   BP: (!) 85/57   Pulse: 67   Resp: 16   Temp: 97.2 °F (36.2 °C)   SpO2: 96%       Past Medical History:   has a past medical history of Depression, Headache(784.0), Hepatitis C, and Hypertension. Past Surgical History:   has a past surgical history that includes other surgical history; CYSTOSCOPY INSERTION / REMOVAL STENT / STONE (Left, 8/20/2020); other surgical history (11/23/2020); Ureteroscopy (Left, 11/23/2020); other surgical history (01/11/2021); Cystoscopy (Left, 1/11/2021); other surgical history (01/27/2021); and CYSTOSCOPY INSERTION / REMOVAL STENT / STONE (Left, 1/27/2021). Medications:   Scheduled Meds:    sodium chloride  1,000 mL Intravenous Once    sodium chloride flush  5-40 mL Intravenous 2 times per day    cefTRIAXone (ROCEPHIN) IV  1,000 mg Intravenous Q24H    tamsulosin  0.4 mg Oral Daily     Continuous Infusions:    sodium chloride      sodium chloride 150 mL/hr at 04/23/21 1003     PRN Meds: hydrOXYzine, sodium chloride flush, sodium chloride, promethazine **OR** ondansetron, acetaminophen **OR** acetaminophen, polyethylene glycol, HYDROmorphone, ketorolac  Home Meds:   Prior to Admission medications    Medication Sig Start Date End Date Taking?  Authorizing Provider   propranolol (INDERAL) 40 MG tablet Take 40 mg by mouth 2 times daily   Yes Historical Provider, MD   aspirin 81 MG EC tablet Take 81 mg by mouth daily   Yes Historical Provider, MD   hydroCHLOROthiazide (HYDRODIURIL) 25 MG tablet Take 25 mg by mouth as needed    Yes Historical Provider, MD   hydrOXYzine (ATARAX) 25 MG tablet Take 25 mg by mouth 3 times daily as needed for Itching   Yes Historical Provider, MD   lisinopril (PRINIVIL;ZESTRIL) 10 MG tablet Take 1 tablet by mouth daily 8/24/20  Yes Lino Villalta MD     Pre-Sedation Documentation and Exam:   I have reviewed the patient's history and review of systems.     Mallampati Airway Assessment:  Mallampati Class I - (soft palate, fauces, uvula & anterior/posterior tonsillar pillars are visible)    Prior History of Anesthesia Complications:   none    ASA Classification:  Class 2 - A normal healthy patient with mild systemic disease    Sedation/ Anesthesia Plan:   intravenous sedation    Medications Planned:   Fentanyl and versed intravenously    Patient is an appropriate candidate for plan of sedation: yes    Electronically signed by Miguelito Singh MD on 4/23/2021 at 2:44 PM

## 2021-04-23 NOTE — CARE COORDINATION
Review of chart for any potential discharge needs. It is noted that pt has insurance and  PCP on the facesheet. Pt's o2 stats are 92% on room air per vitals in epic. No needs identified for discharge intervention at this time. MD and bedside RN  if needs arise please consult case management for discharge intervention. CM not following at this time.

## 2021-04-23 NOTE — PLAN OF CARE
Problem: Pain:  Goal: Pain level will decrease  Description: Pain level will decrease  4/23/2021 0956 by Hillary Acharya RN  Outcome: Ongoing     Problem: Infection:  Goal: Will remain free from infection  Description: Will remain free from infection  Outcome: Ongoing     Problem: Safety:  Goal: Free from accidental physical injury  Description: Free from accidental physical injury  Outcome: Ongoing

## 2021-04-23 NOTE — H&P
[facility] Medicine History & Physical      PCP: Garth Hughes MD    Date of Admission: 4/22/2021    Date of Service: Pt seen/examined on 4/22/2021    Chief Complaint: Flank pain    History Of Present Illness:    48 y.o. female who presented to the hospital with a chief complaint of flank pain. The patient presented to the emergency department with intractable abdominal pain, nausea without vomiting. The pain is been constant and worse with movement. She denies any fevers or chills or sick contacts. She denies any diarrhea. The patient has a history of kidney stones, in the emergency department a CT scan of her abdomen revealed a 1.5 cm left uteropelvic junction stone. She will be admitted for urology evaluation.     Past Medical History:        Diagnosis Date    Depression     Headache(784.0)     Hepatitis C 08/21/2020    Hypertension    Nephrolithiasis    Past Surgical History:          Procedure Laterality Date    CYSTOSCOPY Left 1/11/2021    CYSTOSCOPY, DIAGNOSTIC LEFT URETEROSCOPY WITH HOLMIUM LASER LITHOTRIPSY, LEFT STENT EXCHANGE performed by Jose Lundberg MD at [facility] [address] / [address] / STONE Left 8/20/2020    CYSTOSCOPY URETERAL STENT INSERTION performed by Jose Lundberg MD at [address] / [address] / Anna Satchel Left 1/27/2021    CYSTOSCOPY, LEFT STENT REMOVAL performed by Jose Lundberg MD at [place] [address] (Left Bladder)    OTHER SURGICAL HISTORY  11/23/2020    CYSTOSCOPY, LEFT RIGID AND FLEXIBLE URETEROSCOPY, HOLMIUM LASER LITHOTRIPSY, STONE EXTRACTION, STENT EXCHANGE (Left )     OTHER SURGICAL HISTORY  01/11/2021    CYSTOSCOPY, DIAGNOSTIC LEFT URETEROSCOPY WITH HOLMIUM LASER LITHOTRIPSY, LEFT STENT EXCHANGE (Left Bladder)    OTHER SURGICAL HISTORY  01/27/2021    cystoscopy, left stent removal    URETEROSCOPY Left 11/23/2020    CYSTOSCOPY, LEFT RIGID AND FLEXIBLE URETEROSCOPY, HOLMIUM LASER LITHOTRIPSY, STONE EXTRACTION, STENT EXCHANGE, REMOVAL MIGRATED STENT performed by Stanley Bull MD at [facility] OR       Medications Prior to Admission:      Prior to Admission medications    Medication Sig Start Date End Date Taking? Authorizing Provider   propranolol (INDERAL) 40 MG tablet Take 40 mg by mouth 2 times daily   Yes Historical Provider, MD   aspirin 81 MG EC tablet Take 81 mg by mouth daily   Yes Historical Provider, MD   hydroCHLOROthiazide (HYDRODIURIL) 25 MG tablet Take 25 mg by mouth as needed    Yes Historical Provider, MD   hydrOXYzine (ATARAX) 25 MG tablet Take 25 mg by mouth 3 times daily as needed for Itching   Yes Historical Provider, MD   lisinopril (PRINIVIL;ZESTRIL) 10 MG tablet Take 1 tablet by mouth daily 8/24/20  Yes Natalie Laurent MD       Allergies:  Patient has no known allergies. Social History:      TOBACCO:   reports that she has been smoking cigarettes. She has been smoking about 0.50 packs per day. She has never used smokeless tobacco.  ETOH:   reports no history of alcohol use. Family History:        Problem Relation Age of Onset    Diabetes Mother     Heart Disease Mother     High Blood Pressure Sister     Diabetes Sister     Heart Attack Sister     Coronary Art Dis Father        REVIEW OF SYSTEMS:   Constitutional:  Negative for fever,chills or night sweats  ENT:  Negative for rhinorrhea, epistaxis, hoarseness, sore throat. Respiratory: Negative for SOB or wheezing   Cardiovascular:   Negative for  chest pain, palpitations   Gastrointestinal:  Negative for nausea, vomiting, diarrhea  Genitourinary:   Positive for left flank pain  Hematologic/Lymphatic:  Negative for  bleeding tendency, easy bruising  Musculoskeletal:  Negative for myalgias and arthralgias  Neurologic:  Negative for  confusion,dysarthria. Skin:  Negative for itching,rash  Psychiatric:  Negative for depression,anxiety, agitation.   Endocrine: Negative for polydipsia,polyuria,heat /cold intolerance. PHYSICAL EXAM:    BP (!) 142/72   Pulse 63   Temp 98.2 °F (36.8 °C) (Oral)   Resp 18   LMP 12/01/2014   SpO2 93%     General appearance: In moderate distress  HEENT:  Normal cephalic, atraumatic without obvious deformity. Pupils equal, round, and reactive to light. Extra ocular muscles intact. Conjunctivae/corneas clear. Neck: Supple, with full range of motion. No jugular venous distention. Trachea midline. Respiratory:  Normal respiratory effort. Clear to auscultation, bilaterally without Rales/Wheezes/Rhonchi. Cardiovascular:  Regular rate and rhythm with normal S1/S2 without murmurs, rubs or gallops. Abdomen: Soft, non-tender, non-distended with normal bowel sounds. Musculoskeletal:  No clubbing, cyanosis or edema bilaterally. Full range of motion without deformity. Skin: Skin color, texture, turgor normal.  No rashes or lesions. Neurologic:  Neurovascularly intact without any focal sensory/motor deficits. Cranial nerves: II-XII intact, grossly non-focal.  Psychiatric:  Alert and oriented, thought content appropriate, normal insight  Capillary Refill: Brisk,< 3 seconds   Peripheral Pulses: +2 palpable, equal bilaterally       Labs:   Recent Labs     04/22/21  1727   WBC 13.7*   HGB 9.7*   HCT 32.2*        Recent Labs     04/22/21  1727      K 3.8      CO2 24   BUN 24*   CREATININE 1.5*   CALCIUM 9.6     Recent Labs     04/22/21  1727   AST 42*   ALT 32   BILITOT 0.4   ALKPHOS 101     No results for input(s): INR in the last 72 hours. No results for input(s): Jadene Moh in the last 72 hours.     Urinalysis:      Lab Results   Component Value Date    NITRU POSITIVE 04/22/2021    WBCUA 21-50 04/22/2021    BACTERIA 3+ 04/22/2021    RBCUA  04/22/2021    BLOODU LARGE 04/22/2021    SPECGRAV 1.015 04/22/2021    GLUCOSEU Negative 04/22/2021       Radiology:   CT ABDOMEN PELVIS WO CONTRAST Additional Contrast? None Final Result   Interval development of left nephrolithiasis. Calculus at the left ureteropelvic junction measures 1.5 cm, resulting in   moderate left-sided hydronephrosis. ASSESSMENT:  Nephrolithiasis  Left hydronephrosis  UTI  Acute kidney injury  Hepatitis C   Hypertension    PLAN:  Urology consult, IV fluids and pain control, Flomax. Hold hydrochlorothiazide and lisinopril given MARY, continue propranolol  N.p.o. at midnight for cystoscopy in the a.m. DVT Prophylaxis: Lovenox  Diet: Diet NPO, After Midnight  Code Status: Prior    Dispo -admit to [facility]      Thank you for the opportunity to be involved in this patient's care.       (Please note that portions of this note were completed with a voice recognition program. Efforts were made to edit the dictations but occasionally words are mis-transcribed.)

## 2021-04-23 NOTE — PROGRESS NOTES
Image guided Nephrostomy Tube insertion left completed. Dr. Geri Luis placed 8 Kiswahili 25 cm Argon Skater drain LOT # [id number] EXP 02/13/2026 in the left kidney. Drain/tube secured at the 25 cm line with sutures. 12 milliliters of blood tinged colored urine withdrawn immediately. Specimen sent to lab for culture. Drain/tube dressing clean, dry, and intact. Pt tolerated procedure without any signs or symptoms of distress. Vital signs stable. Report called to [facility] on [facility]. Pt transported back to [facility] in stable condition via bed by transport.      Vital Signs  Vitals:    04/23/21 1612   BP: (!) 101/55   Pulse: 72   Resp: 14   Temp:    SpO2: 100%    (vital signs in table format)    Post Denise  2 - Able to move 4 extremities voluntarily on command  2 - BP+/- 20mmHg of normal  2 - Fully awake  2 - Able to maintain oxygen saturation >92% on room air  2 - Able to breathe deeply and cough freely

## 2021-04-23 NOTE — CONSULTS
Urology Attending Consult Note      Reason for Consultation: 48 y.o. female left obstructing UPJ stone 1.5 cm    History: 48 y. o.female with hx of kidney stones presents to ED with left-sided abdominal and flank pain. +nausea, vomiting. Family History, Social History, Review of Systems:  Reviewed and agreed to as per chart    Vitals:  /69   Pulse 86   Temp 98.7 °F (37.1 °C) (Oral)   Resp 16   Ht 5' 7\" (1.702 m)   Wt 166 lb 3.2 oz (75.4 kg)   LMP 2014   SpO2 92%   BMI 26.03 kg/m²   Temp  Av.3 °F (36.8 °C)  Min: 97.7 °F (36.5 °C)  Max: 98.7 °F (37.1 °C)  No intake or output data in the 24 hours ending 21 0813      Physical:   Well developed, well nourished in no acute distress   Orientated to time and place   Neck is supple, trachea is midline   Respiratory effort is normal withoutdistress   Cardiovascular show no extremity swelling   Abdomen soft, left-sided tenderness, nondistended, no guarding.     Skin warm and dry, exposed skin shows no abnormal lesions or ra=shes   Musculoskeletal no digital cyanosis, head normocephalic   Psych shows normal mood and affect, alert and appropriately answers questions   Left-sided CVA tenderness      Labs:  WBC:    Lab Results   Component Value Date    WBC 11.7 2021     Hemoglobin/Hematocrit:    Lab Results   Component Value Date    HGB 8.9 2021    HCT 28.8 2021     BMP:    Lab Results   Component Value Date     2021    K 4.2 2021     2021    CO2 21 2021    BUN 31 2021    LABALBU 3.5 2021    CREATININE 2.8 2021    CALCIUM 8.7 2021    GFRAA 21 2021    LABGLOM 18 2021     PT/INR:  No results found for: PROTIME, INR  PTT:  No results found for: APTT[APTT    Urine Culture: in process    Antibiotic Therapy:  rocephin    Imaging: ct a/p wo contrast--21  Impression   Interval development of left nephrolithiasis.       Calculus at the left ureteropelvic junction measures 1.5 cm, resulting in   moderate left-sided hydronephrosis. Impression/Plan: 48 y. o.female with obstructing left UPJ renal stone measuring 1.5 cm.   -Patient is currently n.p.o.  -We will discuss nephrostomy tube, followed by PCNL done next week, she would like to proceed    Renata Jimenez PA-C

## 2021-04-23 NOTE — PROGRESS NOTES
Pt arrived for image guided Nephrostomy Tube insertion left. Procedure explained including the risk and benefits of the procedure. All questions answered. Pt verbalizes understanding of the procedure and states no more questions. Consent confirmed. Vital signs stable. Labs, allergies, medications, and code status reviewed. No contraindications noted. Vital Signs  Vitals:    04/23/21 1445   BP: 118/65   Pulse: 72   Resp: 15   Temp:    SpO2: 100%     Pre Denise Score  2 - Able to move 4 extremities voluntarily on command  2 - BP+/- 20mmHg of normal  2 - Fully awake  2 - Able to maintain oxygen saturation >92% on room air  2 - Able to breathe deeply and cough freely    Allergies  Patient has no known allergies.      Labs  Lab Results   Component Value Date    INR 1.19 (H) 04/23/2021    PROTIME 13.8 (H) 04/23/2021     Lab Results   Component Value Date    CREATININE 2.8 (H) 04/23/2021    BUN 31 (H) 04/23/2021     04/23/2021    K 4.2 04/23/2021     04/23/2021    CO2 21 04/23/2021     Lab Results   Component Value Date    WBC 11.7 (H) 04/23/2021    HGB 8.9 (L) 04/23/2021    HCT 28.8 (L) 04/23/2021    MCV 74.5 (L) 04/23/2021     (L) 04/23/2021

## 2021-04-23 NOTE — PROGRESS NOTES
Admission assessment completed, see flowsheet. Patient complaining of pain and nausea, gave prn medications. Patient changed into gown. Call light within reach, bed in lowest position.

## 2021-04-23 NOTE — PROGRESS NOTES
PRN pain meds and nausea meds gave as requested and ordered. No changes throughout the night. No vomiting, only nausea.

## 2021-04-23 NOTE — PROGRESS NOTES
Progress Note    Admit Date:  4/22/2021    Subjective:  Ms. Arnold Peerz reports her pain is controlled. Had some nausea earlier today which has since improved. No vomiting. Objective:   Patient Vitals for the past 4 hrs:   BP Temp Temp src Pulse Resp   04/23/21 0746 128/69 98.7 °F (37.1 °C) Oral 86 16        No intake or output data in the 24 hours ending 04/23/21 1035    Physical Exam:  Gen: No distress. Alert. Middle aged  female, appears uncomfortable  Eyes: No sclera icterus. No conjunctival injection. Neck:  Trachea midline. Resp: No accessory muscle use. No crackles. No wheezes. No rhonchi. CV: Regular rate. Regular rhythm. No murmur. No rub. No edema. GI: Soft, left lower quadrant tenderness. Non-distended. Normal bowel sounds. Skin: Warm and dry. No rash on exposed extremities. Neuro: Awake. Grossly non-focal, moves all extremities, follows commands   Psych: Oriented x 3. No anxiety or agitation. I Tod Sep have reviewed the chart on [facility] and personally interviewed and examined patient, reviewed the data (labs and imaging) and after discussion with my PA formulated the plan. Agree with note with the following edits. HPI:     I reviewed the patient's Past Medical History, Past Surgical History, Medications, and Allergies. Physical exam:    /69   Pulse 86   Temp 98.7 °F (37.1 °C) (Oral)   Resp 16   Ht 5' 7\" (1.702 m)   Wt 166 lb 3.2 oz (75.4 kg)   LMP 12/01/2014   SpO2 92%   BMI 26.03 kg/m²   Gen: No distress. Alert. Eyes: PERRL. No sclera icterus. No conjunctival injection. ENT: No discharge. Pharynx clear. Neck: Trachea midline. Normal thyroid. Resp: No accessory muscle use. No crackles. No wheezes. No rhonchi. No dullness on percussion. CV: Regular rate. Regular rhythm. No murmur or rub. No edema. GI: Left flank and left CVA tenderness. Non-distended. No masses. No organomegaly. Normal bowel sounds. No hernia.    Skin: Warm and dry. No nodule on exposed extremities. No rash on exposed extremities. Lymph: No cervical LAD. No supraclavicular LAD. M/S: No cyanosis. No joint deformity. No clubbing. Neuro: Awake. Moves all 4 extremities, non focal  Psych: Oriented x 3. No anxiety or agitation. ELIZABETH Mcleod        Scheduled Meds:   propranolol  40 mg Oral BID    sodium chloride flush  5-40 mL Intravenous 2 times per day    enoxaparin  40 mg Subcutaneous Daily    cefTRIAXone (ROCEPHIN) IV  1,000 mg Intravenous Q24H    tamsulosin  0.4 mg Oral Daily       Continuous Infusions:   sodium chloride      sodium chloride 150 mL/hr at 04/23/21 1003       PRN Meds:  hydrOXYzine, sodium chloride flush, sodium chloride, promethazine **OR** ondansetron, acetaminophen **OR** acetaminophen, polyethylene glycol, HYDROmorphone, ketorolac      Data:  CBC:   Recent Labs     04/22/21  1727 04/23/21  0517   WBC 13.7* 11.7*   HGB 9.7* 8.9*   HCT 32.2* 28.8*   MCV 75.5* 74.5*    134*     BMP:   Recent Labs     04/22/21  1727 04/23/21  0517    139   K 3.8 4.2    105   CO2 24 21   BUN 24* 31*   CREATININE 1.5* 2.8*     LIVER PROFILE:   Recent Labs     04/22/21  1727   AST 42*   ALT 32   BILITOT 0.4   ALKPHOS 101     PT/INR:   Recent Labs     04/23/21  0923   PROTIME 13.8*   INR 1.19*       CULTURES    Urine cx: pending    SARS-COV-2 - Rapid: Not detected      RADIOLOGY    CT ABDOMEN PELVIS WO CONTRAST Additional Contrast? None   Final Result   Interval development of left nephrolithiasis. Calculus at the left ureteropelvic junction measures 1.5 cm, resulting in   moderate left-sided hydronephrosis.          IR GUIDED NEPHROSTOMY CATH PLACEMENT LEFT    (Results Pending)     Assessment/Plan:    Left Ureteral Stone with Hydronephrosis  UTI  - with obstructing left UPJ renal stone measuring 1.5 cm  - Admitted to Med Surg  - check urine cx  - strain all urine  - IVF, Rocephin D#1, Flomax, PRN Dilaudid  - Urology consult - NPO  Left nephrostomy placement today    MARY  Partly 2 to obstructive uropathy  - Cr 1.5 on arrival, repeat 2.8  - IVF, mgmt as above; hold Lisinopril and HCTZ  - monitor BMP    Leukocytosis  - likely 2/2 above  - mgmt as above, trending down    HTN  - controlled  - cont Propranolol, hold Lisinopril and HCTZ with MARY    Microcytic Anemia  - Hgb 9.7  - baseline: ~10  - stable compared to prior labs  - repeat 8.9 today    Big drop in platelets  Hold lovenox    Hepatitis C    DVT Prophylaxis: SCD  Diet: Diet NPO, After Midnight  Code Status: Full Code     plan for nephrostomy, monitor renal function    Romeo Mcghee PA-C 11:22 AM 4/23/2021     Large left ureteral stone with left hydronephrosis. Acute kidney injury. Obstructive uropathy. UTI. Urology consult. Nephrostomy tube placement. Continue IV fluids. Monitor renal function. Continue Rocephin. KIRTI Mcleod.

## 2021-04-23 NOTE — ED NOTES
2005- Perfect serve was sent to a Dr. Thao Amin MD, with Urology.    2014- Ena Councilman returned call and spoke with John Rodriguez  04/22/21 2007       Tayo Rodriguez  04/22/21 2015

## 2021-04-24 ENCOUNTER — APPOINTMENT (OUTPATIENT)
Dept: INTERVENTIONAL RADIOLOGY/VASCULAR | Age: 56
DRG: 447 | End: 2021-04-24
Payer: COMMERCIAL

## 2021-04-24 PROBLEM — R57.8 HEMORRHAGIC SHOCK (HCC): Status: ACTIVE | Noted: 2021-04-24

## 2021-04-24 PROBLEM — D62 ACUTE BLOOD LOSS ANEMIA: Status: ACTIVE | Noted: 2021-04-24

## 2021-04-24 LAB
ABO/RH: NORMAL
ALBUMIN SERPL-MCNC: 2.2 G/DL (ref 3.4–5)
ANION GAP SERPL CALCULATED.3IONS-SCNC: 13 MMOL/L (ref 3–16)
ANION GAP SERPL CALCULATED.3IONS-SCNC: 16 MMOL/L (ref 3–16)
ANISOCYTOSIS: ABNORMAL
ANTIBODY SCREEN: NORMAL
BANDED NEUTROPHILS RELATIVE PERCENT: 5 % (ref 0–7)
BASOPHILS ABSOLUTE: 0 K/UL (ref 0–0.2)
BASOPHILS RELATIVE PERCENT: 0 %
BUN BLDV-MCNC: 37 MG/DL (ref 7–20)
BUN BLDV-MCNC: 37 MG/DL (ref 7–20)
CALCIUM SERPL-MCNC: 7.1 MG/DL (ref 8.3–10.6)
CALCIUM SERPL-MCNC: 7.2 MG/DL (ref 8.3–10.6)
CHLORIDE BLD-SCNC: 107 MMOL/L (ref 99–110)
CHLORIDE BLD-SCNC: 109 MMOL/L (ref 99–110)
CO2: 15 MMOL/L (ref 21–32)
CO2: 15 MMOL/L (ref 21–32)
CREAT SERPL-MCNC: 2.5 MG/DL (ref 0.6–1.1)
CREAT SERPL-MCNC: 2.6 MG/DL (ref 0.6–1.1)
EOSINOPHILS ABSOLUTE: 0 K/UL (ref 0–0.6)
EOSINOPHILS RELATIVE PERCENT: 0 %
GFR AFRICAN AMERICAN: 23
GFR AFRICAN AMERICAN: 24
GFR NON-AFRICAN AMERICAN: 19
GFR NON-AFRICAN AMERICAN: 20
GLUCOSE BLD-MCNC: 103 MG/DL (ref 70–99)
GLUCOSE BLD-MCNC: 117 MG/DL (ref 70–99)
HCT VFR BLD CALC: 27.4 % (ref 36–48)
HCT VFR BLD CALC: 29.5 % (ref 36–48)
HCT VFR BLD CALC: 30.2 % (ref 36–48)
HEMOGLOBIN: 8.7 G/DL (ref 12–16)
HEMOGLOBIN: 9.4 G/DL (ref 12–16)
HEMOGLOBIN: 9.5 G/DL (ref 12–16)
HYPOCHROMIA: ABNORMAL
LYMPHOCYTES ABSOLUTE: 1.7 K/UL (ref 1–5.1)
LYMPHOCYTES RELATIVE PERCENT: 10 %
MCH RBC QN AUTO: 26.1 PG (ref 26–34)
MCHC RBC AUTO-ENTMCNC: 31.4 G/DL (ref 31–36)
MCV RBC AUTO: 83 FL (ref 80–100)
MONOCYTES ABSOLUTE: 0.3 K/UL (ref 0–1.3)
MONOCYTES RELATIVE PERCENT: 2 %
NEUTROPHILS ABSOLUTE: 15 K/UL (ref 1.7–7.7)
NEUTROPHILS RELATIVE PERCENT: 83 %
ORGANISM: ABNORMAL
PDW BLD-RTO: 17.6 % (ref 12.4–15.4)
PHOSPHORUS: 5.2 MG/DL (ref 2.5–4.9)
PLATELET # BLD: 128 K/UL (ref 135–450)
PLATELET SLIDE REVIEW: ADEQUATE
PMV BLD AUTO: 10.1 FL (ref 5–10.5)
POTASSIUM SERPL-SCNC: 4.3 MMOL/L (ref 3.5–5.1)
POTASSIUM SERPL-SCNC: 4.4 MMOL/L (ref 3.5–5.1)
RBC # BLD: 3.64 M/UL (ref 4–5.2)
SLIDE REVIEW: ABNORMAL
SODIUM BLD-SCNC: 137 MMOL/L (ref 136–145)
SODIUM BLD-SCNC: 138 MMOL/L (ref 136–145)
URINE CULTURE, ROUTINE: ABNORMAL
URINE CULTURE, ROUTINE: ABNORMAL
WBC # BLD: 17 K/UL (ref 4–11)

## 2021-04-24 PROCEDURE — 85018 HEMOGLOBIN: CPT

## 2021-04-24 PROCEDURE — 36246 INS CATH ABD/L-EXT ART 2ND: CPT

## 2021-04-24 PROCEDURE — 36430 TRANSFUSION BLD/BLD COMPNT: CPT

## 2021-04-24 PROCEDURE — 6360000002 HC RX W HCPCS: Performed by: RADIOLOGY

## 2021-04-24 PROCEDURE — 6360000002 HC RX W HCPCS: Performed by: INTERNAL MEDICINE

## 2021-04-24 PROCEDURE — 99152 MOD SED SAME PHYS/QHP 5/>YRS: CPT

## 2021-04-24 PROCEDURE — 51702 INSERT TEMP BLADDER CATH: CPT

## 2021-04-24 PROCEDURE — 6370000000 HC RX 637 (ALT 250 FOR IP): Performed by: INTERNAL MEDICINE

## 2021-04-24 PROCEDURE — 2580000003 HC RX 258: Performed by: INTERNAL MEDICINE

## 2021-04-24 PROCEDURE — 99223 1ST HOSP IP/OBS HIGH 75: CPT | Performed by: INTERNAL MEDICINE

## 2021-04-24 PROCEDURE — 85014 HEMATOCRIT: CPT

## 2021-04-24 PROCEDURE — 99153 MOD SED SAME PHYS/QHP EA: CPT

## 2021-04-24 PROCEDURE — 85025 COMPLETE CBC W/AUTO DIFF WBC: CPT

## 2021-04-24 PROCEDURE — 94761 N-INVAS EAR/PLS OXIMETRY MLT: CPT

## 2021-04-24 PROCEDURE — C1889 IMPLANT/INSERT DEVICE, NOC: HCPCS

## 2021-04-24 PROCEDURE — 37244 VASC EMBOLIZE/OCCLUDE BLEED: CPT

## 2021-04-24 PROCEDURE — 80069 RENAL FUNCTION PANEL: CPT

## 2021-04-24 PROCEDURE — 36415 COLL VENOUS BLD VENIPUNCTURE: CPT

## 2021-04-24 PROCEDURE — 6370000000 HC RX 637 (ALT 250 FOR IP)

## 2021-04-24 PROCEDURE — 2000000000 HC ICU R&B

## 2021-04-24 RX ORDER — SODIUM CHLORIDE 9 MG/ML
INJECTION, SOLUTION INTRAVENOUS
Status: DISPENSED
Start: 2021-04-24 | End: 2021-04-24

## 2021-04-24 RX ORDER — MIDAZOLAM HYDROCHLORIDE 5 MG/ML
INJECTION INTRAMUSCULAR; INTRAVENOUS
Status: COMPLETED | OUTPATIENT
Start: 2021-04-24 | End: 2021-04-24

## 2021-04-24 RX ORDER — FENTANYL CITRATE 50 UG/ML
INJECTION, SOLUTION INTRAMUSCULAR; INTRAVENOUS
Status: COMPLETED | OUTPATIENT
Start: 2021-04-24 | End: 2021-04-24

## 2021-04-24 RX ORDER — SODIUM CHLORIDE 9 MG/ML
INJECTION, SOLUTION INTRAVENOUS PRN
Status: DISCONTINUED | OUTPATIENT
Start: 2021-04-24 | End: 2021-04-29

## 2021-04-24 RX ADMIN — HYDROMORPHONE HYDROCHLORIDE 0.5 MG: 1 INJECTION, SOLUTION INTRAMUSCULAR; INTRAVENOUS; SUBCUTANEOUS at 04:11

## 2021-04-24 RX ADMIN — HYDROMORPHONE HYDROCHLORIDE 0.3 MG: 1 INJECTION, SOLUTION INTRAMUSCULAR; INTRAVENOUS; SUBCUTANEOUS at 00:36

## 2021-04-24 RX ADMIN — HYDROMORPHONE HYDROCHLORIDE 0.5 MG: 1 INJECTION, SOLUTION INTRAMUSCULAR; INTRAVENOUS; SUBCUTANEOUS at 12:08

## 2021-04-24 RX ADMIN — CEFTRIAXONE SODIUM 1000 MG: 1 INJECTION, POWDER, FOR SOLUTION INTRAMUSCULAR; INTRAVENOUS at 22:47

## 2021-04-24 RX ADMIN — HYDROMORPHONE HYDROCHLORIDE 0.5 MG: 1 INJECTION, SOLUTION INTRAMUSCULAR; INTRAVENOUS; SUBCUTANEOUS at 22:51

## 2021-04-24 RX ADMIN — SODIUM CHLORIDE: 9 INJECTION, SOLUTION INTRAVENOUS at 09:16

## 2021-04-24 RX ADMIN — DIMETHICONE, OXYBENZONE, AND PADIMATE O: 2; 2.5; 6.6 STICK TOPICAL at 00:54

## 2021-04-24 RX ADMIN — FENTANYL CITRATE 50 MCG: 50 INJECTION INTRAMUSCULAR; INTRAVENOUS at 01:41

## 2021-04-24 RX ADMIN — ONDANSETRON HYDROCHLORIDE 4 MG: 2 INJECTION, SOLUTION INTRAMUSCULAR; INTRAVENOUS at 14:37

## 2021-04-24 RX ADMIN — HYDROMORPHONE HYDROCHLORIDE 0.5 MG: 1 INJECTION, SOLUTION INTRAMUSCULAR; INTRAVENOUS; SUBCUTANEOUS at 17:33

## 2021-04-24 RX ADMIN — ONDANSETRON HYDROCHLORIDE 4 MG: 2 INJECTION, SOLUTION INTRAMUSCULAR; INTRAVENOUS at 03:10

## 2021-04-24 RX ADMIN — MIDAZOLAM HYDROCHLORIDE 1 MG: 5 INJECTION, SOLUTION INTRAMUSCULAR; INTRAVENOUS at 01:42

## 2021-04-24 RX ADMIN — TAMSULOSIN HYDROCHLORIDE 0.4 MG: 0.4 CAPSULE ORAL at 08:40

## 2021-04-24 RX ADMIN — SODIUM CHLORIDE, PRESERVATIVE FREE 10 ML: 5 INJECTION INTRAVENOUS at 20:45

## 2021-04-24 RX ADMIN — HYDROMORPHONE HYDROCHLORIDE 0.5 MG: 1 INJECTION, SOLUTION INTRAMUSCULAR; INTRAVENOUS; SUBCUTANEOUS at 07:57

## 2021-04-24 RX ADMIN — SODIUM CHLORIDE: 9 INJECTION, SOLUTION INTRAVENOUS at 03:39

## 2021-04-24 ASSESSMENT — PAIN DESCRIPTION - PROGRESSION: CLINICAL_PROGRESSION: GRADUALLY WORSENING

## 2021-04-24 ASSESSMENT — PAIN SCALES - GENERAL
PAINLEVEL_OUTOF10: 7
PAINLEVEL_OUTOF10: 7
PAINLEVEL_OUTOF10: 8
PAINLEVEL_OUTOF10: 7
PAINLEVEL_OUTOF10: 10
PAINLEVEL_OUTOF10: 7

## 2021-04-24 ASSESSMENT — PAIN DESCRIPTION - DIRECTION: RADIATING_TOWARDS: ABDOMIN

## 2021-04-24 ASSESSMENT — PAIN DESCRIPTION - FREQUENCY: FREQUENCY: CONTINUOUS

## 2021-04-24 ASSESSMENT — PAIN DESCRIPTION - ORIENTATION: ORIENTATION: POSTERIOR

## 2021-04-24 ASSESSMENT — PAIN DESCRIPTION - ONSET: ONSET: GRADUAL

## 2021-04-24 ASSESSMENT — PAIN DESCRIPTION - PAIN TYPE: TYPE: ACUTE PAIN;SURGICAL PAIN

## 2021-04-24 ASSESSMENT — PAIN DESCRIPTION - LOCATION: LOCATION: FLANK

## 2021-04-24 ASSESSMENT — PAIN DESCRIPTION - DESCRIPTORS: DESCRIPTORS: ACHING

## 2021-04-24 ASSESSMENT — PAIN - FUNCTIONAL ASSESSMENT: PAIN_FUNCTIONAL_ASSESSMENT: ACTIVITIES ARE NOT PREVENTED

## 2021-04-24 NOTE — CONSULTS
[facility]    Consult Note           Reason for Consult: MARY  Requesting Physician:  Dr. Elizabeth Campbell for    Chief Complaint:    Chief Complaint   Patient presents with    Flank Pain     Left sided flank pain, acute onset over past 2 hours. N&V as well. History of Present Illness on 4/24/2021:    48 y.o. yo female with PMH of hypertension, nephrolithiasis, hepatitis C who is admitted for MARY  Patient presented and was admitted on 4/22 with complaints of intractable abdominal pain nausea vomiting. She was noted to have 1.5 cm left ureteropelvic junction stone and urology was consulted  Patient underwent percutaneous nephrostomy tube placement on 4/23  During the evening and early morning, patient developed shock and was noted to have hemoglobin of 5.3 drop from 8.9.   She had a blood hemorrhagic shock due to large retroperitoneal hematoma with active extravasation and emergently had embolization done overnight  Patient received 3 units of packed red blood cells vitals are stable and patient is urinating    Past Medical History:        Diagnosis Date    Depression     Headache(784.0)     Hepatitis C 08/21/2020    Hypertension        Past Surgical History:        Procedure Laterality Date    CYSTOSCOPY Left 1/11/2021    CYSTOSCOPY, DIAGNOSTIC LEFT URETEROSCOPY WITH HOLMIUM LASER LITHOTRIPSY, LEFT STENT EXCHANGE performed by Shady Nichole MD at [address] [place] [address] / [address] [place] [address] / STONE Left 8/20/2020    CYSTOSCOPY URETERAL STENT INSERTION performed by Shady Nichole MD at [address] [place] [address] / [address] [place] [address] / Anali Winter Left 1/27/2021    CYSTOSCOPY, LEFT STENT REMOVAL performed by Shady Nichole MD at [facility] IR NEPHROSTOMY PERCUTANEOUS LEFT  4/23/2021    IR NEPHROSTOMY PERCUTANEOUS LEFT 4/23/2021 [facility] [place] [facility] [place]URES    OTHER SURGICAL HISTORY      CYSTOSCOPY URETERAL STENT INSERTION (Left Bladder)    OTHER SURGICAL HISTORY 11/23/2020    CYSTOSCOPY, LEFT RIGID AND FLEXIBLE URETEROSCOPY, HOLMIUM LASER LITHOTRIPSY, STONE EXTRACTION, STENT EXCHANGE (Left )     OTHER SURGICAL HISTORY  01/11/2021    CYSTOSCOPY, DIAGNOSTIC LEFT URETEROSCOPY WITH HOLMIUM LASER LITHOTRIPSY, LEFT STENT EXCHANGE (Left Bladder)    OTHER SURGICAL HISTORY  01/27/2021    cystoscopy, left stent removal    URETEROSCOPY Left 11/23/2020    CYSTOSCOPY, LEFT RIGID AND FLEXIBLE URETEROSCOPY, HOLMIUM LASER LITHOTRIPSY, STONE EXTRACTION, STENT EXCHANGE, REMOVAL MIGRATED STENT performed by Joce Chan MD at [address] [place] [address] Medications:    No current facility-administered medications on file prior to encounter. Current Outpatient Medications on File Prior to Encounter   Medication Sig Dispense Refill    propranolol (INDERAL) 40 MG tablet Take 40 mg by mouth 2 times daily      aspirin 81 MG EC tablet Take 81 mg by mouth daily      hydroCHLOROthiazide (HYDRODIURIL) 25 MG tablet Take 25 mg by mouth as needed       hydrOXYzine (ATARAX) 25 MG tablet Take 25 mg by mouth 3 times daily as needed for Itching      lisinopril (PRINIVIL;ZESTRIL) 10 MG tablet Take 1 tablet by mouth daily 30 tablet 0       Allergies:  Patient has no known allergies.     Social History:    Social History     Socioeconomic History    Marital status: Single     Spouse name: Not on file    Number of children: Not on file    Years of education: Not on file    Highest education level: Not on file   Occupational History    Not on file   Social Needs    Financial resource strain: Not on file    Food insecurity     Worry: Not on file     Inability: Not on file    Transportation needs     Medical: Not on file     Non-medical: Not on file   Tobacco Use    Smoking status: Current Every Day Smoker     Packs/day: 0.50     Types: Cigarettes    Smokeless tobacco: Never Used   Substance and Sexual Activity    Alcohol use: No    Drug use: No    Sexual activity: Not Currently Lifestyle    Physical activity     Days per week: Not on file     Minutes per session: Not on file    Stress: Not on file   Relationships    Social connections     Talks on phone: Not on file     Gets together: Not on file     Attends Sabianist service: Not on file     Active member of club or organization: Not on file     Attends meetings of clubs or organizations: Not on file     Relationship status: Not on file    Intimate partner violence     Fear of current or ex partner: Not on file     Emotionally abused: Not on file     Physically abused: Not on file     Forced sexual activity: Not on file   Other Topics Concern    Not on file   Social History Narrative    Not on file       Family History:   Family History   Problem Relation Age of Onset    Diabetes Mother     Heart Disease Mother     High Blood Pressure Sister     Diabetes Sister     Heart Attack Sister     Coronary Art Dis Father        Review of Systems:   Pertinent positives stated above in HPI. All other 10 systems were reviewed and were negative.      Physical exam:   Constitutional:  VITALS:  /65   Pulse 77   Temp 98.4 °F (36.9 °C) (Oral)   Resp 13   Ht 5' 7\" (1.702 m)   Wt 182 lb 11.2 oz (82.9 kg)   LMP 12/01/2014   SpO2 91%   BMI 28.61 kg/m²   Gen: alert, awake, nad  Skin: no rash, turgor wnl  Heent:  eomi, mmm  Neck: no bruits or jvd noted, thyroid normal  Cardiovascular:  S1, S2 without m/r/g; no lower extremity edema  Respiratory: CTA B without w/r/r; respiratory effort normal  Abdomen:  +bs, soft, nt, nd, no hepatosplenomegaly  Neuro/Psy: AAoriented times 3 ; moves all 4 ext  Musculoskeletal:  Rom, muscular strength intact; digits, nails normal    Data/  Recent Labs     04/22/21  1727 04/23/21  0517 04/23/21  2200 04/24/21  0709   WBC 13.7* 11.7*  --  17.0*   HGB 9.7* 8.9* 5.3* 9.5*   HCT 32.2* 28.8* 18.2* 30.2*   MCV 75.5* 74.5*  --  83.0    134*  --  128*     Recent Labs     04/22/21  1727 04/23/21  0517

## 2021-04-24 NOTE — PROGRESS NOTES
Reassessment complete. Patient continues awake in bed with call light within reach. Physical assessment unchanged. No new drainage from nephrostomy. Patient assisted with repositioning.

## 2021-04-24 NOTE — BRIEF OP NOTE
Brief Postoperative Note    Jacy Jernigan  YOB: 1965  [id number]    Pre-operative Diagnosis: L  Renal hemorrhage     Post-operative Diagnosis: Same    Procedure: Lt renal angiogram and coil embolization    Anesthesia: Local and Moderate Sedation    Surgeons/Assistants: Mahad Madison    Estimated Blood Loss: less than 5     Complications: None    Specimens: Was Not Obtained    Findings:   No active bleeding during exam . Large perinephric hematoma. Abnormal anterior branch with aneurysm or pseudoaneurysm formation. Successful coil embolization of that branch only. Pt will need close hemodynamic monitoring.      Electronically signed by Mahad Madison MD on 4/24/2021 at 2:37 AM

## 2021-04-24 NOTE — PROGRESS NOTES
Patient:  Man Soto  YOB: 1965   Date of Service:  04/24/21      Urology Attending Daily Progress Note    Chief complaint: \"pain\"    Interval HPI:  angioembolization with coils for hemorrhage after L NT placement   Hgb 5.3, 9.5 this AM  UOP 250cc overnight shift  CR 2.6 from 2.8    Having some pain in back       Objective:    Patient Vitals for the past 8 hrs:   BP Temp Temp src Pulse Resp SpO2 Weight   04/24/21 0800 124/75 -- -- 87 24 95 % --   04/24/21 0749 -- 98.4 °F (36.9 °C) Oral -- -- -- --   04/24/21 0710 -- -- -- 83 -- -- --   04/24/21 0700 113/61 -- -- 78 23 (!) 88 % --   04/24/21 0630 (!) 104/57 -- -- 71 19 90 % --   04/24/21 0600 108/64 -- -- 67 19 92 % --   04/24/21 0530 114/68 -- -- 70 24 91 % --   04/24/21 0500 106/62 -- -- 67 18 92 % --   04/24/21 0440 -- -- -- 64 22 93 % 182 lb 11.2 oz (82.9 kg)   04/24/21 0430 (!) 104/45 -- -- 64 15 94 % --   04/24/21 0400 (!) 110/58 97.9 °F (36.6 °C) Axillary 63 27 93 % --   04/24/21 0330 115/68 -- -- 63 26 -- --   04/24/21 0300 117/69 -- -- 64 27 95 % --   04/24/21 0252 109/83 -- -- 66 17 96 % --   04/24/21 0250 126/75 -- -- 71 18 97 % --   04/24/21 0245 94/62 -- -- 67 24 95 % --   04/24/21 0231 103/69 -- -- 74 18 100 % --   04/24/21 0230 103/69 -- -- 76 21 100 % --   04/24/21 0226 102/77 -- -- 70 23 100 % --   04/24/21 0221 (!) 102/58 -- -- 70 29 100 % --   04/24/21 0216 99/62 -- -- 67 27 100 % --   04/24/21 0211 98/66 -- -- 65 27 100 % --   04/24/21 0206 102/82 -- -- 67 26 100 % --   04/24/21 0203 92/63 -- -- 70 26 100 % --   04/24/21 0156 (!) 96/56 -- -- 72 12 100 % --   04/24/21 0151 101/63 -- -- 72 12 100 % --   04/24/21 0147 (!) 86/54 -- -- 73 12 100 % --   04/24/21 0145 (!) 86/54 -- -- 73 (!) 31 100 % --   04/24/21 0143 (!) 92/53 -- -- 72 15 100 % --   04/24/21 0140 (!) 92/53 -- -- 80 23 100 % --   04/24/21 0139 96/62 -- -- 72 15 97 % --   04/24/21 0135 96/62 -- -- 79 24 99 % --   04/24/21 0130 109/67 -- -- 72 (!) 31 100 % --   04/24/21 0125 -- -- -- 73 25 99 % --   04/24/21 0120 (!) 103/58 -- -- 72 21 100 % --   04/24/21 0115 98/61 -- -- 68 26 99 % --   04/24/21 0110 98/61 -- -- 65 23 98 % --   04/24/21 0101 -- -- -- 68 25 99 % --   04/24/21 0100 (!) 105/59 -- -- 63 22 99 % --     I/O last 3 completed shifts: In: 1300 [P.O.:240; I.V.:150; Blood:910]  Out: 325 [Urine:325]     Physical Exam:   Constitutional: comfortable in hospital bed, no acute distress  Abdomen: soft, nontender, no guarding, incisions c/d/i  Genitourinary: urethal cortez draining pink urine, NT with light pink urine with scant clots  Psych: normal mood and affect, appropriately answers questions   Skin, extremities: stable, exposed skin intact, no digital cyanosis     Labs:     No results found for: PSA  Lab Results   Component Value Date    CREATININE 2.6 (H) 04/24/2021    CREATININE 2.8 (H) 04/23/2021    CREATININE 1.5 (H) 04/22/2021     Lab Results   Component Value Date    COLORU Yellow 04/22/2021    NITRU POSITIVE 04/22/2021    GLUCOSEU Negative 04/22/2021    KETUA Negative 04/22/2021    UROBILINOGEN 0.2 04/22/2021    BILIRUBINUR Negative 04/22/2021     Lab Results   Component Value Date    WBC 17.0 (H) 04/24/2021    HGB 9.5 (L) 04/24/2021    HCT 30.2 (L) 04/24/2021    MCV 83.0 04/24/2021     (L) 04/24/2021       Radiology:  \"Imaging was independently reviewed by myself and I agree with the radiology interpretation    Assessment:  Salty Zavala is a 48 y.o. female with functionally solitary left kidney - large stone burden and underwent L PNT on 4/23 in anticipation of L PCNL - unfortunately, had hemorrhage and CTA showed active arterial extravasation requiring angioembolization with super selective coiling on 4/24     Plan:  -- flush NT gently q6hr to maintain patency - flushed well today  -- cont cortez for now  -- monitor UOP and CR  -- q6HR H/H  -- bedrest  -- following with you - long discussion with patient, she understands the situation - since she has a functionally solitary kidney, it is extremely important we try to preserve this kidney and not go toward nephrectomy - would advocate for repeat embolization if bleeds again over nephrectomy - seems stable for now which is reassuring    Justin Lee MD  [facility]   [phone] - Please call with questions

## 2021-04-24 NOTE — PROGRESS NOTES
Back from IR. Pressure dressing to right groin in place. Patient instructed to keep right leg straight for 6 hrs. Patient with c/o of nausea medicated with zofran 4 mg iv prn. Vitals are stable. Will continue to monitor.

## 2021-04-24 NOTE — CONSULTS
Patient is being seen at the request of Dr. Terry Barthel for a consultation for hemorrhagic shock    HISTORY OF PRESENT ILLNESS: The patient is a 31-year-old woman with a past medical history of hypertension and hepatitis C who presented to [facility] on 422/21 with complaints of flank pain. Patient reported in the ER that she was having intractable abdominal pain associated with nausea and some vomiting. She underwent a CT scan revealing a 1.5 similar left UP junction stone. The patient underwent a percutaneous nephrostomy tube placement yesterday. Of note the patient has a solitary kidney on the left. Yesterday night she became progressively hypotensive despite IV fluid resuscitation. She complained of worsening flank pain. A repeat hemoglobin showed a level of 5.3 down from 8.9 earlier in the day. Repeat CT scan of the abdomen with contrast showed a large retroperitoneal hematoma with active extravasation out of the left kidney. Interventional radiology came in overnight and took the patient to the IR suite for selective embolization of a probable culprit lesion. She was transfused a total of 3 units of packed red blood cells. Nephrology was consulted. This morning she says she feels a little bit better with mild flank pain.     PAST MEDICAL HISTORY:  Past Medical History:   Diagnosis Date    Depression     Headache(784.0)     Hepatitis C 08/21/2020    Hypertension      PAST SURGICAL HISTORY:  Past Surgical History:   Procedure Laterality Date    CYSTOSCOPY Left 1/11/2021    CYSTOSCOPY, DIAGNOSTIC LEFT URETEROSCOPY WITH HOLMIUM LASER LITHOTRIPSY, LEFT STENT EXCHANGE performed by Juancarlos Kelley MD at [address] [place] [address] / REMOVAL [facility] / Anat Leal Left 8/20/2020    CYSTOSCOPY URETERAL STENT INSERTION performed by Juancarlos Kelley MD at [address] [place] [address] / Devante Marshall / Anat Leal Left 1/27/2021    CYSTOSCOPY, LEFT STENT REMOVAL performed by Alexa Weeks Constance Aviles MD at [address] [place] [address] IR NEPHROSTOMY PERCUTANEOUS LEFT  4/23/2021    IR NEPHROSTOMY PERCUTANEOUS LEFT 4/23/2021 [facility] [place] [facility] [place]    OTHER SURGICAL HISTORY      CYSTOSCOPY URETERAL STENT INSERTION (Left Bladder)    OTHER SURGICAL HISTORY  11/23/2020    CYSTOSCOPY, LEFT RIGID AND FLEXIBLE URETEROSCOPY, HOLMIUM LASER LITHOTRIPSY, STONE EXTRACTION, STENT EXCHANGE (Left )     OTHER SURGICAL HISTORY  01/11/2021    CYSTOSCOPY, DIAGNOSTIC LEFT URETEROSCOPY WITH HOLMIUM LASER LITHOTRIPSY, LEFT STENT EXCHANGE (Left Bladder)    OTHER SURGICAL HISTORY  01/27/2021    cystoscopy, left stent removal    URETEROSCOPY Left 11/23/2020    CYSTOSCOPY, LEFT RIGID AND FLEXIBLE URETEROSCOPY, HOLMIUM LASER LITHOTRIPSY, STONE EXTRACTION, STENT EXCHANGE, REMOVAL MIGRATED STENT performed by Shellie Novoa MD at [address]:  family history includes Coronary Art Dis in her father; Diabetes in her mother and sister; Heart Attack in her sister; Heart Disease in her mother; High Blood Pressure in her sister. SOCIAL HISTORY:   reports that she has been smoking cigarettes. She has been smoking about 0.50 packs per day. She has never used smokeless tobacco.    Scheduled Meds:   sodium chloride flush  5-40 mL Intravenous 2 times per day    cefTRIAXone (ROCEPHIN) IV  1,000 mg Intravenous Q24H    tamsulosin  0.4 mg Oral Daily     Continuous Infusions:   sodium chloride      sodium chloride      sodium chloride      sodium chloride      sodium chloride      sodium chloride      sodium chloride      sodium chloride 150 mL/hr at 04/24/21 0339     PRN Meds:  sodium chloride, sodium chloride, sodium chloride, sodium chloride, hydrOXYzine, sodium chloride flush, sodium chloride, promethazine **OR** ondansetron, acetaminophen **OR** acetaminophen, polyethylene glycol, HYDROmorphone, ketorolac    ALLERGIES:  Patient has No Known Allergies.     REVIEW OF SYSTEMS:  Constitutional: Negative for fever  HENT: Negative for sore throat  Eyes: Negative for redness   Respiratory: Negative for dyspnea, cough  Cardiovascular: Negative for chest pain  Gastrointestinal: + for vomiting, diarrhea   Genitourinary: +  for hematuria   Musculoskeletal: Negative for arthralgias   Skin: Negative for rash  Neurological: Negative for syncope  Hematological: Negative for adenopathy  Psychiatric/Behavorial: Negative for anxiety    PHYSICAL EXAM:  Blood pressure 124/75, pulse 87, temperature 98.4 °F (36.9 °C), temperature source Oral, resp. rate 24, height 5' 7\" (1.702 m), weight 182 lb 11.2 oz (82.9 kg), last menstrual period 12/01/2014, SpO2 95 %, not currently breastfeeding.' on RA  Gen: No distress. Normocephalic, atraumatic. Eyes: PERRL. No sclera icterus. No conjunctival injection. ENT: No discharge. Pharynx clear. Neck: Trachea midline. No obvious mass. Resp: No accessory muscle use. No crackles. No wheezes. No rhonchi. No dullness on percussion. CV: Regular rate. Regular rhythm. No murmur or rub. No edema. GI: Non-tender. Non-distended. No hernia. + L flank pain  Skin: Warm and dry. No nodule on exposed extremities. M/S: No cyanosis. No joint deformity. No clubbing. Neuro: Awake. Alert. Moves all four extremities. Conversant. LABS:  CBC:   Recent Labs     04/22/21  1727 04/23/21  0517 04/23/21  2200 04/24/21  0709   WBC 13.7* 11.7*  --  17.0*   HGB 9.7* 8.9* 5.3* 9.5*   HCT 32.2* 28.8* 18.2* 30.2*   MCV 75.5* 74.5*  --  83.0    134*  --  128*     BMP:   Recent Labs     04/22/21  1727 04/23/21  0517 04/24/21  0709    139 138   K 3.8 4.2 4.3    105 107   CO2 24 21 15*   PHOS  --   --  5.2*   BUN 24* 31* 37*   CREATININE 1.5* 2.8* 2.6*     LIVER PROFILE:   Recent Labs     04/22/21  1727   AST 42*   ALT 32   BILITOT 0.4   ALKPHOS 101     PT/INR:   Recent Labs     04/23/21  0923   PROTIME 13.8*   INR 1.19*     APTT: No results for input(s): APTT in the last 72 hours.   UA:  Recent Labs 04/22/21 2009   COLORU Yellow   PHUR 8.5*   WBCUA 21-50*   RBCUA *   MUCUS 2+*   BACTERIA 3+*   CLARITYU CLOUDY*   SPECGRAV 1.015   LEUKOCYTESUR LARGE*   UROBILINOGEN 0.2   BILIRUBINUR Negative   BLOODU LARGE*   GLUCOSEU Negative   AMORPHOUS 1+     No results for input(s): PHART, [id number], PO2ART in the last 72 hours. Chest imaging - nont    CTA abd 4/23: There is bibasilar atelectasis. There is now significant perinephric hemorrhage on the left after placement  of a left-sided nephrostomy tube.  Small amount of gas is seen in the  nephrostomy tube tract.  The tube appears to be in good position.  Multiple  left-sided calculi are now all present within the renal pelvis.  There is no  hydronephrosis.  Active arterial extravasation is seen from the mid to lower  3rd of the left kidney laterally.  The left kidney is compressed with  slightly heterogeneous perfusion.  Hemorrhage extends inferiorly into the  upper left pelvis.  There is also perinephric hemorrhage surrounding the  aorta and extending into the right renal fossa.  Ascites has also developed. ASSESSMENT:  · Hemorrhagic shock  · Perinephric hematoma after IR nephrostomy tube placement- arterial injury s/p IR embolization early 4/24 am  · Acute kidney injury  · Acute blood loss anemia  · Solitary kidney  · UTI  · Thrombocytopenia  · leukocytosis  · Hep C    PLAN:  · IVF  · Ceftraxone D2  · Urology following  · Nephrology consulted  · Transfused 3 U PRBCs   · Holding BP meds  · H and H q6h  · ICU care and monitoring      Thank you for the consult.

## 2021-04-24 NOTE — PROGRESS NOTES
Morning asssessment complete. Patient seen awake in bed. Patient is A/O x 4. Patient currently on room air. Patient with c/o pain 7/10, PRN medication provided. Lung sounds clear bilaterally. Heart rate sinus rhythm on bedside monitor with rates in the 80's. Bowel sounds present x4, patient with no GI distress at this time. Nephrostomy tube in place to L back, dressing with small amount of drainage. Dressing to incision to R groin C/D/I. No edema noted. No skin issues noted. Scheduled medications given per orders. IVF infusing at 150 mL/hr. Peripheral IV's C/D/I and functioning properly. Murguia intact and draining clear, bloody urine. Clear, bloody urine draining from nephrostomy tube. Patient assisted with repositioning. Patient provided with water per request. Patient with no further needs voiced. Patient educated on use of call light and to call out with needs, verbalized understanding.

## 2021-04-24 NOTE — FLOWSHEET NOTE
04/23/21 2138   Vital Signs   Temp 96.6 °F (35.9 °C)   Temp Source Oral   Pulse 62   Resp 16   BP (!) 87/57   BP Location Right upper arm   Level of Consciousness Alert (0)   MEWS Score 2   Patient Currently in Pain Yes   Oxygen Therapy   SpO2 98 %   O2 Device None (Room air)     Pt transferred from  at this time. Pt moaning in pain, educated on why she can not have pain meds. Pt pale in appearance. MD called to get an H/H at this time. . call light in reach. Bed check in place. Will monitor.  Gayatri Marx

## 2021-04-24 NOTE — ACP (ADVANCE CARE PLANNING)
Advance Care Planning   Healthcare Decision Maker:    Primary Decision Maker: Renzojayson Chastity  Paul - [phone]    Click here to complete Healthcare Decision Makers including selection of the Healthcare Decision Maker Relationship (ie \"Primary\").

## 2021-04-24 NOTE — PROGRESS NOTES
Hospitalist Progress Note      PCP: Shannon Steinberg MD    Date of Admission: 4/22/2021    Hospital Course: pt seen , 55-y admitted with 1.5 cm left ureteropelvic junction stone status post percutaneous nephrostomy   Postprocedure patient has developed retroperitoneal hematoma and hemorrhagic shock patient is transferred to ICU last night received 3 units of PRBC  And underwent an IR embolization  Subjective:   Laying in bed having some pain  Murguia catheter and percutaneous nephrostomy tube is draining clear urine    Medications:  Reviewed    Infusion Medications    sodium chloride      IV infusion builder 150 mL/hr at 04/24/21 1206    sodium chloride      sodium chloride      sodium chloride      sodium chloride       Scheduled Medications    sodium chloride flush  5-40 mL Intravenous 2 times per day    cefTRIAXone (ROCEPHIN) IV  1,000 mg Intravenous Q24H    tamsulosin  0.4 mg Oral Daily     PRN Meds: sodium chloride, sodium chloride, sodium chloride, sodium chloride, hydrOXYzine, sodium chloride flush, sodium chloride, promethazine **OR** ondansetron, acetaminophen **OR** acetaminophen, polyethylene glycol, HYDROmorphone      Intake/Output Summary (Last 24 hours) at 4/24/2021 1342  Last data filed at 4/24/2021 1149  Gross per 24 hour   Intake 2341 ml   Output 930 ml   Net 1411 ml       Physical Exam Performed:    /71   Pulse 94   Temp 98.4 °F (36.9 °C) (Oral)   Resp 20   Ht 5' 7\" (1.702 m)   Wt 182 lb 11.2 oz (82.9 kg)   LMP 12/01/2014   SpO2 (!) 88%   BMI 28.61 kg/m²     General appearance: Mild t distress, appears stated age and cooperative. HEENT: Pupils equal, round, and reactive to light. Respiratory:  Normal respiratory effort. Clear to auscultation, bilaterally without Rales/Wheezes/Rhonchi. Cardiovascular: Regular rate and rhythm with normal S1/S2 without murmurs, rubs or gallops. Abdomen: Soft, non-tender, non-distended with normal bowel sounds.   Left percutaneous drainage  Musculoskeletal: No clubbing, cyanosis or edema bilaterally. Full range of motion without deformity. Skin: Skin color, texture, turgor normal.  No rashes or lesions. Neurologic:  Neurovascularly intact without any focal sensory/motor deficits. Cranial nerves: II-XII intact, grossly non-focal.  Psychiatric: Alert and oriented,     Labs:   Recent Labs     04/22/21  1727 04/23/21  0517 04/23/21  2200 04/24/21  0709 04/24/21  1307   WBC 13.7* 11.7*  --  17.0*  --    HGB 9.7* 8.9* 5.3* 9.5* 9.4*   HCT 32.2* 28.8* 18.2* 30.2* 29.5*    134*  --  128*  --      Recent Labs     04/23/21  0517 04/24/21  0709 04/24/21  1307    138 137   K 4.2 4.3 4.4    107 109   CO2 21 15* 15*   BUN 31* 37* 37*   CREATININE 2.8* 2.6* 2.5*   CALCIUM 8.7 7.1* 7.2*   PHOS  --  5.2*  --      Recent Labs     04/22/21  1727   AST 42*   ALT 32   BILITOT 0.4   ALKPHOS 101     Recent Labs     04/23/21  0923   INR 1.19*     No results for input(s): CKTOTAL, TROPONINI in the last 72 hours. Urinalysis:      Lab Results   Component Value Date    NITRU POSITIVE 04/22/2021    WBCUA 21-50 04/22/2021    BACTERIA 3+ 04/22/2021    RBCUA  04/22/2021    BLOODU LARGE 04/22/2021    SPECGRAV 1.015 04/22/2021    GLUCOSEU Negative 04/22/2021       Radiology:  IR EMBOLIZATION HEMORRHAGE   Final Result   Successful coil embolization of the anterior left renal artery branch. Successful injection of autologous blood clot into the posterior renal branch   that demonstrated active hemorrhage on the CT angiogram      The findings and treatment were discussed with the nighttime hospitalist at   [facility] immediately after the exam .         CTA ABDOMEN PELVIS W CONTRAST   Final Result   Active arterial extravasation from the lateral left kidney with a large   retroperitoneal hematoma. Critical results were called by Dr. Jd Lockett MD to Edna Giraldo on   4/23/2021 at 23:34.          IR GUIDED NEPHROSTOMY CATH PLACEMENT LEFT   Final Result   Technically successful access to the left-sided collecting system via an   intercostal upper pole approach. 8 Russian nephrostomy tube has been placed. CT ABDOMEN PELVIS WO CONTRAST Additional Contrast? None   Final Result   Interval development of left nephrolithiasis. Calculus at the left ureteropelvic junction measures 1.5 cm, resulting in   moderate left-sided hydronephrosis.              Assessment/Plan:    Active Hospital Problems    Diagnosis    Hemorrhagic shock ([address] [R57.8]    Acute blood loss anemia [D62]    Obstructive uropathy [N13.9]    Hydronephrosis [N13.30]    Ureterolithiasis [N20.1]    Acute UTI [N39.0]    MARY (acute kidney injury) ([address] [N17.9]     Left ureteral stone with hydronephrosis status post percutaneous nephrostomy  Continue antibiotic    Acute hemorrhagic shock due to retroperitoneal hematoma status post embolization  Hemoglobin stable continue to monitor H&H    Acute renal failure with metabolic acidosis nephrology has been consulted    Continue to monitor in ICU  Continue IV fluids    DVT Prophylaxis: mech   Diet: DIET RENAL;  Code Status: Full Code    PT/OT Eval Status:p    Dispo -ICU    Yuval Garrett MD

## 2021-04-24 NOTE — PROGRESS NOTES
Dr. Pranav Whipple regarding patients low blood pressure. Gave 1L bolus. When blood pressure was re-checked patients blood pressure was 74/50. Another 1L bolus infusing now. Patient is being transferred to PCU.

## 2021-04-24 NOTE — PROGRESS NOTES
Urology at bedside. Nephrostomy tube flushed with NS flush. Per urology flush nephrostomy tube q6h.  Patient to remain on bedrest. No cyanosis, clubbing or edema

## 2021-04-24 NOTE — PROGRESS NOTES
Hospitalists Progress Note      Date: 4/24/2021  Name: Eli Meza  MRN: [id number]  YOB: 1965    Assessment:   Hemorrhagic shock  Acute blood loss anemia  Left kidney bleed with large retroperitoneal hematoma  Solitary kidney  Acute kidney injury    Plan:  Was notified that the patient was hypotensive despite IV fluids. Saw the patient she was in severe distress and moaning. Her recheck of her hemoglobin showed a hemoglobin of 5.3 which had been 8.9 earlier in the day. The patient had a percutaneous nephrostomy tube placed by interventional radiology earlier for nephrolithiasis. She had scant bloody drainage out of her tube. Despite multiple boluses she remained hypotensive, the decision was made to get a CT of her abdomen pelvis even though she had worsening renal function. I got permission from the patient, a CT was ordered and it showed a large retroperitoneal hematoma with active extravasation out of the left kidney. I spoke with the interventional radiologist on-call and discussed the current findings. She was emergently taken to the IR suite and had selective embolization of the what appeared to be the culprit lesion. She was transfused 3 units of packed red blood cells as she had received multiple boluses of IV fluids. I notified urology on-call and will get a nephrology consult as the patient may likely need renal replacement therapy prior to discharge. She was transferred to the ICU, will get repeat H&H every 6 hours, forward. Consult intensivist.    Critical care time spent minus procedures greater than 65 minutes    Prophylaxis   SCDs      Physical Exam:   BP (!) 104/45   Pulse 64   Temp 97.9 °F (36.6 °C) (Axillary)   Resp 22   Ht 5' 7\" (1.702 m)   Wt 182 lb 11.2 oz (82.9 kg)   LMP 12/01/2014   SpO2 93%   BMI 28.61 kg/m²   General: Awake, alert, in no distress  HEENT: PERRLA, EOMI, MMM, NCAT  Heart:  RRR, normal S1S2, no murmurs, gallops, or rubs.   Lungs:  Equal breath sounds bilaterally, good aeration without rales, rhonchi or wheeze  Abdomen: soft, BS+, nontender, nondistended, no masses  Extremities:  No clubbing, cyanosis, or edema.   Neurologic: alert, no focal motor deficit      Sanjuanita Alvarenga MD  4/24/2021

## 2021-04-24 NOTE — PROGRESS NOTES
Patient called family member Addie Palencia, left a message. Patient does not want any one else called at this time.

## 2021-04-24 NOTE — CARE COORDINATION
Case Management Assessment  Initial Evaluation      Patient Name: Eli Meza  YOB: 1965  Diagnosis: Ureterolithiasis [N20.1]  Obstructive uropathy [N13.9]  Date / Time: 4/22/2021  5:14 PM    Admission status/Date: 4/23/21 inpt  Chart Reviewed: Yes      Patient Interviewed: Yes   Family Interviewed:  No      Hospitalization in the last 30 days:  No      Health Care Decision Maker :   Primary Decision Maker: Cinthya Sender - Other - [phone]    (CM - must 1st enter selection under Navigator - emergency contact- [place] Relationship and pick relationship)   Who do you trust or have selected to make healthcare decisions for you      Met with:  Patient at bedside  Interview conducted  (bedside/phone):    Current PCP: Piero Nunez required for SNF : Y         3 night stay required -  [facility]  Support Systems/Care Needs: None  Transportation: family    Meal Preparation: self    Housing  Living Arrangements:  Lives 2nd floor apt  Steps: 7  Intent for return to present living arrangements: Yes  Identified Issues:     [address] : No Agency:(Services)  Type of Home Care Services: None  Passport/Waiver : No  :                      Phone Number:    Passport/Waiver Services: N/A          Durable Medical Equiptment   DME Provider: N/A  Equipment:   Walker___Cane___RTS___ BSC___Shower Chair___Hospital Bed___W/C____Other________  02 at ____Liter(s)---wears(frequency)_______ HHN ___ CPAP___ BiPap___   N/A____      Home O2 Use :  No    If No for home O2---if presently on O2 during hospitalization:  No  if yes CM to follow for potential DC O2 need  Informed of need for care provider to bring portable home O2 tank on day of discharge for nursing to connect prior to leaving:   Not Indicated  Verbalized agreement/Understanding:   Not Indicated    Community Service Affiliation  Dialysis:  No

## 2021-04-24 NOTE — FLOWSHEET NOTE
04/23/21 2337   Vital Signs   Temp 96.3 °F (35.7 °C)   Pulse 75   Resp 18   BP (!) 77/51   Level of Consciousness Alert (0)   MEWS Score 3   Patient Currently in Pain Yes   Oxygen Therapy   SpO2 98 %   O2 Device None (Room air)     Pt back from CT. Clinical at bedside to insert another line in pt. 1st unit of blood started at this time. Spoke with MD to transfer pt to ICU. Pt transferred to ICU bed 1 at this time. Pt awake, remains pale and moaning in pain.  Bedside report given to Van Muñiz RN at this time Ramsey Bah

## 2021-04-24 NOTE — PROGRESS NOTES
Patient is resting better now. Vitals are stable. Both cortez and nephrostomy tube draining bloody. Patient has had total of 3 units of packed RBC's rapidly infused and total of 4 NS 1000 cc boluses. NSR on monitor. Will continue to monitor.

## 2021-04-25 ENCOUNTER — APPOINTMENT (OUTPATIENT)
Dept: GENERAL RADIOLOGY | Age: 56
DRG: 447 | End: 2021-04-25
Payer: COMMERCIAL

## 2021-04-25 LAB
ALBUMIN SERPL-MCNC: 2.3 G/DL (ref 3.4–5)
ANION GAP SERPL CALCULATED.3IONS-SCNC: 10 MMOL/L (ref 3–16)
BUN BLDV-MCNC: 32 MG/DL (ref 7–20)
CALCIUM SERPL-MCNC: 7.5 MG/DL (ref 8.3–10.6)
CHLORIDE BLD-SCNC: 105 MMOL/L (ref 99–110)
CO2: 21 MMOL/L (ref 21–32)
CREAT SERPL-MCNC: 2.1 MG/DL (ref 0.6–1.1)
GFR AFRICAN AMERICAN: 30
GFR NON-AFRICAN AMERICAN: 24
GLUCOSE BLD-MCNC: 111 MG/DL (ref 70–99)
HCT VFR BLD CALC: 23.1 % (ref 36–48)
HCT VFR BLD CALC: 23.5 % (ref 36–48)
HCT VFR BLD CALC: 24.1 % (ref 36–48)
HCT VFR BLD CALC: 24.9 % (ref 36–48)
HEMOGLOBIN: 7.7 G/DL (ref 12–16)
HEMOGLOBIN: 7.9 G/DL (ref 12–16)
HEMOGLOBIN: 7.9 G/DL (ref 12–16)
HEMOGLOBIN: 8.1 G/DL (ref 12–16)
PHOSPHORUS: 3.9 MG/DL (ref 2.5–4.9)
POTASSIUM SERPL-SCNC: 3.7 MMOL/L (ref 3.5–5.1)
SODIUM BLD-SCNC: 136 MMOL/L (ref 136–145)

## 2021-04-25 PROCEDURE — 6370000000 HC RX 637 (ALT 250 FOR IP): Performed by: INTERNAL MEDICINE

## 2021-04-25 PROCEDURE — 85018 HEMOGLOBIN: CPT

## 2021-04-25 PROCEDURE — 2000000000 HC ICU R&B

## 2021-04-25 PROCEDURE — 2580000003 HC RX 258: Performed by: INTERNAL MEDICINE

## 2021-04-25 PROCEDURE — 80069 RENAL FUNCTION PANEL: CPT

## 2021-04-25 PROCEDURE — 6370000000 HC RX 637 (ALT 250 FOR IP): Performed by: HOSPITALIST

## 2021-04-25 PROCEDURE — 74018 RADEX ABDOMEN 1 VIEW: CPT

## 2021-04-25 PROCEDURE — 2580000003 HC RX 258

## 2021-04-25 PROCEDURE — 6360000002 HC RX W HCPCS: Performed by: INTERNAL MEDICINE

## 2021-04-25 PROCEDURE — 51702 INSERT TEMP BLADDER CATH: CPT

## 2021-04-25 PROCEDURE — 99291 CRITICAL CARE FIRST HOUR: CPT | Performed by: INTERNAL MEDICINE

## 2021-04-25 PROCEDURE — 6360000002 HC RX W HCPCS: Performed by: HOSPITALIST

## 2021-04-25 PROCEDURE — 2580000003 HC RX 258: Performed by: HOSPITALIST

## 2021-04-25 PROCEDURE — 36415 COLL VENOUS BLD VENIPUNCTURE: CPT

## 2021-04-25 PROCEDURE — 85014 HEMATOCRIT: CPT

## 2021-04-25 RX ORDER — AMLODIPINE BESYLATE 5 MG/1
5 TABLET ORAL DAILY
Status: DISCONTINUED | OUTPATIENT
Start: 2021-04-25 | End: 2021-04-25

## 2021-04-25 RX ORDER — HYDRALAZINE HYDROCHLORIDE 20 MG/ML
10 INJECTION INTRAMUSCULAR; INTRAVENOUS EVERY 6 HOURS PRN
Status: DISCONTINUED | OUTPATIENT
Start: 2021-04-25 | End: 2021-04-30 | Stop reason: HOSPADM

## 2021-04-25 RX ORDER — HYDRALAZINE HYDROCHLORIDE 20 MG/ML
10 INJECTION INTRAMUSCULAR; INTRAVENOUS EVERY 4 HOURS PRN
Status: DISCONTINUED | OUTPATIENT
Start: 2021-04-25 | End: 2021-04-25

## 2021-04-25 RX ORDER — TRAMADOL HYDROCHLORIDE 50 MG/1
50 TABLET ORAL EVERY 6 HOURS PRN
Status: DISCONTINUED | OUTPATIENT
Start: 2021-04-25 | End: 2021-04-26

## 2021-04-25 RX ORDER — AMLODIPINE BESYLATE 5 MG/1
5 TABLET ORAL 2 TIMES DAILY
Status: DISCONTINUED | OUTPATIENT
Start: 2021-04-25 | End: 2021-04-27

## 2021-04-25 RX ORDER — HYDRALAZINE HYDROCHLORIDE 20 MG/ML
INJECTION INTRAMUSCULAR; INTRAVENOUS
Status: DISPENSED
Start: 2021-04-25 | End: 2021-04-25

## 2021-04-25 RX ORDER — SODIUM CHLORIDE 9 MG/ML
INJECTION, SOLUTION INTRAVENOUS
Status: COMPLETED
Start: 2021-04-25 | End: 2021-04-25

## 2021-04-25 RX ADMIN — HYDRALAZINE HYDROCHLORIDE 10 MG: 20 INJECTION INTRAMUSCULAR; INTRAVENOUS at 05:00

## 2021-04-25 RX ADMIN — TRAMADOL HYDROCHLORIDE 50 MG: 50 TABLET, FILM COATED ORAL at 19:35

## 2021-04-25 RX ADMIN — HYDROMORPHONE HYDROCHLORIDE 0.5 MG: 1 INJECTION, SOLUTION INTRAMUSCULAR; INTRAVENOUS; SUBCUTANEOUS at 16:47

## 2021-04-25 RX ADMIN — AMLODIPINE BESYLATE 5 MG: 5 TABLET ORAL at 10:24

## 2021-04-25 RX ADMIN — HYDROXYZINE HYDROCHLORIDE 25 MG: 10 TABLET, FILM COATED ORAL at 08:49

## 2021-04-25 RX ADMIN — AMLODIPINE BESYLATE 5 MG: 5 TABLET ORAL at 21:11

## 2021-04-25 RX ADMIN — SODIUM CHLORIDE 500 ML: 9 INJECTION, SOLUTION INTRAVENOUS at 15:01

## 2021-04-25 RX ADMIN — ONDANSETRON HYDROCHLORIDE 4 MG: 2 INJECTION, SOLUTION INTRAMUSCULAR; INTRAVENOUS at 03:01

## 2021-04-25 RX ADMIN — ONDANSETRON HYDROCHLORIDE 4 MG: 2 INJECTION, SOLUTION INTRAMUSCULAR; INTRAVENOUS at 09:49

## 2021-04-25 RX ADMIN — AMPICILLIN SODIUM 500 MG: 500 INJECTION, POWDER, FOR SOLUTION INTRAMUSCULAR; INTRAVENOUS at 15:00

## 2021-04-25 RX ADMIN — SODIUM CHLORIDE, PRESERVATIVE FREE 10 ML: 5 INJECTION INTRAVENOUS at 21:11

## 2021-04-25 RX ADMIN — HYDROMORPHONE HYDROCHLORIDE 0.5 MG: 1 INJECTION, SOLUTION INTRAMUSCULAR; INTRAVENOUS; SUBCUTANEOUS at 12:45

## 2021-04-25 RX ADMIN — HYDRALAZINE HYDROCHLORIDE 10 MG: 20 INJECTION INTRAMUSCULAR; INTRAVENOUS at 11:16

## 2021-04-25 RX ADMIN — TAMSULOSIN HYDROCHLORIDE 0.4 MG: 0.4 CAPSULE ORAL at 08:49

## 2021-04-25 RX ADMIN — SODIUM CHLORIDE, PRESERVATIVE FREE 10 ML: 5 INJECTION INTRAVENOUS at 09:00

## 2021-04-25 ASSESSMENT — PAIN SCALES - GENERAL
PAINLEVEL_OUTOF10: 7
PAINLEVEL_OUTOF10: 0
PAINLEVEL_OUTOF10: 6
PAINLEVEL_OUTOF10: 0
PAINLEVEL_OUTOF10: 7
PAINLEVEL_OUTOF10: 5
PAINLEVEL_OUTOF10: 7
PAINLEVEL_OUTOF10: 4

## 2021-04-25 ASSESSMENT — PAIN DESCRIPTION - LOCATION
LOCATION: FLANK
LOCATION: FLANK

## 2021-04-25 NOTE — PROGRESS NOTES
Left nephrostomy tube flushed with 10 cc of saline per urology order. No clots noted. Patient denies need for pain or nausea meds at this time. Continues to rest on right side. Right groin dressing dry and intact. Left nephrostomy dressing with old drainage noted. Vitals are stable. Call light in reach and bed alarm activated. Will continue to monitor.

## 2021-04-25 NOTE — PROGRESS NOTES
Pulmonary & Critical Care Medicine ICU Progress Note    CC:retroperitoneal hematoma    Events of Last 24 hours: patient feels a bit worse today, complains of nausea but was refusing zofran. Invasive Lines:   IV Line: pivs    MV:       / / /FiO2 : 21 %  No results for input(s): PHART, CBN2ZSO, PO2ART in the last 72 hours. IV:   sodium chloride      IV infusion builder 100 mL/hr at 04/25/21 0753    sodium chloride      sodium chloride      sodium chloride      sodium chloride         EXAM:  BP (!) 162/86   Pulse 98   Temp 98.9 °F (37.2 °C) (Oral)   Resp 21   Ht 5' 7\" (1.702 m)   Wt 190 lb 3.2 oz (86.3 kg)   LMP 12/01/2014   SpO2 92%   BMI 29.79 kg/m²  on RA  Tmax: 98.9F  CVP:      Intake/Output Summary (Last 24 hours) at 4/25/2021 0840  Last data filed at 4/25/2021 0744  Gross per 24 hour   Intake 3117 ml   Output 3155 ml   Net -38 ml     Gen: No distress. Normocephalic, atraumatic. Eyes: PERRL. No sclera icterus. No conjunctival injection. ENT: No discharge. Pharynx clear. Neck: Trachea midline. No obvious mass. Resp: No accessory muscle use. No crackles. No wheezes. No rhonchi. No dullness on percussion. CV: Regular rate. Regular rhythm. No murmur or rub. No edema. GI: Non-tender. Non-distended. No hernia. + L flank pain  Skin: Warm and dry. No nodule on exposed extremities. M/S: No cyanosis. No joint deformity. No clubbing. Neuro: Awake. Alert. Moves all four extremities. Conversant.       Medications:   hydrALAZINE        amLODIPine  5 mg Oral Daily    sodium chloride flush  5-40 mL Intravenous 2 times per day    cefTRIAXone (ROCEPHIN) IV  1,000 mg Intravenous Q24H    tamsulosin  0.4 mg Oral Daily     PRN Meds:  hydrALAZINE, sodium chloride, sodium chloride, sodium chloride, sodium chloride, hydrOXYzine, sodium chloride flush, sodium chloride, promethazine **OR** ondansetron, acetaminophen **OR** acetaminophen, polyethylene glycol, HYDROmorphone    Results:  CBC:   Recent Labs     04/22/21  1727 04/23/21  0517 04/23/21  0517 04/24/21  0709 04/24/21  1307 04/24/21  1857 04/25/21  0341   WBC 13.7* 11.7*  --  17.0*  --   --   --    HGB 9.7* 8.9*   < > 9.5* 9.4* 8.7* 7.9*   HCT 32.2* 28.8*   < > 30.2* 29.5* 27.4* 24.1*   MCV 75.5* 74.5*  --  83.0  --   --   --     134*  --  128*  --   --   --     < > = values in this interval not displayed. BMP:   Recent Labs     04/24/21  0709 04/24/21  1307 04/25/21  0341    137 136   K 4.3 4.4 3.7    109 105   CO2 15* 15* 21   PHOS 5.2*  --  3.9   BUN 37* 37* 32*   CREATININE 2.6* 2.5* 2.1*     LIVER PROFILE:   Recent Labs     04/22/21 1727   AST 42*   ALT 32   BILITOT 0.4   ALKPHOS 101     PT/INR:   Recent Labs     04/23/21  0923   PROTIME 13.8*   INR 1.19*     APTT: No results for input(s): APTT in the last 72 hours. UA:  Recent Labs     04/22/21 2009   COLORU Yellow   PHUR 8.5*   WBCUA 21-50*   RBCUA *   MUCUS 2+*   BACTERIA 3+*   CLARITYU CLOUDY*   SPECGRAV 1.015   LEUKOCYTESUR LARGE*   UROBILINOGEN 0.2   BILIRUBINUR Negative   BLOODU LARGE*   GLUCOSEU Negative   AMORPHOUS 1+       Cultures:  4/22: enterococcus- amp sens      Films:  CTA abd 4/23: There is bibasilar atelectasis.   There is now significant perinephric hemorrhage on the left after placement  of a left-sided nephrostomy tube.  Small amount of gas is seen in the  nephrostomy tube tract.  The tube appears to be in good position.  Multiple  left-sided calculi are now all present within the renal pelvis.  There is no  hydronephrosis.  Active arterial extravasation is seen from the mid to lower  3rd of the left kidney laterally.  The left kidney is compressed with  slightly heterogeneous perfusion.  Hemorrhage extends inferiorly into the  upper left pelvis.  There is also perinephric hemorrhage surrounding the  aorta and extending into the right renal fossa.  Ascites has also developed.           ASSESSMENT:  · Hemorrhagic shock  · Perinephric hematoma after IR nephrostomy tube placement- arterial injury s/p IR embolization early 4/24 am  · Acute kidney injury  · Acute blood loss anemia  · Solitary kidney  · UTI  · Thrombocytopenia  · leukocytosis  · Hep C     PLAN:  · IVF  · Ceftraxone D3  · Urology following  · Nephrology consulted  · Transfused 3 U PRBCs   · Holding BP meds  · H and H q6h  · ICU care and monitoring  · If HGB continues to drop she may need repeat CT scan and/or transfer to facility with vascular surgery    Critical care time spent reviewing labs/films, examining patient, collaborating with other physicians but excluding procedures for life threatening organ failure is 32 minutes.

## 2021-04-25 NOTE — PROGRESS NOTES
Report given to [facility] and care of this patient transferred.

## 2021-04-25 NOTE — PROGRESS NOTES
Patient:  Rodney Area  YOB: 1965   Date of Service:  04/25/21      Urology Attending Daily Progress Note    Chief complaint: \"pain\"    Interval HPI:  Hgb drifted to 7.9 today but vital signs are stable, actually running hypertensive somewhat  CR downtrending  2.9L UOP last 24hr    Having some pain in back still       Objective:    Patient Vitals for the past 8 hrs:   BP Temp Temp src Pulse Resp SpO2 Weight   04/25/21 0900 (!) 161/95 -- -- 110 28 91 % --   04/25/21 0800 (!) 162/86 -- -- 98 21 92 % --   04/25/21 0710 -- -- -- 99 -- -- --   04/25/21 0700 (!) 166/85 98.9 °F (37.2 °C) Oral 101 22 91 % --   04/25/21 0630 (!) 169/85 -- -- 101 20 91 % --   04/25/21 0600 (!) 166/86 -- -- 102 25 90 % --   04/25/21 0530 (!) 159/84 -- -- 93 22 92 % --   04/25/21 0503 -- -- -- 97 19 92 % --   04/25/21 0500 (!) 172/91 -- -- 94 20 92 % --   04/25/21 0430 (!) 172/91 -- -- 92 18 92 % --   04/25/21 0400 (!) 168/88 -- -- 82 21 92 % --   04/25/21 0330 (!) 181/89 98.9 °F (37.2 °C) Oral 100 23 91 % 190 lb 3.2 oz (86.3 kg)   04/25/21 0300 (!) 171/88 -- -- 103 24 92 % --   04/25/21 0230 (!) 174/95 -- -- 97 17 96 % --   04/25/21 0200 (!) 178/95 -- -- 97 18 96 % --   04/25/21 0130 (!) 162/86 -- -- 99 23 92 % --     I/O last 3 completed shifts:   In: 3592 [I.V.:3592]  Out: 2930 [Urine:2930]     Physical Exam:   Constitutional: comfortable in hospital bed, no acute distress  Abdomen: left sided pain in flank  Genitourinary: urethal cortez draining scant clear urine, NT with light pink urine with scant clots  Psych: normal mood and affect, appropriately answers questions   Skin, extremities: stable, exposed skin intact, no digital cyanosis     Labs:     No results found for: PSA    Lab Results   Component Value Date    CREATININE 2.1 (H) 04/25/2021    CREATININE 2.5 (H) 04/24/2021    CREATININE 2.6 (H) 04/24/2021       Lab Results   Component Value Date    COLORU Yellow 04/22/2021    NITRU POSITIVE 04/22/2021    GLUCOSEU Negative 04/22/2021    KETUA Negative 04/22/2021    UROBILINOGEN 0.2 04/22/2021    BILIRUBINUR Negative 04/22/2021       Lab Results   Component Value Date    WBC 17.0 (H) 04/24/2021    HGB 7.9 (L) 04/25/2021    HCT 24.1 (L) 04/25/2021    MCV 83.0 04/24/2021     (L) 04/24/2021       Radiology:  \"Imaging was independently reviewed by myself and I agree with the radiology interpretation    Assessment:  Jacy Jernigan is a 48 y.o. female with functionally solitary left kidney - large stone burden and underwent L PNT on 4/23 in anticipation of L PCNL - unfortunately, had hemorrhage and CTA showed active arterial extravasation requiring angioembolization with super selective coiling on 4/24     Plan:  -- flush NT gently q12hr to maintain patency - flushed well today  -- cont cortez for now  -- monitor UOP and CR  -- q6HR H/H  -- bedrest for now still  -- consider getting hypertension under a little better control to ensure she does not rebleed  -- following with you - long discussion with patient, she understands the situation - since she has a functionally solitary kidney, it is extremely important we try to preserve this kidney and not go toward nephrectomy - would advocate for repeat embolization if bleeds again over nephrectomy - seems stable for now which is reassuring    Discussed with pts nurse    Kai Cowan MD  [facility]   [phone] - Please call with questions

## 2021-04-25 NOTE — FLOWSHEET NOTE
After 10 mg of hydralazine      04/25/21 0530   Vitals   Pulse 93   Resp 22   BP (!) 159/84   MAP (mmHg) 105   Oxygen Therapy   SpO2 92 %

## 2021-04-25 NOTE — PROGRESS NOTES
Medicated with 0.5 mg of dilaudid ivp prn for pain 7/10 left side. Nephrostomy draining hazy yellow urine no clots noted. Patient refused to turn at this time.

## 2021-04-25 NOTE — PROGRESS NOTES
CM-ST, NSR once pain controlled. Pt was refusing pain meds this AM, but is now taking them. VSS, SBP remains <150. Apresoline given X1. Min amount of urine noted in the cortez, but nephrostomy draining clear yellow urine. NT flushed X1 this shift, no clots were noted. Pt is not eating, because she is c/o nausea, no emesis noted.  Zofran given this AM.

## 2021-04-25 NOTE — PROGRESS NOTES
Hospitalist Progress Note      PCP: Dylan Rosales MD    Date of Admission: 4/22/2021    Hospital Course: pt seen , 55-y admitted with 1.5 cm left ureteropelvic junction stone status post percutaneous nephrostomy   Postprocedure patient has developed retroperitoneal hematoma and hemorrhagic shock patient is transferred to ICU  received 3 units of PRBC  And underwent an IR embolization  Subjective:   Patient currently laying in bed pain is somewhat controlled percutaneous nephrostomy in place having clear urine. Blood pressures are running somewhat high  Patient is nauseated  Medications:  Reviewed    Infusion Medications    sodium chloride      IV infusion builder 100 mL/hr at 04/25/21 1303    sodium chloride      sodium chloride      sodium chloride      sodium chloride       Scheduled Medications    hydrALAZINE        amLODIPine  5 mg Oral Daily    sodium chloride flush  5-40 mL Intravenous 2 times per day    cefTRIAXone (ROCEPHIN) IV  1,000 mg Intravenous Q24H    tamsulosin  0.4 mg Oral Daily     PRN Meds: hydrALAZINE, traMADol, sodium chloride, sodium chloride, sodium chloride, sodium chloride, hydrOXYzine, sodium chloride flush, sodium chloride, promethazine **OR** ondansetron, acetaminophen **OR** acetaminophen, polyethylene glycol, HYDROmorphone      Intake/Output Summary (Last 24 hours) at 4/25/2021 1353  Last data filed at 4/25/2021 1155  Gross per 24 hour   Intake 2551 ml   Output 3500 ml   Net -949 ml       Physical Exam Performed:    BP (!) 149/75   Pulse 134   Temp 99 °F (37.2 °C) (Oral)   Resp (!) 31   Ht 5' 7\" (1.702 m)   Wt 190 lb 3.2 oz (86.3 kg)   LMP 12/01/2014   SpO2 91%   BMI 29.79 kg/m²     General appearance: Mild t distress, appears stated age and cooperative. HEENT: Pupils equal, round, and reactive to light. Respiratory:  Normal respiratory effort. Clear to auscultation, bilaterally without Rales/Wheezes/Rhonchi.   Cardiovascular: Regular rate and rhythm with normal S1/S2 without murmurs, rubs or gallops. Abdomen: Soft, non-tender, non-distended with normal bowel sounds. Left percutaneous drainage  Musculoskeletal: No clubbing, cyanosis or edema bilaterally. Full range of motion without deformity. Skin: Skin color, texture, turgor normal.  No rashes or lesions. Neurologic:  Neurovascularly intact without any focal sensory/motor deficits. Cranial nerves: II-XII intact, grossly non-focal.  Psychiatric: Alert and oriented,     Labs:   Recent Labs     04/22/21  1727 04/23/21  0517 04/23/21  0517 04/24/21  0709 04/24/21  0709 04/24/21  1857 04/25/21  0341 04/25/21  0934   WBC 13.7* 11.7*  --  17.0*  --   --   --   --    HGB 9.7* 8.9*   < > 9.5*   < > 8.7* 7.9* 8.1*   HCT 32.2* 28.8*   < > 30.2*   < > 27.4* 24.1* 24.9*    134*  --  128*  --   --   --   --     < > = values in this interval not displayed. Recent Labs     04/24/21  0709 04/24/21  1307 04/25/21  0341    137 136   K 4.3 4.4 3.7    109 105   CO2 15* 15* 21   BUN 37* 37* 32*   CREATININE 2.6* 2.5* 2.1*   CALCIUM 7.1* 7.2* 7.5*   PHOS 5.2*  --  3.9     Recent Labs     04/22/21  1727   AST 42*   ALT 32   BILITOT 0.4   ALKPHOS 101     Recent Labs     04/23/21  0923   INR 1.19*         Assessment/Plan:    Active Hospital Problems    Diagnosis    Hemorrhagic shock ([facility] [R57.8]    Acute blood loss anemia [D62]    Obstructive uropathy [N13.9]    Hydronephrosis [N13.30]    Ureterolithiasis [N20.1]    Acute UTI [N39.0]    MARY (acute kidney injury) ([facility] [N17.9]     Left ureteral stone with hydronephrosis status post percutaneous nephrostomy  Continue antibiotic Rocephin.   Can change it to ampicillin current cultures are growing Enterococcus    Acute hemorrhagic shock due to retroperitoneal hematoma   status post embolization  Hemoglobin stable   continue to monitor H&H    Acute renal failure with metabolic acidosis   nephrology has been consulted  On IV fluids    Hypertension as needed hydralazine ordered.   And amlodipine if tolerated    Pain will keep on tramadol as needed    DVT Prophylaxis: mech   Diet: DIET RENAL;  Code Status: Full Code    PT/OT Eval Status:p    Dispo -ICU    Radha Ordoñez MD

## 2021-04-25 NOTE — PROGRESS NOTES
[facility]    Follow up Note           Reason for Consult: MARY  Requesting Physician:  Dr. Ingrid Ricardo for    Sub/interval history  Feels nauseated   C/o abd discomfort  BP high    Last 24 h uop 2.9l     ROS: No chest pain/shortness of breath/fever  PSFH: No visitor    Scheduled Meds:   hydrALAZINE        amLODIPine  5 mg Oral Daily    sodium chloride flush  5-40 mL Intravenous 2 times per day    cefTRIAXone (ROCEPHIN) IV  1,000 mg Intravenous Q24H    tamsulosin  0.4 mg Oral Daily     Continuous Infusions:   sodium chloride      IV infusion builder 100 mL/hr at 04/25/21 0753    sodium chloride      sodium chloride      sodium chloride      sodium chloride       PRN Meds:.hydrALAZINE, sodium chloride, sodium chloride, sodium chloride, sodium chloride, hydrOXYzine, sodium chloride flush, sodium chloride, promethazine **OR** ondansetron, acetaminophen **OR** acetaminophen, polyethylene glycol, HYDROmorphone    History of Present Illness on 4/24/2021:    48 y.o. yo female with PMH of hypertension, nephrolithiasis, hepatitis C who is admitted for MARY  Patient presented and was admitted on 4/22 with complaints of intractable abdominal pain nausea vomiting. She was noted to have 1.5 cm left ureteropelvic junction stone and urology was consulted  Patient underwent percutaneous nephrostomy tube placement on 4/23  During the evening and early morning, patient developed shock and was noted to have hemoglobin of 5.3 drop from 8.9.   She had a blood hemorrhagic shock due to large retroperitoneal hematoma with active extravasation and emergently had embolization done overnight  Patient received 3 units of packed red blood cells vitals are stable and patient is urinating    Physical exam:   Constitutional:  VITALS:  BP (!) 162/86   Pulse 98   Temp 98.9 °F (37.2 °C) (Oral)   Resp 21   Ht 5' 7\" (1.702 m)   Wt 190 lb 3.2 oz (86.3 kg)   LMP 12/01/2014   SpO2 92%   BMI 29.79 kg/m²   Gen: alert, awake, nad  Skin: no rash, turgor wnl  Heent:  eomi, mmm  Neck: no bruits or jvd noted, thyroid normal  Cardiovascular:  S1, S2 without m/r/g; no lower extremity edema  Respiratory: CTA B without w/r/r; respiratory effort normal  Abdomen:  +bs, soft, nt, nd, no hepatosplenomegaly  Neuro/Psy: AAoriented times 3 ; moves all 4 ext  Musculoskeletal:  Rom, muscular strength intact; digits, nails normal    Data/  Recent Labs     04/22/21  1727 04/23/21  0517 04/23/21  0517 04/24/21  0709 04/24/21  1307 04/24/21  1857 04/25/21  0341   WBC 13.7* 11.7*  --  17.0*  --   --   --    HGB 9.7* 8.9*   < > 9.5* 9.4* 8.7* 7.9*   HCT 32.2* 28.8*   < > 30.2* 29.5* 27.4* 24.1*   MCV 75.5* 74.5*  --  83.0  --   --   --     134*  --  128*  --   --   --     < > = values in this interval not displayed. Recent Labs     04/24/21  0709 04/24/21  1307 04/25/21  0341    137 136   K 4.3 4.4 3.7    109 105   CO2 15* 15* 21   GLUCOSE 117* 103* 111*   PHOS 5.2*  --  3.9   BUN 37* 37* 32*   CREATININE 2.6* 2.5* 2.1*   LABGLOM 19* 20* 24*   GFRAA 23* 24* 30*       Assessment  -MARY in the setting of hemorrhagic shock, seems to have stabilized with packed red blood cells and volume resuscitation. She also had left hydronephrosis and stent was placed on 4/23. She has received IV contrast with CTA and during coil embolization    -Left perinephric hematoma after percutaneous nephrostomy    -Nephrolithiasis, left UVJ, urology following    -Hemorrhagic shock, improved   And received 4 L of normal saline bolus and 3 units of packed red blood cells on 2/72    -Metabolic acidosis      Plan  -Change IVF to 100 ml/h  -AXR for possible ileus  -prn hydralazine as ordered for HTN  -amlodipine if able to tolerate PO  -Stopped Toradol 4/24  -Serial renal panel  -Daily wts and strict i/o  -Renal dose meds and avoid nephrotoxins      Thank you for the consultation. Please do not hesitate to call with questions.     Lizzie Garcia  [facility]  Office: [phone]  Fax:    [phone]

## 2021-04-25 NOTE — PROGRESS NOTES
AM assessment done. VSS, pt remains afebrile. Urostomy tube draining. Pt c/o nausea. No emesis noted. Pt is w/out c/o discomfort @ this time.

## 2021-04-26 LAB
ALBUMIN SERPL-MCNC: 2.3 G/DL (ref 3.4–5)
ANION GAP SERPL CALCULATED.3IONS-SCNC: 10 MMOL/L (ref 3–16)
BASOPHILS ABSOLUTE: 0 K/UL (ref 0–0.2)
BASOPHILS RELATIVE PERCENT: 0.5 %
BUN BLDV-MCNC: 27 MG/DL (ref 7–20)
CALCIUM SERPL-MCNC: 7.9 MG/DL (ref 8.3–10.6)
CHLORIDE BLD-SCNC: 102 MMOL/L (ref 99–110)
CO2: 24 MMOL/L (ref 21–32)
CREAT SERPL-MCNC: 1.6 MG/DL (ref 0.6–1.1)
EOSINOPHILS ABSOLUTE: 0 K/UL (ref 0–0.6)
EOSINOPHILS RELATIVE PERCENT: 0.1 %
GFR AFRICAN AMERICAN: 41
GFR NON-AFRICAN AMERICAN: 33
GLUCOSE BLD-MCNC: 108 MG/DL (ref 70–99)
HCT VFR BLD CALC: 23.8 % (ref 36–48)
HCT VFR BLD CALC: 24.4 % (ref 36–48)
HEMOGLOBIN: 7.9 G/DL (ref 12–16)
HEMOGLOBIN: 8 G/DL (ref 12–16)
LYMPHOCYTES ABSOLUTE: 0.7 K/UL (ref 1–5.1)
LYMPHOCYTES RELATIVE PERCENT: 7.2 %
MCH RBC QN AUTO: 26.3 PG (ref 26–34)
MCHC RBC AUTO-ENTMCNC: 32.9 G/DL (ref 31–36)
MCV RBC AUTO: 80 FL (ref 80–100)
MONOCYTES ABSOLUTE: 1.1 K/UL (ref 0–1.3)
MONOCYTES RELATIVE PERCENT: 10.5 %
NEUTROPHILS ABSOLUTE: 8.5 K/UL (ref 1.7–7.7)
NEUTROPHILS RELATIVE PERCENT: 81.7 %
PDW BLD-RTO: 17.9 % (ref 12.4–15.4)
PHOSPHORUS: 2.9 MG/DL (ref 2.5–4.9)
PLATELET # BLD: 122 K/UL (ref 135–450)
PMV BLD AUTO: 9.8 FL (ref 5–10.5)
POTASSIUM SERPL-SCNC: 3.3 MMOL/L (ref 3.5–5.1)
RBC # BLD: 3.02 M/UL (ref 4–5.2)
SODIUM BLD-SCNC: 136 MMOL/L (ref 136–145)
WBC # BLD: 10.4 K/UL (ref 4–11)

## 2021-04-26 PROCEDURE — 94761 N-INVAS EAR/PLS OXIMETRY MLT: CPT

## 2021-04-26 PROCEDURE — 6370000000 HC RX 637 (ALT 250 FOR IP): Performed by: INTERNAL MEDICINE

## 2021-04-26 PROCEDURE — 85014 HEMATOCRIT: CPT

## 2021-04-26 PROCEDURE — 2060000000 HC ICU INTERMEDIATE R&B

## 2021-04-26 PROCEDURE — 2700000000 HC OXYGEN THERAPY PER DAY

## 2021-04-26 PROCEDURE — 6360000002 HC RX W HCPCS: Performed by: INTERNAL MEDICINE

## 2021-04-26 PROCEDURE — 2580000003 HC RX 258: Performed by: HOSPITALIST

## 2021-04-26 PROCEDURE — 2580000003 HC RX 258: Performed by: INTERNAL MEDICINE

## 2021-04-26 PROCEDURE — 36415 COLL VENOUS BLD VENIPUNCTURE: CPT

## 2021-04-26 PROCEDURE — 99233 SBSQ HOSP IP/OBS HIGH 50: CPT | Performed by: INTERNAL MEDICINE

## 2021-04-26 PROCEDURE — 85025 COMPLETE CBC W/AUTO DIFF WBC: CPT

## 2021-04-26 PROCEDURE — 85018 HEMOGLOBIN: CPT

## 2021-04-26 PROCEDURE — 6360000002 HC RX W HCPCS: Performed by: HOSPITALIST

## 2021-04-26 PROCEDURE — 80069 RENAL FUNCTION PANEL: CPT

## 2021-04-26 RX ORDER — SODIUM CHLORIDE, SODIUM LACTATE, POTASSIUM CHLORIDE, CALCIUM CHLORIDE 600; 310; 30; 20 MG/100ML; MG/100ML; MG/100ML; MG/100ML
INJECTION, SOLUTION INTRAVENOUS CONTINUOUS
Status: DISCONTINUED | OUTPATIENT
Start: 2021-04-26 | End: 2021-04-27

## 2021-04-26 RX ORDER — PROMETHAZINE HYDROCHLORIDE 25 MG/ML
6.25 INJECTION, SOLUTION INTRAMUSCULAR; INTRAVENOUS EVERY 6 HOURS PRN
Status: DISCONTINUED | OUTPATIENT
Start: 2021-04-26 | End: 2021-04-30 | Stop reason: HOSPADM

## 2021-04-26 RX ORDER — OXYCODONE HYDROCHLORIDE AND ACETAMINOPHEN 5; 325 MG/1; MG/1
1 TABLET ORAL EVERY 4 HOURS PRN
Status: DISCONTINUED | OUTPATIENT
Start: 2021-04-26 | End: 2021-04-30 | Stop reason: HOSPADM

## 2021-04-26 RX ORDER — POTASSIUM CHLORIDE 750 MG/1
40 TABLET, EXTENDED RELEASE ORAL ONCE
Status: COMPLETED | OUTPATIENT
Start: 2021-04-26 | End: 2021-04-26

## 2021-04-26 RX ADMIN — AMLODIPINE BESYLATE 5 MG: 5 TABLET ORAL at 20:34

## 2021-04-26 RX ADMIN — AMLODIPINE BESYLATE 5 MG: 5 TABLET ORAL at 08:11

## 2021-04-26 RX ADMIN — HYDROMORPHONE HYDROCHLORIDE 0.5 MG: 1 INJECTION, SOLUTION INTRAMUSCULAR; INTRAVENOUS; SUBCUTANEOUS at 14:27

## 2021-04-26 RX ADMIN — OXYCODONE HYDROCHLORIDE AND ACETAMINOPHEN 1 TABLET: 5; 325 TABLET ORAL at 20:34

## 2021-04-26 RX ADMIN — AMPICILLIN SODIUM 500 MG: 500 INJECTION, POWDER, FOR SOLUTION INTRAMUSCULAR; INTRAVENOUS at 00:13

## 2021-04-26 RX ADMIN — MUPIROCIN: 20 OINTMENT TOPICAL at 08:12

## 2021-04-26 RX ADMIN — TAMSULOSIN HYDROCHLORIDE 0.4 MG: 0.4 CAPSULE ORAL at 08:12

## 2021-04-26 RX ADMIN — PROMETHAZINE HYDROCHLORIDE 6.25 MG: 25 INJECTION INTRAMUSCULAR; INTRAVENOUS at 18:48

## 2021-04-26 RX ADMIN — HYDROMORPHONE HYDROCHLORIDE 0.5 MG: 1 INJECTION, SOLUTION INTRAMUSCULAR; INTRAVENOUS; SUBCUTANEOUS at 05:17

## 2021-04-26 RX ADMIN — AMPICILLIN SODIUM 500 MG: 500 INJECTION, POWDER, FOR SOLUTION INTRAMUSCULAR; INTRAVENOUS at 05:14

## 2021-04-26 RX ADMIN — SODIUM CHLORIDE, PRESERVATIVE FREE 10 ML: 5 INJECTION INTRAVENOUS at 20:35

## 2021-04-26 RX ADMIN — POTASSIUM CHLORIDE 40 MEQ: 750 TABLET, EXTENDED RELEASE ORAL at 09:46

## 2021-04-26 RX ADMIN — AMPICILLIN SODIUM 500 MG: 500 INJECTION, POWDER, FOR SOLUTION INTRAMUSCULAR; INTRAVENOUS at 11:57

## 2021-04-26 RX ADMIN — AMPICILLIN SODIUM 500 MG: 500 INJECTION, POWDER, FOR SOLUTION INTRAMUSCULAR; INTRAVENOUS at 18:48

## 2021-04-26 RX ADMIN — SODIUM CHLORIDE, POTASSIUM CHLORIDE, SODIUM LACTATE AND CALCIUM CHLORIDE: 600; 310; 30; 20 INJECTION, SOLUTION INTRAVENOUS at 09:02

## 2021-04-26 ASSESSMENT — PAIN DESCRIPTION - PROGRESSION: CLINICAL_PROGRESSION: GRADUALLY WORSENING

## 2021-04-26 ASSESSMENT — PAIN DESCRIPTION - ONSET
ONSET: ON-GOING

## 2021-04-26 ASSESSMENT — PAIN DESCRIPTION - LOCATION
LOCATION: FLANK

## 2021-04-26 ASSESSMENT — PAIN - FUNCTIONAL ASSESSMENT
PAIN_FUNCTIONAL_ASSESSMENT: PREVENTS OR INTERFERES SOME ACTIVE ACTIVITIES AND ADLS

## 2021-04-26 ASSESSMENT — PAIN DESCRIPTION - PAIN TYPE
TYPE: ACUTE PAIN

## 2021-04-26 ASSESSMENT — PAIN SCALES - GENERAL
PAINLEVEL_OUTOF10: 7
PAINLEVEL_OUTOF10: 0
PAINLEVEL_OUTOF10: 7
PAINLEVEL_OUTOF10: 4
PAINLEVEL_OUTOF10: 0
PAINLEVEL_OUTOF10: 7
PAINLEVEL_OUTOF10: 7

## 2021-04-26 ASSESSMENT — PAIN DESCRIPTION - FREQUENCY
FREQUENCY: CONTINUOUS
FREQUENCY: CONTINUOUS

## 2021-04-26 ASSESSMENT — PAIN DESCRIPTION - ORIENTATION
ORIENTATION: LEFT;POSTERIOR
ORIENTATION: POSTERIOR
ORIENTATION: LEFT

## 2021-04-26 ASSESSMENT — PAIN DESCRIPTION - DIRECTION: RADIATING_TOWARDS: ABDOMEN

## 2021-04-26 ASSESSMENT — PAIN DESCRIPTION - DESCRIPTORS: DESCRIPTORS: ACHING

## 2021-04-26 NOTE — CARE COORDINATION
INTERDISCIPLINARY PLAN OF CARE CONFERENCE    Date/Time: 4/26/2021 9:02 AM  Completed by: Tapan Toro, Case Management      Patient Name:  Eli Meza  YOB: 1965  Admitting Diagnosis: Ureterolithiasis [N20.1]  Obstructive uropathy [N13.9]     Admit Date/Time:  4/22/2021  5:14 PM    Chart reviewed. Interdisciplinary team contacted or reviewed plan related to patient progress and discharge plans. Disciplines included Case Management, Nursing, and Dietitian. Current Status:ICU LOC,inpt  PT/OT recommendation for discharge plan of care: tbd    Expected D/C Disposition:  Home  Confirmed plan with patient and/or family Yes confirmed with: (name) pt    Discharge Plan Comments: Reviewed chart. Plan for pt to transfer out of the ICU today per . pt from home alone and plan return. Following for possible hhc/dme needs. Pt currently on 3 liters O2.     Home O2 in place on admit: No

## 2021-04-26 NOTE — PROGRESS NOTES
Blood pressure 138/86, pulse 92, temperature 98.3 °F (36.8 °C), temperature source Oral, resp. rate 14, height 5' 7\" (1.702 m), weight 190 lb 14.4 oz (86.6 kg), last menstrual period 12/01/2014, SpO2 96 %, not currently breastfeeding. Patient continues on 3L O2 via NC. Oxygen saturations staying  > 97%. Patient wanted to take oxygen off but dropped to 89% within five minutes, so oxygen was reapplied. Lung sounds clear. No coughing noted. Abdomen soft, with some tenderness. BS + x 4 quads. Nephrostomy tube flushed with no problems. Draining yellow clear fluid. Some swelling noted to patient bilateral extremities and face. Sodium Bicarb continues to infuse at  100 ml/H. Murguia catheter intact with a lot of sediment noted. Will continue to monitor throughout the nights.      Electronically signed by Carlee Jernigan RN on 4/25/2021 at 9:10 PM

## 2021-04-26 NOTE — PROGRESS NOTES
Hospitalist Progress Note      PCP: Steve Grier MD    Date of Admission: 4/22/2021    Hospital Course: pt seen , 55-y admitted with 1.5 cm left ureteropelvic junction stone status post percutaneous nephrostomy   Postprocedure patient has developed retroperitoneal hematoma and hemorrhagic shock patient is transferred to ICU  received 3 units of PRBC  And underwent an IR embolization    Subjective:     Ms Carmona reports she is doing better. Good UOP from nephrostomy tube  No more drop in h.h   Reports pain issues and nausea      Medications:  Reviewed    Infusion Medications    sodium chloride      IV infusion builder 100 mL/hr at 04/26/21 0011    sodium chloride      sodium chloride      sodium chloride      sodium chloride       Scheduled Medications    mupirocin   Nasal BID    ampicillin IVPB 500 mg  500 mg Intravenous 4 times per day    amLODIPine  5 mg Oral BID    sodium chloride flush  5-40 mL Intravenous 2 times per day    tamsulosin  0.4 mg Oral Daily     PRN Meds: hydrALAZINE, traMADol, sodium chloride, sodium chloride, sodium chloride, sodium chloride, hydrOXYzine, sodium chloride flush, sodium chloride, promethazine **OR** ondansetron, acetaminophen **OR** acetaminophen, polyethylene glycol, HYDROmorphone      Intake/Output Summary (Last 24 hours) at 4/26/2021 3624  Last data filed at 4/26/2021 0523  Gross per 24 hour   Intake 3215 ml   Output 3195 ml   Net 20 ml       Physical Exam Performed:    BP (!) 140/81   Pulse 95   Temp 98 °F (36.7 °C) (Oral)   Resp 9   Ht 5' 7\" (1.702 m)   Wt 190 lb 14.4 oz (86.6 kg)   LMP 12/01/2014   SpO2 96%   BMI 29.90 kg/m²         General: middle aged female up in bed   Awake, alert and oriented.  Appears to be not in any distress  Mucous Membranes:  Pink , anicteric  Neck: No JVD, no carotid bruit, no thyromegaly  Chest:  Clear to auscultation bilaterally, no added sounds  Cardiovascular:  RRR S1S2 heard, no murmurs or gallops  Abdomen:  Soft, undistended, non tender, no organomegaly, BS present  Extremities: left nephrostomy tube in left flank with clear urine   No edema or cyanosis. Distal pulses well felt  Neurological : grossly normal        Labs:   Recent Labs     04/24/21  0709 04/24/21  0709 04/25/21  1515 04/25/21  2114 04/26/21  0341   WBC 17.0*  --   --   --  10.4   HGB 9.5*   < > 7.9* 7.7* 8.0*   HCT 30.2*   < > 23.5* 23.1* 23.8*   *  --   --   --  122*    < > = values in this interval not displayed. Recent Labs     04/24/21  0709 04/24/21  1307 04/25/21  0341 04/26/21  0340    137 136 136   K 4.3 4.4 3.7 3.3*    109 105 102   CO2 15* 15* 21 24   BUN 37* 37* 32* 27*   CREATININE 2.6* 2.5* 2.1* 1.6*   CALCIUM 7.1* 7.2* 7.5* 7.9*   PHOS 5.2*  --  3.9 2.9     No results for input(s): AST, ALT, BILIDIR, BILITOT, ALKPHOS in the last 72 hours. Recent Labs     04/23/21  0923   INR 1.19*         Assessment/Plan:    Active Hospital Problems    Diagnosis    Hemorrhagic shock ([facility] [address] [R57.8]    Acute blood loss anemia [D62]    Obstructive uropathy [N13.9]    Hydronephrosis [N13.30]    Ureterolithiasis [N20.1]    Acute UTI [N39.0]    MARY (acute kidney injury) ([address] [N17.9]     Left ureteral stone with hydronephrosis     status post percutaneous nephrostomy tube placement by IR , however complicated with retroperitoneal hemorrhage s.,p angio embolization by IR on 4/24  Control pain with IV narcotics  Urology consulted and plans for lithotripsy soon     Acute hemorrhagic shock due to retroperitoneal hematoma   status post embolization  Hemoglobin stable after 3 units PRBC , hb at 8 today    continue to monitor H&H  Bed rest    UTi - enterococcus - ampicillin sensitive , now on IV  ampicillin    Acute renal failure with metabolic acidosis  - pt has one kidney   - creatinine at 1.5 on adm, peaked to 2.8 and improving now with IVF   nephrology has been consulted  - avoid nephrotoxic agents    Hypertension - prn  hydralazine ordered. And amlodipine if tolerated        DVT Prophylaxis: [facility]   Diet: DIET RENAL;  Code Status: Full Code    Start PT  Ok for floor     Racquel Joel MD

## 2021-04-26 NOTE — PROGRESS NOTES
Patient:  Stephanie Rodgers  YOB: 1965   Date of Service:  04/26/21      Urology Attending Daily Progress Note    Chief complaint: \"sore\"    Interval HPI:  Hgb drifted to 7.9 today but vital signs are stable, actually running hypertensive somewhat  CR downtrending, now 1.6. GFR rising. UOP overnight great, clear yellow urine in nephrostomy tube  No definitive pain at this time, tired       Objective:    Patient Vitals for the past 8 hrs:   BP Temp Temp src Pulse Resp SpO2   04/26/21 0800 (!) 150/89 -- -- 97 17 95 %   04/26/21 0751 -- 98.6 °F (37 °C) Oral -- -- --   04/26/21 0700 (!) 140/82 -- -- 94 14 95 %   04/26/21 0600 (!) 140/81 -- -- 95 9 96 %   04/26/21 0500 (!) 145/92 98 °F (36.7 °C) Oral 97 18 97 %   04/26/21 0400 (!) 143/86 -- -- 100 20 93 %   04/26/21 0300 139/84 -- -- 103 10 96 %   04/26/21 0200 (!) 142/87 -- -- 99 17 97 %   04/26/21 0100 (!) 145/87 -- -- 100 17 96 %     I/O last 3 completed shifts: In: 9618 [P.O.:620; I.V.:2595]  Out: 7716 [Urine:3195]     Physical Exam:   Constitutional: comfortable in hospital bed, no acute distress  Abdomen: left sided pain in flank  Genitourinary: urethal cortez draining scant clear urine, NT with light pink urine with scant clots  Psych: normal mood and affect, appropriately answers questions   Skin, extremities: stable, exposed skin intact, no digital cyanosis                No results found for: PSALab Results   Component Value Date    CREATININE 1.6 (H) 04/26/2021    CREATININE 2.1 (H) 04/25/2021    CREATININE 2.5 (H) 04/24/2021                                 BILIRUBINUR Negative 04/22/2021      Component Value Date    WBC 10.4 04/26/2021    HGB 8.0 (L) 04/26/2021    HCT 23.8 (L) 04/26/2021    MCV 80.0 04/26/2021     (L) 04/26/2021       Radiology:  \"Imaging was independently reviewed by myself and I agree with the radiology interpretation    Assessment:  Stephanie Rodgers is a 48 y.o. female with functionally solitary left kidney - large stone

## 2021-04-26 NOTE — PLAN OF CARE
Problem: Pain:  Goal: Pain level will decrease  Description: Pain level will decrease  Outcome: Ongoing     Problem: Infection:  Goal: Will remain free from infection  Description: Will remain free from infection  Outcome: Ongoing     Problem: Safety:  Goal: Free from accidental physical injury  Description: Free from accidental physical injury  Outcome: Ongoing     Problem: Daily Care:  Goal: Daily care needs are met  Description: Daily care needs are met  Outcome: Ongoing

## 2021-04-26 NOTE — PROGRESS NOTES
Pulmonary & Critical Care Medicine ICU Progress Note    CC: Postprocedural retroperitoneal hematoma    Events of Last 24 hours:   Nausea  Pain in the left flank    Invasive Lines:   IV Line: Peripheral    MV:       / / /FiO2 : 21 %  No results for input(s): PHART, DJU6TZE, PO2ART in the last 72 hours. IV:   sodium chloride      IV infusion builder 100 mL/hr at 04/26/21 0011    sodium chloride      sodium chloride      sodium chloride      sodium chloride         EXAM:  BP (!) 140/81   Pulse 95   Temp 98 °F (36.7 °C) (Oral)   Resp 9   Ht 5' 7\" (1.702 m)   Wt 190 lb 14.4 oz (86.6 kg)   LMP 12/01/2014   SpO2 96%   BMI 29.90 kg/m²  on RA  Tmax: 99 F      Intake/Output Summary (Last 24 hours) at 4/26/2021 0616  Last data filed at 4/26/2021 0523  Gross per 24 hour   Intake 3215 ml   Output 3195 ml   Net 20 ml     General: ill appearing. Is very reluctant to move her body position. Eyes: PERRL. No sclera icterus. No conjunctival injection. ENT: No discharge. Pharynx clear. Neck: Trachea midline. Normal thyroid. Resp: No accessory muscle use. No crackles. No wheezing. No rhonchi. No dullness on percussion. CV: Regular rate. Regular rhythm. No mumur or rub. No edema. Peripheral pulses are 2+. Capillary refill is less than 3 seconds. GI: Non-tender. Non-distended. No masses. No organomegaly. Normal bowel sounds. No hernia. Skin: Warm and dry. No nodule on exposed extremities. No rash on exposed extremities. Lymph: No cervical LAD. No supraclavicular LAD. M/S: No cyanosis. No joint deformity. No clubbing. Neuro: Alert and oriented x3. Patellar reflexes are symmetric. Psych: No agitation, no anxiety, affect is full.     Medications:   ampicillin IVPB 500 mg  500 mg Intravenous 4 times per day    amLODIPine  5 mg Oral BID    sodium chloride flush  5-40 mL Intravenous 2 times per day    tamsulosin  0.4 mg Oral Daily     PRN Meds:  hydrALAZINE, traMADol, sodium chloride, sodium chloride, sodium chloride, sodium chloride, hydrOXYzine, sodium chloride flush, sodium chloride, promethazine **OR** ondansetron, acetaminophen **OR** acetaminophen, polyethylene glycol, HYDROmorphone    Results:  CBC:   Recent Labs     04/24/21  0709 04/24/21  0709 04/25/21  1515 04/25/21  2114 04/26/21  0341   WBC 17.0*  --   --   --   --    HGB 9.5*   < > 7.9* 7.7* 8.0*   HCT 30.2*   < > 23.5* 23.1* 23.8*   MCV 83.0  --   --   --   --    *  --   --   --   --     < > = values in this interval not displayed. BMP:   Recent Labs     04/24/21  0709 04/24/21  1307 04/25/21  0341 04/26/21  0340    137 136 136   K 4.3 4.4 3.7 3.3*    109 105 102   CO2 15* 15* 21 24   PHOS 5.2*  --  3.9 2.9   BUN 37* 37* 32* 27*   CREATININE 2.6* 2.5* 2.1* 1.6*     LIVER PROFILE:   No results for input(s): AST, ALT, LIPASE, BILIDIR, BILITOT, ALKPHOS in the last 72 hours. Invalid input(s): AMYLASE,  ALB  PT/INR:   Recent Labs     04/23/21  0923   PROTIME 13.8*   INR 1.19*     APTT: No results for input(s): APTT in the last 72 hours. UA:  No results for input(s): NITRITE, COLORU, PHUR, LABCAST, WBCUA, RBCUA, MUCUS, TRICHOMONAS, YEAST, BACTERIA, CLARITYU, SPECGRAV, LEUKOCYTESUR, UROBILINOGEN, BILIRUBINUR, BLOODU, GLUCOSEU, AMORPHOUS in the last 72 hours. Invalid input(s): Mary Carrera    Cultures:  4/22/2021 SARS-CoV-2 NAAT negative   4/22/2021: enterococcus- amp sens  4/23/2021 urine/drain Enterococcus and Staph epi    Films:  CTA abd 4/23: There is bibasilar atelectasis.   There is now significant perinephric hemorrhage on the left after placement  of a left-sided nephrostomy tube.  Small amount of gas is seen in the  nephrostomy tube tract.  The tube appears to be in good position.  Multiple  left-sided calculi are now all present within the renal pelvis.  There is no  hydronephrosis.  Active arterial extravasation is seen from the mid to lower  3rd of the left kidney laterally.  The left kidney is compressed with  slightly heterogeneous perfusion.  Hemorrhage extends inferiorly into the  upper left pelvis.  There is also perinephric hemorrhage surrounding the  aorta and extending into the right renal fossa.  Ascites has also developed.     ASSESSMENT:  · Perinephric hematoma after IR nephrostomy tube placement- arterial injury s/p IR embolization 4/24/21  · Acute kidney injury  · Acute blood loss anemia  · Functionally solitary left kidney with large left nephrolithiasis  · UTI  · Thrombocytopenia  · Hep C     PLAN:  · IVF to LR  · Antibiotic day #4, ampicillin day #2    · Urology and nephrology following  · S/P 3 U PRBCs   · Holding BP meds  · H and H q 12  · IV dilaudid or PO percocet PRN pain   · ICU care: SCD and bactroban   Okay with me to leave the ICU. I will not plan to follow once transferred out of ICU. Please call with any questions or concerns: [phone] (personal cell) or [phone] (service).

## 2021-04-26 NOTE — PROGRESS NOTES
[facility]       Progress Note           Subjective/   48y.o. year old female who we are seeing in consultation for MARY. HPI:  Renal function better with IVF's, urine output of 3.2 liters over last 24 hours. Intake better. ROS:  +left flank pain, +weak. Objective/   GEN:  Chronically ill, BP (!) 140/88   Pulse 105   Temp 98.6 °F (37 °C) (Oral)   Resp 11   Ht 5' 7\" (1.702 m)   Wt 190 lb 14.4 oz (86.6 kg)   LMP 12/01/2014   SpO2 94%   BMI 29.90 kg/m²   HEENT: non-icteric, no JVD  CV: S1, S2 without m/r/g; no LE edema  RESP: CTA B without w/r/r; breathing wnl  ABD: +bs, soft, nt, no hsm  SKIN: warm, no rashes    Data/  Recent Labs     04/24/21  0709 04/24/21  0709 04/25/21  1515 04/25/21  2114 04/26/21  0341   WBC 17.0*  --   --   --  10.4   HGB 9.5*   < > 7.9* 7.7* 8.0*   HCT 30.2*   < > 23.5* 23.1* 23.8*   MCV 83.0  --   --   --  80.0   *  --   --   --  122*    < > = values in this interval not displayed. Recent Labs     04/24/21  0709 04/24/21  1307 04/25/21  0341 04/26/21  0340    137 136 136   K 4.3 4.4 3.7 3.3*    109 105 102   CO2 15* 15* 21 24   GLUCOSE 117* 103* 111* 108*   PHOS 5.2*  --  3.9 2.9   BUN 37* 37* 32* 27*   CREATININE 2.6* 2.5* 2.1* 1.6*   LABGLOM 19* 20* 24* 33*   GFRAA 23* 24* 30* 41*       Assessment/     -MARY in the setting of hemorrhagic shock, seems to have stabilized with packed red blood cells and volume resuscitation. She also had left hydronephrosis and stent was placed on 4/23.   She has received IV contrast with CTA and during coil embolization     -Left perinephric hematoma after percutaneous nephrostomy     -Nephrolithiasis, left UVJ, Urology following     -Hemorrhagic shock, improved                 -Metabolic acidosis    Plan/     - Continue LR 75 ml/hour  - Amlodipine for BP control  - Trend labs, bp's, urine output

## 2021-04-27 LAB
ALBUMIN SERPL-MCNC: 2.2 G/DL (ref 3.4–5)
ANION GAP SERPL CALCULATED.3IONS-SCNC: 9 MMOL/L (ref 3–16)
APTT: 26.4 SEC (ref 24.2–36.2)
BASOPHILS ABSOLUTE: 0 K/UL (ref 0–0.2)
BASOPHILS RELATIVE PERCENT: 0.2 %
BLOOD BANK DISPENSE STATUS: NORMAL
BLOOD BANK PRODUCT CODE: NORMAL
BPU ID: NORMAL
BUN BLDV-MCNC: 21 MG/DL (ref 7–20)
CALCIUM SERPL-MCNC: 7.8 MG/DL (ref 8.3–10.6)
CHLORIDE BLD-SCNC: 101 MMOL/L (ref 99–110)
CHOLESTEROL, TOTAL: 106 MG/DL (ref 0–199)
CO2: 25 MMOL/L (ref 21–32)
CREAT SERPL-MCNC: 1.2 MG/DL (ref 0.6–1.1)
DESCRIPTION BLOOD BANK: NORMAL
EKG ATRIAL RATE: 126 BPM
EKG ATRIAL RATE: 95 BPM
EKG DIAGNOSIS: NORMAL
EKG DIAGNOSIS: NORMAL
EKG P AXIS: 13 DEGREES
EKG P-R INTERVAL: 88 MS
EKG Q-T INTERVAL: 234 MS
EKG Q-T INTERVAL: 412 MS
EKG QRS DURATION: 82 MS
EKG QRS DURATION: 88 MS
EKG QTC CALCULATION (BAZETT): 418 MS
EKG QTC CALCULATION (BAZETT): 517 MS
EKG R AXIS: 33 DEGREES
EKG R AXIS: 41 DEGREES
EKG T AXIS: -45 DEGREES
EKG T AXIS: 234 DEGREES
EKG VENTRICULAR RATE: 192 BPM
EKG VENTRICULAR RATE: 95 BPM
EOSINOPHILS ABSOLUTE: 0 K/UL (ref 0–0.6)
EOSINOPHILS RELATIVE PERCENT: 0.4 %
GFR AFRICAN AMERICAN: 56
GFR NON-AFRICAN AMERICAN: 47
GLUCOSE BLD-MCNC: 80 MG/DL (ref 70–99)
HCG QUALITATIVE: NEGATIVE
HCT VFR BLD CALC: 24.5 % (ref 36–48)
HCT VFR BLD CALC: 24.8 % (ref 36–48)
HCT VFR BLD CALC: 25.2 % (ref 36–48)
HDLC SERPL-MCNC: 31 MG/DL (ref 40–60)
HEMOGLOBIN: 7.9 G/DL (ref 12–16)
HEMOGLOBIN: 8.1 G/DL (ref 12–16)
HEMOGLOBIN: 8.2 G/DL (ref 12–16)
LDL CHOLESTEROL CALCULATED: 57 MG/DL
LYMPHOCYTES ABSOLUTE: 1 K/UL (ref 1–5.1)
LYMPHOCYTES RELATIVE PERCENT: 12.1 %
MAGNESIUM: 1.7 MG/DL (ref 1.8–2.4)
MCH RBC QN AUTO: 25.9 PG (ref 26–34)
MCH RBC QN AUTO: 26.3 PG (ref 26–34)
MCHC RBC AUTO-ENTMCNC: 32.2 G/DL (ref 31–36)
MCHC RBC AUTO-ENTMCNC: 32.7 G/DL (ref 31–36)
MCV RBC AUTO: 80.3 FL (ref 80–100)
MCV RBC AUTO: 80.5 FL (ref 80–100)
MONOCYTES ABSOLUTE: 1.4 K/UL (ref 0–1.3)
MONOCYTES RELATIVE PERCENT: 17 %
NEUTROPHILS ABSOLUTE: 5.8 K/UL (ref 1.7–7.7)
NEUTROPHILS RELATIVE PERCENT: 70.3 %
PDW BLD-RTO: 18.3 % (ref 12.4–15.4)
PDW BLD-RTO: 18.5 % (ref 12.4–15.4)
PHOSPHORUS: 1.8 MG/DL (ref 2.5–4.9)
PLATELET # BLD: 129 K/UL (ref 135–450)
PLATELET # BLD: 131 K/UL (ref 135–450)
PMV BLD AUTO: 9.3 FL (ref 5–10.5)
PMV BLD AUTO: 9.4 FL (ref 5–10.5)
POTASSIUM SERPL-SCNC: 3.3 MMOL/L (ref 3.5–5.1)
POTASSIUM SERPL-SCNC: 3.6 MMOL/L (ref 3.5–5.1)
RBC # BLD: 3.05 M/UL (ref 4–5.2)
RBC # BLD: 3.14 M/UL (ref 4–5.2)
SODIUM BLD-SCNC: 135 MMOL/L (ref 136–145)
TRIGL SERPL-MCNC: 92 MG/DL (ref 0–150)
TROPONIN: 0.56 NG/ML
TROPONIN: 0.59 NG/ML
TROPONIN: 0.59 NG/ML
TSH SERPL DL<=0.05 MIU/L-ACNC: 10.74 UIU/ML (ref 0.27–4.2)
VLDLC SERPL CALC-MCNC: 18 MG/DL
WBC # BLD: 8.2 K/UL (ref 4–11)
WBC # BLD: 8.6 K/UL (ref 4–11)

## 2021-04-27 PROCEDURE — 84132 ASSAY OF SERUM POTASSIUM: CPT

## 2021-04-27 PROCEDURE — 85014 HEMATOCRIT: CPT

## 2021-04-27 PROCEDURE — 2580000003 HC RX 258: Performed by: INTERNAL MEDICINE

## 2021-04-27 PROCEDURE — 2700000000 HC OXYGEN THERAPY PER DAY

## 2021-04-27 PROCEDURE — 94761 N-INVAS EAR/PLS OXIMETRY MLT: CPT

## 2021-04-27 PROCEDURE — 80069 RENAL FUNCTION PANEL: CPT

## 2021-04-27 PROCEDURE — 83735 ASSAY OF MAGNESIUM: CPT

## 2021-04-27 PROCEDURE — 2500000003 HC RX 250 WO HCPCS: Performed by: INTERNAL MEDICINE

## 2021-04-27 PROCEDURE — 84484 ASSAY OF TROPONIN QUANT: CPT

## 2021-04-27 PROCEDURE — 36415 COLL VENOUS BLD VENIPUNCTURE: CPT

## 2021-04-27 PROCEDURE — 97535 SELF CARE MNGMENT TRAINING: CPT

## 2021-04-27 PROCEDURE — 85730 THROMBOPLASTIN TIME PARTIAL: CPT

## 2021-04-27 PROCEDURE — 99254 IP/OBS CNSLTJ NEW/EST MOD 60: CPT | Performed by: INTERNAL MEDICINE

## 2021-04-27 PROCEDURE — 84443 ASSAY THYROID STIM HORMONE: CPT

## 2021-04-27 PROCEDURE — 6370000000 HC RX 637 (ALT 250 FOR IP): Performed by: INTERNAL MEDICINE

## 2021-04-27 PROCEDURE — 80061 LIPID PANEL: CPT

## 2021-04-27 PROCEDURE — 85027 COMPLETE CBC AUTOMATED: CPT

## 2021-04-27 PROCEDURE — 2060000000 HC ICU INTERMEDIATE R&B

## 2021-04-27 PROCEDURE — 97530 THERAPEUTIC ACTIVITIES: CPT

## 2021-04-27 PROCEDURE — 6360000002 HC RX W HCPCS: Performed by: INTERNAL MEDICINE

## 2021-04-27 PROCEDURE — 93005 ELECTROCARDIOGRAM TRACING: CPT | Performed by: INTERNAL MEDICINE

## 2021-04-27 PROCEDURE — 85025 COMPLETE CBC W/AUTO DIFF WBC: CPT

## 2021-04-27 PROCEDURE — 84703 CHORIONIC GONADOTROPIN ASSAY: CPT

## 2021-04-27 PROCEDURE — 6360000002 HC RX W HCPCS: Performed by: HOSPITALIST

## 2021-04-27 PROCEDURE — 2580000003 HC RX 258: Performed by: HOSPITALIST

## 2021-04-27 PROCEDURE — 93010 ELECTROCARDIOGRAM REPORT: CPT | Performed by: INTERNAL MEDICINE

## 2021-04-27 PROCEDURE — 97166 OT EVAL MOD COMPLEX 45 MIN: CPT

## 2021-04-27 PROCEDURE — 99233 SBSQ HOSP IP/OBS HIGH 50: CPT | Performed by: INTERNAL MEDICINE

## 2021-04-27 PROCEDURE — 97162 PT EVAL MOD COMPLEX 30 MIN: CPT

## 2021-04-27 PROCEDURE — 85018 HEMOGLOBIN: CPT

## 2021-04-27 RX ORDER — POTASSIUM CHLORIDE 20 MEQ/1
40 TABLET, EXTENDED RELEASE ORAL PRN
Status: DISCONTINUED | OUTPATIENT
Start: 2021-04-27 | End: 2021-04-30 | Stop reason: HOSPADM

## 2021-04-27 RX ORDER — MAGNESIUM SULFATE 1 G/100ML
1000 INJECTION INTRAVENOUS PRN
Status: DISCONTINUED | OUTPATIENT
Start: 2021-04-27 | End: 2021-04-30 | Stop reason: HOSPADM

## 2021-04-27 RX ORDER — ATORVASTATIN CALCIUM 40 MG/1
40 TABLET, FILM COATED ORAL NIGHTLY
Status: DISCONTINUED | OUTPATIENT
Start: 2021-04-27 | End: 2021-04-30 | Stop reason: HOSPADM

## 2021-04-27 RX ORDER — HEPARIN SODIUM 1000 [USP'U]/ML
2000 INJECTION, SOLUTION INTRAVENOUS; SUBCUTANEOUS PRN
Status: DISCONTINUED | OUTPATIENT
Start: 2021-04-27 | End: 2021-04-28

## 2021-04-27 RX ORDER — ASPIRIN 81 MG/1
81 TABLET, CHEWABLE ORAL DAILY
Status: DISCONTINUED | OUTPATIENT
Start: 2021-04-27 | End: 2021-04-30 | Stop reason: HOSPADM

## 2021-04-27 RX ORDER — POTASSIUM CHLORIDE 7.45 MG/ML
10 INJECTION INTRAVENOUS PRN
Status: DISCONTINUED | OUTPATIENT
Start: 2021-04-27 | End: 2021-04-30 | Stop reason: HOSPADM

## 2021-04-27 RX ORDER — HEPARIN SODIUM 1000 [USP'U]/ML
4000 INJECTION, SOLUTION INTRAVENOUS; SUBCUTANEOUS PRN
Status: DISCONTINUED | OUTPATIENT
Start: 2021-04-27 | End: 2021-04-28

## 2021-04-27 RX ORDER — DILTIAZEM HYDROCHLORIDE 5 MG/ML
10 INJECTION INTRAVENOUS ONCE
Status: COMPLETED | OUTPATIENT
Start: 2021-04-27 | End: 2021-04-27

## 2021-04-27 RX ORDER — HEPARIN SODIUM 10000 [USP'U]/100ML
9.5 INJECTION, SOLUTION INTRAVENOUS CONTINUOUS
Status: DISCONTINUED | OUTPATIENT
Start: 2021-04-27 | End: 2021-04-28

## 2021-04-27 RX ORDER — HEPARIN SODIUM 10000 [USP'U]/100ML
8.5 INJECTION, SOLUTION INTRAVENOUS CONTINUOUS
Status: DISCONTINUED | OUTPATIENT
Start: 2021-04-27 | End: 2021-04-27

## 2021-04-27 RX ADMIN — ATORVASTATIN CALCIUM 40 MG: 40 TABLET, FILM COATED ORAL at 21:15

## 2021-04-27 RX ADMIN — OXYCODONE HYDROCHLORIDE AND ACETAMINOPHEN 1 TABLET: 5; 325 TABLET ORAL at 21:15

## 2021-04-27 RX ADMIN — POTASSIUM CHLORIDE 40 MEQ: 1500 TABLET, EXTENDED RELEASE ORAL at 02:08

## 2021-04-27 RX ADMIN — SODIUM CHLORIDE, POTASSIUM CHLORIDE, SODIUM LACTATE AND CALCIUM CHLORIDE: 600; 310; 30; 20 INJECTION, SOLUTION INTRAVENOUS at 00:23

## 2021-04-27 RX ADMIN — ACETAMINOPHEN 650 MG: 325 TABLET ORAL at 18:26

## 2021-04-27 RX ADMIN — AMPICILLIN SODIUM 500 MG: 500 INJECTION, POWDER, FOR SOLUTION INTRAMUSCULAR; INTRAVENOUS at 06:45

## 2021-04-27 RX ADMIN — OXYCODONE HYDROCHLORIDE AND ACETAMINOPHEN 1 TABLET: 5; 325 TABLET ORAL at 16:40

## 2021-04-27 RX ADMIN — METOPROLOL TARTRATE 25 MG: 25 TABLET, FILM COATED ORAL at 21:15

## 2021-04-27 RX ADMIN — AMPICILLIN SODIUM 500 MG: 500 INJECTION, POWDER, FOR SOLUTION INTRAMUSCULAR; INTRAVENOUS at 12:25

## 2021-04-27 RX ADMIN — DILTIAZEM HYDROCHLORIDE 5 MG/HR: 5 INJECTION, SOLUTION INTRAVENOUS at 02:02

## 2021-04-27 RX ADMIN — OXYCODONE HYDROCHLORIDE AND ACETAMINOPHEN 1 TABLET: 5; 325 TABLET ORAL at 10:33

## 2021-04-27 RX ADMIN — AMPICILLIN SODIUM 500 MG: 500 INJECTION, POWDER, FOR SOLUTION INTRAMUSCULAR; INTRAVENOUS at 18:26

## 2021-04-27 RX ADMIN — DILTIAZEM HYDROCHLORIDE 10 MG: 5 INJECTION INTRAVENOUS at 01:59

## 2021-04-27 RX ADMIN — HEPARIN SODIUM 8.5 ML/HR: 10000 INJECTION, SOLUTION INTRAVENOUS at 16:41

## 2021-04-27 RX ADMIN — POTASSIUM PHOSPHATE, MONOBASIC AND POTASSIUM PHOSPHATE, DIBASIC 20 MMOL: 224; 236 INJECTION, SOLUTION, CONCENTRATE INTRAVENOUS at 12:25

## 2021-04-27 RX ADMIN — TAMSULOSIN HYDROCHLORIDE 0.4 MG: 0.4 CAPSULE ORAL at 10:29

## 2021-04-27 RX ADMIN — SODIUM CHLORIDE, PRESERVATIVE FREE 10 ML: 5 INJECTION INTRAVENOUS at 21:19

## 2021-04-27 RX ADMIN — ASPIRIN 81 MG: 81 TABLET, CHEWABLE ORAL at 10:29

## 2021-04-27 RX ADMIN — AMPICILLIN SODIUM 500 MG: 500 INJECTION, POWDER, FOR SOLUTION INTRAMUSCULAR; INTRAVENOUS at 00:19

## 2021-04-27 RX ADMIN — OXYCODONE HYDROCHLORIDE AND ACETAMINOPHEN 1 TABLET: 5; 325 TABLET ORAL at 05:03

## 2021-04-27 RX ADMIN — DILTIAZEM HYDROCHLORIDE 2.5 MG/HR: 5 INJECTION, SOLUTION INTRAVENOUS at 07:25

## 2021-04-27 RX ADMIN — OXYCODONE HYDROCHLORIDE AND ACETAMINOPHEN 1 TABLET: 5; 325 TABLET ORAL at 00:28

## 2021-04-27 ASSESSMENT — PAIN DESCRIPTION - DESCRIPTORS
DESCRIPTORS: ACHING
DESCRIPTORS: PRESSURE
DESCRIPTORS: ACHING

## 2021-04-27 ASSESSMENT — PAIN DESCRIPTION - LOCATION
LOCATION: FLANK

## 2021-04-27 ASSESSMENT — PAIN DESCRIPTION - PROGRESSION
CLINICAL_PROGRESSION: GRADUALLY IMPROVING
CLINICAL_PROGRESSION: GRADUALLY WORSENING

## 2021-04-27 ASSESSMENT — PAIN DESCRIPTION - ONSET
ONSET: ON-GOING
ONSET: ON-GOING

## 2021-04-27 ASSESSMENT — PAIN - FUNCTIONAL ASSESSMENT
PAIN_FUNCTIONAL_ASSESSMENT: PREVENTS OR INTERFERES SOME ACTIVE ACTIVITIES AND ADLS
PAIN_FUNCTIONAL_ASSESSMENT: ACTIVITIES ARE NOT PREVENTED

## 2021-04-27 ASSESSMENT — PAIN DESCRIPTION - FREQUENCY
FREQUENCY: CONTINUOUS

## 2021-04-27 ASSESSMENT — PAIN DESCRIPTION - DIRECTION: RADIATING_TOWARDS: ABDOMEN

## 2021-04-27 ASSESSMENT — PAIN DESCRIPTION - PAIN TYPE
TYPE: ACUTE PAIN
TYPE: ACUTE PAIN

## 2021-04-27 ASSESSMENT — PAIN SCALES - GENERAL
PAINLEVEL_OUTOF10: 7
PAINLEVEL_OUTOF10: 7
PAINLEVEL_OUTOF10: 6
PAINLEVEL_OUTOF10: 7
PAINLEVEL_OUTOF10: 3
PAINLEVEL_OUTOF10: 6

## 2021-04-27 ASSESSMENT — PAIN DESCRIPTION - ORIENTATION
ORIENTATION: LEFT

## 2021-04-27 NOTE — CONSULTS
[id number]    Elevated trop - supply-demand imbalance, MARY.  Consider ischemic heart dz  PAF - now NSR  Tachycardia  Hemorrhagic shock, s/p angio embolization by IR on 4/24  Left perinephric hematoma after percutaneous nephrostomy  Anemia  MARY  HTN  Hep C  Smoking  Abnormal TSH    AC - heparin while awaiting test results, then change to oral   Echo  Melanie dong  ASA, statin BBlk

## 2021-04-27 NOTE — PROGRESS NOTES
Pt assessment complete; see flow sheets. Vitals Stable; see flow sheets. Meds given per MAR. Pt educated on reasoning behind wearing compression boots; pt refused at this time stating \" I will wear them tomorrow\". This RN has advised pt on putting a prevenative on her R Ear to prevent further skin breakdown. Pt compliant. Pt also educated on turning in the bed to prevent pressure ulcers; pt refusing at this time stating \"I am comfortable and will turn on my own\". Pt currently resting in bed with the call light within reach. Pt denies any other care needs at this time.     Sean Mason, RN

## 2021-04-27 NOTE — PROGRESS NOTES
Hep gtt started at this time at 8.5ml/hr per Dr. Norris Dang. Pt stable at this time.     Benjie Vences, RN

## 2021-04-27 NOTE — PROGRESS NOTES
EKG completed and brought to gerardo, pt denies chest pain, only discomfort while lying supine.  -new orders noted.  Krystina Waggoner Clinical

## 2021-04-27 NOTE — PROGRESS NOTES
[facility]       Progress Note           Subjective/   48y.o. year old female who we are seeing in consultation for MARY. HPI:  Renal function better with IVF's, urine output of 3 liters over last 24 hours. Intake reduced. Afib with RVR o/n requiring cardizem. ROS:  +left flank pain, +weak. Objective/   GEN:  Chronically ill, /83   Pulse 88   Temp 97 °F (36.1 °C) (Oral)   Resp 17   Ht 5' 7\" (1.702 m)   Wt 188 lb (85.3 kg)   LMP 12/01/2014   SpO2 95%   BMI 29.44 kg/m²   HEENT: non-icteric, no JVD  CV: S1, S2 without m/r/g; no LE edema  RESP: CTA B without w/r/r; breathing wnl  ABD: +bs, soft, nt, no hsm  SKIN: warm, no rashes    Data/  Recent Labs     04/26/21  0341 04/26/21  1518 04/27/21  0457   WBC 10.4  --  8.2   HGB 8.0* 7.9* 8.2*  8.1*   HCT 23.8* 24.4* 25.2*  24.8*   MCV 80.0  --  80.3   *  --  129*     Recent Labs     04/25/21  0341 04/26/21  0340 04/27/21  0116 04/27/21  0457    136  --  135*   K 3.7 3.3* 3.3* 3.6    102  --  101   CO2 21 24  --  25   GLUCOSE 111* 108*  --  80   PHOS 3.9 2.9  --  1.8*   MG  --   --  1.70*  --    BUN 32* 27*  --  21*   CREATININE 2.1* 1.6*  --  1.2*   LABGLOM 24* 33*  --  47*   GFRAA 30* 41*  --  56*       Assessment/     -MAYR in the setting of hemorrhagic shock, seems to have stabilized with packed red blood cells and volume resuscitation. She also had left hydronephrosis and stent was placed on 4/23.   She has received IV contrast with CTA and during coil embolization     -Left perinephric hematoma after percutaneous nephrostomy     -Nephrolithiasis, left UVJ, Urology following     -Hemorrhagic shock, improved                 -Metabolic acidosis    -Afb with RVR    -Troponin elevation    Plan/     - Attempt to monitor off of IVF's today - monitor with oral intake  - Prn K, Mg, Phos replacement  - Trend labs, bp's, urine output

## 2021-04-27 NOTE — PROGRESS NOTES
Michelle Moreira RN spoke with Dr. Robbie Carrera regarding consult  [phone] 4/27/21  Marya Alexander

## 2021-04-27 NOTE — PROGRESS NOTES
Pt went into a fib w/ RVR and rated up to almost 200. No CP. Started Cardizem. Has subsequently converted. Trop came back markedly elevated at 0.59. Pt has hx this admission of retroperitoneal hemorrhage so will not place on ASA or AC. Cards c/s.

## 2021-04-27 NOTE — FLOWSHEET NOTE
04/27/21 1230   Vital Signs   Temp 97 °F (36.1 °C)   Temp Source Oral   Pulse 123   Heart Rate Source Monitor   Resp 18   /88   BP Location Left Arm   Patient Position Sitting   Level of Consciousness Alert (0)   MEWS Score 3   Patient Currently in Pain Denies   Oxygen Therapy   SpO2 99 %   O2 Device Nasal cannula   O2 Flow Rate (L/min) 1 L/min   Pt vitals as shown above. Weaned pt down to 1 L oxygen since pt states that they don't wear any at home. Pt saturation at 99%. Will continue to monitor.     Franca Norwood RN

## 2021-04-27 NOTE — PROGRESS NOTES
Inpatient Physical Therapy Evaluation and Treatment    Unit: PCU  Date:  4/27/2021  Patient Name:    Miguel Casas  Admitting diagnosis:  Phi Gonzales [N20.1]  Obstructive uropathy [N13.9]  Admit Date:  4/22/2021  Precautions/Restrictions/WB Status/ Lines/ Wounds/ Oxygen: Fall risk, Bed/chair alarm, Lines -IV, Supplemental O2 (3L), Murguia catheter and L nephrostomy tube and Telemetry     Treatment Time:  11:25-12:00  Treatment Number:  1   Timed Code Treatment Minutes: 25 minutes  Total Treatment Minutes:  35  minutes    Patient Goals for Therapy: \" I don't want to leave too soon \"          Discharge Recommendations: Home PRN assist  and Home PT  DME needs for discharge: cont to assess       Therapy recommendation for EMS Transport: can transport by wheelchair    Therapy recommendations for staff:   Assist of 1 with use of HHA and IV pole for all transfers to/from [facility]  to/from [facility]    History of Present Illness: (Per H&P by Dr. Chloe Flores on 4/22/21)  48 y.o. female who presented to the hospital with a chief complaint of flank pain. The patient presented to the emergency department with intractable abdominal pain, nausea without vomiting. The pain is been constant and worse with movement. She denies any fevers or chills or sick contacts. She denies any diarrhea. The patient has a history of kidney stones, in the emergency department a CT scan of her abdomen revealed a 1.5 cm left uteropelvic junction stone. She will be admitted for urology evaluation. 4/23 - nephrostomy tube placed  4/24 - hypotensive with drop in hemoglobin (found to have retroperitoneal hematoma), Lt renal angiogram and coil emobilization, 3 units PRBCs, in ICU  4/26 - transfer to PCU  4/27 - A fib with RVR with rate up to 200 bpm, placed on cardizem drip    PMH: HTN, depression    Home Health S4 Level Recommendation:  Level 1 Standard  AM-PAC Mobility Score    AM-PAC Inpatient Mobility Raw Score : 17       Preadmission Environment    Pt. Lives Alone  Home environment:    apartment  2nd floor   Steps to enter first floor: No steps  Steps to second floor: 6-7 with two railings   Bathroom: tub/shower unit, grab bars and standard height commode  Equipment owned: none  Pt sleeps in a flat bed     Preadmission Status:  Pt. Able to drive: No friend takes pt to grocery, [facility] transportation to appts   Pt Fully independent with ADLs: Yes  Pt. Required assistance from family for: Cleaning, Cooking and [employer]- down 6-7 steps - uses basket to carry clothes   Pt. independent for transfers and gait and walked with No Device  History of falls No     Pain  Yes  Rating: mild  Location: left side   Pain Medicine Status: Received pain med prior to tx       Cognition    A&O x4   Able to follow 2 step commands     Subjective  Patient lying supine in bed with no family present. Pt agreeable to this PT eval & tx. Upper Extremity ROM/Strength  Please see OT evaluation. Lower Extremity ROM / Strength   AROM WFL: Yes    Strength Assessment (measured on a 0-5 scale):  R LE   Quad   4   Ant Tib  4+   Hamstring 4+   Iliopsoas WFL  L LE  Quad   4+   Ant Tib  4+   Hamstring 4+   Iliopsoas WFL    Lower Extremity Sensation    WNL    Lower Extremity Proprioception:   WNL    Coordination and Tone  WNL    Balance  Sitting:  Good ; Independent  Comments: for static and dynamic sitting    Standing: Fair +; CGA  Comments: forward flexed posture, mildly unsteady with unilateral UE support on IV pole and pt reaching for furniture for extra support    Bed Mobility   Supine to Sit:    SBA with HOB elevated  Sit to Supine:   Not Tested  Rolling:   Not Tested  Scooting in sitting: Independent  Scooting in supine:  Not Tested    Transfer Training     Sit to stand:   CGA  Stand to sit:   CGA  Bed to Chair:   CGA with use of gait belt and IV pole    Gait pt declined to ambulate; pt ambulated 0 ft.      Stair Training deferred, pt unsafe/ not appropriate to complete stairs at this time    Activity Tolerance   Pt completed therapy session with No adverse symptoms noted w/activity   Supine (at rest) SpO2: 93% on 3L  HR: 99 bpm  BP: 143/87    Positioning Needs   Pt up in chair, alarm set, positioned in proper neutral alignment and pressure relief provided. Call light provided and all needs within reach    Exercises Initiated  Darryl deferred secondary to treatment focus on functional mobility  NA    Other  None. Patient/Family Education   Pt educated on role of inpatient PT, POC, importance of continued activity, DC recommendations, safety awareness, transfer techniques, pacing activity and calling for assist with mobility. Assessment  Pt seen for Physical Therapy evaluation in acute care setting. Pt demonstrated decreased Activity tolerance, Balance and Safety as well as decreased independence with Ambulation, Bed Mobility  and Transfers. Pt required moderated encouragement to participate in therapy but felt better once up in the chair. Recommending Home PRN assist and with home PT upon discharge as patient functioning below baseline level and would benefit from continued therapy services    Goals : To be met in 3 visits:  1). Independent with LE Ex x 10 reps    To be met in 6 visits:  1). Supine to/from sit: Independent  2). Sit to/from stand: Independent  3). Bed to chair: Independent  4). Gait: Ambulate 50 ft.  with  Modified Independent and use of LRAD (least restrictive assistive device)  5). Tolerate B LE exercises 3 sets of 10-15 reps    Rehabilitation Potential: Good  Strengths for achieving goals include:   Pt motivated, PLOF and Pt cooperative   Barriers to achieving goals include:    Pain    Plan    To be seen 3-5 x / week  while in acute care setting for therapeutic exercises, bed mobility, transfers, progressive gait training, balance training, and family/patient education.     Signature: Radha Krishnan, PT, DPT #[id number]    If patient discharges from this facility prior to next visit, this note will serve as the Discharge Summary.

## 2021-04-27 NOTE — PLAN OF CARE
Problem: Pain:  Goal: Control of acute pain  Description: Control of acute pain  Outcome: Ongoing  Note: PRN Percocet to help manage pain     Problem: Infection:  Goal: Will remain free from infection  Description: Will remain free from infection  Outcome: Ongoing     Problem: Daily Care:  Goal: Daily care needs are met  Description: Daily care needs are met  Outcome: Ongoing     Problem: Skin Integrity:  Goal: Skin integrity will stabilize  Description: Skin integrity will stabilize  Outcome: Ongoing  Note: Mepilex applied to right skin tear. Educated patient on importance on repositioning off right side.       Problem: Skin Integrity:  Goal: Absence of new skin breakdown  Description: Absence of new skin breakdown  Outcome: Ongoing

## 2021-04-27 NOTE — PROGRESS NOTES
Inpatient Occupational Therapy  Evaluation and Treatment    Unit: PCU  Date:  4/27/2021  Patient Name:    Miguel Casas  Admitting diagnosis:  Phi Gonzales [N20.1]  Obstructive uropathy [N13.9]  Admit Date:  4/22/2021  Precautions/Restrictions/WB Status/ Lines/ Wounds/ Oxygen:  Fall risk, Bed/chair alarm, Lines -IV, Supplemental O2 (3L), Murguia catheter and L nephrostomy tube and Telemetry   On 4-23-21  Image guided Nephrostomy Tube insertion left completed. Dr. Redd Frances placed 8 Setswana 25 cm Argon Skater drain LOT # [id number] EXP 02/13/2026 in the left kidney. Drain/tube secured at the 25 cm line with sutures  4-24-21  Lt renal angiogram and coil embolization completed. Dr. Brandon Leslie placed three coils in the left anterior branch of the kidney. Pressure held for 15 minutes and pressure dressing applied. Pressure dressing clean, dry, and intact. Vital signs stable. Pt transported back to ICU by this writer and ICU nurse Katelyn Bocanegra. Treatment Time:  1125- 1200  Treatment Number: 1   Timed code treatment minutes 25 minutes   Total Treatment minutes:   35  minutes    Patient Goals for Therapy:  \"  I don't want to leave to soon  \"      Discharge Recommendations: Home PRN assist   DME needs for discharge: continue to assess       Therapy recommendations for staff:   Stand by assist with use of IV pole  for all transfers to/from chair    History of Present Illness: per H&P on 4-22-21 Elvira Arce MD   48 y.o. female who presented to the hospital with a chief complaint of flank pain. The patient presented to the emergency department with intractable abdominal pain, nausea without vomiting. The pain is been constant and worse with movement. She denies any fevers or chills or sick contacts. She denies any diarrhea. The patient has a history of kidney stones, in the emergency department a CT scan of her abdomen revealed a 1.5 cm left uteropelvic junction stone.   She will be admitted for urology evaluation.    history of hypertension and hepatitis C   Home Health S4 Level Recommendation:  Level 1 Standard  AM-PAC Score: 21    Preadmission Environment    Pt. Lives Alone  Home environment:  apartment  2nd floor   Steps to enter first floor: NA  Steps to second floor: 6-7 with two railings   Bathroom: tub/shower unit, grab bars and standard height commode  Equipment owned: none    Preadmission Status:  Pt. Able to drive: No friend takes pt to grocery, [employer] transportation to appts   Pt Fully independent with ADLs: Yes  Pt. Required assistance from family for: Cleaning, Cooking and [employer]- down 6-7 steps - uses basket to carry clothes   Pt. independent for transfers and gait and walked with No Device  History of falls No    Pain  Yes  Rating:mild  Location:left side   Pain Medicine Status: Received pain med prior to tx      Cognition    A&O x4   Able to follow 2 step commands    Subjective  Patient lying supine in bed with no family present. Pt agreeable to this OT eval & tx.      Upper Extremity ROM:    WFL    Upper Extremity Strength:    WFL    Upper Extremity Sensation    WFL    Upper Extremity Proprioception:  WFL    Coordination and Tone  WFL    Balance  Functional Sitting Balance:  WFL  Functional Standing Balance:Diminished    Bed mobility:    Supine to sit:   SBA  Sit to supine:   Not Tested  Rolling:    Not Tested  Scooting in sitting:  Independent  Scooting to head of bed:   Not Tested    Bridging:   Not Tested    Transfers:    Sit to stand:  SBA  Stand to sit:  SBA  Bed to chair:   CGA holding onto IV pole   Standard toilet: Not Tested  Bed to [facility]:  Not Tested    Dressing:      UE:   Not Tested  LE:    Independent- donning socks     Bathing:    UE:  Not Tested  LE:  Not Tested    Eating:   Not Tested    Toileting:  Not Tested    Activity Tolerance   Pt completed therapy session with decreased balance, pain, decreased endurance   Fall risk, Bed/chair alarm, Lines -IV, Supplemental O2 (3L), Murguia catheter and L nephrostomy tube and Telemetry   Positioning Needs:   Pt up in chair, alarm set, positioned in proper neutral alignment and pressure relief provided. Exercise / Activities Initiated:   N/A    Patient/Family Education:   Role of OT    Assessment of Deficits: Pt seen for Occupational therapy evaluation in acute care setting. Pt demonstrated decreased Activity tolerance, ADLs, Balance  and Transfers. Pt functioning below baseline and will likely benefit from skilled occupational therapy services to maximize safety and independence. Goal(s) : To be met in 3 Visits:  1). Bed to toilet/BSC: Independent    To be met in 5 Visits:  1). Supine to/from Sit:  Independent  2). Upper Body Bathing:   Independent  3). Lower Body Bathing:   Independent- sup  4). Upper Body Dressing:  Independent  5). Lower Body Dressing:  Independent  6). Pt to demonstrate UE exs x 15 reps with minimal cues    Rehabilitation Potential:  Good for goals listed above. Strengths for achieving goals include: PLOF  Barriers to achieving goals include:  Complexity of condition     Plan: To be seen 3-5 x/wk while in acute care setting for therapeutic exercises, bed mobility, transfers, dressing, bathing, family/patient education, ADL/IADL retraining, energy conservation training.    Elsa Muhammad OTR/L [id number]          If patient discharges from this facility prior to next visit, this note will serve as the Discharge Summary

## 2021-04-27 NOTE — FLOWSHEET NOTE
04/26/21 1840   Vitals   Temp 98.3 °F (36.8 °C)   Temp Source Oral   Pulse 116   Heart Rate Source Monitor   Resp 16   /78   BP Location Left upper arm   BP Method Automatic   Patient Position Right side   Level of Consciousness Alert (0)   MEWS Score 3   Oxygen Therapy   SpO2 90 %   O2 Device Nasal cannula   O2 Flow Rate (L/min) 3 L/min   Shift assessment completed-see flow sheet. Patient in bed awake,alert and oriented x4. Vitals stable. Respirations even and easy. Evening medication given per order. Educated patient on importance of repositioning, Patient refused, stated lying on left side is painful at this time. Non-blanchable redness to right ear, patient refused dressing. PRN pain medication given per order, C/o pain 7/10. Patient denies any needs at this time,will continue to monitor,call light within reach.

## 2021-04-27 NOTE — PROGRESS NOTES
New order for Diltiazem, 10 mg bolus and gtt started @ 5mg/hr see MAR.  K+3.3 replaced per order. Patient denies any needs at this time,will continue to monitor,call light within reach.

## 2021-04-27 NOTE — PROGRESS NOTES
Patient:  Marina Owens  YOB: 1965   Date of Service:  04/26/21      Urology Attending Daily Progress Note    Chief complaint: \"sore\"    Interval HPI:  Hgb 7.9-->8.1 today but vital signs are stable, CR downtrending, now 1.2. GFR rising. UOP overnight great, clear yellow urine in nephrostomy tube  States painful when she tries to roll on her back for extended period of time       Objective:    Patient Vitals for the past 8 hrs:   BP Temp Temp src Pulse Resp SpO2   04/26/21 0800 (!) 150/89 -- -- 97 17 95 %   04/26/21 0751 -- 98.6 °F (37 °C) Oral -- -- --   04/26/21 0700 (!) 140/82 -- -- 94 14 95 %   04/26/21 0600 (!) 140/81 -- -- 95 9 96 %   04/26/21 0500 (!) 145/92 98 °F (36.7 °C) Oral 97 18 97 %   04/26/21 0400 (!) 143/86 -- -- 100 20 93 %   04/26/21 0300 139/84 -- -- 103 10 96 %   04/26/21 0200 (!) 142/87 -- -- 99 17 97 %   04/26/21 0100 (!) 145/87 -- -- 100 17 96 %     I/O last 3 completed shifts: In: 7148 [P.O.:620; I.V.:2595]  Out: 9434 [Urine:3195]     Physical Exam:   Constitutional: comfortable in hospital bed, no acute distress  Abdomen: left sided pain in flank  Genitourinary: urethal cortez draining scant clear urine, NT with CYU  Psych: normal mood and affect, appropriately answers questions   Skin, extremities: stable, exposed skin intact, no digital cyanosis                No results found for: PSALab Results   Component Value Date    CREATININE 1.6 (H) 04/26/2021    CREATININE 2.1 (H) 04/25/2021    CREATININE 2.5 (H) 04/24/2021                                 BILIRUBINUR Negative 04/22/2021      Component Value Date    WBC 10.4 04/26/2021    HGB 8.0 (L) 04/26/2021    HCT 23.8 (L) 04/26/2021    MCV 80.0 04/26/2021     (L) 04/26/2021       Radiology:  \"Imaging was independently reviewed by myself and I agree with the radiology interpretation    Assessment:  Marina Owens is a 48 y.o. female with functionally solitary left kidney - large stone burden and underwent L PNT on 4/23 in anticipation of L PCNL - unfortunately, had hemorrhage and CTA showed active arterial extravasation requiring angioembolization with super selective coiling on 4/24     Plan:  -- flush NT gently q12hr to maintain patency - flushed well today  -- cont cortez for now  -- monitor UOP and CR  -- q6HR H/H  -- bedrest for now still-->consider cortez removal tomorrow AM   -- consider getting hypertension under a little better control to ensure she does not rebleed  -- SCDs and incentive spirometer     -- following with you - aTylor with long discussion with patient, she understands the situation - since she has a functionally solitary kidney, it is extremely important we try to preserve this kidney and not go toward nephrectomy - would advocate for repeat embolization if bleeds again over nephrectomy - seems stable for now which is reassuring        JAGRUTI Aragon  [facility]

## 2021-04-27 NOTE — PROGRESS NOTES
Hospitalist Progress Note      PCP: Marci Arthur MD    Date of Admission: 4/22/2021    Hospital Course: pt seen , 55-y admitted with 1.5 cm left ureteropelvic junction stone status post percutaneous nephrostomy   Postprocedure patient has developed retroperitoneal hematoma and hemorrhagic shock patient is transferred to ICU  received 3 units of PRBC  And underwent an IR embolization    Subjective:     Ms Carmona had rapid Afibb overnight , quickly convered to NSR with cardizem gtt  No AC for recent bleed    Pt reports soreness in flank region. Worried to get up   Good UOP  No chest pain       Medications:  Reviewed    Infusion Medications    dilTIAZem 2.5 mg/hr (04/27/21 0725)    lactated ringers 75 mL/hr at 04/27/21 0023    sodium chloride      sodium chloride      sodium chloride      sodium chloride      sodium chloride       Scheduled Medications    mupirocin   Nasal BID    ampicillin IVPB 500 mg  500 mg Intravenous 4 times per day    sodium chloride flush  5-40 mL Intravenous 2 times per day    tamsulosin  0.4 mg Oral Daily     PRN Meds: potassium chloride **OR** potassium alternative oral replacement **OR** potassium chloride, magnesium sulfate, promethazine, oxyCODONE-acetaminophen, hydrALAZINE, sodium chloride, sodium chloride, sodium chloride, sodium chloride, hydrOXYzine, sodium chloride flush, sodium chloride, acetaminophen **OR** acetaminophen, polyethylene glycol, HYDROmorphone      Intake/Output Summary (Last 24 hours) at 4/27/2021 0806  Last data filed at 4/27/2021 0758  Gross per 24 hour   Intake 1000 ml   Output 2900 ml   Net -1900 ml       Physical Exam Performed:    /77   Pulse 106   Temp 98.4 °F (36.9 °C) (Tympanic)   Resp 16   Ht 5' 7\" (1.702 m)   Wt 188 lb (85.3 kg)   LMP 12/01/2014   SpO2 94%   BMI 29.44 kg/m²         General: middle aged female up in bed   Awake, alert and oriented.  Appears to be not in any distress  Mucous Membranes:  Pink , anicteric  Neck: No JVD, no carotid bruit, no thyromegaly  Chest:  Clear to auscultation bilaterally, no added sounds  Cardiovascular:  RRR S1S2 heard, no murmurs or gallops  Abdomen:  Soft, undistended, non tender, no organomegaly, BS present  Extremities: left nephrostomy tube in left flank with clear urine   No edema or cyanosis. Distal pulses well felt  Neurological : grossly normal        Labs:   Recent Labs     04/26/21  0341 04/26/21  1518 04/27/21  0457   WBC 10.4  --  8.2   HGB 8.0* 7.9* 8.2*  8.1*   HCT 23.8* 24.4* 25.2*  24.8*   *  --  129*     Recent Labs     04/25/21  0341 04/26/21  0340 04/27/21  0116 04/27/21  0457    136  --  135*   K 3.7 3.3* 3.3* 3.6    102  --  101   CO2 21 24  --  25   BUN 32* 27*  --  21*   CREATININE 2.1* 1.6*  --  1.2*   CALCIUM 7.5* 7.9*  --  7.8*   PHOS 3.9 2.9  --  1.8*     No results for input(s): AST, ALT, BILIDIR, BILITOT, ALKPHOS in the last 72 hours. No results for input(s): INR in the last 72 hours.       Assessment/Plan:    Active Hospital Problems    Diagnosis    Hemorrhagic shock ([facility] [address] [R57.8]    Acute blood loss anemia [D62]    Obstructive uropathy [N13.9]    Hydronephrosis [N13.30]    Ureterolithiasis [N20.1]    Kidney stone [N20.0]    Acute UTI [N39.0]    MARY (acute kidney injury) ([facility] [address] [N17.9]     Left ureteral stone with hydronephrosis     status post percutaneous nephrostomy tube placement by IR , however complicated with retroperitoneal hemorrhage s.,p angio embolization by IR on 4/24  Control pain with IV narcotics  Urology consulted and plans for lithotripsy soon     Acute hemorrhagic shock due to retroperitoneal hematoma   status post embolization  Hemoglobin stable after 3 units PRBC , hb at 8 today    continue to monitor H&H  Bed rest    UTi - enterococcus - ampicillin sensitive , now on IV  Ampicillin    Acute renal failure with metabolic acidosis  - pt has one kidney   - creatinine at 1.5 on adm, peaked to 2.8 and improved to 1.2 now with IVF   nephrology has been consulted  - avoid nephrotoxic agents    New onset Afibb - likely with acute sickness - quickly resolved with cardizem bolus and gtt  Can change to oral meds. Check tsh, ECHo     Elevated Troponin  - likely demand ischemia vs NSTEMi   - no AC with recent retroperitoneal bleed  - start ASA, statins, start BB  - echo pending  - cardiology consult    Hypertension - prn  hydralazine ordered.   And amlodipine if tolerated        DVT Prophylaxis: mech   Diet: DIET RENAL;  Code Status: Full Code    Start PT      Kyle Del Rio MD

## 2021-04-27 NOTE — CONSULTS
Pharmacy to Manage Heparin Infusion per [facility]    Dx: MI rule out  Pt wt = 85.3kg   AdjBW = 71kg   (will use adjusted wt if ABW > 120% IBW). Baseline aPTT = 26.4 at 1522. Low Dose Heparin Infusion  Heparin 4000 units IVP bolus followed by Heparin infusion at 8.5ml/hr  Recheck aPTT in 6 hours. Goal aPTT = 49-76 seconds.

## 2021-04-27 NOTE — PROGRESS NOTES
Patient HR reading 180 to 200s on monitor. Patient awake in bed alert and oriented. Denies any chest pain or symptoms. EKG completed- showed A-fib RVR.  Notified MD.

## 2021-04-28 LAB
ABO/RH: NORMAL
ALBUMIN SERPL-MCNC: 2.1 G/DL (ref 3.4–5)
ANAEROBIC CULTURE: ABNORMAL
ANION GAP SERPL CALCULATED.3IONS-SCNC: 8 MMOL/L (ref 3–16)
ANTIBODY SCREEN: NORMAL
APTT: 47.6 SEC (ref 24.2–36.2)
BASOPHILS ABSOLUTE: 0 K/UL (ref 0–0.2)
BASOPHILS RELATIVE PERCENT: 0.3 %
BLOOD BANK DISPENSE STATUS: NORMAL
BLOOD BANK DISPENSE STATUS: NORMAL
BLOOD BANK PRODUCT CODE: NORMAL
BLOOD BANK PRODUCT CODE: NORMAL
BPU ID: NORMAL
BPU ID: NORMAL
BUN BLDV-MCNC: 21 MG/DL (ref 7–20)
CALCIUM SERPL-MCNC: 7.9 MG/DL (ref 8.3–10.6)
CHLORIDE BLD-SCNC: 102 MMOL/L (ref 99–110)
CO2: 24 MMOL/L (ref 21–32)
CREAT SERPL-MCNC: 1.2 MG/DL (ref 0.6–1.1)
DESCRIPTION BLOOD BANK: NORMAL
DESCRIPTION BLOOD BANK: NORMAL
EOSINOPHILS ABSOLUTE: 0.1 K/UL (ref 0–0.6)
EOSINOPHILS RELATIVE PERCENT: 1.6 %
GFR AFRICAN AMERICAN: 56
GFR NON-AFRICAN AMERICAN: 47
GLUCOSE BLD-MCNC: 91 MG/DL (ref 70–99)
GRAM STAIN RESULT: ABNORMAL
HCT VFR BLD CALC: 19.6 % (ref 36–48)
HCT VFR BLD CALC: 21 % (ref 36–48)
HCT VFR BLD CALC: 27.2 % (ref 36–48)
HCT VFR BLD CALC: 29.1 % (ref 36–48)
HEMOGLOBIN: 6.3 G/DL (ref 12–16)
HEMOGLOBIN: 6.7 G/DL (ref 12–16)
HEMOGLOBIN: 9 G/DL (ref 12–16)
HEMOGLOBIN: 9.4 G/DL (ref 12–16)
LV EF: 43 %
LVEF MODALITY: NORMAL
LYMPHOCYTES ABSOLUTE: 1.5 K/UL (ref 1–5.1)
LYMPHOCYTES RELATIVE PERCENT: 18.8 %
MCH RBC QN AUTO: 25.8 PG (ref 26–34)
MCHC RBC AUTO-ENTMCNC: 32.1 G/DL (ref 31–36)
MCV RBC AUTO: 80.4 FL (ref 80–100)
MONOCYTES ABSOLUTE: 1.2 K/UL (ref 0–1.3)
MONOCYTES RELATIVE PERCENT: 15.4 %
NEUTROPHILS ABSOLUTE: 5.1 K/UL (ref 1.7–7.7)
NEUTROPHILS RELATIVE PERCENT: 63.9 %
ORGANISM: ABNORMAL
ORGANISM: ABNORMAL
PDW BLD-RTO: 18.2 % (ref 12.4–15.4)
PHOSPHORUS: 2.2 MG/DL (ref 2.5–4.9)
PLATELET # BLD: 126 K/UL (ref 135–450)
PMV BLD AUTO: 9 FL (ref 5–10.5)
POTASSIUM SERPL-SCNC: 3.6 MMOL/L (ref 3.5–5.1)
RBC # BLD: 2.61 M/UL (ref 4–5.2)
SODIUM BLD-SCNC: 134 MMOL/L (ref 136–145)
T4 FREE: 1.3 NG/DL (ref 0.9–1.8)
WBC # BLD: 7.9 K/UL (ref 4–11)
WOUND/ABSCESS: ABNORMAL
WOUND/ABSCESS: ABNORMAL

## 2021-04-28 PROCEDURE — 86850 RBC ANTIBODY SCREEN: CPT

## 2021-04-28 PROCEDURE — 86923 COMPATIBILITY TEST ELECTRIC: CPT

## 2021-04-28 PROCEDURE — 84439 ASSAY OF FREE THYROXINE: CPT

## 2021-04-28 PROCEDURE — 6370000000 HC RX 637 (ALT 250 FOR IP): Performed by: INTERNAL MEDICINE

## 2021-04-28 PROCEDURE — 99233 SBSQ HOSP IP/OBS HIGH 50: CPT | Performed by: INTERNAL MEDICINE

## 2021-04-28 PROCEDURE — 80069 RENAL FUNCTION PANEL: CPT

## 2021-04-28 PROCEDURE — 2060000000 HC ICU INTERMEDIATE R&B

## 2021-04-28 PROCEDURE — 93306 TTE W/DOPPLER COMPLETE: CPT

## 2021-04-28 PROCEDURE — 86900 BLOOD TYPING SEROLOGIC ABO: CPT

## 2021-04-28 PROCEDURE — 85730 THROMBOPLASTIN TIME PARTIAL: CPT

## 2021-04-28 PROCEDURE — P9016 RBC LEUKOCYTES REDUCED: HCPCS

## 2021-04-28 PROCEDURE — 86901 BLOOD TYPING SEROLOGIC RH(D): CPT

## 2021-04-28 PROCEDURE — 2580000003 HC RX 258: Performed by: HOSPITALIST

## 2021-04-28 PROCEDURE — 6360000002 HC RX W HCPCS: Performed by: INTERNAL MEDICINE

## 2021-04-28 PROCEDURE — 85018 HEMOGLOBIN: CPT

## 2021-04-28 PROCEDURE — 85025 COMPLETE CBC W/AUTO DIFF WBC: CPT

## 2021-04-28 PROCEDURE — 83970 ASSAY OF PARATHORMONE: CPT

## 2021-04-28 PROCEDURE — 6360000002 HC RX W HCPCS: Performed by: HOSPITALIST

## 2021-04-28 PROCEDURE — 36415 COLL VENOUS BLD VENIPUNCTURE: CPT

## 2021-04-28 PROCEDURE — 85014 HEMATOCRIT: CPT

## 2021-04-28 RX ORDER — SODIUM CHLORIDE 9 MG/ML
INJECTION, SOLUTION INTRAVENOUS PRN
Status: DISCONTINUED | OUTPATIENT
Start: 2021-04-28 | End: 2021-04-29

## 2021-04-28 RX ORDER — DOCUSATE SODIUM 100 MG/1
100 CAPSULE, LIQUID FILLED ORAL 2 TIMES DAILY
Status: DISCONTINUED | OUTPATIENT
Start: 2021-04-28 | End: 2021-04-30 | Stop reason: HOSPADM

## 2021-04-28 RX ADMIN — OXYCODONE HYDROCHLORIDE AND ACETAMINOPHEN 1 TABLET: 5; 325 TABLET ORAL at 21:25

## 2021-04-28 RX ADMIN — OXYCODONE HYDROCHLORIDE AND ACETAMINOPHEN 1 TABLET: 5; 325 TABLET ORAL at 10:30

## 2021-04-28 RX ADMIN — ACETAMINOPHEN 650 MG: 325 TABLET ORAL at 18:19

## 2021-04-28 RX ADMIN — DOCUSATE SODIUM 100 MG: 100 CAPSULE, LIQUID FILLED ORAL at 11:56

## 2021-04-28 RX ADMIN — METOPROLOL TARTRATE 25 MG: 25 TABLET, FILM COATED ORAL at 10:30

## 2021-04-28 RX ADMIN — HEPARIN SODIUM 2000 UNITS: 1000 INJECTION INTRAVENOUS; SUBCUTANEOUS at 02:21

## 2021-04-28 RX ADMIN — AMPICILLIN SODIUM 500 MG: 500 INJECTION, POWDER, FOR SOLUTION INTRAMUSCULAR; INTRAVENOUS at 11:56

## 2021-04-28 RX ADMIN — DOCUSATE SODIUM 100 MG: 100 CAPSULE, LIQUID FILLED ORAL at 19:56

## 2021-04-28 RX ADMIN — AMPICILLIN SODIUM 500 MG: 500 INJECTION, POWDER, FOR SOLUTION INTRAMUSCULAR; INTRAVENOUS at 18:19

## 2021-04-28 RX ADMIN — AMPICILLIN SODIUM 500 MG: 500 INJECTION, POWDER, FOR SOLUTION INTRAMUSCULAR; INTRAVENOUS at 04:42

## 2021-04-28 RX ADMIN — OXYCODONE HYDROCHLORIDE AND ACETAMINOPHEN 1 TABLET: 5; 325 TABLET ORAL at 02:20

## 2021-04-28 RX ADMIN — ATORVASTATIN CALCIUM 40 MG: 40 TABLET, FILM COATED ORAL at 19:56

## 2021-04-28 RX ADMIN — AMPICILLIN SODIUM 500 MG: 500 INJECTION, POWDER, FOR SOLUTION INTRAMUSCULAR; INTRAVENOUS at 03:01

## 2021-04-28 RX ADMIN — ACETAMINOPHEN 650 MG: 325 TABLET ORAL at 13:47

## 2021-04-28 RX ADMIN — TAMSULOSIN HYDROCHLORIDE 0.4 MG: 0.4 CAPSULE ORAL at 10:30

## 2021-04-28 RX ADMIN — METOPROLOL TARTRATE 25 MG: 25 TABLET, FILM COATED ORAL at 19:56

## 2021-04-28 RX ADMIN — OXYCODONE HYDROCHLORIDE AND ACETAMINOPHEN 1 TABLET: 5; 325 TABLET ORAL at 16:52

## 2021-04-28 RX ADMIN — OXYCODONE HYDROCHLORIDE AND ACETAMINOPHEN 1 TABLET: 5; 325 TABLET ORAL at 06:26

## 2021-04-28 ASSESSMENT — PAIN - FUNCTIONAL ASSESSMENT
PAIN_FUNCTIONAL_ASSESSMENT: ACTIVITIES ARE NOT PREVENTED
PAIN_FUNCTIONAL_ASSESSMENT: PREVENTS OR INTERFERES SOME ACTIVE ACTIVITIES AND ADLS

## 2021-04-28 ASSESSMENT — PAIN DESCRIPTION - ONSET
ONSET: ON-GOING
ONSET: ON-GOING

## 2021-04-28 ASSESSMENT — PAIN SCALES - GENERAL
PAINLEVEL_OUTOF10: 6
PAINLEVEL_OUTOF10: 0
PAINLEVEL_OUTOF10: 6
PAINLEVEL_OUTOF10: 6
PAINLEVEL_OUTOF10: 7
PAINLEVEL_OUTOF10: 6
PAINLEVEL_OUTOF10: 0
PAINLEVEL_OUTOF10: 7
PAINLEVEL_OUTOF10: 7
PAINLEVEL_OUTOF10: 6

## 2021-04-28 ASSESSMENT — PAIN DESCRIPTION - ORIENTATION
ORIENTATION: LEFT
ORIENTATION: LEFT

## 2021-04-28 ASSESSMENT — PAIN DESCRIPTION - FREQUENCY
FREQUENCY: CONTINUOUS
FREQUENCY: CONTINUOUS

## 2021-04-28 ASSESSMENT — PAIN DESCRIPTION - DESCRIPTORS
DESCRIPTORS: PRESSURE
DESCRIPTORS: DISCOMFORT

## 2021-04-28 ASSESSMENT — PAIN DESCRIPTION - PAIN TYPE
TYPE: ACUTE PAIN
TYPE: ACUTE PAIN

## 2021-04-28 ASSESSMENT — PAIN DESCRIPTION - LOCATION
LOCATION: FLANK
LOCATION: FLANK

## 2021-04-28 ASSESSMENT — PAIN DESCRIPTION - PROGRESSION
CLINICAL_PROGRESSION: GRADUALLY WORSENING
CLINICAL_PROGRESSION: GRADUALLY WORSENING

## 2021-04-28 NOTE — PROGRESS NOTES
CARDIOLOGY PROGRESS NOTE      Patient Name: Donna Young  Date of admission: 4/22/2021  5:14 PM  Admission Dx: Ureterolithiasis [N20.1]  Obstructive uropathy [N13.9]  Reason for Consult: Atrial fibrillation   requesting Physician: Arvind Simpson MD  Primary Care physician: Melba Szymanski MD    Subjective:     Donna Young is a 48 y.o. patient with prior medical history notable for hypertension, tobacco use and hepatitis C who presented to the hospital 4/22/2021 with complaints of flank pain and nausea. Patient was diagnosed with nephrolithiasis and during nephrostomy tube placement she had unfortunate development of perinephric/retroperitoneal hematoma with consequent hemorrhagic shock. She underwent coil embolization with interventional radiology 4/24 and has had transfusions. Course complicated with acute kidney injury, acute blood loss anemia and atrial fibrillation. Cardiology was consulted for A. fib management. Patient was seen by my partner Dr. Akbar Hardy 4/27. Noted the patient was started on diltiazem drip for rate control and spontaneously converted to normal sinus rhythm. Patient was noted to have elevated troponin up to 0.59 with flat trend, but no chest pain. She was noted to have abnormal EKG. Echo and noninvasive stress test ordered. She was started on anticoagulation as well as aspirin, statin and beta-blocker. Unfortunately overnight the patient had 2 g drop in hemoglobin requiring transfusion-2 units plan today. Heparin and aspirin discontinued. Today, She denies chest pain/pressure prior to admission or during course. No limited dyspnea with exertion prior to admission. She states she would walk around apartment building 2-3 times per day without limitations. Denies palpitations, dizziness, pre-syncope. No orthopnea/PND/LE edema. Smoking 3-4 cigarettes per day at this point - down from 1/2 pack.      Home Medications:  Were reviewed and are listed in nursing record and/or below  Prior to Admission medications    Medication Sig Start Date End Date Taking?  Authorizing Provider   propranolol (INDERAL) 40 MG tablet Take 40 mg by mouth 2 times daily   Yes Historical Provider, MD   aspirin 81 MG EC tablet Take 81 mg by mouth daily   Yes Historical Provider, MD   hydroCHLOROthiazide (HYDRODIURIL) 25 MG tablet Take 25 mg by mouth as needed    Yes Historical Provider, MD   hydrOXYzine (ATARAX) 25 MG tablet Take 25 mg by mouth 3 times daily as needed for Itching   Yes Historical Provider, MD   lisinopril (PRINIVIL;ZESTRIL) 10 MG tablet Take 1 tablet by mouth daily 8/24/20  Yes Sivan Fields MD        CURRENT Medications:  0.9 % sodium chloride infusion, PRN  docusate sodium (COLACE) capsule 100 mg, BID  potassium chloride (KLOR-CON M) extended release tablet 40 mEq, PRN    Or  potassium bicarb-citric acid (EFFER-K) effervescent tablet 40 mEq, PRN    Or  potassium chloride 10 mEq/100 mL IVPB (Peripheral Line), PRN  magnesium sulfate 1000 mg in dextrose 5% 100 mL IVPB, PRN  [Held by provider] aspirin chewable tablet 81 mg, Daily  atorvastatin (LIPITOR) tablet 40 mg, Nightly  perflutren lipid microspheres (DEFINITY) injection 1.65 mg, ONCE PRN  metoprolol tartrate (LOPRESSOR) tablet 25 mg, BID  regadenoson (LEXISCAN) injection 0.4 mg, ONCE PRN  promethazine (PHENERGAN) injection 6.25 mg, Q6H PRN  oxyCODONE-acetaminophen (PERCOCET) 5-325 MG per tablet 1 tablet, Q4H PRN  hydrALAZINE (APRESOLINE) injection 10 mg, Q6H PRN  ampicillin (OMNIPEN) 500 mg in sodium chloride 0.9 % 50 mL IVPB, 4 times per day  0.9 % sodium chloride infusion, PRN  0.9 % sodium chloride infusion, PRN  0.9 % sodium chloride infusion, PRN  0.9 % sodium chloride infusion, PRN  hydrOXYzine (ATARAX) tablet 25 mg, TID PRN  sodium chloride flush 0.9 % injection 5-40 mL, 2 times per day  sodium chloride flush 0.9 % injection 5-40 mL, PRN  0.9 % sodium chloride infusion, PRN  acetaminophen (TYLENOL) tablet 650 mg, Q6H PRN    Or  acetaminophen (TYLENOL) suppository 650 mg, Q6H PRN  polyethylene glycol (GLYCOLAX) packet 17 g, Daily PRN  HYDROmorphone (DILAUDID) injection 0.5 mg, Q4H PRN  tamsulosin (FLOMAX) capsule 0.4 mg, Daily        Allergies:  Patient has no known allergies. Review of Systems:   A 14 point review of symptoms completed. Pertinent positives identified in the HPI, all other review of symptoms negative. Objective:     Vitals:    04/28/21 0830 04/28/21 1030 04/28/21 1115 04/28/21 1150   BP: 117/73 123/76 103/62 109/70   Pulse: 90 94 85 93   Resp: 18  17 18   Temp: 98.7 °F (37.1 °C)  99 °F (37.2 °C) 98.8 °F (37.1 °C)   TempSrc: Oral  Oral Oral   SpO2: 93%  92% 93%   Weight:       Height:          Weight: 183 lb 11.2 oz (83.3 kg)       PHYSICAL EXAM:    General:  Alert, cooperative, no distress, appears stated age   Head:  Normocephalic, atraumatic   Eyes:  Conjunctiva/corneas clear, anicteric sclerae    Nose: Nares normal, no drainage or sinus tenderness   Throat: No abnormalities of the lips, oral mucosa or tongue. Neck: Trachea midline. Neck supple with no lymphadenopathy, thyroid not enlarged, symmetric, no tenderness/mass/nodules, no Jugular venous pressure elevation    Lungs:   Clear to auscultation bilaterally, no wheezes, no rales, no respiratory distress   Chest Wall:  No deformity or tenderness to palpation   Heart:  Regular rate and rhythm, normal S1, normal S2, no murmur, no rub, no S3/S4, PMI non-displaced. Abdomen:   Soft, non-tender, with normoactive bowel sounds. No masses, no hepatosplenomegaly   Extremities: No cyanosis, clubbing or pitting edema. Vascular: 2+ radial, dorsalis pedis and posterior tibial pulses bilaterally. Brisk carotid upstrokes without carotid bruit. Skin: Skin color, texture, turgor are normal with no rashes or ulceration. Pysch: Euthymic mood, appropriate affect   Neurologic: Oriented to person, place and time. No slurred speech or facial asymmetry.  No motor or sensory deficits on gross examination. Labs:   CBC:   Lab Results   Component Value Date    WBC 7.9 04/28/2021    RBC 2.61 04/28/2021    HGB 6.3 04/28/2021    HCT 19.6 04/28/2021    MCV 80.4 04/28/2021    RDW 18.2 04/28/2021     04/28/2021     CMP:  Lab Results   Component Value Date     04/28/2021    K 3.6 04/28/2021    K 4.2 04/23/2021     04/28/2021    CO2 24 04/28/2021    BUN 21 04/28/2021    CREATININE 1.2 04/28/2021    GFRAA 56 04/28/2021    AGRATIO 0.9 04/22/2021    LABGLOM 47 04/28/2021    GLUCOSE 91 04/28/2021    PROT 7.2 04/22/2021    CALCIUM 7.9 04/28/2021    BILITOT 0.4 04/22/2021    ALKPHOS 101 04/22/2021    AST 42 04/22/2021    ALT 32 04/22/2021     PT/INR:  No results found for: PTINR  HgBA1c:No results found for: LABA1C  Lab Results   Component Value Date    TROPONINI 0.56 ([facility]) 04/27/2021         Interval Testing/Data:   EKGs personally reviewed. Telemetry personally reviewed. Echo  Summary   The left ventricular systolic function is mildly reduced with an ejection   fraction of 40-45 %. There is akinesis of the apical septal, apical lateral , apical anterior and   apical inferior walls. There is mild concentric left ventricular hypertrophy. Grade II diastolic dysfunction with elevated left ventricular filling   pressure. Left ventricular cavity size is normal.   Mild thickening of the anterior leaflet of mitral valve. Mild mitral regurgitation. Systolic pulmonary artery pressure (SPAP) is estimated at 55 mmHg consistent   with moderate pulmonary hypertension (Right atrial pressure of 3 mmHg). Stress test pending      Impression and Plan      Mellisa Gramajo is a 48 y.o. patient with prior medical history notable for hypertension, tobacco use and hepatitis C who presented to the hospital 4/22/2021 with complaints of flank pain and nausea.   Patient was diagnosed with nephrolithiasis and during nephrostomy tube placement she had unfortunate development of perinephric/retroperitoneal hematoma with consequent hemorrhagic shock. She underwent coil embolization with interventional radiology 4/24 and has had transfusions. Course complicated with acute kidney injury, acute blood loss anemia and atrial fibrillation. Cardiology was consulted for A. fib management. 1.  Newly diagnosed cardiomyopathy with mild LV dysfunction  2. Suspected underlying coronary artery disease  3. Elevated troponin, consider plaque rupture versus demand ischemia in the setting of tachyarrhythmia, acute blood loss anemia, and decreased clearance with acute kidney injury  4. Atrial fibrillation, paroxysmal, recurrent   5. Retroperitoneal hematoma status post nephrostomy tube placement, complicated by hemorrhagic shock with acute blood loss anemia requiring blood transfusion again today  6. Acute kidney injury, baseline 0.9, today 1.2 with good recovery over the course of hospitalization    [id number] Score for Atrial Fibrillation Stroke Risk   Risk   Factors  Component Value   C CHF No 0   H HTN No 0   A2 Age >= 75 No,  (54 y.o.) 0   D DM No 0   S2 Prior Stroke/TIA No 0   V Vascular Disease Yes 1   A Age 74-69 No,  (54 y.o.) 0   Sc Sex female 1    [id number]  Score  2   Score last updated 4/28/21 16:29 PM EDT    Click here for a link to the UpToDate guideline \"Atrial Fibrillation: Anticoagulation therapy to prevent embolization    Disclaimer: Risk Score calculation is dependent on accuracy of patient problem list and past encounter diagnosis. Patient Active Problem List   Diagnosis    Septicemia ([address]    Acute UTI    MARY (acute kidney injury) ([address]    Bacteremia    Kidney stone    Left flank pain    Generalized abdominal pain    Ureterolithiasis    Hydronephrosis    Obstructive uropathy    Hemorrhagic shock ([address]    Acute blood loss anemia    NSTEMI (non-ST elevated myocardial infarction) ([address]       PLAN:  1.   Holding all anticoagulants/antiplatelets at this time  2. Continue statin and beta-blocker  3. Once hemodynamically stable will consider transition of metoprolol to Toprol-XL formulation. Upon stabilization of renal function will consider ACE/ARB. 4.  Defer stress test at this time given the acute illness. We can consider noninvasive stress later this admission upon improvement to assess burden of ischemia versus scar. We will follow. Will need follow up with cardiology on OP basis. I will address the patient's cardiac risk factors and adjusted pharmacologic treatment as needed. In addition, I have reinforced the need for patient directed risk factor modification. All questions and concerns were addressed to the patient/family. Alternatives to my treatment were discussed. Thank you for allowing us to participate in the care of [address] [place] [address]. Please call me with any questions [phone].     Jolynn Bell MD, [facility]  Cardiovascular Disease  [facility]  [phone] [facility]  [phone] [address] [place] [address]  4/28/2021 12:30 PM

## 2021-04-28 NOTE — PROGRESS NOTES
Patient:  Braeden Ontiveros  YOB: 1965   Date of Service:  04/26/21      Urology Attending Daily Progress Note    Chief complaint: \"sore\"    Interval HPI:  Hgb 8.1-->6.7 today but vital signs are stable, CR stable 1.2. She will receive 2 units of PRBCs  UOP overnight 925, clear yellow urine in nephrostomy tube  States she got up in chair yesterday and felt much better. However, very tired today. Feels like she is able to lay on back more today       Objective:    Patient Vitals for the past 8 hrs:   BP Temp Temp src Pulse Resp SpO2   04/26/21 0800 (!) 150/89 -- -- 97 17 95 %   04/26/21 0751 -- 98.6 °F (37 °C) Oral -- -- --   04/26/21 0700 (!) 140/82 -- -- 94 14 95 %   04/26/21 0600 (!) 140/81 -- -- 95 9 96 %   04/26/21 0500 (!) 145/92 98 °F (36.7 °C) Oral 97 18 97 %   04/26/21 0400 (!) 143/86 -- -- 100 20 93 %   04/26/21 0300 139/84 -- -- 103 10 96 %   04/26/21 0200 (!) 142/87 -- -- 99 17 97 %   04/26/21 0100 (!) 145/87 -- -- 100 17 96 %     I/O last 3 completed shifts: In: 5076 [P.O.:620; I.V.:2595]  Out: 4204 [Urine:3195]     Physical Exam:   Constitutional: comfortable in hospital bed, no acute distress  Abdomen: left sided pain in flank  Genitourinary: urethal cortez draining scant clear urine, NT with CYU  Psych: normal mood and affect, appropriately answers questions   Skin, extremities: stable, exposed skin intact, no digital cyanosis                No results found for: PSALab Results   Component Value Date    CREATININE 1.6 (H) 04/26/2021    CREATININE 2.1 (H) 04/25/2021    CREATININE 2.5 (H) 04/24/2021                                 BILIRUBINUR Negative 04/22/2021      Component Value Date    WBC 10.4 04/26/2021    HGB 8.0 (L) 04/26/2021    HCT 23.8 (L) 04/26/2021    MCV 80.0 04/26/2021     (L) 04/26/2021       Radiology:  \"Imaging was independently reviewed by myself and I agree with the radiology interpretation    Assessment:  Braeden Ontiveros is a 48 y.o. female with functionally solitary left kidney - large stone burden and underwent L PNT on 4/23 in anticipation of L PCNL - unfortunately, had hemorrhage and CTA showed active arterial extravasation requiring angioembolization with super selective coiling on 4/24     Plan:  -- flush NT gently q12hr to maintain patency - flushed well today  -- monitor UOP and CR--stable   -- q6HR H/H--downtrending  -- She is receiving 2 units of pRBCs--the plan was to get her up in the chair and cortez out today, may want to wait until hgb stabilizes  -- consider getting hypertension under a little better control to ensure she does not rebleed  -- SCDs and incentive spirometer     -- following with you - Taylor with long discussion with patient, she understands the situation - since she has a functionally solitary kidney, it is extremely important we try to preserve this kidney and not go toward nephrectomy - would advocate for repeat embolization if bleeds again over nephrectomy        JAGRUTI Ingram  [facility]

## 2021-04-28 NOTE — PROGRESS NOTES
2000 unit heparin bolus given, rate changed to 9.5 ml/hr per Pharmacy for APTT 47.6. PRN Percocet given for  pain 7/10. Waiting on ampicillin from Pharmacy.

## 2021-04-28 NOTE — FLOWSHEET NOTE
04/28/21 1342   Vitals   /71   Temp 99 °F (37.2 °C)   Temp Source Oral   Pulse 97   Resp 17   SpO2 94 %   First unit of PRBC's completed at this time. No signs or symptoms of transfusion reaction. Pt currently resting in bed with the call light within reach. Pt stable at this time.     Zackary Johnson RN

## 2021-04-28 NOTE — PROGRESS NOTES
Hospitalist Progress Note      PCP: Hanna Christy MD    Date of Admission: 4/22/2021    Hospital Course: pt seen , 55-y admitted with 1.5 cm left ureteropelvic junction stone status post percutaneous nephrostomy   Postprocedure patient has developed retroperitoneal hematoma and hemorrhagic shock patient is transferred to ICU  received 3 units of PRBC  And underwent an IR embolization    Rapid afibb with troponin leak , started on heparin with further drop in h.h     Subjective:     Ms Chad Walsh feels ok today.  Able to sit up in chair for few hrs  Constipated  No worsening flank or abd pain   For blood transfusion today       Medications:  Reviewed    Infusion Medications    sodium chloride      sodium chloride      sodium chloride      sodium chloride      sodium chloride      sodium chloride       Scheduled Medications    docusate sodium  100 mg Oral BID    [Held by provider] aspirin  81 mg Oral Daily    atorvastatin  40 mg Oral Nightly    metoprolol tartrate  25 mg Oral BID    ampicillin IVPB 500 mg  500 mg Intravenous 4 times per day    sodium chloride flush  5-40 mL Intravenous 2 times per day    tamsulosin  0.4 mg Oral Daily     PRN Meds: sodium chloride, potassium chloride **OR** potassium alternative oral replacement **OR** potassium chloride, magnesium sulfate, perflutren lipid microspheres, regadenoson, promethazine, oxyCODONE-acetaminophen, hydrALAZINE, sodium chloride, sodium chloride, sodium chloride, sodium chloride, hydrOXYzine, sodium chloride flush, sodium chloride, acetaminophen **OR** acetaminophen, polyethylene glycol, HYDROmorphone      Intake/Output Summary (Last 24 hours) at 4/28/2021 1023  Last data filed at 4/28/2021 0515  Gross per 24 hour   Intake 600 ml   Output 1775 ml   Net -1175 ml       Physical Exam Performed:    /73   Pulse 90   Temp 98.7 °F (37.1 °C) (Oral)   Resp 18   Ht 5' 7\" (1.702 m)   Wt 183 lb 11.2 oz (83.3 kg)   LMP 12/01/2014   SpO2 93%   BMI 28.77 kg/m²         General: middle aged female up in bed   Awake, alert and oriented. Appears to be not in any distress  Mucous Membranes:  Pink , anicteric  Neck: No JVD, no carotid bruit, no thyromegaly  Chest:  Clear to auscultation bilaterally, no added sounds  Cardiovascular:  RRR S1S2 heard, no murmurs or gallops  Abdomen:  Soft, undistended, non tender, no organomegaly, BS present  Extremities: left nephrostomy tube in left flank with clear urine   No edema or cyanosis. Distal pulses well felt  Neurological : grossly normal        Labs:   Recent Labs     04/27/21  0457 04/27/21  1522 04/28/21  0329 04/28/21  0933   WBC 8.2 8.6 7.9  --    HGB 8.2*  8.1* 7.9* 6.7* 6.3*   HCT 25.2*  24.8* 24.5* 21.0* 19.6*   * 131* 126*  --      Recent Labs     04/26/21  0340 04/27/21  0116 04/27/21  0457 04/28/21  0328     --  135* 134*   K 3.3* 3.3* 3.6 3.6     --  101 102   CO2 24  --  25 24   BUN 27*  --  21* 21*   CREATININE 1.6*  --  1.2* 1.2*   CALCIUM 7.9*  --  7.8* 7.9*   PHOS 2.9  --  1.8* 2.2*     No results for input(s): AST, ALT, BILIDIR, BILITOT, ALKPHOS in the last 72 hours. No results for input(s): INR in the last 72 hours. Urine  - enterococcus and staph EPI      CTA abd    Active arterial extravasation from the lateral left kidney with a large   retroperitoneal hematoma     Ct abd   Interval development of left nephrolithiasis.       Calculus at the left ureteropelvic junction measures 1.5 cm, resulting in   moderate left-sided hydronephrosis.          Assessment/Plan:      Left ureteral stone with hydronephrosis     status post percutaneous nephrostomy tube placement by IR , however complicated with retroperitoneal hemorrhage   s.,p angio embolization by IR on 4/24  Control pain with IV narcotics  Urology consulted and plans for lithotripsy soon     Acute hemorrhagic shock due to retroperitoneal hematoma   status post IR embolization  Hemoglobin stable after 3 units PRBC for 48 hrs but started to drop once heparin gtt initiated for NSTEMi with Afibb     Hb down from 8.1 to 6.3   continue to monitor H&H- for 2 units PRBC today again   Consider repeat CTA abd to rule out bleed again   Stop heparin and ASA     UTi - enterococcus and Staph EPi  cx from nephrostomy tube  - ampicillin sensitive , now on IV  Ampicillin    Acute renal failure with metabolic acidosis  - pt has one kidney   - creatinine at 1.5 on adm, peaked to 2.8 and improved to 1.2 now with IVF   nephrology has been consulted  - avoid nephrotoxic agents    New onset Afibb - likely with acute sickness - quickly resolved with cardizem bolus and gtt  TSH slightly high , ECHO and stress test pending  Started on BB  Cardiology following      Elevated Troponin  - likely demand ischemia vs NSTEMi   - no AC with recent retroperitoneal bleed  - hold ASA, continue statins, start BB  - echo pending  - cardiology consulted, for stress test     Hypertension - prn  hydralazine ordered.   And amlodipine if tolerated        DVT Prophylaxis: mech   Diet: DIET GENERAL;  Code Status: Full Code    Start PT      Aaron Painting MD

## 2021-04-28 NOTE — PROGRESS NOTES
Pt resting in bed this evening watching TV. Shift assessment complete. All meds given per [facility]. Heparin drip running @ 8.5. Pt reminded of NPO status at midnight. Bed in lowest position and call light within reach. Beverage and snack offered. Will continue to monitor and assess.

## 2021-04-28 NOTE — CARE COORDINATION
INTERDISCIPLINARY PLAN OF CARE CONFERENCE    Date/Time: 4/28/2021 11:59 AM  Completed by: Zach Escobedo Case Management      Patient Name:  Rodney Area  YOB: 1965  Admitting Diagnosis: Ureterolithiasis [N20.1]  Obstructive uropathy [N13.9]     Admit Date/Time:  4/22/2021  5:14 PM    Chart reviewed. Interdisciplinary team contacted or reviewed plan related to patient progress and discharge plans. Disciplines included Case Management, Nursing, and Dietitian. Current Status:stable on RA, echo pending; 2 units of blood today  PT/OT recommendation for discharge plan of care: Home PRN assist  and Home PT    Expected D/C Disposition:  Home  Confirmed plan with patient and/or family Yes confirmed with: (name) pt  Met with:pt  Discharge Plan Comments: Chart reviewed. Met with pt at bedside. Pt continues with plan to return home and states has family that will help her as needed. Pt weaned to RA. Will follow for possible HHC needs.      Home O2 in place on admit: No

## 2021-04-28 NOTE — PROGRESS NOTES
Pharmacy - RE:  Low-dose Heparin drip  Current rate = 8.5 mL/hr  (850 units/hr)  aPTT drawn on 4/28 @ 0006 = 47.6 sec. Goal aPTT = 49 - 76 sec. Per protocol, give 2000 unit bolus and increase rate of heparin infusion to 9.5 mL/hr (950 units/hr)  Will order next aPTT for 0830 on 4/28/21.   Amna Vivar [facility]

## 2021-04-28 NOTE — FLOWSHEET NOTE
04/28/21 1425   Vitals   /77   Temp 97.9 °F (36.6 °C)   Temp Source Oral   Pulse 95   Resp 17   SpO2 94 %   Vitals shown above after remaining in room with pt for first 15 minutes of transfusion. No signs or symptoms noted by this RN for transfusion reaction. Pt resting in bed with the call light within reach. Pt denies any other care needs at this time. Pt stable at this time.     Angie Fernandez RN

## 2021-04-28 NOTE — PROGRESS NOTES
Orders placed per Gretel Baxter for Type & Screen, 2 units PRBC's, occult stool needed, and discontinued heparin drip.

## 2021-04-28 NOTE — CONSULTS
allergies. MEDICATIONS:  See list in the chart, which I have reviewed. PHYSICAL EXAMINATION:  VITALS:  Blood pressure is 126/83, heart rate 88, respirations 17, and  temperature 97. She is 95% saturated on oxygen. GENERAL:  A well-developed, well-nourished female, in no acute distress. HEENT:  Normocephalic and atraumatic. Oropharynx clear. Moist mucous  membranes. NECK:  Supple. CHEST:  Clear. CARDIAC:  Regular S1 and S2. There is no S3 or S4 gallop. There is no  significant murmur. Jugular venous pressure is normal.  Carotids are 2+  and symmetric without bruit. ABDOMEN:  Soft and nontender. Positive bowel sounds. EXTREMITIES:  No cyanosis or edema. NEUROLOGIC:  Grossly nonfocal.  SKIN:  Warm and dry. PSYCHIATRIC:  Affect calm. DIAGNOSTIC DATA:  EKG, initial EKG shows atrial fibrillation with rapid  ventricular response, diffuse ST and T-wave abnormalities. Subsequent  EKG shows normal sinus rhythm, ST and T-wave abnormalities in the  anterolateral leads suggestive of ischemia. LABORATORY DATA:  Significant laboratory data includes a troponin of  0.59. IMPRESSION:  1. Elevated troponin and an abnormal EKG. The patient has no anginal  symptoms. This may all be due to supply demand imbalance precipitated by  her profound anemia, atrial fibrillation, and tachyarrhythmia with her  acute kidney injury also contributing to the elevated troponin. She has  no history of ischemic heart disease. However, this needs to be  considered in this clinical setting. Due to her recent acute  comorbidities, cardiac catheterization at this time carries increased  risk. Given the fact that she has no angina, I think initial nuclear  stress testing for risk assessment is the better option. Pending these  results, then we will consider further ischemic work up including  cardiac catheterization. 2.  Paroxysmal atrial fibrillation.   She reports having atrial  fibrillation about eight years ago, but is unaware of any recurrence. This recurrence likely precipitated by her acute illness. She is now  spontaneously converted back to normal sinus rhythm. 3.  Tachycardia due to multiple acute medical conditions as well as her  atrial fibrillation. 4.  Recent hemorrhagic shock status post embolization on 04/24/2021.  5.  Left perinephric hematoma after percutaneous nephrostomy. 6.  Anemia. 7.  Acute kidney injury. 8.  Hypertension. 9.  Hepatitis C.  10.  Smoking. 11.  Abnormal TSH. RECOMMENDATIONS:  1. Initiate heparin. It appears at this time, if the bleeding has  discontinued and her hemoglobin has stabilized, we will check with the  hospitalist prior to initiating anticoagulation. We will initially  start with IV heparin and then change to oral medication pending further  testing. 2.  Echocardiogram.  3.  Lexiscan Myoview stress test.  4.  Aspirin, statin, and beta-blocker. 5.  Smoking cessation is discussed.         Chele Crowley MD    D: 04/27/2021 15:19:40       T: 04/27/2021 17:43:19     [email or web address]  Job#: [id number]     Doc#: [id number]    CC:

## 2021-04-28 NOTE — PROGRESS NOTES
Pulmonary & Critical Care Medicine ICU Progress Note    CC: Postprocedural retroperitoneal hematoma    S:   Patient is feeling somewhat better overall, she certainly looks better, she received 2 units packed red blood cells today. Invasive Lines:   IV Line: Peripheral    MV:       / / /FiO2 : 2 %  No results for input(s): PHART, JZK2TDG, PO2ART in the last 72 hours. IV:   sodium chloride      sodium chloride      sodium chloride      sodium chloride      sodium chloride      sodium chloride         EXAM:  /81   Pulse 97   Temp 97.6 °F (36.4 °C) (Oral)   Resp 17   Ht 5' 7\" (1.702 m)   Wt 183 lb 11.2 oz (83.3 kg)   LMP 12/01/2014   SpO2 93%   BMI 28.77 kg/m²  on RA  Tmax: 99 F      Intake/Output Summary (Last 24 hours) at 4/28/2021 1936  Last data filed at 4/28/2021 1802  Gross per 24 hour   Intake 532.5 ml   Output 1825 ml   Net -1292.5 ml     General: Looks better, more comfortable  Eyes: PERRL. No sclera icterus. No conjunctival injection. ENT: No discharge. Pharynx clear. Neck: Trachea midline. Normal thyroid. Resp: No accessory muscle use. No crackles. No wheezing. No rhonchi. No dullness on percussion. CV: Regular rate. Regular rhythm. No mumur or rub. No edema. Peripheral pulses are 2+. Capillary refill is less than 3 seconds. GI: Non-tender. Non-distended. No masses. No organomegaly. Normal bowel sounds. No hernia. Skin: Warm and dry. No nodule on exposed extremities. No rash on exposed extremities. Nephrostomy tube in place  Lymph: No cervical LAD. No supraclavicular LAD. M/S: No cyanosis. No joint deformity. No clubbing. Neuro: Alert and oriented x3. Patellar reflexes are symmetric. Psych: No agitation, no anxiety, affect is full.     Medications:   docusate sodium  100 mg Oral BID    [Held by provider] aspirin  81 mg Oral Daily    atorvastatin  40 mg Oral Nightly    metoprolol tartrate  25 mg Oral BID    ampicillin IVPB 500 mg  500 mg Intravenous 4 times per day  sodium chloride flush  5-40 mL Intravenous 2 times per day    tamsulosin  0.4 mg Oral Daily     PRN Meds:  sodium chloride, potassium chloride **OR** potassium alternative oral replacement **OR** potassium chloride, magnesium sulfate, perflutren lipid microspheres, regadenoson, promethazine, oxyCODONE-acetaminophen, hydrALAZINE, sodium chloride, sodium chloride, sodium chloride, sodium chloride, hydrOXYzine, sodium chloride flush, sodium chloride, acetaminophen **OR** acetaminophen, polyethylene glycol, HYDROmorphone    Results:  CBC:   Recent Labs     04/27/21  0457 04/27/21  1522 04/28/21  0329 04/28/21  0933 04/28/21  1650   WBC 8.2 8.6 7.9  --   --    HGB 8.2*  8.1* 7.9* 6.7* 6.3* 9.4*   HCT 25.2*  24.8* 24.5* 21.0* 19.6* 29.1*   MCV 80.3 80.5 80.4  --   --    * 131* 126*  --   --      BMP:   Recent Labs     04/26/21  0340 04/27/21  0116 04/27/21  0457 04/28/21  0328     --  135* 134*   K 3.3* 3.3* 3.6 3.6     --  101 102   CO2 24  --  25 24   PHOS 2.9  --  1.8* 2.2*   BUN 27*  --  21* 21*   CREATININE 1.6*  --  1.2* 1.2*     LIVER PROFILE:   No results for input(s): AST, ALT, LIPASE, BILIDIR, BILITOT, ALKPHOS in the last 72 hours. Invalid input(s): AMYLASE,  ALB  PT/INR:   No results for input(s): PROTIME, INR in the last 72 hours. APTT:   Recent Labs     04/27/21  1522 04/28/21  0006   APTT 26.4 47.6*     UA:  No results for input(s): NITRITE, COLORU, PHUR, LABCAST, WBCUA, RBCUA, MUCUS, TRICHOMONAS, YEAST, BACTERIA, CLARITYU, SPECGRAV, LEUKOCYTESUR, UROBILINOGEN, BILIRUBINUR, BLOODU, GLUCOSEU, AMORPHOUS in the last 72 hours. Invalid input(s): Ralf Porteous    Cultures:  4/22/2021 SARS-CoV-2 NAAT negative   4/22/2021: enterococcus- amp sens  4/23/2021 urine/drain Enterococcus and Staph epi    Films:  CTA abd 4/23: There is bibasilar atelectasis.   There is now significant perinephric hemorrhage on the left after placement  of a left-sided nephrostomy tube.  Small amount of gas is seen in the  nephrostomy tube tract.  The tube appears to be in good position.  Multiple  left-sided calculi are now all present within the renal pelvis.  There is no  hydronephrosis.  Active arterial extravasation is seen from the mid to lower  3rd of the left kidney laterally.  The left kidney is compressed with  slightly heterogeneous perfusion.  Hemorrhage extends inferiorly into the  upper left pelvis.  There is also perinephric hemorrhage surrounding the  aorta and extending into the right renal fossa.  Ascites has also developed.     ASSESSMENT:  · Perinephric hematoma after IR nephrostomy tube placement- arterial injury s/p IR embolization 4/24/21  · Acute kidney injury  · Acute blood loss anemia -drop in hemoglobin overnight while on heparin infusion, appears stable now. · Functionally solitary left kidney with large left nephrolithiasis  · UTI  · Thrombocytopenia  · Hep C     PLAN:  · Antibiotic day #6, ampicillin day #4    · S/P 5 U PRBCs   · I discussed with Dr. Corbin Hill today. Patient appears stable and does not require transfer to the ICU at this time. She will continue to receive close monitoring on the floor. No additional imaging at this time was recommended 2/2 risk felt to outweigh the benefit.   · Hold all anticoagulants

## 2021-04-28 NOTE — PROGRESS NOTES
Perfect Serve sent to [facility];    Panic H&H 6.7; 21. I placed an order for type and screen because > than 36 hrs since last infusion. Also, pt on a Heparin drip @ 9.5 mL/hr per Cardiology.

## 2021-04-28 NOTE — FLOWSHEET NOTE
04/28/21 1602   Vital Signs   Temp 97.6 °F (36.4 °C)   Temp Source Oral   Pulse 97   Heart Rate Source Monitor   Resp 17   /81   BP Location Right lower arm   Patient Position Semi fowlers   Level of Consciousness Alert (0)   MEWS Score 1   Patient Currently in Pain Denies   Oxygen Therapy   SpO2 93 %   O2 Device None (Room air)   O2 Flow Rate (L/min) 0 L/min   Second unit of PRBC's completed at this time. Vitals shown above; stable. No signs or symptoms of transfusion reaction. Pt currently resting in bed with the call light within reach. Pt denies any other care needs at this time. Pt stable at this time.     Natasha Jimenez RN

## 2021-04-28 NOTE — PROGRESS NOTES
[facility]       Progress Note           Subjective/   48y.o. year old female who we are seeing in consultation for MARY. HPI:  Renal function better with IVF's, now off, urine output of 2 liters over last 24 hours. Intake better. ROS:  +left flank pain. Denies any lightheadedness. Objective/   GEN:  Chronically ill, /70   Pulse 93   Temp 98.8 °F (37.1 °C) (Oral)   Resp 18   Ht 5' 7\" (1.702 m)   Wt 183 lb 11.2 oz (83.3 kg)   LMP 12/01/2014   SpO2 93%   BMI 28.77 kg/m²   HEENT: non-icteric, no JVD  CV: S1, S2 without m/r/g; no LE edema  RESP: CTA B without w/r/r; breathing wnl  ABD: +bs, soft, nt, no hsm  SKIN: warm, no rashes    Data/  Recent Labs     04/27/21  0457 04/27/21  1522 04/28/21  0329 04/28/21  0933   WBC 8.2 8.6 7.9  --    HGB 8.2*  8.1* 7.9* 6.7* 6.3*   HCT 25.2*  24.8* 24.5* 21.0* 19.6*   MCV 80.3 80.5 80.4  --    * 131* 126*  --      Recent Labs     04/26/21  0340 04/27/21  0116 04/27/21  0457 04/28/21  0328     --  135* 134*   K 3.3* 3.3* 3.6 3.6     --  101 102   CO2 24  --  25 24   GLUCOSE 108*  --  80 91   PHOS 2.9  --  1.8* 2.2*   MG  --  1.70*  --   --    BUN 27*  --  21* 21*   CREATININE 1.6*  --  1.2* 1.2*   LABGLOM 33*  --  47* 47*   GFRAA 41*  --  56* 56*       Assessment/     -MARY in the setting of hemorrhagic shock, seems to have stabilized with packed red blood cells and volume resuscitation. She also had left hydronephrosis and stent was placed on 4/23.   She has received IV contrast with CTA and during coil embolization     -Left perinephric hematoma after percutaneous nephrostomy     -Nephrolithiasis, left UVJ, Urology following     -Hemorrhagic shock, improved                 -Metabolic acidosis    -Afib with RVR    -Troponin elevation    Plan/     - Okay for CT with IV contrast if needed to evaluate for blood loss  - Trend labs, bp's, urine output

## 2021-04-28 NOTE — PROGRESS NOTES
Pt assessment complete; see flow sheets. Vitals completed and stable; see flow sheets. PO meds held at this time per Cardiology. Hemoglobin of 6.7 this morning MD aware- 2 units of PRBCs ordered. Pt currently resting in bed with the call light within reach. Pt denies any other care needs at this time. Pt stable at this time.     Ayden Loera RN

## 2021-04-28 NOTE — PROGRESS NOTES
Pt put on heparin gtt by cards yd for PAF and concern for cardiac ischemia. She has hx of recent L retroperitoneal bleed/hematoma. HGB 6.7 this am, down from 7.9 yd afternoon and 8.2 yd am.  DC heparin gtt, ordered SCD's. Made HH q6h. Given cards concern for possible ischemia I will transfuse 2 units pRBC for a goal > 8.0.

## 2021-04-28 NOTE — FLOWSHEET NOTE
04/28/21 1150   Vitals   /70   Temp 98.8 °F (37.1 °C)   Temp Source Oral   Pulse 93   Resp 18   SpO2 93 %   Transfuse RBC   Patient Observed Initial 15 Min  Yes   First unit of PRBC started at 1135. Observed for the first 15 minutes. Vitals are as shown above. No signs or symptoms of distress. Will continue to monitor.   Emily Vaz RN

## 2021-04-28 NOTE — PROGRESS NOTES
Blood consent signed, in chart. Occult stool needed, cup, labels, and bag in bathroom in pts room. Percocet given for pain 7/10. BP stable 115/70. Will continue to monitor.

## 2021-04-28 NOTE — FLOWSHEET NOTE
04/28/21 1405   Transfuse RBC   Time Blood Reaches Vein 1410       Second unit of PRBC's started at this time; will remain in room to assess pt.     Donnie Valencia RN

## 2021-04-29 LAB
ALBUMIN SERPL-MCNC: 2.1 G/DL (ref 3.4–5)
ANION GAP SERPL CALCULATED.3IONS-SCNC: 8 MMOL/L (ref 3–16)
BASOPHILS ABSOLUTE: 0.1 K/UL (ref 0–0.2)
BASOPHILS RELATIVE PERCENT: 0.6 %
BUN BLDV-MCNC: 18 MG/DL (ref 7–20)
CALCIUM SERPL-MCNC: 7.9 MG/DL (ref 8.3–10.6)
CHLORIDE BLD-SCNC: 105 MMOL/L (ref 99–110)
CO2: 23 MMOL/L (ref 21–32)
CREAT SERPL-MCNC: 1.1 MG/DL (ref 0.6–1.1)
EOSINOPHILS ABSOLUTE: 0.2 K/UL (ref 0–0.6)
EOSINOPHILS RELATIVE PERCENT: 2.3 %
GFR AFRICAN AMERICAN: >60
GFR NON-AFRICAN AMERICAN: 52
GLUCOSE BLD-MCNC: 95 MG/DL (ref 70–99)
HCT VFR BLD CALC: 27.5 % (ref 36–48)
HEMOGLOBIN: 9.2 G/DL (ref 12–16)
LYMPHOCYTES ABSOLUTE: 1.3 K/UL (ref 1–5.1)
LYMPHOCYTES RELATIVE PERCENT: 12.4 %
MCH RBC QN AUTO: 28.1 PG (ref 26–34)
MCHC RBC AUTO-ENTMCNC: 33.5 G/DL (ref 31–36)
MCV RBC AUTO: 83.7 FL (ref 80–100)
MONOCYTES ABSOLUTE: 1.5 K/UL (ref 0–1.3)
MONOCYTES RELATIVE PERCENT: 14.5 %
NEUTROPHILS ABSOLUTE: 7.2 K/UL (ref 1.7–7.7)
NEUTROPHILS RELATIVE PERCENT: 70.2 %
PARATHYROID HORMONE INTACT: 61.7 PG/ML (ref 14–72)
PDW BLD-RTO: 20.2 % (ref 12.4–15.4)
PHOSPHORUS: 2.2 MG/DL (ref 2.5–4.9)
PLATELET # BLD: 136 K/UL (ref 135–450)
PMV BLD AUTO: 9.6 FL (ref 5–10.5)
POTASSIUM SERPL-SCNC: 3.6 MMOL/L (ref 3.5–5.1)
RBC # BLD: 3.28 M/UL (ref 4–5.2)
SODIUM BLD-SCNC: 136 MMOL/L (ref 136–145)
TROPONIN: 1.03 NG/ML
WBC # BLD: 10.2 K/UL (ref 4–11)

## 2021-04-29 PROCEDURE — 6370000000 HC RX 637 (ALT 250 FOR IP): Performed by: INTERNAL MEDICINE

## 2021-04-29 PROCEDURE — 99232 SBSQ HOSP IP/OBS MODERATE 35: CPT | Performed by: INTERNAL MEDICINE

## 2021-04-29 PROCEDURE — 6360000002 HC RX W HCPCS: Performed by: INTERNAL MEDICINE

## 2021-04-29 PROCEDURE — 2060000000 HC ICU INTERMEDIATE R&B

## 2021-04-29 PROCEDURE — 85025 COMPLETE CBC W/AUTO DIFF WBC: CPT

## 2021-04-29 PROCEDURE — 36415 COLL VENOUS BLD VENIPUNCTURE: CPT

## 2021-04-29 PROCEDURE — 99233 SBSQ HOSP IP/OBS HIGH 50: CPT | Performed by: INTERNAL MEDICINE

## 2021-04-29 PROCEDURE — 2580000003 HC RX 258: Performed by: INTERNAL MEDICINE

## 2021-04-29 PROCEDURE — 2580000003 HC RX 258: Performed by: HOSPITALIST

## 2021-04-29 PROCEDURE — 84484 ASSAY OF TROPONIN QUANT: CPT

## 2021-04-29 PROCEDURE — 80069 RENAL FUNCTION PANEL: CPT

## 2021-04-29 PROCEDURE — 6360000002 HC RX W HCPCS: Performed by: HOSPITALIST

## 2021-04-29 RX ADMIN — ATORVASTATIN CALCIUM 40 MG: 40 TABLET, FILM COATED ORAL at 21:47

## 2021-04-29 RX ADMIN — ACETAMINOPHEN 650 MG: 325 TABLET ORAL at 18:27

## 2021-04-29 RX ADMIN — SODIUM CHLORIDE, PRESERVATIVE FREE 10 ML: 5 INJECTION INTRAVENOUS at 09:11

## 2021-04-29 RX ADMIN — METOPROLOL TARTRATE 25 MG: 25 TABLET, FILM COATED ORAL at 21:46

## 2021-04-29 RX ADMIN — SODIUM CHLORIDE, PRESERVATIVE FREE 10 ML: 5 INJECTION INTRAVENOUS at 21:48

## 2021-04-29 RX ADMIN — AMPICILLIN SODIUM 500 MG: 500 INJECTION, POWDER, FOR SOLUTION INTRAMUSCULAR; INTRAVENOUS at 06:06

## 2021-04-29 RX ADMIN — OXYCODONE HYDROCHLORIDE AND ACETAMINOPHEN 1 TABLET: 5; 325 TABLET ORAL at 01:52

## 2021-04-29 RX ADMIN — METOPROLOL TARTRATE 25 MG: 25 TABLET, FILM COATED ORAL at 09:05

## 2021-04-29 RX ADMIN — OXYCODONE HYDROCHLORIDE AND ACETAMINOPHEN 1 TABLET: 5; 325 TABLET ORAL at 21:47

## 2021-04-29 RX ADMIN — OXYCODONE HYDROCHLORIDE AND ACETAMINOPHEN 1 TABLET: 5; 325 TABLET ORAL at 11:35

## 2021-04-29 RX ADMIN — TAMSULOSIN HYDROCHLORIDE 0.4 MG: 0.4 CAPSULE ORAL at 09:05

## 2021-04-29 RX ADMIN — AMPICILLIN SODIUM 500 MG: 500 INJECTION, POWDER, FOR SOLUTION INTRAMUSCULAR; INTRAVENOUS at 18:20

## 2021-04-29 RX ADMIN — AMPICILLIN SODIUM 500 MG: 500 INJECTION, POWDER, FOR SOLUTION INTRAMUSCULAR; INTRAVENOUS at 11:37

## 2021-04-29 RX ADMIN — AMPICILLIN SODIUM 500 MG: 500 INJECTION, POWDER, FOR SOLUTION INTRAMUSCULAR; INTRAVENOUS at 00:21

## 2021-04-29 RX ADMIN — ACETAMINOPHEN 650 MG: 325 TABLET ORAL at 09:21

## 2021-04-29 RX ADMIN — OXYCODONE HYDROCHLORIDE AND ACETAMINOPHEN 1 TABLET: 5; 325 TABLET ORAL at 15:50

## 2021-04-29 RX ADMIN — DOCUSATE SODIUM 100 MG: 100 CAPSULE, LIQUID FILLED ORAL at 09:05

## 2021-04-29 RX ADMIN — DOCUSATE SODIUM 100 MG: 100 CAPSULE, LIQUID FILLED ORAL at 21:47

## 2021-04-29 RX ADMIN — OXYCODONE HYDROCHLORIDE AND ACETAMINOPHEN 1 TABLET: 5; 325 TABLET ORAL at 06:51

## 2021-04-29 ASSESSMENT — PAIN DESCRIPTION - PROGRESSION
CLINICAL_PROGRESSION: GRADUALLY WORSENING
CLINICAL_PROGRESSION: NOT CHANGED
CLINICAL_PROGRESSION: NOT CHANGED

## 2021-04-29 ASSESSMENT — PAIN DESCRIPTION - FREQUENCY
FREQUENCY: CONTINUOUS
FREQUENCY: CONTINUOUS
FREQUENCY: INTERMITTENT

## 2021-04-29 ASSESSMENT — PAIN DESCRIPTION - ORIENTATION
ORIENTATION: LEFT

## 2021-04-29 ASSESSMENT — PAIN SCALES - GENERAL
PAINLEVEL_OUTOF10: 6
PAINLEVEL_OUTOF10: 7
PAINLEVEL_OUTOF10: 6
PAINLEVEL_OUTOF10: 6
PAINLEVEL_OUTOF10: 7
PAINLEVEL_OUTOF10: 7
PAINLEVEL_OUTOF10: 6
PAINLEVEL_OUTOF10: 7

## 2021-04-29 ASSESSMENT — PAIN DESCRIPTION - DESCRIPTORS
DESCRIPTORS: SHARP
DESCRIPTORS: DISCOMFORT
DESCRIPTORS: SHARP

## 2021-04-29 ASSESSMENT — PAIN DESCRIPTION - PAIN TYPE
TYPE: ACUTE PAIN

## 2021-04-29 ASSESSMENT — PAIN DESCRIPTION - ONSET
ONSET: ON-GOING
ONSET: ON-GOING

## 2021-04-29 ASSESSMENT — PAIN DESCRIPTION - LOCATION
LOCATION: FLANK
LOCATION: FLANK
LOCATION: BACK

## 2021-04-29 ASSESSMENT — PAIN - FUNCTIONAL ASSESSMENT
PAIN_FUNCTIONAL_ASSESSMENT: PREVENTS OR INTERFERES SOME ACTIVE ACTIVITIES AND ADLS

## 2021-04-29 NOTE — FLOWSHEET NOTE
04/29/21 0853   Vital Signs   Temp 97.9 °F (36.6 °C)   Temp Source Oral   Pulse 105   Heart Rate Source Monitor   Resp 18   BP (!) 154/90   BP Location Left upper arm   Patient Position Lying right side   Level of Consciousness Alert (0)   MEWS Score 2   Patient Currently in Pain Yes   Pain Assessment   Pain Assessment 0-10   Pain Level 6   Patient's Stated Pain Goal No pain   Pain Type Acute pain   Pain Location Flank   Pain Orientation Left   Pain Descriptors Sharp   Pain Frequency Continuous   Clinical Progression Not changed   Functional Pain Assessment Prevents or interferes some active activities and ADLs   Non-Pharmaceutical Pain Intervention(s) Repositioned;Relaxation techniques; Rest   Oxygen Therapy   SpO2 93 %   Pulse Oximeter Device Mode Intermittent   Pulse Oximeter Device Location Left;Finger   O2 Device None (Room air)     AM assessment complete. Pt a&o x4. She states that she is having discomfort of 6/10 to L flank. Requested PRN dilaudid but then changed mind and requested tylenol. See MAR. Call light and bedside table within reach. Will continue to monitor.

## 2021-04-29 NOTE — PROGRESS NOTES
Patient:  Yoselin Curiel  YOB: 1965   Date of Service:  04/26/21      Urology Attending Daily Progress Note    Chief complaint: \"feeling better\"    Interval HPI:  Received pRBC yesterday - Hgb stable in 9s since - trop 1.03, medicine following, pt feels better overall, eager for DC     Objective:    Patient Vitals for the past 8 hrs:   BP Temp Temp src Pulse Resp SpO2   04/26/21 0800 (!) 150/89 -- -- 97 17 95 %   04/26/21 0751 -- 98.6 °F (37 °C) Oral -- -- --   04/26/21 0700 (!) 140/82 -- -- 94 14 95 %   04/26/21 0600 (!) 140/81 -- -- 95 9 96 %   04/26/21 0500 (!) 145/92 98 °F (36.7 °C) Oral 97 18 97 %   04/26/21 0400 (!) 143/86 -- -- 100 20 93 %   04/26/21 0300 139/84 -- -- 103 10 96 %   04/26/21 0200 (!) 142/87 -- -- 99 17 97 %   04/26/21 0100 (!) 145/87 -- -- 100 17 96 %     I/O last 3 completed shifts: In: 0562 [P.O.:620; I.V.:2595]  Out: 7079 [Urine:3195]     Physical Exam:   Constitutional: comfortable in hospital bed, no acute distress  Abdomen: left sided pain in flank  Genitourinary:  NT with CYU  Psych: normal mood and affect, appropriately answers questions   Skin, extremities: stable, exposed skin intact, no digital cyanosis                No results found for: PSALab Results   Component Value Date    CREATININE 1.6 (H) 04/26/2021    CREATININE 2.1 (H) 04/25/2021    CREATININE 2.5 (H) 04/24/2021                                 BILIRUBINUR Negative 04/22/2021      Component Value Date    WBC 10.4 04/26/2021    HGB 8.0 (L) 04/26/2021    HCT 23.8 (L) 04/26/2021    MCV 80.0 04/26/2021     (L) 04/26/2021       Radiology:  \"Imaging was independently reviewed by myself and I agree with the radiology interpretation    Assessment:  Yoselin Curiel is a 48 y.o. female with functionally solitary left kidney - large stone burden and underwent L PNT on 4/23 in anticipation of L PCNL - unfortunately, had hemorrhage and CTA showed active arterial extravasation requiring angioembolization with super selective coiling on 4/24     Plan:  -- NT draining well -reassuring - continue at DC  -- Hgb stable after pRBC transfusion - reassuring, continue to monitor  -- defer treatment of elevated troponin to medicine - appreciated   -- SCDs and incentive spirometer   -- will see Dr Morgan Peters once discharged - we will defer PCNL for now, let her heal from this bleed and then consider definitive stone management - PTH pending currently  -- following with you        Ari Rodriguez MD  [facility]

## 2021-04-29 NOTE — PROGRESS NOTES
Physical/Occupational Therapy Attempt  Attempted follow-up treatment. Pt declining therapy at this time stating she is too sore. Educated pt on importance of continued mobility. Pt states she has been getting up to the [facility] frequently. Pt declined transfer to chair and ambulation the room. Encouraged pt to get up and ambulate with staff at least once prior to d/c tomorrow so a walker or assistive device can be ordered for her if needed. Pt verbalized understanding. Will follow-up tomorrow as pt status and schedule allow. No charge.   Ranjan Rodriguez, PT, DPT #[id number]  Roly Rule OTR/L [id number]

## 2021-04-29 NOTE — FLOWSHEET NOTE
04/28/21 2125   Pain Assessment   Pain Assessment 0-10   Pain Level 6   Patient's Stated Pain Goal No pain   Pain Type Acute pain   Pain Location Flank   Pain Orientation Left   Pain Radiating Towards n/a   Pain Descriptors Discomfort   Pain Frequency Continuous   Pain Onset On-going   Clinical Progression Gradually worsening   Functional Pain Assessment Prevents or interferes some active activities and ADLs   Non-Pharmaceutical Pain Intervention(s) Declines   Response to Pain Intervention Asleep with RR greater than 10   PRN pain medication given per order, Patient with c/o flank pain 6/10. Patient satisfied at this time,will continue to monitor.

## 2021-04-29 NOTE — PROGRESS NOTES
Hospitalist Progress Note      PCP: Joshua Villegas MD    Date of Admission: 4/22/2021    Hospital Course: pt seen , 55-y admitted with 1.5 cm left ureteropelvic junction stone status post percutaneous nephrostomy   Postprocedure patient has developed retroperitoneal hematoma and hemorrhagic shock patient is transferred to ICU  received 3 units of PRBC  And underwent an IR embolization    Rapid afibb with troponin leak , started on heparin with further drop in h.h on 4/27     S.p 2 more units on 4/28       Subjective:     Ms Nikhil Mcgovern feels ok today.  Had BM   Pain still an issue   No worsening flank or abd pain   No chest pain       Medications:  Reviewed    Infusion Medications    sodium chloride      sodium chloride      sodium chloride      sodium chloride      sodium chloride      sodium chloride       Scheduled Medications    docusate sodium  100 mg Oral BID    [Held by provider] aspirin  81 mg Oral Daily    atorvastatin  40 mg Oral Nightly    metoprolol tartrate  25 mg Oral BID    ampicillin IVPB 500 mg  500 mg Intravenous 4 times per day    sodium chloride flush  5-40 mL Intravenous 2 times per day    tamsulosin  0.4 mg Oral Daily     PRN Meds: sodium chloride, potassium chloride **OR** potassium alternative oral replacement **OR** potassium chloride, magnesium sulfate, perflutren lipid microspheres, regadenoson, promethazine, oxyCODONE-acetaminophen, hydrALAZINE, sodium chloride, sodium chloride, sodium chloride, sodium chloride, hydrOXYzine, sodium chloride flush, sodium chloride, acetaminophen **OR** acetaminophen, polyethylene glycol, HYDROmorphone      Intake/Output Summary (Last 24 hours) at 4/29/2021 0739  Last data filed at 4/28/2021 2307  Gross per 24 hour   Intake 532.5 ml   Output 1100 ml   Net -567.5 ml       Physical Exam Performed:    BP (!) 143/87   Pulse 89   Temp 98 °F (36.7 °C) (Oral)   Resp 18   Ht 5' 7\" (1.702 m)   Wt 182 lb 9.6 oz (82.8 kg)   LMP 12/01/2014   SpO2 91% BMI 28.60 kg/m²         General: middle aged female up in bed   Awake, alert and oriented. Appears to be not in any distress  Mucous Membranes:  Pink , anicteric  Neck: No JVD, no carotid bruit, no thyromegaly  Chest:  Clear to auscultation bilaterally, no added sounds  Cardiovascular:  RRR S1S2 heard, no murmurs or gallops  Abdomen:  Soft, undistended, non tender, no organomegaly, BS present  Extremities: left nephrostomy tube in left flank with clear urine   No edema or cyanosis. Distal pulses well felt  Neurological : grossly normal        Labs:   Recent Labs     04/27/21  1522 04/28/21  0329 04/28/21  0329 04/28/21  1650 04/28/21  2131 04/29/21  0329   WBC 8.6 7.9  --   --   --  10.2   HGB 7.9* 6.7*   < > 9.4* 9.0* 9.2*   HCT 24.5* 21.0*   < > 29.1* 27.2* 27.5*   * 126*  --   --   --  136    < > = values in this interval not displayed. Recent Labs     04/27/21  0457 04/28/21  0328 04/29/21  0329   * 134* 136   K 3.6 3.6 3.6    102 105   CO2 25 24 23   BUN 21* 21* 18   CREATININE 1.2* 1.2* 1.1   CALCIUM 7.8* 7.9* 7.9*   PHOS 1.8* 2.2* 2.2*     No results for input(s): AST, ALT, BILIDIR, BILITOT, ALKPHOS in the last 72 hours. No results for input(s): INR in the last 72 hours. Urine  - enterococcus and staph EPI      CTA abd    Active arterial extravasation from the lateral left kidney with a large   retroperitoneal hematoma     Ct abd   Interval development of left nephrolithiasis.       Calculus at the left ureteropelvic junction measures 1.5 cm, resulting in   moderate left-sided hydronephrosis. ECHO    The left ventricular systolic function is mildly reduced with an ejection   fraction of 40-45 %. There is akinesis of the apical septal, apical lateral , apical anterior and apical inferior walls. There is mild concentric left ventricular hypertrophy. Grade II diastolic dysfunction with elevated left ventricular filling   pressure.    Left ventricular cavity size is normal.   Mild thickening of the anterior leaflet of mitral valve. Mild mitral regurgitation. Systolic pulmonary artery pressure (SPAP) is estimated at 55 mmHg consistent   with moderate pulmonary hypertension (Right atrial pressure of 3 mmHg). Assessment/Plan:      Left ureteral stone with hydronephrosis     status post percutaneous nephrostomy tube placement by IR , however complicated with retroperitoneal hemorrhage   s.,p angio embolization by IR on 4/24  Control pain with IV narcotics> oral meds  Urology consulted and plans for lithotripsy soon     Acute hemorrhagic shock due to retroperitoneal hematoma   status post IR embolization  Hemoglobin stable after 3 units PRBC for 48 hrs but started to drop once heparin gtt initiated for AFibb     Hb down from 8.1 to 6.3  Given 2 more units and now at 9.2    continue to monitor H&H-  Stopped  heparin and ASA     UTi - enterococcus and Staph EPi  cx from nephrostomy tube  - ampicillin sensitive , now on IV  Ampicillin    Acute renal failure with metabolic acidosis  - pt has one kidney   - creatinine at 1.5 on adm, peaked to 2.8 and improved to 1.2 now with IVF   nephrology has been consulted  - avoid nephrotoxic agents    New onset Afibb/NSTEMI   likely with acute sickness related Takasubo CM   - quickly resolved AFibb with cardizem bolus and gtt  Started on BB, no ASA or heparin with above   ECHO with EF of  40-45 %. akinesis of the apical septal, apical lateral , apical anterior and apical inferior walls.       Elevated Troponin  - likely demand ischemia vs NSTEMi   - no AC with recent retroperitoneal bleed  - hold ASA, continue statins, started BB  - cardiology consulted - recommend no stress test for now and repeat echo in few weeks after acute issues resolve     Resume diet  Dc planning in am      DVT Prophylaxis: [facility]   Diet: DIET GENERAL;  Code Status: Full Code        Natalie Laurent MD

## 2021-04-29 NOTE — PROGRESS NOTES
[facility]       Progress Note           Subjective/   48y.o. year old female who we are seeing in consultation for MARY. HPI:  Renal function better with IVF's, now off, urine output of 1.1 liters over last 24 hours. Intake better. ROS:  +left flank pain. Denies any lightheadedness. Objective/   GEN:  Chronically ill, BP (!) 154/90   Pulse 105   Temp 97.9 °F (36.6 °C) (Oral)   Resp 18   Ht 5' 7\" (1.702 m)   Wt 182 lb 9.6 oz (82.8 kg)   LMP 12/01/2014   SpO2 93%   BMI 28.60 kg/m²   HEENT: non-icteric, no JVD  CV: S1, S2 without m/r/g; no LE edema  RESP: CTA B without w/r/r; breathing wnl  ABD: +bs, soft, nt, no hsm  SKIN: warm, no rashes    Data/  Recent Labs     04/27/21  1522 04/28/21  0329 04/28/21  0329 04/28/21  1650 04/28/21  2131 04/29/21  0329   WBC 8.6 7.9  --   --   --  10.2   HGB 7.9* 6.7*   < > 9.4* 9.0* 9.2*   HCT 24.5* 21.0*   < > 29.1* 27.2* 27.5*   MCV 80.5 80.4  --   --   --  83.7   * 126*  --   --   --  136    < > = values in this interval not displayed. Recent Labs     04/27/21  0116 04/27/21  0457 04/28/21  0328 04/29/21 0329   NA  --  135* 134* 136   K 3.3* 3.6 3.6 3.6   CL  --  101 102 105   CO2  --  25 24 23   GLUCOSE  --  80 91 95   PHOS  --  1.8* 2.2* 2.2*   MG 1.70*  --   --   --    BUN  --  21* 21* 18   CREATININE  --  1.2* 1.2* 1.1   LABGLOM  --  47* 47* 52*   GFRAA  --  56* 56* >60       Assessment/     -MARY in the setting of hemorrhagic shock, seems to have stabilized with packed red blood cells and volume resuscitation. She also had left hydronephrosis and stent was placed on 4/23.   She has received IV contrast with CTA and during coil embolization     -Left perinephric hematoma after percutaneous nephrostomy     -Nephrolithiasis, left UVJ, Urology following     -Hemorrhagic shock, improved                 -Metabolic acidosis    -Afib with RVR    -Troponin elevation    Plan/     - Okay for CT with IV contrast if needed to

## 2021-04-29 NOTE — PLAN OF CARE
Problem: Pain:  Goal: Pain level will decrease  Description: Pain level will decrease  Outcome: Ongoing     Problem: Skin Integrity:  Goal: Skin integrity will stabilize  Description: Skin integrity will stabilize  Outcome: Ongoing

## 2021-04-29 NOTE — FLOWSHEET NOTE
04/29/21 0150   Vitals   Temp 98 °F (36.7 °C)   Temp Source Oral   Pulse 89   Heart Rate Source Monitor   Resp 18   BP (!) 143/87   BP Location Left upper arm   BP Upper/Lower Upper   BP Method Automatic   Patient Position Right side   Level of Consciousness Alert (0)   MEWS Score 1   Patient Currently in Pain Yes   Cardiac Rhythm NSR   Oxygen Therapy   SpO2 91 %   O2 Device None (Room air)   Pt in bed awake. C/o pain 7/10, PRN percocet given per order. Nephrostomy tube intact. Pt satisfied at this time,will continue to monitor.

## 2021-04-29 NOTE — PROGRESS NOTES
Pulmonary & Critical Care Medicine ICU Progress Note    CC: Postprocedural retroperitoneal hematoma    S:   No shortness of breath, flank pain persist    Invasive Lines:   IV Line: Peripheral    MV:       / / /FiO2 : 2 %  No results for input(s): PHART, YUJ2DBT, PO2ART in the last 72 hours. IV:      EXAM:  BP (!) 143/87   Pulse 89   Temp 98 °F (36.7 °C) (Oral)   Resp 18   Ht 5' 7\" (1.702 m)   Wt 182 lb 9.6 oz (82.8 kg)   LMP 12/01/2014   SpO2 91%   BMI 28.60 kg/m²  on RA      Intake/Output Summary (Last 24 hours) at 4/29/2021 0803  Last data filed at 4/28/2021 2307  Gross per 24 hour   Intake 532.5 ml   Output 1100 ml   Net -567.5 ml     Constitutional:  No acute distress. HENT:  Oropharynx is clear and moist.   Neck: No tracheal deviation present. Cardiovascular: Normal heart sounds. No lower extremity edema. Pulmonary/Chest: No wheezes. No rhonchi. No rales. No decreased breath sounds. No accessory muscle usage or stridor. Musculoskeletal: No cyanosis. No clubbing. Skin: Skin is warm and dry. Psychiatric: Normal mood and affect.   Neurologic: speech fluent, alert and oriented, strength symmetric        Medications:   docusate sodium  100 mg Oral BID    [Held by provider] aspirin  81 mg Oral Daily    atorvastatin  40 mg Oral Nightly    metoprolol tartrate  25 mg Oral BID    ampicillin IVPB 500 mg  500 mg Intravenous 4 times per day    sodium chloride flush  5-40 mL Intravenous 2 times per day    tamsulosin  0.4 mg Oral Daily     PRN Meds:  potassium chloride **OR** potassium alternative oral replacement **OR** potassium chloride, magnesium sulfate, perflutren lipid microspheres, regadenoson, promethazine, oxyCODONE-acetaminophen, hydrALAZINE, hydrOXYzine, sodium chloride flush, acetaminophen **OR** acetaminophen, polyethylene glycol, HYDROmorphone    Results:  CBC:   Recent Labs     04/27/21  1522 04/28/21  0329 04/28/21  0329 04/28/21  1650 04/28/21  2131 04/29/21  0329   WBC 8.6 7.9 --   --   --  10.2   HGB 7.9* 6.7*   < > 9.4* 9.0* 9.2*   HCT 24.5* 21.0*   < > 29.1* 27.2* 27.5*   MCV 80.5 80.4  --   --   --  83.7   * 126*  --   --   --  136    < > = values in this interval not displayed. BMP:   Recent Labs     04/27/21  0457 04/28/21  0328 04/29/21  0329   * 134* 136   K 3.6 3.6 3.6    102 105   CO2 25 24 23   PHOS 1.8* 2.2* 2.2*   BUN 21* 21* 18   CREATININE 1.2* 1.2* 1.1     LIVER PROFILE:   No results for input(s): AST, ALT, LIPASE, BILIDIR, BILITOT, ALKPHOS in the last 72 hours. Invalid input(s): AMYLASE,  ALB  PT/INR:   No results for input(s): PROTIME, INR in the last 72 hours. APTT:   Recent Labs     04/27/21  1522 04/28/21  0006   APTT 26.4 47.6*     UA:  No results for input(s): NITRITE, COLORU, PHUR, LABCAST, WBCUA, RBCUA, MUCUS, TRICHOMONAS, YEAST, BACTERIA, CLARITYU, SPECGRAV, LEUKOCYTESUR, UROBILINOGEN, BILIRUBINUR, BLOODU, GLUCOSEU, AMORPHOUS in the last 72 hours. Invalid input(s): Mary Carrera    Cultures:  4/22/2021 SARS-CoV-2 NAAT negative   4/22/2021: enterococcus- amp sens  4/23/2021 urine/drain Enterococcus and Staph epi    Films:  CTA abd 4/23: There is bibasilar atelectasis.   There is now significant perinephric hemorrhage on the left after placementof a left-sided nephrostomy tube.  Small amount of gas is seen in thenephrostomy tube tract.  The tube appears to be in good position.  Multipleleft-sided calculi are now all present within the renal pelvis.  There is nohydronephrosis.  Active arterial extravasation is seen from the mid to [id number] of the left kidney laterally.  The left kidney is compressed withslightly heterogeneous perfusion.  Hemorrhage extends inferiorly into theupper left pelvis. [place] is also perinephric hemorrhage surrounding the  aorta and extending into the right renal fossa.  Ascites has also developed.     ASSESSMENT:  · Perinephric hematoma after IR nephrostomy tube placement- arterial injury s/p IR embolization

## 2021-04-29 NOTE — FLOWSHEET NOTE
04/28/21 1950   Vitals   Temp 98.1 °F (36.7 °C)   Temp Source Oral   Pulse 91   Heart Rate Source Monitor   Resp 18   /74   BP Location Right lower arm   BP Upper/Lower Upper   BP Method Automatic   Patient Position Semi fowlers   Level of Consciousness Alert (0)   MEWS Score 1   Patient Currently in Pain Denies   Cardiac Rhythm NSR   Oxygen Therapy   SpO2 92 %   O2 Device None (Room air)   Pain Assessment   Pain Assessment 0-10   Pain Level 0   Shift assessment completed-see flow sheet. Patient in bed awake,alert and oriented x4. Vitals stable. 92% on RA. HR normal sinus on monitor. Patient denies any discomfort at this time. Evening medications given per order. Nephrostomy tube intact, urine noted. Dressing C,D,I. Patient denies any needs at this time,will continue to monitor,call light within reach.

## 2021-04-29 NOTE — PROGRESS NOTES
[facility] Daily Progress Note      Admit Date:  4/22/2021    Subjective:  Ms. Anatoly Daugherty is anxious ache in back. No chest pain. REASON FOR CONSULTATION:  Elevated troponin and atrial fibrillation.   HISTORY OF PRESENT ILLNESS:  The patient is a 60-year-old female, who  presented to the hospital with the chief complaint of flank pain and  nausea. She is diagnosed with left kidney stone. She underwent a nephrostomy procedure  for removal, which was complicated by perinephric hematoma and  hemorrhagic shock. She underwent embolization by Interventional  Radiology on 04/24/2021. Her hospitalization has been complicated by  acute kidney injury, profound anemia, and then atrial fibrillation. She  was started on diltiazem drip for the atrial fibrillation and she  spontaneously converted to normal sinus rhythm. She was also noted to  have an elevated troponin. For these reasons, Cardiology is consulted. The patient at this time complains of feeling tired and weak. She has  no chest pain. She has no shortness of breath. She states that prior  to this hospitalization, she never had any chest pain or significant  shortness of breath.   She denies any prior cardiac history.     ROS:  12 point ROS negative in all areas as listed below except as in [place]  Constitutional, EENT, pulmonary, GI,Musculoskeletal, skin, neurological, hematological, endocrine, Psychiatric    Past Medical History:   Diagnosis Date    Depression     Headache(784.0)     Hepatitis C 08/21/2020    Hypertension      Past Surgical History:   Procedure Laterality Date    CYSTOSCOPY Left 1/11/2021    CYSTOSCOPY, DIAGNOSTIC LEFT URETEROSCOPY WITH HOLMIUM LASER LITHOTRIPSY, LEFT STENT EXCHANGE performed by Diandra Sandoval MD at [address] / [address]iy[address] Morgan / [address] [place] Left 8/20/2020    CYSTOSCOPY URETERAL STENT INSERTION performed by Diandra Sandoval MD at [address] / Piyush Morgan / STONE Left 1/27/2021    CYSTOSCOPY, LEFT STENT REMOVAL performed by Grover El MD at [facility] IR EMBOLIZATION HEMORRHAGE  4/24/2021    IR EMBOLIZATION HEMORRHAGE 4/24/2021 [address]  SPECIAL PROCEDURES    IR NEPHROSTOMY PERCUTANEOUS LEFT  4/23/2021    IR NEPHROSTOMY PERCUTANEOUS LEFT 4/23/2021 [facility] SPECIAL PROCEDURES    OTHER SURGICAL HISTORY      CYSTOSCOPY URETERAL STENT INSERTION (Left Bladder)    OTHER SURGICAL HISTORY  11/23/2020    CYSTOSCOPY, LEFT RIGID AND FLEXIBLE URETEROSCOPY, HOLMIUM LASER LITHOTRIPSY, STONE EXTRACTION, STENT EXCHANGE (Left )     OTHER SURGICAL HISTORY  01/11/2021    CYSTOSCOPY, DIAGNOSTIC LEFT URETEROSCOPY WITH HOLMIUM LASER LITHOTRIPSY, LEFT STENT EXCHANGE (Left Bladder)    OTHER SURGICAL HISTORY  01/27/2021    cystoscopy, left stent removal    URETEROSCOPY Left 11/23/2020    CYSTOSCOPY, LEFT RIGID AND FLEXIBLE URETEROSCOPY, HOLMIUM LASER LITHOTRIPSY, STONE EXTRACTION, STENT EXCHANGE, REMOVAL MIGRATED STENT performed by Grover El MD at [address] OR       Objective:   BP (!) 143/87   Pulse 89   Temp 98 °F (36.7 °C) (Oral)   Resp 18   Ht 5' 7\" (1.702 m)   Wt 182 lb 9.6 oz (82.8 kg)   LMP 12/01/2014   SpO2 91%   BMI 28.60 kg/m²       Intake/Output Summary (Last 24 hours) at 4/29/2021 0848  Last data filed at 4/28/2021 2307  Gross per 24 hour   Intake 532.5 ml   Output 1100 ml   Net -567.5 ml       TELEMETRY: NSR    Physical Exam:  General: No Respiratory distress, appears well developed and well nourished. Eyes:  Sclera nonicteric  Nose/Sinuses:  negative findings: nose shows no deformity, asymmetry, or inflammation, nasal mucosa normal, septum midline with no perforation or bleeding  Back:  no pain to palpation  Joint:  no active joint inflammation  Musculoskeletal:  negative  Skin:  Warm and dry  Neck:  Negative for JVD and Carotid Bruits.    Chest:  Clear to auscultation, respiration easy  Cardiovascular:  RRR, S1S2 normal, no murmur, no rub or thrill. Abdomen:  Soft normal liver and spleen  Extremities:   No edema, clubbing, cyanosis,  Neuro: intact    Medications:    docusate sodium  100 mg Oral BID    [Held by provider] aspirin  81 mg Oral Daily    atorvastatin  40 mg Oral Nightly    metoprolol tartrate  25 mg Oral BID    ampicillin IVPB 500 mg  500 mg Intravenous 4 times per day    sodium chloride flush  5-40 mL Intravenous 2 times per day    tamsulosin  0.4 mg Oral Daily       potassium chloride **OR** potassium alternative oral replacement **OR** potassium chloride, magnesium sulfate, perflutren lipid microspheres, regadenoson, promethazine, oxyCODONE-acetaminophen, hydrALAZINE, hydrOXYzine, sodium chloride flush, acetaminophen **OR** acetaminophen, polyethylene glycol, HYDROmorphone    Lab Data:  CBC:   Recent Labs     04/27/21  1522 04/28/21  0329 04/28/21  0329 04/28/21  1650 04/28/21  2131 04/29/21  0329   WBC 8.6 7.9  --   --   --  10.2   HGB 7.9* 6.7*   < > 9.4* 9.0* 9.2*   HCT 24.5* 21.0*   < > 29.1* 27.2* 27.5*   MCV 80.5 80.4  --   --   --  83.7   * 126*  --   --   --  136    < > = values in this interval not displayed. BMP:   Recent Labs     04/27/21  0457 04/28/21  0328 04/29/21  0329   * 134* 136   K 3.6 3.6 3.6    102 105   CO2 25 24 23   PHOS 1.8* 2.2* 2.2*   BUN 21* 21* 18   CREATININE 1.2* 1.2* 1.1     LIVER PROFILE: No results for input(s): AST, ALT, LIPASE, BILIDIR, BILITOT, ALKPHOS in the last 72 hours. Invalid input(s): AMYLASE,  ALB  PT/INR: No results for input(s): PROTIME, INR in the last 72 hours. APTT:   Recent Labs     04/27/21  1522 04/28/21  0006   APTT 26.4 47.6*     BNP:  No results for input(s): BNP in the last 72 hours. IMAGING:   Echo 4.28.21  Summary   The left ventricular systolic function is mildly reduced with an ejection   fraction of 40-45 %. There is akinesis of the apical septal, apical lateral , apical anterior and   apical inferior walls.    There is mild concentric left ventricular hypertrophy. Grade II diastolic dysfunction with elevated left ventricular filling   pressure. Left ventricular cavity size is normal.   Mild thickening of the anterior leaflet of mitral valve. Mild mitral regurgitation. Systolic pulmonary artery pressure (SPAP) is estimated at 55 mmHg consistent   with moderate pulmonary hypertension (Right atrial pressure of 3 mmHg). EKG   Final 04/27/2021  6:28 AM 14     Sinus rhythm with short PRST and  T wave abnormality, consider anterolateral ischemiaProlonged QTAbnormal ECGConfirmed by Miranda Santillan MD ([id number]) on 4/27/2021 4:12:26 PM     04/27/2021 12:55 AM 14 Atrial fibrillation with rapid ventricular responseLow voltage QRSNonspecific ST and T wave abnormalityAbnormal ECGConfirmed by Miranda Santillan MD ([id number]) on 4/27/2021 4:12:06 PM     Assessment and plan   I have reviewed her history examined patient reviewed echo EKG labs   She has no prior heart disease  She had no chest pain  This is consistent with stress cardiomyopathy Takotsubo cardiomyopathy  She is stable hemodynamically  Recommend no anticoagulation  Consider asa 81 mg if safe from retroperitoneal bleed aspect  Statin  Beta blockers  Repeat echo in 4-6 weeks if normal echo then do stress test  If repeat echo still abnormal do cath. Discussed with patient and Dr Sushant Moss we are all in agreement.   Patient Active Problem List    Diagnosis Date Noted    NSTEMI (non-ST elevated myocardial infarction) ([address]     Hemorrhagic shock ([address] 04/24/2021    Acute blood loss anemia 04/24/2021    Obstructive uropathy 04/23/2021    Hydronephrosis     Ureterolithiasis 04/22/2021    Generalized abdominal pain     Kidney stone     Left flank pain     Acute UTI     MARY (acute kidney injury) ([address]     Bacteremia     Septicemia ([address] 08/20/2020       [address] Leroy Alexander MD 4/29/2021 8:48 AM

## 2021-04-30 VITALS
HEART RATE: 93 BPM | HEIGHT: 67 IN | BODY MASS INDEX: 28.35 KG/M2 | DIASTOLIC BLOOD PRESSURE: 93 MMHG | OXYGEN SATURATION: 97 % | SYSTOLIC BLOOD PRESSURE: 156 MMHG | WEIGHT: 180.6 LBS | RESPIRATION RATE: 16 BRPM | TEMPERATURE: 99.4 F

## 2021-04-30 LAB
ALBUMIN SERPL-MCNC: 1.8 G/DL (ref 3.4–5)
ANION GAP SERPL CALCULATED.3IONS-SCNC: 8 MMOL/L (ref 3–16)
BASOPHILS ABSOLUTE: 0.1 K/UL (ref 0–0.2)
BASOPHILS RELATIVE PERCENT: 0.5 %
BUN BLDV-MCNC: 13 MG/DL (ref 7–20)
CALCIUM SERPL-MCNC: 8 MG/DL (ref 8.3–10.6)
CHLORIDE BLD-SCNC: 105 MMOL/L (ref 99–110)
CO2: 23 MMOL/L (ref 21–32)
CREAT SERPL-MCNC: 1 MG/DL (ref 0.6–1.1)
EOSINOPHILS ABSOLUTE: 0.2 K/UL (ref 0–0.6)
EOSINOPHILS RELATIVE PERCENT: 1.8 %
GFR AFRICAN AMERICAN: >60
GFR NON-AFRICAN AMERICAN: 58
GLUCOSE BLD-MCNC: 87 MG/DL (ref 70–99)
HCT VFR BLD CALC: 28.9 % (ref 36–48)
HEMOGLOBIN: 9.6 G/DL (ref 12–16)
LYMPHOCYTES ABSOLUTE: 1.5 K/UL (ref 1–5.1)
LYMPHOCYTES RELATIVE PERCENT: 13.9 %
MCH RBC QN AUTO: 27.7 PG (ref 26–34)
MCHC RBC AUTO-ENTMCNC: 33.1 G/DL (ref 31–36)
MCV RBC AUTO: 83.8 FL (ref 80–100)
MONOCYTES ABSOLUTE: 1.5 K/UL (ref 0–1.3)
MONOCYTES RELATIVE PERCENT: 14.1 %
NEUTROPHILS ABSOLUTE: 7.4 K/UL (ref 1.7–7.7)
NEUTROPHILS RELATIVE PERCENT: 69.7 %
PDW BLD-RTO: 20.6 % (ref 12.4–15.4)
PHOSPHORUS: 3 MG/DL (ref 2.5–4.9)
PLATELET # BLD: 154 K/UL (ref 135–450)
PMV BLD AUTO: 9.5 FL (ref 5–10.5)
POTASSIUM SERPL-SCNC: 3.3 MMOL/L (ref 3.5–5.1)
RBC # BLD: 3.45 M/UL (ref 4–5.2)
SODIUM BLD-SCNC: 136 MMOL/L (ref 136–145)
WBC # BLD: 10.6 K/UL (ref 4–11)

## 2021-04-30 PROCEDURE — 99232 SBSQ HOSP IP/OBS MODERATE 35: CPT | Performed by: NURSE PRACTITIONER

## 2021-04-30 PROCEDURE — 6370000000 HC RX 637 (ALT 250 FOR IP): Performed by: INTERNAL MEDICINE

## 2021-04-30 PROCEDURE — 80069 RENAL FUNCTION PANEL: CPT

## 2021-04-30 PROCEDURE — 85025 COMPLETE CBC W/AUTO DIFF WBC: CPT

## 2021-04-30 PROCEDURE — 2580000003 HC RX 258: Performed by: HOSPITALIST

## 2021-04-30 PROCEDURE — 6360000002 HC RX W HCPCS: Performed by: INTERNAL MEDICINE

## 2021-04-30 PROCEDURE — 6360000002 HC RX W HCPCS: Performed by: HOSPITALIST

## 2021-04-30 PROCEDURE — 2580000003 HC RX 258: Performed by: INTERNAL MEDICINE

## 2021-04-30 PROCEDURE — 36415 COLL VENOUS BLD VENIPUNCTURE: CPT

## 2021-04-30 PROCEDURE — 99239 HOSP IP/OBS DSCHRG MGMT >30: CPT | Performed by: INTERNAL MEDICINE

## 2021-04-30 RX ORDER — METOPROLOL SUCCINATE 50 MG/1
50 TABLET, EXTENDED RELEASE ORAL DAILY
Qty: 30 TABLET | Refills: 3 | Status: ON HOLD | OUTPATIENT
Start: 2021-05-01 | End: 2021-07-01 | Stop reason: SDUPTHER

## 2021-04-30 RX ORDER — OXYCODONE HYDROCHLORIDE AND ACETAMINOPHEN 5; 325 MG/1; MG/1
1 TABLET ORAL EVERY 4 HOURS PRN
Qty: 20 TABLET | Refills: 0 | Status: SHIPPED | OUTPATIENT
Start: 2021-04-30 | End: 2021-05-05

## 2021-04-30 RX ORDER — ATORVASTATIN CALCIUM 40 MG/1
40 TABLET, FILM COATED ORAL NIGHTLY
Qty: 30 TABLET | Refills: 3 | Status: SHIPPED | OUTPATIENT
Start: 2021-04-30 | End: 2021-07-02

## 2021-04-30 RX ORDER — LINEZOLID 600 MG/1
600 TABLET, FILM COATED ORAL 2 TIMES DAILY
Qty: 14 TABLET | Refills: 0 | Status: SHIPPED | OUTPATIENT
Start: 2021-04-30 | End: 2021-05-07

## 2021-04-30 RX ORDER — METOPROLOL SUCCINATE 50 MG/1
50 TABLET, EXTENDED RELEASE ORAL DAILY
Status: DISCONTINUED | OUTPATIENT
Start: 2021-05-01 | End: 2021-04-30 | Stop reason: HOSPADM

## 2021-04-30 RX ORDER — PSEUDOEPHEDRINE HCL 30 MG
100 TABLET ORAL 2 TIMES DAILY
Qty: 60 CAPSULE | Refills: 0 | Status: SHIPPED | OUTPATIENT
Start: 2021-04-30 | End: 2021-07-02 | Stop reason: ALTCHOICE

## 2021-04-30 RX ADMIN — TAMSULOSIN HYDROCHLORIDE 0.4 MG: 0.4 CAPSULE ORAL at 08:19

## 2021-04-30 RX ADMIN — METOPROLOL TARTRATE 25 MG: 25 TABLET, FILM COATED ORAL at 08:19

## 2021-04-30 RX ADMIN — OXYCODONE HYDROCHLORIDE AND ACETAMINOPHEN 1 TABLET: 5; 325 TABLET ORAL at 08:19

## 2021-04-30 RX ADMIN — OXYCODONE HYDROCHLORIDE AND ACETAMINOPHEN 1 TABLET: 5; 325 TABLET ORAL at 16:21

## 2021-04-30 RX ADMIN — DOCUSATE SODIUM 100 MG: 100 CAPSULE, LIQUID FILLED ORAL at 08:19

## 2021-04-30 RX ADMIN — OXYCODONE HYDROCHLORIDE AND ACETAMINOPHEN 1 TABLET: 5; 325 TABLET ORAL at 03:54

## 2021-04-30 RX ADMIN — AMPICILLIN SODIUM 500 MG: 500 INJECTION, POWDER, FOR SOLUTION INTRAMUSCULAR; INTRAVENOUS at 12:21

## 2021-04-30 RX ADMIN — POTASSIUM CHLORIDE 40 MEQ: 1500 TABLET, EXTENDED RELEASE ORAL at 08:19

## 2021-04-30 RX ADMIN — AMPICILLIN SODIUM 500 MG: 500 INJECTION, POWDER, FOR SOLUTION INTRAMUSCULAR; INTRAVENOUS at 00:14

## 2021-04-30 RX ADMIN — OXYCODONE HYDROCHLORIDE AND ACETAMINOPHEN 1 TABLET: 5; 325 TABLET ORAL at 12:21

## 2021-04-30 RX ADMIN — ACETAMINOPHEN 650 MG: 325 TABLET ORAL at 00:17

## 2021-04-30 RX ADMIN — AMPICILLIN SODIUM 500 MG: 500 INJECTION, POWDER, FOR SOLUTION INTRAMUSCULAR; INTRAVENOUS at 05:33

## 2021-04-30 RX ADMIN — PROMETHAZINE HYDROCHLORIDE 6.25 MG: 25 INJECTION INTRAMUSCULAR; INTRAVENOUS at 12:29

## 2021-04-30 RX ADMIN — SODIUM CHLORIDE, PRESERVATIVE FREE 10 ML: 5 INJECTION INTRAVENOUS at 08:19

## 2021-04-30 ASSESSMENT — PAIN SCALES - GENERAL
PAINLEVEL_OUTOF10: 5
PAINLEVEL_OUTOF10: 4
PAINLEVEL_OUTOF10: 7
PAINLEVEL_OUTOF10: 7
PAINLEVEL_OUTOF10: 0
PAINLEVEL_OUTOF10: 7
PAINLEVEL_OUTOF10: 4
PAINLEVEL_OUTOF10: 7

## 2021-04-30 ASSESSMENT — PAIN DESCRIPTION - FREQUENCY: FREQUENCY: INTERMITTENT

## 2021-04-30 ASSESSMENT — PAIN DESCRIPTION - ONSET
ONSET: ON-GOING
ONSET: ON-GOING

## 2021-04-30 ASSESSMENT — PAIN DESCRIPTION - LOCATION
LOCATION: BACK
LOCATION: BACK

## 2021-04-30 ASSESSMENT — ENCOUNTER SYMPTOMS
SHORTNESS OF BREATH: 1
NAUSEA: 1

## 2021-04-30 ASSESSMENT — PAIN DESCRIPTION - PAIN TYPE
TYPE: ACUTE PAIN
TYPE: ACUTE PAIN

## 2021-04-30 ASSESSMENT — PAIN DESCRIPTION - PROGRESSION
CLINICAL_PROGRESSION: NOT CHANGED
CLINICAL_PROGRESSION: NOT CHANGED

## 2021-04-30 ASSESSMENT — PAIN DESCRIPTION - DESCRIPTORS
DESCRIPTORS: SHARP
DESCRIPTORS: SHARP

## 2021-04-30 ASSESSMENT — PAIN DESCRIPTION - ORIENTATION
ORIENTATION: LEFT
ORIENTATION: LEFT

## 2021-04-30 NOTE — PROGRESS NOTES
Patient:  Pamela Negro  YOB: 1965   Date of Service:  04/26/21      Urology Attending Daily Progress Note    Chief complaint: \"feeling better, I think i'm going home\"    Interval HPI:   Denies fevers. C/o of mild flank pain    Objective:    Patient Vitals for the past 8 hrs:   BP Temp Temp src Pulse Resp SpO2   04/26/21 0800 (!) 150/89 -- -- 97 17 95 %   04/26/21 0751 -- 98.6 °F (37 °C) Oral -- -- --   04/26/21 0700 (!) 140/82 -- -- 94 14 95 %   04/26/21 0600 (!) 140/81 -- -- 95 9 96 %   04/26/21 0500 (!) 145/92 98 °F (36.7 °C) Oral 97 18 97 %   04/26/21 0400 (!) 143/86 -- -- 100 20 93 %   04/26/21 0300 139/84 -- -- 103 10 96 %   04/26/21 0200 (!) 142/87 -- -- 99 17 97 %   04/26/21 0100 (!) 145/87 -- -- 100 17 96 %     I/O last 3 completed shifts: In: 8485 [P.O.:620; I.V.:2595]  Out: 8938 [Urine:3195]     Physical Exam:   Constitutional: comfortable in hospital bed, no acute distress  Abdomen: nondistended  Genitourinary:  Neph Tube with clear  Psych: normal mood and affect, appropriately answers questions   Skin, extremities: stable, exposed skin intact, no digital cyanosis                No results found for: PSALab Results   Component Value Date    CREATININE 1.6 (H) 04/26/2021    CREATININE 2.1 (H) 04/25/2021    CREATININE 2.5 (H) 04/24/2021                                 BILIRUBINUR Negative 04/22/2021      Component Value Date    WBC 10.4 04/26/2021    HGB 8.0 (L) 04/26/2021    HCT 23.8 (L) 04/26/2021    MCV 80.0 04/26/2021     (L) 04/26/2021       Radiology:  \"Imaging was independently reviewed by myself and I agree with the radiology interpretation    Assessment:  Pamela Negro is a 48 y.o. female with functionally solitary left kidney - large stone burden and underwent L PNT on 4/23 in anticipation of L PCNL - unfortunately, had hemorrhage and CTA showed active arterial extravasation requiring angioembolization with super selective coiling on 4/24     Plan:  -- NT draining well -reassuring - continue at DC  -- Hgb stable  -- I will arrange PCNL as outpt once appropriate        Marina Bull MD  [facility]

## 2021-04-30 NOTE — PROGRESS NOTES
[facility]  Cardiology  Progress Note    Admission date:  2021    Reason for follow up visit: Elevated troponin, AF, cardiomyopathy    HPI/CC: Calvin Hunt is a 48 y.o. female who was admitted 2021 for flank pain and found to have kidney stone. Developed perinephric hematoma and hemorrhagic shock post nephrostomy tube placement. She had coil embolization of left anterior branch renal artery. Post procedure she was noted to have PAF and elevated troponin. Echo showed EF 40-45%. Rhythm has been sinus. Subjective: No chest pain, hurts to take a deep breath. Vitals:  Blood pressure 131/82, pulse 92, temperature 98.4 °F (36.9 °C), temperature source Oral, resp. rate 16, height 5' 7\" (1.702 m), weight 180 lb 9.6 oz (81.9 kg), last menstrual period 2014, SpO2 94 %, not currently breastfeeding.   Temp  Av.1 °F (36.7 °C)  Min: 97.3 °F (36.3 °C)  Max: 98.4 °F (36.9 °C)  Pulse  Av.5  Min: 86  Max: 95  BP  Min: 131/82  Max: 154/86  SpO2  Av.3 %  Min: 94 %  Max: 97 %    24 hour I/O    Intake/Output Summary (Last 24 hours) at 2021 1443  Last data filed at 2021 1246  Gross per 24 hour   Intake 360 ml   Output 1725 ml   Net -1365 ml     Current Facility-Administered Medications   Medication Dose Route Frequency Provider Last Rate Last Admin    docusate sodium (COLACE) capsule 100 mg  100 mg Oral BID Kelly Vaz MD   100 mg at 21 0819    potassium chloride (KLOR-CON M) extended release tablet 40 mEq  40 mEq Oral PRN Tee Diamond MD   40 mEq at 21 3682    Or    potassium bicarb-citric acid (EFFER-K) effervescent tablet 40 mEq  40 mEq Oral PRN Tee Diamond MD        Or   Gurpreet Cruz potassium chloride 10 mEq/100 mL IVPB (Peripheral Line)  10 mEq Intravenous PRN Tee Diamond MD        magnesium sulfate 1000 mg in dextrose 5% 100 mL IVPB  1,000 mg Intravenous PRN MD Gurpreet Ramirez [facility] [place] by provider] aspirin chewable tablet 81 mg  81 mg Oral Daily Lino Villalta MD   Stopped at 04/28/21 1729    atorvastatin (LIPITOR) tablet 40 mg  40 mg Oral Nightly Lino Villalta MD   40 mg at 04/29/21 2147    perflutren lipid microspheres (DEFINITY) injection 1.65 mg  1.5 mL Intravenous ONCE PRN Lino Villalta MD        metoprolol tartrate (LOPRESSOR) tablet 25 mg  25 mg Oral BID Harvie Goodpasture, MD   25 mg at 04/30/21 6651    regadenoson (LEXISCAN) injection 0.4 mg  0.4 mg Intravenous ONCE PRN Harvie Goodpasture, MD        [facility]) injection 6.25 mg  6.25 mg Intravenous Q6H PRN Annamaria Tuttle MD   6.25 mg at 04/30/21 1229    oxyCODONE-acetaminophen (PERCOCET) 5-325 MG per tablet 1 tablet  1 tablet Oral Q4H PRN Annamaria Tuttle MD   1 tablet at 04/30/21 1221    hydrALAZINE (APRESOLINE) injection 10 mg  10 mg Intravenous Q6H PRN Tommy Valencia MD   10 mg at 04/25/21 1116    ampicillin (OMNIPEN) 500 mg in sodium chloride 0.9 % 50 mL IVPB  500 mg Intravenous 4 times per day Mary Kate Schilling MD   Stopped at 04/30/21 1300    hydrOXYzine (ATARAX) tablet 25 mg  25 mg Oral TID PRN Hailey Mcleod MD   25 mg at 04/25/21 0849    sodium chloride flush 0.9 % injection 5-40 mL  5-40 mL Intravenous 2 times per day Hailey Mcleod MD   10 mL at 04/30/21 0819    sodium chloride flush 0.9 % injection 5-40 mL  5-40 mL Intravenous PRN Hailey Mcleod MD        acetaminophen (TYLENOL) tablet 650 mg  650 mg Oral Q6H PRN Hailey Mcleod MD   650 mg at 04/30/21 0017    Or    acetaminophen (TYLENOL) suppository 650 mg  650 mg Rectal Q6H PRN Hailey Mcleod MD        polyethylene glycol (GLYCOLAX) packet 17 g  17 g Oral Daily PRN Hailey Mcleod MD        HYDROmorphone (DILAUDID) injection 0.5 mg  0.5 mg Intravenous Q4H PRN Hailey Mcleod MD   0.5 mg at 04/26/21 1427    tamsulosin (FLOMAX) capsule 0.4 mg  0.4 mg Oral Daily Hailey Mcleod MD   0.4 mg at 04/30/21 2288     Review of Systems   Constitutional: Positive for fatigue. Respiratory: Positive for shortness of breath. Cardiovascular: Negative. Gastrointestinal: Positive for nausea. Neurological: Negative. Objective:     Telemetry monitor: SR    Physical Exam:  Constitutional:  Comfortable and alert, NAD, appears stated age  Eyes: PERRL, sclera nonicteric  Neck:  Supple, no masses, no thyroidmegaly, no JVD  Skin:  Warm and dry; no rash or lesions  Heart:  Regular, normal apex, S1 and S2 normal, no M/G/R  Lungs:  Normal respiratory effort; clear; no wheezing/rhonchi/rales  Abdomen: soft, non tender, + bowel sounds  Extremities:  No edema or cyanosis; no clubbing  Neuro: alert and oriented, moves legs and arms equally, normal mood and affect    Data Reviewed:    Echo 4/2021:  Alfredo Sol left ventricular systolic function is mildly reduced with an ejection   fraction of 40-45 %.   There is akinesis of the apical septal, apical lateral , apical anterior and   apical inferior walls.   There is mild concentric left ventricular hypertrophy.   Grade II diastolic dysfunction with elevated left ventricular filling   pressure.   Left ventricular cavity size is normal.   Mild thickening of the anterior leaflet of mitral valve.   Mild mitral regurgitation.   Systolic pulmonary artery pressure (SPAP) is estimated at 55 mmHg consistent   with moderate pulmonary hypertension (Right atrial pressure of 3 mmHg).     Lab Reviewed:     Renal Profile:  Lab Results   Component Value Date    CREATININE 1.0 04/30/2021    BUN 13 04/30/2021     04/30/2021    K 3.3 04/30/2021    K 4.2 04/23/2021     04/30/2021    CO2 23 04/30/2021     CBC:    Lab Results   Component Value Date    WBC 10.6 04/30/2021    RBC 3.45 04/30/2021    HGB 9.6 04/30/2021    HCT 28.9 04/30/2021    MCV 83.8 04/30/2021    RDW 20.6 04/30/2021     04/30/2021     BNP:  No results found for: PROBNP  Fasting Lipid Panel:    Lab Results   Component Value Date    CHOL 106 04/27/2021    HDL 31 04/27/2021    TRIG 92 04/27/2021     Cardiac Enzymes:  CK/MbTroponin  Lab Results   Component Value Date    TROPONINI 1.03 04/29/2021     PT/ INR   Lab Results   Component Value Date    INR 1.19 04/23/2021    PROTIME 13.8 04/23/2021     PTT No results found for: PTT   Lab Results   Component Value Date    MG 1.70 04/27/2021      Lab Results   Component Value Date    TSH 10.74 04/27/2021     All labs and imaging reviewed today    Assessment:  Elevated troponin  Abnormal EKG  Cardiomyopathy, presumed takotsubo: EF 40-45% on echo 4/2021  Paroxysmal atrial fibrillation: stable   - [id number] score 2  Hemorrhagic shock: s/p PRBCs  S/p left renal anterior branch coil embolization 4/24/2021  Left perinephric hematoma following percutaneous nephrostomy  MARY  HTN  Solitary kidney  Hepatitis C  Abnormal TSH  Tobacco abuse    Plan:   1. Aspirin held due to severe anemia/bleed  2. Change lopressor to toprol due to LV dysfunction  3. Holding ACE/ARB due to MARY  4. Continue statin  5. Plan to repeat echo in follow up. If EF normalized, will perform stress test. If EF remains decreased, consider LHC. 6. Cardiology will sign off, please call with questions. She has follow up arranged.      Shayan Engel, SIENNA-CNP  [address]  [phone]

## 2021-04-30 NOTE — CARE COORDINATION
DISCHARGE ORDER  Date/Time 2021 10:53 AM  Completed by: Loren Reed, Case Management    Patient Name: Hali Hoover      : 1965  Admitting Diagnosis: Ureterolithiasis [N20.1]  Obstructive uropathy [N13.9]      Admit order Date and Status:ongoing  (verify MD's last order for status of admission)      Noted discharge order. If applicable PT/OT recommendation at Discharge: home with PRN assist and home PT and OT  DME recommendation by PT/OT:n/a  Confirmed discharge plan  (pt): Yes  with whom______pt_________  If pt confirmed DC plan does family need to be contacted by CM No if yes who___n/a___  Discharge Plan: Reviewed chart. Role of discharge planner explained and patient verbalized understanding. Discharge order is noted. Pt is being d/c'd to home today. Pt is aware that PT/OT recommends home with PRN assist and home PT and OT, but pt declines and states that she has great family support that will be wither her  . Pt's O2 sats are 94% on RA. Malika Harper, RN to show pt how to dress dressings prior to d/c. Pt states she has transport home. No further discharge needs needed or noted. Addendum 21 1540: Per Dr. Calli Connelly, he sent a RX of Zyvox po x 7 days to [facility]. Ayo Hence with [facility] is aware of this RX, as well as Malika Harper, RN. Addendum 2021 1551: Ayo Hence with [facility] call and stated pt needed a PA for Zyvox ([phone]). CM called for PA ([phone]) [address] [place] [address] for PA and spoke with Eliezer Schilder. She stated that CM needed to call [phone] and transferred CM to this number. CM called Florida Atkins ([phone]), and he states it will take greater than 24 hours if if Urgent. Florida Atkins state he wants pt to CM to onto covermymeds to do the PA. Florida Atkins states to use Key code: [address] on Covermymeds. CM created this on Coverm[employer] and it stated : [id number] - PA Case ID: [id number]     It could take 24-72 hours to get it back.     Per Dr. Calli Connelly, pt was prescribed Zyvox 600mg 1 po BID x 7 days (#14 tablets)  d/t having a UTI and a renal stone, obstructing with one single kidney. Georges spoke with Timur Hernandez (pharmacist) with [facility], as he will prescribe fro pt to go home with today anyway, as if pt has to pay out of pocket, it is in-expensive and the cost will be covered for pt. Timur Hernandez states that he will follow up on this on Monday, as well. Pt discharged home with Zyvox. Reviewed chart. Role of discharge planner explained and patient verbalized understanding. Discharge order is noted. Has Home O2 in place on admit:  No  Informed of need to bring portable home O2 tank on day of discharge for nursing to connect prior to leaving:   Not Indicated  Verbalized agreement/Understanding:   Not Indicated    Discharge timeout done with Joss Reyes, RN. All discharge needs and concerns addressed. Addendum 5/1/2021 1456; Received call from GEORGES with [address] [place] [address] ([phone]) inquiring about pt's d/c plan and when d/c to home. Cm returned call and LM only stating pt d/c to home on 4/30/2021. Addendum 5/2/2021 1304: CM received a denial from [employer] ([employer]) for the Zyvox. It is being sent ot Medical Records to be placed in the Media section. CM informed Timur Hernandez (pharmacist in [place] [address]). It stated that it was denied because \"Unable to approve Zyvox 600 mg ORAL TABLET. Member must try drug alternatives for a specific timeframe per criteria number [2]: [Nitrofurantoin, Bactrim DS, Levofloxacin]. If you are looking for additional alternatives please refer to the plan's\".

## 2021-04-30 NOTE — PROGRESS NOTES
[employer] called for pt to order cab home. Prescriptions and discharge instructions given. Pt verbalized understanding/ denies questions/ needs at this time. Transport called to transport pt to vehicle for discharge home.

## 2021-04-30 NOTE — DISCHARGE SUMMARY
Name:  Frida Mcnulty  Room:  /8715-31  MRN:    [id number]    Discharge Summary      This discharge summary is in conjunction with a complete physical exam done on the day of discharge. Attending Physician: Dr. Jonn Peace  Discharging Physician: Dr. Mya Rose: 4/22/2021  Discharge:   4/30/2021    HPI:  48 y.o. female who presented to the hospital with a chief complaint of flank pain. The patient presented to the emergency department with intractable abdominal pain, nausea without vomiting. The pain is been constant and worse with movement. She denies any fevers or chills or sick contacts. She denies any diarrhea. The patient has a history of kidney stones, in the emergency department a CT scan of her abdomen revealed a 1.5 cm left uteropelvic junction stone. She will be admitted for urology evaluation.     Diagnoses this Admission and Hospital Course     Left ureteral stone with hydronephrosis      status post percutaneous nephrostomy tube placement by IR , however complicated with retroperitoneal hemorrhage   s.p angio embolization by IR on 4/24  Control pain with IV narcotics> oral meds  Urology consulted and plans for lithotripsy soon      Acute hemorrhagic shock due to retroperitoneal hematoma   status post IR embolization  Hemoglobin stable after 3 units PRBC for 48 hrs but started to drop once heparin gtt initiated for AFibb     Hgb down from 8.1 to 6.3  Given 2 more units and now at 9.2    continued to monitor H&H-  Stopped  heparin and ASA      UTi - enterococcus and Staph EPi  cx from nephrostomy tube  - ampicillin sensitive, treated with IV Ampicillin and dcd home on zyvox     Acute renal failure with metabolic acidosis  - pt has one kidney   - creatinine at 1.5 on adm, peaked to 2.8 and improved to 1.0 now with IVF   nephrology has been consulted  - avoid nephrotoxic agents     New onset Afibb/NSTEMI   likely with acute sickness related Takasubo CM   - quickly resolved AFibb with cardizem bolus and gtt  Started on BB, no ASA or heparin with above   ECHO with EF of  40-45 %. akinesis of the apical septal, apical lateral, apical anterior and apical inferior walls.        Elevated Troponin  - likely demand ischemia vs NSTEMi   - no AC with recent retroperitoneal bleed  - held ASA, continued statins, started BB  - cardiology consulted - recommend no stress test for now and repeat echo in few weeks after acute issues resolve      Resumed diet  Dc planning ok   Pt not ambulating due to pain issues, remains in bed  Refuses SNF, reports she has help at home        F.w urology in 1 week for stone removal      F.w cardiology in 4 weeks for repeat ECHO      Procedures (Please Review Full Report for Details)  Lt renal angiogram and coil embolization    Nephrostomy tube placement     Consults    Cardiology  Nephrology  Pulmonology  Urology       Physical Exam at Discharge:    /82   Pulse 92   Temp 98.4 °F (36.9 °C) (Oral)   Resp 16   Ht 5' 7\" (1.702 m)   Wt 180 lb 9.6 oz (81.9 kg)   LMP 12/01/2014   SpO2 94%   BMI 28.29 kg/m²     See today's progress note    CBC:   Recent Labs     04/28/21 0329 04/28/21 0329 04/28/21 2131 04/29/21 0329 04/30/21  0411   WBC 7.9  --   --  10.2 10.6   HGB 6.7*   < > 9.0* 9.2* 9.6*   HCT 21.0*   < > 27.2* 27.5* 28.9*   MCV 80.4  --   --  83.7 83.8   *  --   --  136 154    < > = values in this interval not displayed.      BMP:   Recent Labs     04/28/21 0328 04/29/21 0329 04/30/21  0411   * 136 136   K 3.6 3.6 3.3*    105 105   CO2 24 23 23   PHOS 2.2* 2.2* 3.0   BUN 21* 18 13   CREATININE 1.2* 1.1 1.0     APTT:   Recent Labs     04/28/21  0006   APTT 47.6*         CARDIAC ENZYMES  Recent Labs     04/29/21 0329   TROPONINI 1.03*       U/A:    Lab Results   Component Value Date    COLORU Yellow 04/22/2021    WBCUA 21-50 04/22/2021    RBCUA  04/22/2021    MUCUS 2+ 04/22/2021    BACTERIA 3+ 04/22/2021    CLARITYU CLOUDY 04/22/2021 SPECGRAV 1.015 04/22/2021    LEUKOCYTESUR LARGE 04/22/2021    BLOODU LARGE 04/22/2021    GLUCOSEU Negative 04/22/2021    AMORPHOUS 1+ 04/22/2021         CULTURES  Urine  - enterococcus and staph EPI    RADIOLOGY  XR ABDOMEN (KUB) (SINGLE AP VIEW)   Final Result   Normal bowel gas pattern. IR EMBOLIZATION HEMORRHAGE   Final Result   Successful coil embolization of the anterior left renal artery branch. Successful injection of autologous blood clot into the posterior renal branch   that demonstrated active hemorrhage on the CT angiogram      The findings and treatment were discussed with the nighttime hospitalist at   [facility] immediately after the exam .         CTA ABDOMEN PELVIS W CONTRAST   Final Result   Active arterial extravasation from the lateral left kidney with a large   retroperitoneal hematoma. Critical results were called by Dr. Ruth Carcamo MD to Emery Simental on   4/23/2021 at 23:34. IR GUIDED NEPHROSTOMY CATH PLACEMENT LEFT   Final Result   Technically successful access to the left-sided collecting system via an   intercostal upper pole approach. 8 [place] nephrostomy tube has been placed. CT ABDOMEN PELVIS WO CONTRAST Additional Contrast? None   Final Result   Interval development of left nephrolithiasis. Calculus at the left ureteropelvic junction measures 1.5 cm, resulting in   moderate left-sided hydronephrosis.              ECHO    The left ventricular systolic function is mildly reduced with an ejection   fraction of 40-45 %.   There is akinesis of the apical septal, apical lateral , apical anterior and apical inferior walls.   There is mild concentric left ventricular hypertrophy.   Grade II diastolic dysfunction with elevated left ventricular filling   pressure.   Left ventricular cavity size is normal.   Mild thickening of the anterior leaflet of mitral valve.   Mild mitral regurgitation.   Systolic pulmonary artery pressure (SPAP) is estimated at 55 mmHg consistent   with moderate pulmonary hypertension (Right atrial pressure of 3 mmHg). Discharge Medications     Medication List      START taking these medications    atorvastatin 40 MG tablet  Commonly known as: LIPITOR  Take 1 tablet by mouth nightly     docusate 100 MG Caps  Commonly known as: COLACE, DULCOLAX  Take 100 mg by mouth 2 times daily     linezolid 600 MG tablet  Commonly known as: Zyvox  Take 1 tablet by mouth 2 times daily for 7 days     metoprolol succinate 50 MG extended release tablet  Commonly known as: TOPROL XL  Take 1 tablet by mouth daily     oxyCODONE-acetaminophen 5-325 MG per tablet  Commonly known as: PERCOCET  Take 1 tablet by mouth every 4 hours as needed for Pain for up to 5 days.         STOP taking these medications    aspirin 81 MG EC tablet     hydroCHLOROthiazide 25 MG tablet  Commonly known as: HYDRODIURIL     hydrOXYzine 25 MG tablet  Commonly known as: ATARAX     lisinopril 10 MG tablet  Commonly known as: PRINIVIL;ZESTRIL     propranolol 40 MG tablet  Commonly known as: INDERAL           Where to Get Your Medications      These medications were sent to [facility] - P [phone] - F [phone]  [address], [place]    Phone: [phone]   · metoprolol succinate 50 MG extended release tablet     These medications were sent to [id number] [facility], [address] [place] [address], [address] [address]    Phone: [phone]   · linezolid 600 MG tablet     You can get these medications from any pharmacy    Bring a paper prescription for each of these medications  · atorvastatin 40 MG tablet  · docusate 100 MG Caps  · oxyCODONE-acetaminophen 5-325 MG per tablet           Discharged in stable condition to home    F.w urology in 1 week for stone removal      F.w cardiology in 4 weeks for repeat ECHO       Jennifer Lujan Kasie Lee MD 5/4/2021 7:13 AM

## 2021-04-30 NOTE — PROGRESS NOTES
JVD, no carotid bruit, no thyromegaly  Chest:  Clear to auscultation bilaterally, no added sounds  Cardiovascular:  RRR S1S2 heard, no murmurs or gallops  Abdomen:  Soft, undistended, non tender, no organomegaly, BS present  Extremities: left nephrostomy tube in left flank with clear urine  Large hypopigmented patches on trunk, upper ext noted    No edema or cyanosis. Distal pulses well felt  Neurological : grossly normal        Labs:   Recent Labs     04/28/21 0329 04/28/21 0329 04/28/21 2131 04/29/21 0329 04/30/21  0411   WBC 7.9  --   --  10.2 10.6   HGB 6.7*   < > 9.0* 9.2* 9.6*   HCT 21.0*   < > 27.2* 27.5* 28.9*   *  --   --  136 154    < > = values in this interval not displayed. Recent Labs     04/28/21 0328 04/29/21 0329 04/30/21 0411   * 136 136   K 3.6 3.6 3.3*    105 105   CO2 24 23 23   BUN 21* 18 13   CREATININE 1.2* 1.1 1.0   CALCIUM 7.9* 7.9* 8.0*   PHOS 2.2* 2.2* 3.0     No results for input(s): AST, ALT, BILIDIR, BILITOT, ALKPHOS in the last 72 hours. No results for input(s): INR in the last 72 hours. Urine  - enterococcus and staph EPI      CTA abd    Active arterial extravasation from the lateral left kidney with a large   retroperitoneal hematoma     Ct abd   Interval development of left nephrolithiasis.       Calculus at the left ureteropelvic junction measures 1.5 cm, resulting in   moderate left-sided hydronephrosis. ECHO    The left ventricular systolic function is mildly reduced with an ejection   fraction of 40-45 %. There is akinesis of the apical septal, apical lateral , apical anterior and apical inferior walls. There is mild concentric left ventricular hypertrophy. Grade II diastolic dysfunction with elevated left ventricular filling   pressure. Left ventricular cavity size is normal.   Mild thickening of the anterior leaflet of mitral valve. Mild mitral regurgitation.    Systolic pulmonary artery pressure (SPAP) is estimated at 55 mmHg consistent   with moderate pulmonary hypertension (Right atrial pressure of 3 mmHg). Assessment/Plan:      Left ureteral stone with hydronephrosis     status post percutaneous nephrostomy tube placement by IR , however complicated with retroperitoneal hemorrhage   s.,p angio embolization by IR on 4/24  Control pain with IV narcotics> oral meds  Urology consulted and plans for lithotripsy soon     Acute hemorrhagic shock due to retroperitoneal hematoma   status post IR embolization  Hemoglobin stable after 3 units PRBC for 48 hrs but started to drop once heparin gtt initiated for AFibb     Hb down from 8.1 to 6.3  Given 2 more units and now at 9.2    continue to monitor H&H-  Stopped  heparin and ASA     UTi - enterococcus and Staph EPi  cx from nephrostomy tube  - ampicillin sensitive , treated with  IV  Ampicillin    Acute renal failure with metabolic acidosis  - pt has one kidney   - creatinine at 1.5 on adm, peaked to 2.8 and improved to 1.0 now with IVF   nephrology has been consulted  - avoid nephrotoxic agents    New onset Afibb/NSTEMI   likely with acute sickness related Takasubo CM   - quickly resolved AFibb with cardizem bolus and gtt  Started on BB, no ASA or heparin with above   ECHO with EF of  40-45 %. akinesis of the apical septal, apical lateral , apical anterior and apical inferior walls.       Elevated Troponin  - likely demand ischemia vs NSTEMi   - no AC with recent retroperitoneal bleed  - hold ASA, continue statins, started BB  - cardiology consulted - recommend no stress test for now and repeat echo in few weeks after acute issues resolve     Resume diet  Dc planning ok   Pt not ambulating due to pain issues, remains in bed  Refuses SNF, reports she has help at home        DVT Prophylaxis: [facility]   Diet: DIET GENERAL;  Code Status: Full Code      F.w urology in 1 week for stone removal     F.w cardiology in 4 weeks for repeat ECHO      Derrick Coronel MD

## 2021-04-30 NOTE — PROGRESS NOTES
Pt a/o. Am assessment completed see flow sheet. PRN percocet given for flank pain rated 7/10. K 3.3 replaced with PO 40 mEq. Call light within reach. Will continue to monitor.

## 2021-04-30 NOTE — PROGRESS NOTES
[facility]       Progress Note           Subjective/   48y.o. year old female who we are seeing in consultation for MARY. HPI:  Renal function better with IVF's, now off, urine output of 1.6 liters over last 24 hours. Intake better. ROS:  +left flank pain. Denies any lightheadedness. Objective/   GEN:  Chronically ill, /82   Pulse 92   Temp 98.4 °F (36.9 °C) (Oral)   Resp 16   Ht 5' 7\" (1.702 m)   Wt 180 lb 9.6 oz (81.9 kg)   LMP 12/01/2014   SpO2 94%   BMI 28.29 kg/m²   HEENT: non-icteric, no JVD  CV: S1, S2 without m/r/g; no LE edema  RESP: CTA B without w/r/r; breathing wnl  ABD: +bs, soft, nt, no hsm  SKIN: warm, no rashes    Data/  Recent Labs     04/28/21 0329 04/28/21 0329 04/28/21  2131 04/29/21 0329 04/30/21  0411   WBC 7.9  --   --  10.2 10.6   HGB 6.7*   < > 9.0* 9.2* 9.6*   HCT 21.0*   < > 27.2* 27.5* 28.9*   MCV 80.4  --   --  83.7 83.8   *  --   --  136 154    < > = values in this interval not displayed. Recent Labs     04/28/21 0328 04/29/21 0329 04/30/21  0411   * 136 136   K 3.6 3.6 3.3*    105 105   CO2 24 23 23   GLUCOSE 91 95 87   PHOS 2.2* 2.2* 3.0   BUN 21* 18 13   CREATININE 1.2* 1.1 1.0   LABGLOM 47* 52* 58*   GFRAA 56* >60 >60       Assessment/     -MARY in the setting of hemorrhagic shock, seems to have stabilized with packed red blood cells and volume resuscitation. She also had left hydronephrosis and stent was placed on 4/23.   She has received IV contrast with CTA and during coil embolization   Resolved     -Left perinephric hematoma after percutaneous nephrostomy     -Nephrolithiasis, left UVJ, Urology following     -Hemorrhagic shock, improved                 -Metabolic acidosis    -Afib with RVR    -Troponin elevation    Plan/     - Prn K replacement today  - Trend labs, bp's, urine output    Okay for discharge when okay with others

## 2021-05-19 PROBLEM — I42.9 CARDIOMYOPATHY (HCC): Status: ACTIVE | Noted: 2021-05-19

## 2021-05-19 PROBLEM — I48.0 PAROXYSMAL ATRIAL FIBRILLATION (HCC): Status: ACTIVE | Noted: 2021-05-19

## 2021-06-27 ENCOUNTER — HOSPITAL ENCOUNTER (INPATIENT)
Age: 56
LOS: 4 days | Discharge: HOME | DRG: 721 | End: 2021-07-02
Attending: STUDENT IN AN ORGANIZED HEALTH CARE EDUCATION/TRAINING PROGRAM | Admitting: INTERNAL MEDICINE
Payer: COMMERCIAL

## 2021-06-27 ENCOUNTER — APPOINTMENT (OUTPATIENT)
Dept: CT IMAGING | Age: 56
DRG: 721 | End: 2021-06-27
Payer: COMMERCIAL

## 2021-06-27 DIAGNOSIS — R93.5 ABNORMAL CT OF THE ABDOMEN: ICD-10-CM

## 2021-06-27 DIAGNOSIS — N28.89 LEFT RENAL MASS: ICD-10-CM

## 2021-06-27 DIAGNOSIS — E87.1 HYPONATREMIA: ICD-10-CM

## 2021-06-27 DIAGNOSIS — N39.0 COMPLICATED URINARY TRACT INFECTION: ICD-10-CM

## 2021-06-27 DIAGNOSIS — N17.9 AKI (ACUTE KIDNEY INJURY) (HCC): ICD-10-CM

## 2021-06-27 DIAGNOSIS — R10.9 LEFT FLANK PAIN: ICD-10-CM

## 2021-06-27 DIAGNOSIS — N99.528 COMPLICATION OF NEPHROSTOMY (HCC): Primary | ICD-10-CM

## 2021-06-27 LAB
A/G RATIO: 0.4 (ref 1.1–2.2)
ALBUMIN SERPL-MCNC: 2.2 G/DL (ref 3.4–5)
ALP BLD-CCNC: 106 U/L (ref 40–129)
ALT SERPL-CCNC: 6 U/L (ref 10–40)
ANION GAP SERPL CALCULATED.3IONS-SCNC: 13 MMOL/L (ref 3–16)
AST SERPL-CCNC: 20 U/L (ref 15–37)
BACTERIA: ABNORMAL /HPF
BASOPHILS ABSOLUTE: 0.1 K/UL (ref 0–0.2)
BASOPHILS RELATIVE PERCENT: 0.6 %
BILIRUB SERPL-MCNC: 0.7 MG/DL (ref 0–1)
BILIRUBIN URINE: ABNORMAL
BLOOD, URINE: ABNORMAL
BUN BLDV-MCNC: 23 MG/DL (ref 7–20)
CALCIUM SERPL-MCNC: 8.9 MG/DL (ref 8.3–10.6)
CHLORIDE BLD-SCNC: 97 MMOL/L (ref 99–110)
CLARITY: ABNORMAL
CO2: 18 MMOL/L (ref 21–32)
COLOR: ABNORMAL
CREAT SERPL-MCNC: 1.4 MG/DL (ref 0.6–1.1)
EOSINOPHILS ABSOLUTE: 0 K/UL (ref 0–0.6)
EOSINOPHILS RELATIVE PERCENT: 0.2 %
GFR AFRICAN AMERICAN: 47
GFR NON-AFRICAN AMERICAN: 39
GLOBULIN: 5.1 G/DL
GLUCOSE BLD-MCNC: 93 MG/DL (ref 70–99)
GLUCOSE URINE: NEGATIVE MG/DL
HCT VFR BLD CALC: 32.6 % (ref 36–48)
HEMOGLOBIN: 10.5 G/DL (ref 12–16)
KETONES, URINE: NEGATIVE MG/DL
LACTIC ACID, SEPSIS: 1.5 MMOL/L (ref 0.4–1.9)
LEUKOCYTE ESTERASE, URINE: ABNORMAL
LYMPHOCYTES ABSOLUTE: 1 K/UL (ref 1–5.1)
LYMPHOCYTES RELATIVE PERCENT: 8.3 %
MCH RBC QN AUTO: 26.4 PG (ref 26–34)
MCHC RBC AUTO-ENTMCNC: 32.1 G/DL (ref 31–36)
MCV RBC AUTO: 82.1 FL (ref 80–100)
MICROSCOPIC EXAMINATION: YES
MONOCYTES ABSOLUTE: 1.2 K/UL (ref 0–1.3)
MONOCYTES RELATIVE PERCENT: 9.8 %
MUCUS: ABNORMAL /LPF
NEUTROPHILS ABSOLUTE: 9.7 K/UL (ref 1.7–7.7)
NEUTROPHILS RELATIVE PERCENT: 81.1 %
NITRITE, URINE: NEGATIVE
PDW BLD-RTO: 16.8 % (ref 12.4–15.4)
PH UA: 7 (ref 5–8)
PLATELET # BLD: 263 K/UL (ref 135–450)
PMV BLD AUTO: 8.8 FL (ref 5–10.5)
POTASSIUM REFLEX MAGNESIUM: 4.2 MMOL/L (ref 3.5–5.1)
PROCALCITONIN: 0.45 NG/ML (ref 0–0.15)
PROTEIN UA: 100 MG/DL
RBC # BLD: 3.98 M/UL (ref 4–5.2)
RBC UA: ABNORMAL /HPF (ref 0–4)
SODIUM BLD-SCNC: 128 MMOL/L (ref 136–145)
SPECIFIC GRAVITY UA: 1.02 (ref 1–1.03)
TOTAL PROTEIN: 7.3 G/DL (ref 6.4–8.2)
URINE REFLEX TO CULTURE: YES
URINE TYPE: ABNORMAL
UROBILINOGEN, URINE: 2 E.U./DL
WBC # BLD: 11.9 K/UL (ref 4–11)
WBC UA: >100 /HPF (ref 0–5)

## 2021-06-27 PROCEDURE — 87077 CULTURE AEROBIC IDENTIFY: CPT

## 2021-06-27 PROCEDURE — 96365 THER/PROPH/DIAG IV INF INIT: CPT

## 2021-06-27 PROCEDURE — 87086 URINE CULTURE/COLONY COUNT: CPT

## 2021-06-27 PROCEDURE — 74176 CT ABD & PELVIS W/O CONTRAST: CPT

## 2021-06-27 PROCEDURE — 87186 SC STD MICRODIL/AGAR DIL: CPT

## 2021-06-27 PROCEDURE — 96375 TX/PRO/DX INJ NEW DRUG ADDON: CPT

## 2021-06-27 PROCEDURE — 96361 HYDRATE IV INFUSION ADD-ON: CPT

## 2021-06-27 PROCEDURE — 85025 COMPLETE CBC W/AUTO DIFF WBC: CPT

## 2021-06-27 PROCEDURE — 96367 TX/PROPH/DG ADDL SEQ IV INF: CPT

## 2021-06-27 PROCEDURE — 87205 SMEAR GRAM STAIN: CPT

## 2021-06-27 PROCEDURE — 2580000003 HC RX 258: Performed by: PHYSICIAN ASSISTANT

## 2021-06-27 PROCEDURE — 6360000002 HC RX W HCPCS: Performed by: PHYSICIAN ASSISTANT

## 2021-06-27 PROCEDURE — 81001 URINALYSIS AUTO W/SCOPE: CPT

## 2021-06-27 PROCEDURE — 87040 BLOOD CULTURE FOR BACTERIA: CPT

## 2021-06-27 PROCEDURE — 99284 EMERGENCY DEPT VISIT MOD MDM: CPT

## 2021-06-27 PROCEDURE — 87070 CULTURE OTHR SPECIMN AEROBIC: CPT

## 2021-06-27 PROCEDURE — 83605 ASSAY OF LACTIC ACID: CPT

## 2021-06-27 PROCEDURE — 84145 PROCALCITONIN (PCT): CPT

## 2021-06-27 PROCEDURE — 80053 COMPREHEN METABOLIC PANEL: CPT

## 2021-06-27 RX ORDER — 0.9 % SODIUM CHLORIDE 0.9 %
1000 INTRAVENOUS SOLUTION INTRAVENOUS ONCE
Status: COMPLETED | OUTPATIENT
Start: 2021-06-27 | End: 2021-06-28

## 2021-06-27 RX ORDER — MORPHINE SULFATE 2 MG/ML
2 INJECTION, SOLUTION INTRAMUSCULAR; INTRAVENOUS ONCE
Status: COMPLETED | OUTPATIENT
Start: 2021-06-27 | End: 2021-06-27

## 2021-06-27 RX ORDER — ONDANSETRON 2 MG/ML
4 INJECTION INTRAMUSCULAR; INTRAVENOUS ONCE
Status: COMPLETED | OUTPATIENT
Start: 2021-06-27 | End: 2021-06-27

## 2021-06-27 RX ADMIN — ONDANSETRON HYDROCHLORIDE 4 MG: 2 INJECTION, SOLUTION INTRAMUSCULAR; INTRAVENOUS at 19:24

## 2021-06-27 RX ADMIN — PIPERACILLIN SODIUM AND TAZOBACTAM SODIUM 4500 MG: 4; .5 INJECTION, POWDER, LYOPHILIZED, FOR SOLUTION INTRAVENOUS at 20:33

## 2021-06-27 RX ADMIN — VANCOMYCIN HYDROCHLORIDE 1000 MG: 1 INJECTION, POWDER, LYOPHILIZED, FOR SOLUTION INTRAVENOUS at 21:29

## 2021-06-27 RX ADMIN — SODIUM CHLORIDE 1000 ML: 9 INJECTION, SOLUTION INTRAVENOUS at 22:59

## 2021-06-27 RX ADMIN — MORPHINE SULFATE 2 MG: 2 INJECTION, SOLUTION INTRAMUSCULAR; INTRAVENOUS at 19:24

## 2021-06-27 ASSESSMENT — PAIN SCALES - GENERAL
PAINLEVEL_OUTOF10: 9
PAINLEVEL_OUTOF10: 3

## 2021-06-27 NOTE — ED PROVIDER NOTES
breath. Cardiovascular: Negative for chest pain. Gastrointestinal: Positive for abdominal pain and nausea. Negative for vomiting. Genitourinary: Positive for flank pain. Drainage from nephrostomy tube   All other systems reviewed and are negative. Positives and Pertinent negatives as per HPI.        PAST MEDICAL HISTORY     Past Medical History:   Diagnosis Date    Depression     Headache(784.0)     Hepatitis C 08/21/2020    Hypertension          SURGICAL HISTORY     Past Surgical History:   Procedure Laterality Date    CYSTOSCOPY Left 1/11/2021    CYSTOSCOPY, DIAGNOSTIC LEFT URETEROSCOPY WITH HOLMIUM LASER LITHOTRIPSY, LEFT STENT EXCHANGE performed by Mac Conway MD at [address] / REMOVAL [place] Half / [place] Left 8/20/2020    CYSTOSCOPY URETERAL STENT INSERTION performed by Mac Conway MD at [address] / REMOVAL [place] / [place] Left 1/27/2021    CYSTOSCOPY, LEFT STENT REMOVAL performed by Mac Conway MD at [place] [id number]  4/24/2021    IR EMBOLIZATION HEMORRHAGE 4/24/2021 [address] SPECIAL PROCEDURES    IR NEPHROSTOMY PERCUTANEOUS LEFT  4/23/2021    IR NEPHROSTOMY PERCUTANEOUS LEFT 4/23/2021 [facility] SPECIAL PROCEDURES    OTHER SURGICAL HISTORY      CYSTOSCOPY URETERAL STENT INSERTION (Left Bladder)    OTHER SURGICAL HISTORY  11/23/2020    CYSTOSCOPY, LEFT RIGID AND FLEXIBLE URETEROSCOPY, HOLMIUM LASER LITHOTRIPSY, STONE EXTRACTION, STENT EXCHANGE (Left )     OTHER SURGICAL HISTORY  01/11/2021    CYSTOSCOPY, DIAGNOSTIC LEFT URETEROSCOPY WITH HOLMIUM LASER LITHOTRIPSY, LEFT STENT EXCHANGE (Left Bladder)    OTHER SURGICAL HISTORY  01/27/2021    cystoscopy, left stent removal    URETEROSCOPY Left 11/23/2020    CYSTOSCOPY, LEFT RIGID AND FLEXIBLE URETEROSCOPY, HOLMIUM LASER LITHOTRIPSY, STONE EXTRACTION, STENT EXCHANGE, REMOVAL MIGRATED STENT performed by Mac Conway MD at [facility] CURRENTMEDICATIONS       Previous Medications    ATORVASTATIN (LIPITOR) 40 MG TABLET    Take 1 tablet by mouth nightly    DOCUSATE SODIUM (COLACE, DULCOLAX) 100 MG CAPS    Take 100 mg by mouth 2 times daily    METOPROLOL SUCCINATE (TOPROL XL) 50 MG EXTENDED RELEASE TABLET    Take 1 tablet by mouth daily         ALLERGIES     Patient has no known allergies. FAMILYHISTORY       Family History   Problem Relation Age of Onset    Diabetes Mother     Heart Disease Mother     High Blood Pressure Sister     Diabetes Sister     Heart Attack Sister     Coronary Art Dis Father           SOCIAL HISTORY       Social History     Socioeconomic History    Marital status: Single     Spouse name: Not on file    Number of children: Not on file    Years of education: Not on file    Highest education level: Not on file   Occupational History    Not on file   Tobacco Use    Smoking status: Current Every Day Smoker     Packs/day: 0.50     Types: Cigarettes    Smokeless tobacco: Never Used   Vaping Use    Vaping Use: Never used   Substance and Sexual Activity    Alcohol use: No    Drug use: No    Sexual activity: Not Currently   Other Topics Concern    Not on file   Social History Narrative    Not on file     Social Determinants of Health     Financial Resource Strain:     Difficulty of Paying Living Expenses:    Food Insecurity:     Worried About Running Out of Food in the Last Year:     Ran Out of Food in the Last Year:    Transportation Needs:     Lack of Transportation (Medical):      Lack of Transportation (Non-Medical):    Physical Activity:     Days of Exercise per Week:     Minutes of Exercise per Session:    Stress:     Feeling of Stress :    Social Connections:     Frequency of Communication with Friends and Family:     Frequency of Social Gatherings with Friends and Family:     Attends [employer] Services:     Active Member of Clubs or Organizations:     Attends Club or Organization Meetings:     Marital Status:    Intimate Partner Violence:     Fear of Current or Ex-Partner:     Emotionally Abused:     Physically Abused:     Sexually Abused:        SCREENINGS             PHYSICAL EXAM    (up to 7 for level 4, 8 or more for level 5)     ED Triage Vitals [06/27/21 1734]   BP Temp Temp Source Pulse Resp SpO2 Height Weight   108/66 96.9 °F (36.1 °C) Oral 80 16 94 % 5' 7\" (1.702 m) 155 lb (70.3 kg)       Physical Exam  Vitals and nursing note reviewed. Constitutional:       Appearance: She is well-developed. She is ill-appearing. Comments: Appears uncomfortable   HENT:      Head: Normocephalic and atraumatic. Mouth/Throat:      Mouth: Mucous membranes are moist.      Pharynx: Oropharynx is clear. No pharyngeal swelling, oropharyngeal exudate or posterior oropharyngeal erythema. Eyes:      Conjunctiva/sclera: Conjunctivae normal.      Pupils: Pupils are equal, round, and reactive to light. Neck:      Vascular: No JVD. Cardiovascular:      Rate and Rhythm: Normal rate and regular rhythm. Pulmonary:      Effort: Pulmonary effort is normal. No respiratory distress. Breath sounds: Normal breath sounds. No stridor. No wheezing, rhonchi or rales. Abdominal:      General: Bowel sounds are normal.      Palpations: Abdomen is soft. Abdomen is not rigid. Tenderness: There is abdominal tenderness in the left upper quadrant and left lower quadrant. There is left CVA tenderness. There is no guarding or rebound. Comments: Nephrostomy tube left flank with thick yellow/ brown discharge. No bleeding. No erythema of the skin. Musculoskeletal:         General: Normal range of motion. Cervical back: Normal range of motion and neck supple. Skin:     General: Skin is warm and dry. Findings: No rash. Neurological:      Mental Status: She is alert and oriented to person, place, and time. GCS: GCS eye subscore is 4. GCS verbal subscore is 5.  GCS motor subscore Lymphocytes % 8.3 %    Monocytes % 9.8 %    Eosinophils % 0.2 %    Basophils % 0.6 %    Neutrophils Absolute 9.7 (H) 1.7 - 7.7 K/uL    Lymphocytes Absolute 1.0 1.0 - 5.1 K/uL    Monocytes Absolute 1.2 0.0 - 1.3 K/uL    Eosinophils Absolute 0.0 0.0 - 0.6 K/uL    Basophils Absolute 0.1 0.0 - 0.2 K/uL   Comprehensive Metabolic Panel w/ Reflex to MG   Result Value Ref Range    Sodium 128 (L) 136 - 145 mmol/L    Potassium reflex Magnesium 4.2 3.5 - 5.1 mmol/L    Chloride 97 (L) 99 - 110 mmol/L    CO2 18 (L) 21 - 32 mmol/L    Anion Gap 13 3 - 16    Glucose 93 70 - 99 mg/dL    BUN 23 (H) 7 - 20 mg/dL    CREATININE 1.4 (H) 0.6 - 1.1 mg/dL    GFR Non-African American 39 (A) >60    GFR  47 (A) >60    Calcium 8.9 8.3 - 10.6 mg/dL    Total Protein 7.3 6.4 - 8.2 g/dL    Albumin 2.2 (L) 3.4 - 5.0 g/dL    Albumin/Globulin Ratio 0.4 (L) 1.1 - 2.2    Total Bilirubin 0.7 0.0 - 1.0 mg/dL    Alkaline Phosphatase 106 40 - 129 U/L    ALT 6 (L) 10 - 40 U/L    AST 20 15 - 37 U/L    Globulin 5.1 g/dL   Procalcitonin   Result Value Ref Range    Procalcitonin 0.45 (H) 0.00 - 0.15 ng/mL   Urinalysis Reflex to Culture    Specimen: Urine, clean catch   Result Value Ref Range    Color, UA DK YELLOW Straw/Yellow    Clarity, UA SL CLOUDY (A) Clear    Glucose, Ur Negative Negative mg/dL    Bilirubin Urine MODERATE (A) Negative    Ketones, Urine Negative Negative mg/dL    Specific Gravity, UA 1.020 1.005 - 1.030    Blood, Urine LARGE (A) Negative    pH, UA 7.0 5.0 - 8.0    Protein,  (A) Negative mg/dL    Urobilinogen, Urine 2.0 (A) <2.0 E.U./dL    Nitrite, Urine Negative Negative    Leukocyte Esterase, Urine LARGE (A) Negative    Microscopic Examination YES     Urine Type NotGiven     Urine Reflex to Culture Yes    Microscopic Urinalysis   Result Value Ref Range    Mucus, UA 1+ (A) None Seen /LPF    WBC, UA >100 (A) 0 - 5 /HPF    RBC, UA 21-50 (A) 0 - 4 /HPF    Bacteria, UA 3+ (A) None Seen /HPF       All other labs were within normal range or not returned as of this dictation. EKG: All EKG's are interpreted by the Emergency Department Physician in the absence of a cardiologist.  Please see their note for interpretation of EKG. RADIOLOGY:   Non-plain film images such as CT, Ultrasound and MRI are read by the radiologist. Plain radiographic images are visualized andpreliminarily interpreted by the  ED Provider with the below findings:        Interpretation perthe Radiologist below, if available at the time of this note:    CT ABDOMEN PELVIS WO CONTRAST Additional Contrast? None   Final Result   Limited exam due to the lack of IV contrast.      Large, multilobulated, 16 x 14 cm hypodense mass in the area of the left   renal fossa which has increased in size and is more well-circumscribed. There is a nephrostomy tube seen entering the mass superiorly which   presumably is entering the left kidney but the kidney is not visualized due   to the lack of IV contrast.  This mass may represent a large residual   hematoma, which may be chronic and liquefying and/or urinoma. Recommend   prompt urological surgical consultation and suggest a follow-up study with   contrast to further characterize and evaluate the left kidney and nephrostomy   tube position. Multiple calcifications along the medial aspect of the mass superiorly which   were seen previously and are unchanged. These may be within the renal pelvis   and are unchanged. Recommend follow-up. Status post placement of embolization coils in the mesentery along the right   upper abdomen, medial to the mass. Mild-to-moderate splenomegaly which is more prominent      Mild chronic liver disease which is unchanged. Absent right kidney which is unchanged      Questionable 6 cm hypodense mass in the left adnexal region which could be   related to the uterus and a 3 cm hypodense mass more superiorly which could   be within the ovary which are unchanged.   Recommend ultrasound follow-up           CT ABDOMEN PELVIS WO CONTRAST Additional Contrast? None    Result Date: 6/27/2021  EXAMINATION: CT OF THE ABDOMEN AND PELVIS WITHOUT CONTRAST 6/27/2021 9:24 pm TECHNIQUE: CT of the abdomen and pelvis was performed without the administration of intravenous contrast. Multiplanar reformatted images are provided for review. Dose modulation, iterative reconstruction, and/or weight based adjustment of the mA/kV was utilized to reduce the radiation dose to as low as reasonably achievable. COMPARISON: 04/23/2021 HISTORY: ORDERING SYSTEM PROVIDED HISTORY: left flank pain, drainage from around nephrostomy tube TECHNOLOGIST PROVIDED HISTORY: Reason for exam:->left flank pain, drainage from around nephrostomy tube Additional Contrast?->None Is the patient pregnant?->No Reason for Exam: flank pain, left side Acuity: Acute Type of Exam: Initial Mechanism of Injury: left flank pain, drainage around nephrostomy tube, GFR=39 FINDINGS: Lower Chest: There are subsegmental linear opacities along the right lung base anterior laterally which is more prominent. No effusion is seen Organs: The liver is mildly enlarged with hypertrophy of the caudate and left lobes which is more prominent. The gallbladder, pancreas, and bile ducts are normal.  The spleen is mildly enlarged measuring 14 cm in length and is more prominent. The adrenals are normal. There is a large rounded multilobulated hypodense mass arising from the left renal fossa measuring 16 x 14 x 12 cm which was is more prominent. The mass is fairly well-circumscribed with stranding around the mass and mild heterogeneity internally. There is a nephrostomy tube seen entering the mass superiorly which is presumably in the left kidney but the left kidney cannot be visualized and further characterize without contrast.  The nephrostomy tube otherwise appears unchanged in position.   There is mild stranding around the mass extending into the surrounding mesentery. There are several rounded calcifications adjacent to the mass medially measuring up to 1.6 cm which are unchanged. There are embolization coils along the medial aspect of the mass superiorly which is more prominent. The right kidney is not visualized which is unchanged. GI/Bowel: The bowel gas pattern is unremarkable with no obstruction. There diverticula throughout the sigmoid colon with no pericolonic inflammation. The mesentery is unremarkable. Pelvis: The bladder is unremarkable. The uterus appears deviated to the left and there is questionable hypodense adnexal fullness on the left measuring 6 cm which is unchanged. The appendix is not well visualized. No adenopathy or ascites is seen. There is a 3.4 cm hypodense mass superiorly in the left pelvis which probably is within the ovary and is unchanged. Peritoneum/Retroperitoneum:   There is moderate calcified plaque throughout the aorta which is normal caliber with no aneurysm and no periaortic adenopathy. Bones/Soft Tissues: The bones are intact. No aggressive osseous lesion is seen. Limited exam due to the lack of IV contrast. Large, multilobulated, 16 x 14 cm hypodense mass in the area of the left renal fossa which has increased in size and is more well-circumscribed. There is a nephrostomy tube seen entering the mass superiorly which presumably is entering the left kidney but the kidney is not visualized due to the lack of IV contrast.  This mass may represent a large residual hematoma, which may be chronic and liquefying and/or urinoma. Recommend prompt urological surgical consultation and suggest a follow-up study with contrast to further characterize and evaluate the left kidney and nephrostomy tube position. Multiple calcifications along the medial aspect of the mass superiorly which were seen previously and are unchanged. These may be within the renal pelvis and are unchanged. Recommend follow-up.  Status post placement of embolization coils in the mesentery along the right upper abdomen, medial to the mass. Mild-to-moderate splenomegaly which is more prominent Mild chronic liver disease which is unchanged. Absent right kidney which is unchanged Questionable 6 cm hypodense mass in the left adnexal region which could be related to the uterus and a 3 cm hypodense mass more superiorly which could be within the ovary which are unchanged. Recommend ultrasound follow-up          PROCEDURES   Unless otherwise noted below, none     Procedures    CRITICAL CARE TIME   Critical care provided for this patient of which 20 min were spend on critical care and decision making. 0 min spent on procedures. There was imminent failure of an organ system which required critical intervention to prevent clinically significant progression of life threatening deterioration of the patient's condition to the point of disability or death.     CONSULTS:  IP CONSULT TO HOSPITALIST  IP CONSULT TO UROLOGY      EMERGENCY DEPARTMENT COURSE and DIFFERENTIAL DIAGNOSIS/MDM:   Vitals:    Vitals:    06/27/21 2202 06/27/21 2301 06/28/21 0002 06/28/21 0102   BP: (!) 99/44 (!) 107/53 (!) 132/91 119/64   Pulse: 61 68 62 64   Resp: 14 14 14 16   Temp:       TempSrc:       SpO2: 96% 96% 100% 96%   Weight:       Height:           Patient was given thefollowing medications:  Medications   morphine (PF) injection 2 mg (2 mg Intravenous Given 6/27/21 1924)   ondansetron (ZOFRAN) injection 4 mg (4 mg Intravenous Given 6/27/21 1924)   piperacillin-tazobactam (ZOSYN) 4,500 mg in sodium chloride 0.9 % 100 mL IVPB (mini-bag) (0 mg Intravenous Stopped 6/27/21 2128)   vancomycin 1000 mg IVPB in 250 mL D5W addavial (0 mg Intravenous Stopped 6/27/21 2233)   0.9 % sodium chloride bolus (1,000 mLs Intravenous New Bag 6/27/21 2259)         Patient presented to the ER for evaluation of left flank pain abdominal pain has nephrostomy tube in place increased pain the past 1 week with new drainage today.  Intermittent fevers and nausea. Work-up obtained. White count 11.9. Hemoglobin 10.5. Sodium 128 BUN 23 creatinine 1.4, given IV fluids. Zosyn started for suspected intra-abdominal infection. Urinalysis resulted greater than 100 WBC with 3+ bacteria. Previous urine culture reviewed sensitive to vancomycin, ordered. CT scan abdomen pelvis showing large multilobulated 16 x 14 cm hypodense mass in the area of the left renal fossa increased in size and more well-circumscribed. 10:58 PM EDT  Spoke with urology Dr Yen Ohara discussed case exam findings and test results, they will see her tomorrow, no further orders at this time. Patient is stable. Spoke with Dr. Ivania Medina he will admit the patient and place orders. FINAL IMPRESSION      1. Complication of nephrostomy ([address]    2. Complicated urinary tract infection    3. Abnormal CT of the abdomen    4. Left renal mass    5. MARY (acute kidney injury) ([address]    6. Hyponatremia          DISPOSITION/PLAN   DISPOSITION Admitted    PATIENT REFERREDTO:  No follow-up provider specified.     DISCHARGE MEDICATIONS:  New Prescriptions    No medications on file       DISCONTINUED MEDICATIONS:  Discontinued Medications    No medications on file              (Please note that portions ofthis note were completed with a voice recognition program.  Efforts were made to edit the dictations but occasionally words are mis-transcribed.)    Peyton Gamble PA-C (electronically signed)            Jessica Mackenzie PA-C  06/28/21 0127

## 2021-06-28 ENCOUNTER — APPOINTMENT (OUTPATIENT)
Dept: INTERVENTIONAL RADIOLOGY/VASCULAR | Age: 56
DRG: 721 | End: 2021-06-28
Payer: COMMERCIAL

## 2021-06-28 PROBLEM — I48.91 ATRIAL FIBRILLATION (HCC): Status: ACTIVE | Noted: 2021-05-19

## 2021-06-28 PROBLEM — N10 ACUTE PYELONEPHRITIS: Status: ACTIVE | Noted: 2021-06-28

## 2021-06-28 LAB
A/G RATIO: 0.4 (ref 1.1–2.2)
ALBUMIN SERPL-MCNC: 1.8 G/DL (ref 3.4–5)
ALP BLD-CCNC: 93 U/L (ref 40–129)
ALT SERPL-CCNC: <5 U/L (ref 10–40)
ANION GAP SERPL CALCULATED.3IONS-SCNC: 12 MMOL/L (ref 3–16)
AST SERPL-CCNC: 16 U/L (ref 15–37)
BASOPHILS ABSOLUTE: 0 K/UL (ref 0–0.2)
BASOPHILS RELATIVE PERCENT: 0.3 %
BILIRUB SERPL-MCNC: 0.6 MG/DL (ref 0–1)
BUN BLDV-MCNC: 23 MG/DL (ref 7–20)
CALCIUM SERPL-MCNC: 8.4 MG/DL (ref 8.3–10.6)
CHLORIDE BLD-SCNC: 101 MMOL/L (ref 99–110)
CO2: 18 MMOL/L (ref 21–32)
CREAT SERPL-MCNC: 1.4 MG/DL (ref 0.6–1.1)
EOSINOPHILS ABSOLUTE: 0 K/UL (ref 0–0.6)
EOSINOPHILS RELATIVE PERCENT: 0.3 %
GFR AFRICAN AMERICAN: 47
GFR NON-AFRICAN AMERICAN: 39
GLOBULIN: 4.8 G/DL
GLUCOSE BLD-MCNC: 79 MG/DL (ref 70–99)
HCT VFR BLD CALC: 32 % (ref 36–48)
HEMOGLOBIN: 10.2 G/DL (ref 12–16)
INR BLD: 1.34 (ref 0.86–1.14)
LYMPHOCYTES ABSOLUTE: 0.9 K/UL (ref 1–5.1)
LYMPHOCYTES RELATIVE PERCENT: 7.6 %
MCH RBC QN AUTO: 26.8 PG (ref 26–34)
MCHC RBC AUTO-ENTMCNC: 31.9 G/DL (ref 31–36)
MCV RBC AUTO: 84.1 FL (ref 80–100)
MONOCYTES ABSOLUTE: 1.5 K/UL (ref 0–1.3)
MONOCYTES RELATIVE PERCENT: 12.9 %
NEUTROPHILS ABSOLUTE: 8.9 K/UL (ref 1.7–7.7)
NEUTROPHILS RELATIVE PERCENT: 78.9 %
PDW BLD-RTO: 16.9 % (ref 12.4–15.4)
PLATELET # BLD: 226 K/UL (ref 135–450)
PMV BLD AUTO: 9 FL (ref 5–10.5)
POTASSIUM REFLEX MAGNESIUM: 4.1 MMOL/L (ref 3.5–5.1)
PROTHROMBIN TIME: 15.6 SEC (ref 10–13.2)
RBC # BLD: 3.8 M/UL (ref 4–5.2)
SARS-COV-2, NAAT: NOT DETECTED
SODIUM BLD-SCNC: 131 MMOL/L (ref 136–145)
TOTAL PROTEIN: 6.6 G/DL (ref 6.4–8.2)
URINE CULTURE, ROUTINE: NORMAL
WBC # BLD: 11.2 K/UL (ref 4–11)

## 2021-06-28 PROCEDURE — 99223 1ST HOSP IP/OBS HIGH 75: CPT | Performed by: INTERNAL MEDICINE

## 2021-06-28 PROCEDURE — 6360000002 HC RX W HCPCS: Performed by: RADIOLOGY

## 2021-06-28 PROCEDURE — 87205 SMEAR GRAM STAIN: CPT

## 2021-06-28 PROCEDURE — 99153 MOD SED SAME PHYS/QHP EA: CPT

## 2021-06-28 PROCEDURE — 85610 PROTHROMBIN TIME: CPT

## 2021-06-28 PROCEDURE — 0T943ZZ DRAINAGE OF LEFT KIDNEY PELVIS, PERCUTANEOUS APPROACH: ICD-10-PCS | Performed by: RADIOLOGY

## 2021-06-28 PROCEDURE — 49406 IMAGE CATH FLUID PERI/RETRO: CPT

## 2021-06-28 PROCEDURE — 87635 SARS-COV-2 COVID-19 AMP PRB: CPT

## 2021-06-28 PROCEDURE — 80053 COMPREHEN METABOLIC PANEL: CPT

## 2021-06-28 PROCEDURE — 85025 COMPLETE CBC W/AUTO DIFF WBC: CPT

## 2021-06-28 PROCEDURE — 99152 MOD SED SAME PHYS/QHP 5/>YRS: CPT

## 2021-06-28 PROCEDURE — 6360000002 HC RX W HCPCS: Performed by: INTERNAL MEDICINE

## 2021-06-28 PROCEDURE — BT14ZZZ FLUOROSCOPY OF KIDNEYS, URETERS AND BLADDER: ICD-10-PCS | Performed by: RADIOLOGY

## 2021-06-28 PROCEDURE — 87186 SC STD MICRODIL/AGAR DIL: CPT

## 2021-06-28 PROCEDURE — 6370000000 HC RX 637 (ALT 250 FOR IP): Performed by: INTERNAL MEDICINE

## 2021-06-28 PROCEDURE — 2580000003 HC RX 258: Performed by: INTERNAL MEDICINE

## 2021-06-28 PROCEDURE — 87070 CULTURE OTHR SPECIMN AEROBIC: CPT

## 2021-06-28 PROCEDURE — 36415 COLL VENOUS BLD VENIPUNCTURE: CPT

## 2021-06-28 PROCEDURE — 6360000002 HC RX W HCPCS: Performed by: PHYSICIAN ASSISTANT

## 2021-06-28 PROCEDURE — C1729 CATH, DRAINAGE: HCPCS

## 2021-06-28 PROCEDURE — 87077 CULTURE AEROBIC IDENTIFY: CPT

## 2021-06-28 PROCEDURE — 2060000000 HC ICU INTERMEDIATE R&B

## 2021-06-28 RX ORDER — ACETAMINOPHEN 650 MG/1
650 SUPPOSITORY RECTAL EVERY 6 HOURS PRN
Status: DISCONTINUED | OUTPATIENT
Start: 2021-06-28 | End: 2021-07-02 | Stop reason: HOSPADM

## 2021-06-28 RX ORDER — SODIUM CHLORIDE 9 MG/ML
25 INJECTION, SOLUTION INTRAVENOUS PRN
Status: DISCONTINUED | OUTPATIENT
Start: 2021-06-28 | End: 2021-07-02 | Stop reason: HOSPADM

## 2021-06-28 RX ORDER — SODIUM CHLORIDE 9 MG/ML
INJECTION, SOLUTION INTRAVENOUS CONTINUOUS
Status: DISCONTINUED | OUTPATIENT
Start: 2021-06-28 | End: 2021-06-30

## 2021-06-28 RX ORDER — LINEZOLID 2 MG/ML
600 INJECTION, SOLUTION INTRAVENOUS EVERY 12 HOURS
Status: DISCONTINUED | OUTPATIENT
Start: 2021-06-28 | End: 2021-06-29

## 2021-06-28 RX ORDER — SODIUM CHLORIDE 0.9 % (FLUSH) 0.9 %
5-40 SYRINGE (ML) INJECTION EVERY 12 HOURS SCHEDULED
Status: DISCONTINUED | OUTPATIENT
Start: 2021-06-28 | End: 2021-07-02 | Stop reason: HOSPADM

## 2021-06-28 RX ORDER — ASPIRIN 81 MG/1
81 TABLET, CHEWABLE ORAL DAILY
Status: ON HOLD | COMMUNITY
End: 2021-09-28 | Stop reason: HOSPADM

## 2021-06-28 RX ORDER — SODIUM CHLORIDE 0.9 % (FLUSH) 0.9 %
5-40 SYRINGE (ML) INJECTION PRN
Status: DISCONTINUED | OUTPATIENT
Start: 2021-06-28 | End: 2021-07-02 | Stop reason: HOSPADM

## 2021-06-28 RX ORDER — ATORVASTATIN CALCIUM 40 MG/1
40 TABLET, FILM COATED ORAL NIGHTLY
Status: DISCONTINUED | OUTPATIENT
Start: 2021-06-28 | End: 2021-07-02 | Stop reason: HOSPADM

## 2021-06-28 RX ORDER — DOCUSATE SODIUM 100 MG/1
100 CAPSULE, LIQUID FILLED ORAL 2 TIMES DAILY
Status: DISCONTINUED | OUTPATIENT
Start: 2021-06-28 | End: 2021-07-02 | Stop reason: HOSPADM

## 2021-06-28 RX ORDER — DIPHENHYDRAMINE HYDROCHLORIDE 50 MG/ML
INJECTION INTRAMUSCULAR; INTRAVENOUS
Status: COMPLETED | OUTPATIENT
Start: 2021-06-28 | End: 2021-06-28

## 2021-06-28 RX ORDER — MORPHINE SULFATE 2 MG/ML
2 INJECTION, SOLUTION INTRAMUSCULAR; INTRAVENOUS
Status: DISCONTINUED | OUTPATIENT
Start: 2021-06-28 | End: 2021-06-30

## 2021-06-28 RX ORDER — FENTANYL CITRATE 50 UG/ML
INJECTION, SOLUTION INTRAMUSCULAR; INTRAVENOUS
Status: COMPLETED | OUTPATIENT
Start: 2021-06-28 | End: 2021-06-28

## 2021-06-28 RX ORDER — ACETAMINOPHEN 325 MG/1
650 TABLET ORAL EVERY 6 HOURS PRN
Status: DISCONTINUED | OUTPATIENT
Start: 2021-06-28 | End: 2021-07-02 | Stop reason: HOSPADM

## 2021-06-28 RX ORDER — MIDAZOLAM HYDROCHLORIDE 5 MG/ML
INJECTION INTRAMUSCULAR; INTRAVENOUS
Status: COMPLETED | OUTPATIENT
Start: 2021-06-28 | End: 2021-06-28

## 2021-06-28 RX ORDER — ONDANSETRON 2 MG/ML
4 INJECTION INTRAMUSCULAR; INTRAVENOUS EVERY 6 HOURS PRN
Status: DISCONTINUED | OUTPATIENT
Start: 2021-06-28 | End: 2021-07-02 | Stop reason: HOSPADM

## 2021-06-28 RX ADMIN — FENTANYL CITRATE 50 MCG: 50 INJECTION INTRAMUSCULAR; INTRAVENOUS at 15:31

## 2021-06-28 RX ADMIN — MORPHINE SULFATE 2 MG: 2 INJECTION, SOLUTION INTRAMUSCULAR; INTRAVENOUS at 06:18

## 2021-06-28 RX ADMIN — PIPERACILLIN SODIUM AND TAZOBACTAM SODIUM 3375 MG: 3; .375 INJECTION, POWDER, LYOPHILIZED, FOR SOLUTION INTRAVENOUS at 20:06

## 2021-06-28 RX ADMIN — LINEZOLID 600 MG: 600 INJECTION, SOLUTION INTRAVENOUS at 18:07

## 2021-06-28 RX ADMIN — ATORVASTATIN CALCIUM 40 MG: 40 TABLET, FILM COATED ORAL at 21:24

## 2021-06-28 RX ADMIN — SODIUM CHLORIDE: 9 INJECTION, SOLUTION INTRAVENOUS at 14:56

## 2021-06-28 RX ADMIN — PIPERACILLIN SODIUM AND TAZOBACTAM SODIUM 3375 MG: 3; .375 INJECTION, POWDER, LYOPHILIZED, FOR SOLUTION INTRAVENOUS at 03:10

## 2021-06-28 RX ADMIN — SODIUM CHLORIDE: 9 INJECTION, SOLUTION INTRAVENOUS at 03:07

## 2021-06-28 RX ADMIN — MORPHINE SULFATE 2 MG: 2 INJECTION, SOLUTION INTRAMUSCULAR; INTRAVENOUS at 20:05

## 2021-06-28 RX ADMIN — Medication 10 ML: at 21:24

## 2021-06-28 RX ADMIN — MIDAZOLAM HYDROCHLORIDE 1 MG: 5 INJECTION INTRAMUSCULAR; INTRAVENOUS at 15:31

## 2021-06-28 RX ADMIN — PIPERACILLIN SODIUM AND TAZOBACTAM SODIUM 3375 MG: 3; .375 INJECTION, POWDER, LYOPHILIZED, FOR SOLUTION INTRAVENOUS at 11:23

## 2021-06-28 RX ADMIN — MORPHINE SULFATE 2 MG: 2 INJECTION, SOLUTION INTRAMUSCULAR; INTRAVENOUS at 03:02

## 2021-06-28 RX ADMIN — SODIUM CHLORIDE 25 ML: 9 INJECTION, SOLUTION INTRAVENOUS at 11:20

## 2021-06-28 RX ADMIN — SODIUM CHLORIDE: 9 INJECTION, SOLUTION INTRAVENOUS at 10:15

## 2021-06-28 RX ADMIN — Medication 10 ML: at 11:24

## 2021-06-28 RX ADMIN — DIPHENHYDRAMINE HYDROCHLORIDE 50 MG: 50 INJECTION, SOLUTION INTRAMUSCULAR; INTRAVENOUS at 15:32

## 2021-06-28 RX ADMIN — MORPHINE SULFATE 2 MG: 2 INJECTION, SOLUTION INTRAMUSCULAR; INTRAVENOUS at 14:54

## 2021-06-28 RX ADMIN — ONDANSETRON HYDROCHLORIDE 4 MG: 2 INJECTION, SOLUTION INTRAMUSCULAR; INTRAVENOUS at 10:11

## 2021-06-28 RX ADMIN — MORPHINE SULFATE 2 MG: 2 INJECTION, SOLUTION INTRAMUSCULAR; INTRAVENOUS at 18:01

## 2021-06-28 ASSESSMENT — PAIN DESCRIPTION - PROGRESSION
CLINICAL_PROGRESSION: GRADUALLY WORSENING

## 2021-06-28 ASSESSMENT — PAIN DESCRIPTION - FREQUENCY
FREQUENCY: CONTINUOUS

## 2021-06-28 ASSESSMENT — PAIN DESCRIPTION - DESCRIPTORS
DESCRIPTORS: ACHING;DISCOMFORT;SORE
DESCRIPTORS: SHARP
DESCRIPTORS: SHARP
DESCRIPTORS: BURNING;CRAMPING;CONSTANT

## 2021-06-28 ASSESSMENT — PAIN DESCRIPTION - ORIENTATION
ORIENTATION: LEFT
ORIENTATION: LOWER;MID
ORIENTATION: LEFT
ORIENTATION: LEFT
ORIENTATION: MID;LOWER

## 2021-06-28 ASSESSMENT — PAIN SCALES - GENERAL
PAINLEVEL_OUTOF10: 7
PAINLEVEL_OUTOF10: 6
PAINLEVEL_OUTOF10: 7

## 2021-06-28 ASSESSMENT — PAIN - FUNCTIONAL ASSESSMENT
PAIN_FUNCTIONAL_ASSESSMENT: PREVENTS OR INTERFERES SOME ACTIVE ACTIVITIES AND ADLS

## 2021-06-28 ASSESSMENT — PAIN DESCRIPTION - PAIN TYPE
TYPE: ACUTE PAIN

## 2021-06-28 ASSESSMENT — PAIN DESCRIPTION - LOCATION
LOCATION: BACK
LOCATION: BACK
LOCATION: ABDOMEN
LOCATION: ABDOMEN;FLANK
LOCATION: ABDOMEN;FLANK

## 2021-06-28 ASSESSMENT — ENCOUNTER SYMPTOMS
VOMITING: 0
SHORTNESS OF BREATH: 0
ABDOMINAL PAIN: 1
NAUSEA: 1

## 2021-06-28 ASSESSMENT — PAIN DESCRIPTION - ONSET
ONSET: ON-GOING

## 2021-06-28 NOTE — PLAN OF CARE
48 yoF w/ hx of solitary kidney and nephrostomy placed in April p/w L flank/abd pain and discharge from nephrostomy site. When nephrostomy was placed in April pt reportedly had a retroperitoneal hemorrhage. Discharge was sent for Cx by ER. On imaging, the area around her kidney has an enlarging mass (14x16 cm). Elevated PCT. Acute pyelo/UTI, infected nephrostomy site w/ increasing hematoma vs abscess  Hypotension (at time of admit). Will place on PCU. IVF.     MARY  Hyponatremia (mild)  L flank/abd pain

## 2021-06-28 NOTE — H&P
Hospital Medicine History & Physical      PCP: Jim Mckeon MD    Date of Admission: 6/27/2021    Date of Service: Pt seen/examined on 6/28/2021    Chief Complaint:    Chief Complaint   Patient presents with    Flank Pain     ongoing pain from left kidney due to kidney stones - has nephrostomy tube to drainage. Has noticed blood around tube insertion site. Continues to drain as usual.        History Of Present Illness: The patient is a 48 y.o. female with depression, Hep C, HTN who presented to [facility] ED with complaint of flank pain. Patient reports that she was discharged home not too long ago but hasn't felt well since that time. 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 She had a retroperitoneal hematoma and IR embolizatio was performed and she went in to hemorrhagic shock. She had a UTI as well and was discharged home on Zyvox. She reports that since getting home, she has had flank pain at the site of her nephrostomy tube and has noticed drainage coming from the area as well. She has had nausea and poor PO intake and believes that she has had fevers at home as well. She reports that she followed up with Dr. Brayden Dubon and they discussed possibly removing the nephrostomy tube on July 6th prior to the start of these severe symptoms.      Past Medical History:        Diagnosis Date    Depression     Headache(784.0)     Hepatitis C 08/21/2020    Hypertension        Past Surgical History:        Procedure Laterality Date    CYSTOSCOPY Left 1/11/2021    CYSTOSCOPY, DIAGNOSTIC LEFT URETEROSCOPY WITH HOLMIUM LASER LITHOTRIPSY, LEFT STENT EXCHANGE performed by Chu Su MD at [address] / Christin Simpson / Kirsten Aniceto Left 8/20/2020    CYSTOSCOPY URETERAL STENT INSERTION performed by Chu Su MD at [address] / REMOVAL Sarah Bran / Kirsten Aniceto Left 1/27/2021 CYSTOSCOPY, LEFT STENT REMOVAL [facility]  [address]  4/24/2021    IR EMBOLIZATION HEMORRHAGE 4/24/2021 [address] SPECIAL PROCEDURES    IR NEPHROSTOMY PERCUTANEOUS LEFT  4/23/2021    IR NEPHROSTOMY PERCUTANEOUS LEFT 4/23/2021 [facility] PROCEDURES    OTHER SURGICAL HISTORY      CYSTOSCOPY URETERAL STENT INSERTION (Left Bladder)    OTHER SURGICAL HISTORY  11/23/2020    CYSTOSCOPY, LEFT RIGID AND FLEXIBLE URETEROSCOPY, HOLMIUM LASER LITHOTRIPSY, STONE EXTRACTION, STENT EXCHANGE (Left )     OTHER SURGICAL HISTORY  01/11/2021    CYSTOSCOPY, DIAGNOSTIC LEFT URETEROSCOPY WITH HOLMIUM LASER LITHOTRIPSY, LEFT STENT EXCHANGE (Left Bladder)    OTHER SURGICAL HISTORY  01/27/2021    cystoscopy, left stent removal    URETEROSCOPY Left 11/23/2020    CYSTOSCOPY, LEFT RIGID AND FLEXIBLE URETEROSCOPY, HOLMIUM LASER LITHOTRIPSY, STONE EXTRACTION, STENT EXCHANGE, REMOVAL MIGRATED STENT performed by Celeste Loera MD at [address] OR       Medications Prior to Admission:    Prior to Admission medications    Medication Sig Start Date End Date Taking? Authorizing Provider   aspirin 81 MG chewable tablet Take 81 mg by mouth daily   Yes Historical Provider, MD   vitamin D (CHOLECALCIFEROL) 25 MCG (1000 UT) TABS tablet Take 1,000 Units by mouth once a week   Yes Historical Provider, MD   docusate sodium (COLACE, DULCOLAX) 100 MG CAPS Take 100 mg by mouth 2 times daily 4/30/21   Shelly Acosta MD   atorvastatin (LIPITOR) 40 MG tablet Take 1 tablet by mouth nightly 4/30/21   Shelly Acosta MD   metoprolol succinate (TOPROL XL) 50 MG extended release tablet Take 1 tablet by mouth daily 5/1/21   SIENNA Castro - CNP       Allergies:  Patient has no known allergies. Social History:  The patient currently lives at home     TOBACCO:   reports that she has been smoking cigarettes. She has been smoking about 0.50 packs per day.  She has never used smokeless tobacco.  ETOH:   reports no history of alcohol use. Family History:   Positive as follows:        Problem Relation Age of Onset    Diabetes Mother     Heart Disease Mother     High Blood Pressure Sister     Diabetes Sister     Heart Attack Sister     Coronary Art Dis Father        REVIEW OF SYSTEMS:     Constitutional: + fever   HENT: Negative for sore throat   Eyes: Negative for redness   Respiratory: Negative  for dyspnea, cough   Cardiovascular: Negative for chest pain   Gastrointestinal: + nausea, + abdominal pain   Genitourinary: Negative for hematuria   Musculoskeletal: + flank pain   Skin: + drainage from nephrostomy tube insertion site   Neurological: Negative for syncope   Hematological: Negative for adenopathy   Psychiatric/Behavorial: Negative for anxiety    PHYSICAL EXAM:    /73   Pulse 85   Temp 98.5 °F (36.9 °C) (Oral)   Resp 16   Ht 5' 7\" (1.702 m)   Wt 150 lb (68 kg)   LMP 12/01/2014   SpO2 98%   BMI 23.49 kg/m²   Gen: Ill appearing, No distress. Alert. Eyes:  No conjunctival injection. ENT: No discharge. Pharynx clear. Neck:Trachea midline. Resp: No accessory muscle use. No crackles. No wheezes. No rhonchi. CV: Regular rate. Regular rhythm. No murmur. No rub. No edema. Capillary Refill: Brisk,< 3 seconds   Peripheral Pulses: +2 palpable, equal bilaterally   GI: Diffuse TTP. Mild guarding, No masses. Normal bowel sounds. Skin: Warm and dry. M/S: L nephrostomy tube in place, purulent material draining around the nephrostomy tube site   Neuro: Awake. Grossly nonfocal    Psych: Oriented x 3. No anxiety or agitation. Krupa Telles MD have reviewed the chart on [facility]anco and personally interviewed and examined patient, reviewed the data (labs and imaging) and after discussion with my PA formulated the plan. Agree with note with the following edits.     HPI: 50-year-old female with history of hepatitis C, hypertension presented emergency room with flank pain. During the last admission she was found to have a large renal stone with history of single kidney from birth and had a nephrostomy tube placed for  hydronephrosis. I reviewed the patient's Past Medical History, Past Surgical History, Medications, and Allergies. Physical exam:    /65   Pulse 78   Temp 98.6 °F (37 °C) (Oral)   Resp 16   Ht 5' 7\" (1.702 m)   Wt 150 lb (68 kg)   LMP 12/01/2014   SpO2 98%   BMI 23.49 kg/m²     Gen: No distress. Alert. Eyes: PERRL. No sclera icterus. No conjunctival injection. ENT: No discharge. Pharynx clear. Neck: Trachea midline. Normal thyroid. Resp: No accessory muscle use. No crackles. No wheezes. No rhonchi. No dullness on percussion. CV: Regular rate. Regular rhythm. No murmur or rub. No edema. GI: Non-tender. Non-distended. No masses. No organomegaly. Normal bowel sounds. No hernia. Nephrostomy tube left flank. Skin: Warm and dry. No nodule on exposed extremities. No rash on exposed extremities. Lymph: No cervical LAD. No supraclavicular LAD. M/S: No cyanosis. No joint deformity. No clubbing. Neuro: Awake. Reflexes 2+ symmetric bilaterally. Moves all 4 extremities, non focal  Psych: Oriented x 3. No anxiety or agitation.            KIRTI Mcleod.      CBC:   Recent Labs     06/27/21 1921 06/28/21 0414   WBC 11.9* 11.2*   HGB 10.5* 10.2*   HCT 32.6* 32.0*   MCV 82.1 84.1    226     BMP:   Recent Labs     06/27/21 1921 06/28/21 0414   * 131*   K 4.2 4.1   CL 97* 101   CO2 18* 18*   BUN 23* 23*   CREATININE 1.4* 1.4*     LIVER PROFILE:   Recent Labs     06/27/21 1921 06/28/21 0414   AST 20 16   ALT 6* <5*   BILITOT 0.7 0.6   ALKPHOS 106 93     PT/INR:   Recent Labs     06/28/21 0414   PROTIME 15.6*   INR 1.34*     UA:  Recent Labs     06/27/21 1921   COLORU DK YELLOW   PHUR 7.0   WBCUA >100*   RBCUA 21-50*   MUCUS 1+*   BACTERIA 3+*   CLARITYU SL CLOUDY*   SPECGRAV 1.020   LEUKOCYTESUR LARGE* UROBILINOGEN 2.0*   BILIRUBINUR MODERATE*   BLOODU LARGE*   GLUCOSEU Negative     CULTURES  Blood Cx: pending   Urine Cx: NGTD  Wound Cx: E. Coli, light growth   COVID-19: not detected     EKG:    No EKG from this admission for review    RADIOLOGY  CT ABDOMEN PELVIS WO CONTRAST Additional Contrast? None   Final Result   Limited exam due to the lack of IV contrast.      Large, multilobulated, 16 x 14 cm hypodense mass in the area of the left   renal fossa which has increased in size and is more well-circumscribed. There is a nephrostomy tube seen entering the mass superiorly which   presumably is entering the left kidney but the kidney is not visualized due   to the lack of IV contrast.  This mass may represent a large residual   hematoma, which may be chronic and liquefying and/or urinoma. Recommend   prompt urological surgical consultation and suggest a follow-up study with   contrast to further characterize and evaluate the left kidney and nephrostomy   tube position. Multiple calcifications along the medial aspect of the mass superiorly which   were seen previously and are unchanged. These may be within the renal pelvis   and are unchanged. Recommend follow-up. Status post placement of embolization coils in the mesentery along the right   upper abdomen, medial to the mass. Mild-to-moderate splenomegaly which is more prominent      Mild chronic liver disease which is unchanged. Absent right kidney which is unchanged      Questionable 6 cm hypodense mass in the left adnexal region which could be   related to the uterus and a 3 cm hypodense mass more superiorly which could   be within the ovary which are unchanged.   Recommend ultrasound follow-up           Pertinent previous results reviewed   CT A/P 4/23  Impression   Active arterial extravasation from the lateral left kidney with a large   retroperitoneal hematoma     Echo 4/28/21   Summary   The left ventricular systolic function is mildly reduced with an ejection   fraction of 40-45 %. There is akinesis of the apical septal, apical lateral , apical anterior and   apical inferior walls. There is mild concentric left ventricular hypertrophy. Grade II diastolic dysfunction with elevated left ventricular filling   pressure. Left ventricular cavity size is normal.   Mild thickening of the anterior leaflet of mitral valve. Mild mitral regurgitation. Systolic pulmonary artery pressure (SPAP) is estimated at 55 mmHg consistent   with moderate pulmonary hypertension (Right atrial pressure of 3 mmHg).     ASSESSMENT/PLAN:  Perinephritic fluid collection   Solitary Kidney   - residual hematoma vs abscess?  - recently had nephrostomy tube placed (for large utereal stone)and now with increased pain, drainage from the site and fevers at home   - Wound Cx with E. Coli, light growth   - IR for drainage of the fluid collection   - Nephrostogram to evaluate placement of current nephrostomy tube   - Urology consult   - Continue Zosyn   - Trend H/H: stable     MARY   - Baseline creatinine ~1  - Creatinine on admission 1.4  - likely 2/2 above   - IVF and trend     Recent retroperitoneal hematoma and hemorrhagic shock  - Had bleeding s/p nephrostomy tube insertion   - s/p IR embolization   - H/H stable     HTN  - BP is well controlled    - Holding BB for now     Atrial Fibrillation   Recent NSTEMI  Chronic combined CHF  - noted as new onset during last admission at the end of April   - thought to be 2/2 acute illness and Takasubo CM   - Was started on BB but no AC with recent retroperitoneal hemorrhage  - Cardiology recommended repeat Echo in the future , last Echo with EF 40-45% and grade II DD   - Resume BB and ASA when able     Chronic Hep C    HLD  - Continue statin     L adnexal and Uterine mass  - reported as stable on scan   - Can follow up OP     DVT Prophylaxis: Lovenox: holding for procedure   Diet: Diet NPO Exceptions are: [employer], Sips of Water with Meds   Code Status: Full Code      Enzo Alcala PA-C  6/28/2021 7:59 AM      Agree with above. KIRTI Mcleod.

## 2021-06-28 NOTE — PROGRESS NOTES
Patient admitted to room 313 from ER. Patient oriented to room, call light, bed rails, phone, lights and bathroom. Patient instructed about the schedule of the day including: vital sign frequency, lab draws, possible tests, frequency of MD and staff rounds, daily weights, I &O's and prescribed diet. No bed alarm in place, pt low fall risk. Yes, Telemetry box in place, patient aware of placement and reason. Bed locked, in lowest position, side rails up 2/4, call light within reach. Recliner Assessment  Patient is able to demonstrate the ability to move from a reclining position to an upright position within the recliner. 4 Eyes Skin Assessment     The patient is being assess for   Admission    I agree that 2 RN's have performed a thorough Head to Toe Skin Assessment on the patient. ALL assessment sites listed below have been assessed. Areas assessed for pressure by both nurses:   [x]   Head, Face, and Ears   [x]   Shoulders, Back, and Chest, Abdomen  [x]   Arms, Elbows, and Hands   [x]   Coccyx, Sacrum, and Ischium  [x]   Legs, Feet, and Heels    Skin intact no open areas, generalized dryness, scattered moles, a few scattered bruises, redness around nephrostomy tube with purulent drainage present. Nephro tube site cleaned and dressed with dry gauze and tape. Skin Assessed Under all Medical Devices by both nurses:  nephro tube (left)              All Mepilex Borders were peeled back and area peeked at by both nurses:  No: N/A  Please list where Mepilex Borders are located:  N/A              **SHARE this note so that the co-signing nurse is able to place an eSignature**    Co-signer eSignature: Electronically signed by Elaine Donohue RN on 6/28/21 at 4:03 AM EDT    Does the Patient have Skin Breakdown related to pressure?   No          Meeka Prevention initiated:  NA   Wound Care Orders initiated:  NA      [facility] nurse consulted for Pressure Injury (Stage 3,4, Unstageable, DTI, NWPT, Complex wounds)and New or Established Ostomies:  NA    Primary Nurse eSignature: Electronically signed by Zen Wang RN on 6/28/21 at 3:36 AM EDT

## 2021-06-28 NOTE — PROGRESS NOTES
Pt arrived for image guided abscess drain insertion LLQ . Procedure explained including the risk and benefits of the procedure. All questions answered. Pt verbalizes understanding of the procedure and states no more questions. Consent confirmed. Vital signs stable. Labs, allergies, medications, and code status reviewed. No contraindications noted. Vital Signs  Vitals:    06/28/21 1015   BP: 125/65   Pulse: 78   Resp: 16   Temp: 98.6 °F (37 °C)   SpO2: 98%    (vital signs in table format)    Pre Denise Score  2 - Able to move 4 extremities voluntarily on command  2 - BP+/- 20mmHg of normal  2 - Fully awake  2 - Able to maintain oxygen saturation >92% on room air  2 - Able to breathe deeply and cough freely    Allergies  Patient has no known allergies.  (allergies)    Labs  Lab Results   Component Value Date    INR 1.34 (H) 06/28/2021    PROTIME 15.6 (H) 06/28/2021     Lab Results   Component Value Date    CREATININE 1.4 (H) 06/28/2021    BUN 23 (H) 06/28/2021     (L) 06/28/2021    K 4.1 06/28/2021     06/28/2021    CO2 18 (L) 06/28/2021     Lab Results   Component Value Date    WBC 11.2 (H) 06/28/2021    HGB 10.2 (L) 06/28/2021    HCT 32.0 (L) 06/28/2021    MCV 84.1 06/28/2021     06/28/2021

## 2021-06-28 NOTE — ACP (ADVANCE CARE PLANNING)
Advance Care Planning   Healthcare Decision Maker:    Primary Decision Maker: Genevieve Reeks - [phone]     Pt wanting friend Abdulkadir Valadez as her primary decision maker. Discussed with pt that her dtr would be NOK without POA. Pt does not want her dtr to make decisions for her and is agreeable to consult to spiritual care for POA paperwork. Click here to complete Healthcare Decision Makers including selection of the Healthcare Decision Maker Relationship (ie \"Primary\").

## 2021-06-28 NOTE — ACP (ADVANCE CARE PLANNING)
Pt said she was not feeling well today to complete document, but said she likes her friends Andie Lopez to be her decision maker. Pt has a daughter. This  talked to pt about the [facility] if documents are not completed. Pt stated understanding but asked to complete document another day.   will follow up tomorrow, 6/29/21

## 2021-06-28 NOTE — FLOWSHEET NOTE
06/28/21 1454   Pain Assessment   Pain Assessment 0-10   Pain Level 7   Pain Type Acute pain   Pain Location Back   Pain Orientation Lower;Mid   Pain Descriptors Aching;Discomfort; Sore   Pain Frequency Continuous   Pain Onset On-going   Clinical Progression Gradually worsening   Functional Pain Assessment Prevents or interferes some active activities and ADLs     Pain as shown above. PRN Morphine given per [facility] order. Transport here to take down to IR. Pt. Stable. Lauren Lino, radiology RN, made aware morphine was given.  Buzz Quiroz RN

## 2021-06-28 NOTE — PROGRESS NOTES
Image guided abscess drain insertion LLQ completed. Dr. Govind Albrecht placed 10 Urdu 25 cm Exodus Array catheter LOT # [id number] EXP 10/31/2023 in the LLQ. Drain/tube secured at the 19 cm line with sutures. 1 liter of thick brown/red tinged colored fluid withdrawn immediately. Specimen sent to lab for culture. Drain/tube dressing clean, dry, and intact. Pt tolerated procedure without any signs or symptoms of distress. Vital signs stable. Report called to John Pina on PCU. Pt transported back to PCU in stable condition via bed by transport.      Vital Signs  Vitals:    06/28/21 1542   BP: 110/61   Pulse: 80   Resp: 15   Temp:    SpO2: 94%    (vital signs in table format)    Post Denise  2 - Able to move 4 extremities voluntarily on command  2 - BP+/- 20mmHg of normal  2 - Fully awake  2 - Able to maintain oxygen saturation >92% on room air  2 - Able to breathe deeply and cough freely

## 2021-06-28 NOTE — CARE COORDINATION
Case Management Assessment  Initial Evaluation      Patient Name: Nickie Rendon  YOB: 1965  Diagnosis: Acute pyelonephritis [N10]  Date / Time: 6/27/2021  5:21 PM    Admission status/Date:6/28/2021  Chart Reviewed: Yes      Patient Interviewed: Yes   Family Interviewed:  No      Hospitalization in the last 30 days:  No      Health Care Decision Maker :   Primary Decision Maker: Fawad Miller - [phone]    (CM - must 1st enter selection under Navigator - emergency contact- [place] Relationship and pick relationship)   Who do you trust or have selected to make healthcare decisions for you      Met with: pt  Interview conducted  (bedside/phone):bedside    Current PCP:   [address] [place] [address] required for SNF : Y         3 night stay required - Ibrahima Ferrari  Support Systems/Care Needs: Family Members  Transportation: family  /CTC  Meal Preparation: self    Housing  Living Arrangements: lives in 2nd floor apt alone  Steps: 7  Intent for return to present living arrangements: Yes  Identified Issues:     Home Care Information  Active with [address] : No Agency:(Services)  Type of Home Care Services: PT, OT  Passport/Waiver : No  :                      Phone Number:    Passport/Waiver Services:           Durable Medical Equiptment   DME Provider: none  Equipment: none  Walker___Cane___RTS___ BSC___Shower Chair___Hospital Bed___W/C____Other________  02 at ____Liter(s)---wears(frequency)_______ HHN ___ CPAP___ BiPap___   N/A____      Home O2 Use :  No    If No for home O2---if presently on O2 during hospitalization:  No      Community Service Affiliation  Dialysis:  No    · Agency:  · Location:  · Dialysis Schedule:  · Phone:   · Fax: Other Community Services:none     DISCHARGE PLAN: Explained Case Management role/services. Chart reviewed. Met with pt at bedside and she states she lives in apt alone and plans to return.  Pt relies on family and CTC for transportation. Discussed HHC and pt would like to wait until closer to [place] to make a decision. Following.

## 2021-06-28 NOTE — H&P (VIEW-ONLY)
Hospital Medicine History & Physical      PCP: Justin De Oliveira MD    Date of Admission: 6/27/2021    Date of Service: Pt seen/examined on 6/28/2021    Chief Complaint:    Chief Complaint   Patient presents with    Flank Pain     ongoing pain from left kidney due to kidney stones - has nephrostomy tube to drainage. Has noticed blood around tube insertion site. Continues to drain as usual.        History Of Present Illness: The patient is a 48 y.o. female with depression, Hep C, HTN who presented to [address] ED with complaint of flank pain. Patient reports that she was discharged home not too long ago but hasn't felt well since that time. 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 She had a retroperitoneal hematoma and IR embolizatio was performed and she went in to hemorrhagic shock. She had a UTI as well and was discharged home on Zyvox. She reports that since getting home, she has had flank pain at the site of her nephrostomy tube and has noticed drainage coming from the area as well. She has had nausea and poor PO intake and believes that she has had fevers at home as well. She reports that she followed up with Dr. Joyce Womack and they discussed possibly removing the nephrostomy tube on July 6th prior to the start of these severe symptoms.      Past Medical History:        Diagnosis Date    Depression     Headache(784.0)     Hepatitis C 08/21/2020    Hypertension        Past Surgical History:        Procedure Laterality Date    CYSTOSCOPY Left 1/11/2021    CYSTOSCOPY, DIAGNOSTIC LEFT URETEROSCOPY WITH HOLMIUM LASER LITHOTRIPSY, LEFT STENT EXCHANGE performed by Stanley Bull MD at [address] / Alfred Valdovinos / Adrien Guerin Left 8/20/2020    CYSTOSCOPY URETERAL STENT INSERTION performed by Stanley Bull MD at [address] / ION Morales / Adrien Guerin Left 1/27/2021 CYSTOSCOPY, LEFT STENT REMOVAL performed by Florentino Feng MD at [facility]  [address]  4/24/2021    IR EMBOLIZATION HEMORRHAGE 4/24/2021 [address] SPECIAL PROCEDURES    IR NEPHROSTOMY PERCUTANEOUS LEFT  4/23/2021    IR NEPHROSTOMY PERCUTANEOUS LEFT 4/23/2021 [facility] SPECIAL PROCEDURES    OTHER SURGICAL HISTORY      CYSTOSCOPY URETERAL STENT INSERTION (Left Bladder)    OTHER SURGICAL HISTORY  11/23/2020    CYSTOSCOPY, LEFT RIGID AND FLEXIBLE URETEROSCOPY, HOLMIUM LASER LITHOTRIPSY, STONE EXTRACTION, STENT EXCHANGE (Left )     OTHER SURGICAL HISTORY  01/11/2021    CYSTOSCOPY, DIAGNOSTIC LEFT URETEROSCOPY WITH HOLMIUM LASER LITHOTRIPSY, LEFT STENT EXCHANGE (Left Bladder)    OTHER SURGICAL HISTORY  01/27/2021    cystoscopy, left stent removal    URETEROSCOPY Left 11/23/2020    CYSTOSCOPY, LEFT RIGID AND FLEXIBLE URETEROSCOPY, HOLMIUM LASER LITHOTRIPSY, STONE EXTRACTION, STENT EXCHANGE, REMOVAL MIGRATED STENT performed by Florentino Feng MD at [address] OR       Medications Prior to Admission:    Prior to Admission medications    Medication Sig Start Date End Date Taking? Authorizing Provider   aspirin 81 MG chewable tablet Take 81 mg by mouth daily   Yes Historical Provider, MD   vitamin D (CHOLECALCIFEROL) 25 MCG (1000 UT) TABS tablet Take 1,000 Units by mouth once a week   Yes Historical Provider, MD   docusate sodium (COLACE, DULCOLAX) 100 MG CAPS Take 100 mg by mouth 2 times daily 4/30/21   Kurt Munoz MD   atorvastatin (LIPITOR) 40 MG tablet Take 1 tablet by mouth nightly 4/30/21   Kurt Munoz MD   metoprolol succinate (TOPROL XL) 50 MG extended release tablet Take 1 tablet by mouth daily 5/1/21   SIENNA Alvarenga CNP       Allergies:  Patient has no known allergies. Social History:  The patient currently lives at home     TOBACCO:   reports that she has been smoking cigarettes. She has been smoking about 0.50 packs per day.  She has never used smokeless tobacco.  ETOH:   reports no history of alcohol use. Family History:   Positive as follows:        Problem Relation Age of Onset    Diabetes Mother     Heart Disease Mother     High Blood Pressure Sister     Diabetes Sister     Heart Attack Sister     Coronary Art Dis Father        REVIEW OF SYSTEMS:     Constitutional: + fever   HENT: Negative for sore throat   Eyes: Negative for redness   Respiratory: Negative  for dyspnea, cough   Cardiovascular: Negative for chest pain   Gastrointestinal: + nausea, + abdominal pain   Genitourinary: Negative for hematuria   Musculoskeletal: + flank pain   Skin: + drainage from nephrostomy tube insertion site   Neurological: Negative for syncope   Hematological: Negative for adenopathy   Psychiatric/Behavorial: Negative for anxiety    PHYSICAL EXAM:    /73   Pulse 85   Temp 98.5 °F (36.9 °C) (Oral)   Resp 16   Ht 5' 7\" (1.702 m)   Wt 150 lb (68 kg)   LMP 12/01/2014   SpO2 98%   BMI 23.49 kg/m²   Gen: Ill appearing, No distress. Alert. Eyes:  No conjunctival injection. ENT: No discharge. Pharynx clear. Neck:Trachea midline. Resp: No accessory muscle use. No crackles. No wheezes. No rhonchi. CV: Regular rate. Regular rhythm. No murmur. No rub. No edema. Capillary Refill: Brisk,< 3 seconds   Peripheral Pulses: +2 palpable, equal bilaterally   GI: Diffuse TTP. Mild guarding, No masses. Normal bowel sounds. Skin: Warm and dry. M/S: L nephrostomy tube in place, purulent material draining around the nephrostomy tube site   Neuro: Awake. Grossly nonfocal    Psych: Oriented x 3. No anxiety or agitation. Nkechi Aceves MD have reviewed the chart on Pamela Negro and personally interviewed and examined patient, reviewed the data (labs and imaging) and after discussion with my PA formulated the plan. Agree with note with the following edits.     HPI: 61-year-old female with history of hepatitis C, hypertension presented emergency UROBILINOGEN 2.0*   BILIRUBINUR MODERATE*   BLOODU LARGE*   GLUCOSEU Negative     CULTURES  Blood Cx: pending   Urine Cx: NGTD  Wound Cx: E. Coli, light growth   COVID-19: not detected     EKG:    No EKG from this admission for review    RADIOLOGY  CT ABDOMEN PELVIS WO CONTRAST Additional Contrast? None   Final Result   Limited exam due to the lack of IV contrast.      Large, multilobulated, 16 x 14 cm hypodense mass in the area of the left   renal fossa which has increased in size and is more well-circumscribed. There is a nephrostomy tube seen entering the mass superiorly which   presumably is entering the left kidney but the kidney is not visualized due   to the lack of IV contrast.  This mass may represent a large residual   hematoma, which may be chronic and liquefying and/or urinoma. Recommend   prompt urological surgical consultation and suggest a follow-up study with   contrast to further characterize and evaluate the left kidney and nephrostomy   tube position. Multiple calcifications along the medial aspect of the mass superiorly which   were seen previously and are unchanged. These may be within the renal pelvis   and are unchanged. Recommend follow-up. Status post placement of embolization coils in the mesentery along the right   upper abdomen, medial to the mass. Mild-to-moderate splenomegaly which is more prominent      Mild chronic liver disease which is unchanged. Absent right kidney which is unchanged      Questionable 6 cm hypodense mass in the left adnexal region which could be   related to the uterus and a 3 cm hypodense mass more superiorly which could   be within the ovary which are unchanged.   Recommend ultrasound follow-up           Pertinent previous results reviewed   CT A/P 4/23  Impression   Active arterial extravasation from the lateral left kidney with a large   retroperitoneal hematoma     Echo 4/28/21   Summary   The left ventricular systolic function is Water with Meds   Code Status: Full Code      Erich Armenta PA-C  6/28/2021 7:59 AM      Agree with above. KIRTI Mcleod.

## 2021-06-28 NOTE — CONSULTS
6/28/2021  Laura Gomez    Reason for Consult:  Left kidney stones, renal abscess  Requesting Physician:  Bob Jay      History Obtained From:  patient, electronic medical record    HISTORY OF PRESENT ILLNESS:                The patient is a 48 y.o. female who presents with worse flank pain. She has a solitary kidney with stones and a left neph tube. She was set up for a perc in early July with Dr Kristin Sandoval.     Past Medical History:        Diagnosis Date    Depression     Headache(784.0)     Hepatitis C 08/21/2020    Hypertension      Past Surgical History:        Procedure Laterality Date    CYSTOSCOPY Left 1/11/2021    CYSTOSCOPY, DIAGNOSTIC LEFT URETEROSCOPY WITH HOLMIUM LASER LITHOTRIPSY, LEFT STENT EXCHANGE performed by Katie Sánchez MD at [address] / REMOVAL [address] / STONE Left 8/20/2020    CYSTOSCOPY URETERAL STENT INSERTION performed by Katie Sánchez MD at [address] / Katina Letters / [employer] Left 1/27/2021    CYSTOSCOPY, LEFT STENT REMOVAL performed by Katie Sánchez MD at [place] [address]  4/24/2021    IR EMBOLIZATION HEMORRHAGE 4/[facility]/2021 [facility]ED[facility]    IR NEPHROSTOMY PERCUTANEOUS LEFT  4/23/2021    IR NEPHROSTOMY PERCUTANEOUS LEFT 4/23/2021 [facility]    OTHER SURGICAL HISTORY      CYSTOSCOPY URETERAL STENT INSERTION (Left Bladder)    OTHER SURGICAL HISTORY  11/23/2020    CYSTOSCOPY, LEFT RIGID AND FLEXIBLE URETEROSCOPY, HOLMIUM LASER LITHOTRIPSY, STONE EXTRACTION, STENT EXCHANGE (Left )     OTHER SURGICAL HISTORY  01/11/2021    CYSTOSCOPY, DIAGNOSTIC LEFT URETEROSCOPY WITH HOLMIUM LASER LITHOTRIPSY, LEFT STENT EXCHANGE (Left Bladder)    OTHER SURGICAL HISTORY  01/27/2021    cystoscopy, left stent removal    URETEROSCOPY Left 11/23/2020    CYSTOSCOPY, LEFT RIGID AND FLEXIBLE URETEROSCOPY, HOLMIUM LASER LITHOTRIPSY, STONE EXTRACTION, STENT EXCHANGE, REMOVAL MIGRATED STENT performed by Colonel Marii MD at [facility] OR     Current Medications:   Current Facility-Administered Medications: atorvastatin (LIPITOR) tablet 40 mg, 40 mg, Oral, Nightly  docusate sodium (COLACE) capsule 100 mg, 100 mg, Oral, BID  piperacillin-tazobactam (ZOSYN) 3,375 mg in sodium chloride 0.9 % 100 mL IVPB extended infusion (mini-bag), 3,375 mg, Intravenous, Q8H  sodium chloride flush 0.9 % injection 5-40 mL, 5-40 mL, Intravenous, 2 times per day  sodium chloride flush 0.9 % injection 5-40 mL, 5-40 mL, Intravenous, PRN  0.9 % sodium chloride infusion, 25 mL, Intravenous, PRN  enoxaparin (LOVENOX) injection 40 mg, 40 mg, Subcutaneous, Daily  acetaminophen (TYLENOL) tablet 650 mg, 650 mg, Oral, Q6H PRN **OR** acetaminophen (TYLENOL) suppository 650 mg, 650 mg, Rectal, Q6H PRN  0.9 % sodium chloride infusion, , Intravenous, Continuous  morphine (PF) injection 2 mg, 2 mg, Intravenous, Q2H PRN  ondansetron (ZOFRAN) injection 4 mg, 4 mg, Intravenous, Q6H PRN  HYDROmorphone (DILAUDID) injection 0.5 mg, 0.5 mg, Intravenous, Q4H PRN  Allergies:  No Known Allergies  Social History:  Reviewed, non contributory    Family History:  Reviewed, non contributory      REVIEW OF SYSTEMS:    12 point ROS has been completed and reviewed. PHYSICAL EXAM:    VITALS:  /61   Pulse 80   Temp 98.6 °F (37 °C) (Oral)   Resp 15   Ht 5' 7\" (1.702 m)   Wt 150 lb (68 kg)   LMP 12/01/2014   SpO2 94%   BMI 23.49 kg/m²   H&N: Sclera normal, no masses, trachea midline, no bruit  CVS: Normal rate and rhythm, no murmurs or rubs, peripheral pulses equal, no clubbing or cyanosis. RESP: Breath sounds equal bilateral, few rhonchi. ABDO: Soft, non-tender, bowel sounds active, no organomegaly, no hernias. LYMPH:  No lymphadenopathy. Skin: Warm dry and intact. : No CVAT, normal external genitalia, no discharge.   MSK: Grossly normal for patient  LYNNETTE: Grossly normal for patient  PSY: No acute changes noted in psychosocial assessment. DATA:    CBC:   Lab Results   Component Value Date    WBC 11.2 06/28/2021    RBC 3.80 06/28/2021    HGB 10.2 06/28/2021    HCT 32.0 06/28/2021    MCV 84.1 06/28/2021    MCH 26.8 06/28/2021    MCHC 31.9 06/28/2021    RDW 16.9 06/28/2021     06/28/2021    MPV 9.0 06/28/2021     BMP:    Lab Results   Component Value Date     06/28/2021    K 4.1 06/28/2021     06/28/2021    CO2 18 06/28/2021    BUN 23 06/28/2021    LABALBU 1.8 06/28/2021    CREATININE 1.4 06/28/2021    CALCIUM 8.4 06/28/2021    GFRAA 47 06/28/2021    LABGLOM 39 06/28/2021    GLUCOSE 79 06/28/2021     U/A:    Lab Results   Component Value Date    COLORU DK YELLOW 06/27/2021    PROTEINU 100 06/27/2021    PHUR 7.0 06/27/2021    LABCAST 0-1 Hyaline 07/28/2015    WBCUA >100 06/27/2021    RBCUA 21-50 06/27/2021    MUCUS 1+ 06/27/2021    BACTERIA 3+ 06/27/2021    CLARITYU SL CLOUDY 06/27/2021    SPECGRAV 1.020 06/27/2021    LEUKOCYTESUR LARGE 06/27/2021    UROBILINOGEN 2.0 06/27/2021    BILIRUBINUR MODERATE 06/27/2021    BLOODU LARGE 06/27/2021    GLUCOSEU Negative 06/27/2021    AMORPHOUS 1+ 04/22/2021     CT reviewed - see report    IMPRESSION/RECOMMENDATIONS:      Perinephric fluid collection suspicious for an abscess. Multiple kidney stones. D/W IR, she will be set up for a perc drain and a nephrostogram today. Thank you for asking me to see this interesting patient.     Yasmin Gilmore MD

## 2021-06-28 NOTE — ED NOTES
3363- Perfect serve was sent to Urologist Dr. Gustavo Yeager   [phone]-  returned call   Claudene Books  06/27/21 [facility]  06/27/21 [facility]  06/27/21 1084

## 2021-06-28 NOTE — ED NOTES
2305- Perfect serve was sent to    0017-  returned call   Cherelle Able  06/27/21 2306       Cherelle Able  06/28/21 0016

## 2021-06-28 NOTE — ED PROVIDER NOTES
I independently examined and evaluated Farida Juarez. All diagnostic, treatment, and disposition decisions were made by myself in conjunction with the advanced practice provider. For all further details of the patient's emergency department visit, please see the advanced practice provider's documentation. Primary Care Physician: Gil Smith MD    History: This is a 48 y.o. female who presents to the Emergency Department with complaint of left-sided flank pain, site of nephrostomy tube where it is draining purulent material.  Patient had a known retroperitoneal hematoma and I wonder underwent IR guided embolization. Any of generalized weakness fatigue subjective fevers chills she is afebrile on arrival with a temp of 36.1. She recovered with broad-spectrum antibiotics, obtain sepsis studies. Anticipate admission. Mild hyponatremia, creatinine up to 1.4 today from 1.0, receiving fluids, mild leukocytosis H&H however stable. Receiving antibiotics. Physical:     height is 5' 7\" (1.702 m) and weight is 155 lb (70.3 kg). Her oral temperature is 96.9 °F (36.1 °C). Her blood pressure is 107/53 (abnormal) and her pulse is 68. Her respiration is 14 and oxygen saturation is 96%.    48 y.o. female   Alert oriented  Left-sided flank pain, nephrostomy tube draining purulent material    Impression:   Left-sided flank pain, nephrostomy complication,   Plan: Antibiotics, admit    E  CRITICAL CARE: There was a high probability of clinically significant/life threatening deterioration in this patient's condition which required my urgent intervention. Total critical care time was 0 minutes. This excludes any time for separately reportable procedures.        Radha Daugherty DO  Emergency Physician        Comment: Please note this report has been produced using speech recognition software and may contain errors related to that system including errors in grammar, punctuation, and spelling, as well as words and phrases that may be inappropriate. If there are any questions or concerns please feel free to contact the dictating provider for clarification.           Rene Sunshine,   06/28/21 1141

## 2021-06-29 LAB
ANION GAP SERPL CALCULATED.3IONS-SCNC: 9 MMOL/L (ref 3–16)
BUN BLDV-MCNC: 18 MG/DL (ref 7–20)
CALCIUM SERPL-MCNC: 7.9 MG/DL (ref 8.3–10.6)
CHLORIDE BLD-SCNC: 108 MMOL/L (ref 99–110)
CO2: 17 MMOL/L (ref 21–32)
CREAT SERPL-MCNC: 1.4 MG/DL (ref 0.6–1.1)
GFR AFRICAN AMERICAN: 47
GFR NON-AFRICAN AMERICAN: 39
GLUCOSE BLD-MCNC: 137 MG/DL (ref 70–99)
GRAM STAIN RESULT: ABNORMAL
HCT VFR BLD CALC: 30.2 % (ref 36–48)
HEMOGLOBIN: 9.4 G/DL (ref 12–16)
MAGNESIUM: 1.8 MG/DL (ref 1.8–2.4)
MCH RBC QN AUTO: 26.7 PG (ref 26–34)
MCHC RBC AUTO-ENTMCNC: 31.3 G/DL (ref 31–36)
MCV RBC AUTO: 85.2 FL (ref 80–100)
ORGANISM: ABNORMAL
PDW BLD-RTO: 16.7 % (ref 12.4–15.4)
PLATELET # BLD: 230 K/UL (ref 135–450)
PMV BLD AUTO: 8.7 FL (ref 5–10.5)
POTASSIUM REFLEX MAGNESIUM: 3.4 MMOL/L (ref 3.5–5.1)
RBC # BLD: 3.54 M/UL (ref 4–5.2)
SODIUM BLD-SCNC: 134 MMOL/L (ref 136–145)
WBC # BLD: 6.8 K/UL (ref 4–11)
WOUND/ABSCESS: ABNORMAL

## 2021-06-29 PROCEDURE — 36415 COLL VENOUS BLD VENIPUNCTURE: CPT

## 2021-06-29 PROCEDURE — 6370000000 HC RX 637 (ALT 250 FOR IP): Performed by: INTERNAL MEDICINE

## 2021-06-29 PROCEDURE — 6360000002 HC RX W HCPCS: Performed by: INTERNAL MEDICINE

## 2021-06-29 PROCEDURE — 2580000003 HC RX 258: Performed by: INTERNAL MEDICINE

## 2021-06-29 PROCEDURE — 83735 ASSAY OF MAGNESIUM: CPT

## 2021-06-29 PROCEDURE — 85027 COMPLETE CBC AUTOMATED: CPT

## 2021-06-29 PROCEDURE — 80048 BASIC METABOLIC PNL TOTAL CA: CPT

## 2021-06-29 PROCEDURE — 99232 SBSQ HOSP IP/OBS MODERATE 35: CPT | Performed by: INTERNAL MEDICINE

## 2021-06-29 PROCEDURE — 6370000000 HC RX 637 (ALT 250 FOR IP): Performed by: NURSE PRACTITIONER

## 2021-06-29 PROCEDURE — 2060000000 HC ICU INTERMEDIATE R&B

## 2021-06-29 PROCEDURE — 2580000003 HC RX 258: Performed by: NURSE PRACTITIONER

## 2021-06-29 RX ORDER — POTASSIUM CHLORIDE 20 MEQ/1
20 TABLET, EXTENDED RELEASE ORAL ONCE
Status: COMPLETED | OUTPATIENT
Start: 2021-06-29 | End: 2021-06-29

## 2021-06-29 RX ORDER — 0.9 % SODIUM CHLORIDE 0.9 %
500 INTRAVENOUS SOLUTION INTRAVENOUS ONCE
Status: COMPLETED | OUTPATIENT
Start: 2021-06-29 | End: 2021-06-29

## 2021-06-29 RX ADMIN — SODIUM CHLORIDE: 9 INJECTION, SOLUTION INTRAVENOUS at 06:45

## 2021-06-29 RX ADMIN — Medication 10 ML: at 20:22

## 2021-06-29 RX ADMIN — SODIUM CHLORIDE: 9 INJECTION, SOLUTION INTRAVENOUS at 13:29

## 2021-06-29 RX ADMIN — SODIUM CHLORIDE 500 ML: 9 INJECTION, SOLUTION INTRAVENOUS at 10:58

## 2021-06-29 RX ADMIN — MORPHINE SULFATE 2 MG: 2 INJECTION, SOLUTION INTRAMUSCULAR; INTRAVENOUS at 22:17

## 2021-06-29 RX ADMIN — Medication 10 ML: at 11:01

## 2021-06-29 RX ADMIN — MORPHINE SULFATE 2 MG: 2 INJECTION, SOLUTION INTRAMUSCULAR; INTRAVENOUS at 19:20

## 2021-06-29 RX ADMIN — PIPERACILLIN SODIUM AND TAZOBACTAM SODIUM 3375 MG: 3; .375 INJECTION, POWDER, LYOPHILIZED, FOR SOLUTION INTRAVENOUS at 19:16

## 2021-06-29 RX ADMIN — PIPERACILLIN SODIUM AND TAZOBACTAM SODIUM 3375 MG: 3; .375 INJECTION, POWDER, LYOPHILIZED, FOR SOLUTION INTRAVENOUS at 11:10

## 2021-06-29 RX ADMIN — POTASSIUM CHLORIDE 20 MEQ: 1500 TABLET, EXTENDED RELEASE ORAL at 13:23

## 2021-06-29 RX ADMIN — ENOXAPARIN SODIUM 40 MG: 40 INJECTION SUBCUTANEOUS at 09:05

## 2021-06-29 RX ADMIN — MORPHINE SULFATE 2 MG: 2 INJECTION, SOLUTION INTRAMUSCULAR; INTRAVENOUS at 15:53

## 2021-06-29 RX ADMIN — PIPERACILLIN SODIUM AND TAZOBACTAM SODIUM 3375 MG: 3; .375 INJECTION, POWDER, LYOPHILIZED, FOR SOLUTION INTRAVENOUS at 04:36

## 2021-06-29 RX ADMIN — MORPHINE SULFATE 2 MG: 2 INJECTION, SOLUTION INTRAMUSCULAR; INTRAVENOUS at 04:38

## 2021-06-29 RX ADMIN — MORPHINE SULFATE 2 MG: 2 INJECTION, SOLUTION INTRAMUSCULAR; INTRAVENOUS at 13:24

## 2021-06-29 RX ADMIN — LINEZOLID 600 MG: 600 INJECTION, SOLUTION INTRAVENOUS at 06:46

## 2021-06-29 RX ADMIN — ATORVASTATIN CALCIUM 40 MG: 40 TABLET, FILM COATED ORAL at 20:22

## 2021-06-29 ASSESSMENT — PAIN DESCRIPTION - DESCRIPTORS
DESCRIPTORS: SHARP

## 2021-06-29 ASSESSMENT — PAIN - FUNCTIONAL ASSESSMENT
PAIN_FUNCTIONAL_ASSESSMENT: PREVENTS OR INTERFERES SOME ACTIVE ACTIVITIES AND ADLS

## 2021-06-29 ASSESSMENT — PAIN DESCRIPTION - ONSET
ONSET: ON-GOING

## 2021-06-29 ASSESSMENT — PAIN DESCRIPTION - FREQUENCY
FREQUENCY: CONTINUOUS

## 2021-06-29 ASSESSMENT — PAIN DESCRIPTION - ORIENTATION
ORIENTATION: LEFT

## 2021-06-29 ASSESSMENT — PAIN SCALES - GENERAL
PAINLEVEL_OUTOF10: 6
PAINLEVEL_OUTOF10: 4
PAINLEVEL_OUTOF10: 5
PAINLEVEL_OUTOF10: 7
PAINLEVEL_OUTOF10: 6
PAINLEVEL_OUTOF10: 7
PAINLEVEL_OUTOF10: 3
PAINLEVEL_OUTOF10: 7

## 2021-06-29 ASSESSMENT — PAIN DESCRIPTION - LOCATION
LOCATION: ABDOMEN;FLANK

## 2021-06-29 ASSESSMENT — PAIN DESCRIPTION - PAIN TYPE
TYPE: ACUTE PAIN

## 2021-06-29 ASSESSMENT — PAIN DESCRIPTION - PROGRESSION
CLINICAL_PROGRESSION: GRADUALLY WORSENING

## 2021-06-29 NOTE — FLOWSHEET NOTE
06/29/21 1920   Pain Assessment   Pain Assessment 0-10   Pain Level 7   Pain Type Acute pain   Pain Location Abdomen;Flank   Pain Orientation Left   Pain Descriptors Sharp   Pain Frequency Continuous   Pain Onset On-going   Clinical Progression Gradually worsening   Functional Pain Assessment Prevents or interferes some active activities and ADLs     Pain as shown above. PRN Morphine given per [facility] order. Bedside report given to [facility]. Pt. denies further needs at this time. Call light is within reach.   Jose Correia RN

## 2021-06-29 NOTE — PROGRESS NOTES
Pt resting in room with eyes closed, pt awakened for shift assessment. All meds given per [facility]. Beverage and snack provided. Pt has no complaints at this time. Will continue to monitor and reassess. Bed in lowest position and call light within reach. Pt denies any further needs at this time.

## 2021-06-29 NOTE — PROGRESS NOTES
Patient:  Bharti Blanco  YOB: 1965   Date of Service:  06/29/21      Urology Attending Daily Progress Note    HPI: admitted with perinephric abscess, hist of solitary kidney, neph tube  Chief complaint: \"I feel a little better\"  ROS:     Some hypotension. Neph tube and   Drain tube functioning well. Past Medical History:   Diagnosis Date    Depression     Headache(784.0)     Hepatitis C 08/21/2020    Hypertension        No Known Allergies    Objective:    Patient Vitals for the past 8 hrs:   BP Temp Temp src Pulse Resp SpO2   06/29/21 1543 107/64 96.6 °F (35.9 °C) Oral 74 16 97 %   06/29/21 1324 107/64 -- -- 80 -- --   06/29/21 1200 (!) 88/58 -- -- 69 -- --   06/29/21 1030 89/61 -- -- 75 -- --     I/O last 3 completed shifts: In: 3749.6 [P.O.:240; I.V.:2514; IV Piggyback:995.6]  Out: 1975 [Urine:875; Drains:1100]     Physical Exam:   Constitutional: pleasant patient in no acute distress, normal body habitus  Musculoskeletal: no digital cyanosis, head normocephalic  Psych: normal mood and affect, appropriately answers questions  Skin: exposed skin, stable, intact  Abdomen: soft, nondistended, no guardin   Genitourinary: stable bladder,  neph tube yellow urine.    Drain tube purulent fluid    Labs:     No results found for: PSA  No results found for: PSA, PSADIA  Recent Labs     06/29/21  1144 06/28/21  0414 06/27/21  1921   WBC 6.8 11.2* 11.9*   HGB 9.4* 10.2* 10.5*   HCT 30.2* 32.0* 32.6*   MCV 85.2 84.1 82.1    226 263     No results found for: LABA1C  Lab Results   Component Value Date    LABMICR YES 06/27/2021    LDLCALC 57 04/27/2021    CREATININE 1.4 (H) 06/29/2021     Lab Results   Component Value Date     (L) 06/29/2021    K 3.4 (L) 06/29/2021     06/29/2021    CO2 17 (L) 06/29/2021    BUN 18 06/29/2021    CREATININE 1.4 (H) 06/29/2021    GLUCOSE 137 (H) 06/29/2021    CALCIUM 7.9 (L) 06/29/2021    PROT 6.6 06/28/2021    LABALBU 1.8 (L) 06/28/2021    BILITOT 0.6 06/28/2021    ALKPHOS 93 06/28/2021    AST 16 06/28/2021    ALT <5 (L) 06/28/2021    LABGLOM 39 (A) 06/29/2021    GFRAA 47 (A) 06/29/2021    AGRATIO 0.4 (L) 06/28/2021    GLOB 4.8 06/28/2021     Lab Results   Component Value Date    CREATININE 1.4 (H) 06/29/2021    CREATININE 1.4 (H) 06/28/2021    CREATININE 1.4 (H) 06/27/2021     Lab Results   Component Value Date    COLORU DK YELLOW 06/27/2021    NITRU Negative 06/27/2021    GLUCOSEU Negative 06/27/2021    KETUA Negative 06/27/2021    UROBILINOGEN 2.0 06/27/2021    BILIRUBINUR MODERATE 06/27/2021     No active infections      Radiology: \"Imaging was independently reviewed by myself and I agree with the radiology interpretation  Large perinephric fluid vs abscess. Renal stone seen. neph tube upper pole but imaging distorted. Assessment:  48 y.o. female who is admitted with flank pain  UTI present prior to admit  Nephrolithiasis  Solitary kidney  MARY     Plan:  -trend h/h and crt  -repeat CT abd pelvis without contrast on wed  -sequential compression devices for DVT prophylaxis  -prn oral narcotic medications for pain control  -stool softeners to minimize post-operative constipation    She was tentatively scheduled for July 6th for renal stone removal.   At that time she will required a cystoscopy and stent placement,,,, if she does not continue to improve we may attempt cysto/stent placement sooner.        Will follow     physician directly spoken to regarding patient care plan      Rosalva Lala MD  Office: [phone]

## 2021-06-29 NOTE — FLOWSHEET NOTE
06/29/21 0211   Encounter Summary   Services provided to: Patient   Referral/Consult From: Rounding   Continue Visiting   (6/29 follow up, left AD forms, sppt and prayer)   Complexity of Encounter Moderate   Length of Encounter 15 minutes   Advance Care Planning Yes   Routine   Type Follow up   Assessment Calm; Approachable;Passive   Intervention Active listening;Explored feelings, thoughts, concerns;Prayer;Discussed illness/injury and it's impact   Outcome Expressed gratitude;Engaged in conversation;Expressed feelings/needs/concerns   Advance Directives (For Healthcare)   Healthcare Directive No, patient does not have an advance directive for healthcare treatment   Advance Directives Documents given;Pt. not interested at this time

## 2021-06-29 NOTE — PROGRESS NOTES
Pt reported increased pain. SBP less than 105 and due to parameter of medication, unable to give pain med at this time. Will reevaluate and continue to monitor.  Gabi Castillo RN

## 2021-06-29 NOTE — PROGRESS NOTES
BP reevaluation for pain meds, SBP still below parameters. Leidy Reeder NP in room at time, states she will place new orders.  Raúl Roman

## 2021-06-29 NOTE — FLOWSHEET NOTE
06/29/21 1324   Vital Signs   Pulse 80   Heart Rate Source Monitor   /64   BP Location Right upper arm   Pain Assessment   Pain Assessment 0-10   Pain Level 7   Patient's Stated Pain Goal No pain   Pain Type Acute pain   Pain Location Abdomen;Flank   Pain Orientation Left   Pain Descriptors Sharp   Pain Frequency Continuous   Pain Onset On-going   Clinical Progression Gradually worsening   Functional Pain Assessment Prevents or interferes some active activities and ADLs     BP improved as shown above, notified Zenaida Inch NP, PRN morphine given per [facility] order.  Will continue to monitor

## 2021-06-29 NOTE — PROGRESS NOTES
Pt lying in bed peacefully, no concerns voiced, denies need for pain meds at this time. Call light within reach.  Ava Saldaña RN

## 2021-06-29 NOTE — FLOWSHEET NOTE
06/28/21 1615   Vital Signs   Temp 96.6 °F (35.9 °C)   Temp Source Oral   Pulse 73   Heart Rate Source Monitor   Resp 16   BP (!) 102/58   BP Location Left upper arm   Patient Position Right side   Level of Consciousness Alert (0)   Oxygen Therapy   SpO2 98 %   O2 Device None (Room air)     VS as shown. Pt. Returned from radiology in stable condition. Will continue to monitor. Call light is within reach.   Kingston Moreland RN

## 2021-06-29 NOTE — PROGRESS NOTES
Progress Note    Admit Date:  6/27/2021    48 y.o. female with depression, Hep C, HTN who presented to [facility] ED with complaint of flank pain. Subjective:  Ms. Tyshawn Cabrera feels slightly improved. BP is hypotensive this morning. Objective:   Patient Vitals for the past 4 hrs:   BP Temp Temp src Pulse Resp SpO2   06/29/21 0858 95/62 96.5 °F (35.8 °C) Oral 75 16 98 %            Intake/Output Summary (Last 24 hours) at 6/29/2021 1018  Last data filed at 6/29/2021 0900  Gross per 24 hour   Intake 3509.61 ml   Output 2000 ml   Net 1509.61 ml       Physical Exam:  Gen: No distress. Alert. Appears fatigued, ill  Eyes: PERRL. No sclera icterus. No conjunctival injection. ENT: No discharge. Pharynx clear. Neck: Trachea midline. Resp: No accessory muscle use. No crackles. No wheezes. No rhonchi. CV: Regular rate. Regular rhythm. No murmur. No rub. No edema. Capillary Refill: Brisk,< 3 seconds   Peripheral Pulses: +2 palpable, equal bilaterally   GI: Diffuse tenderness. Non-distended. Normal bowel sounds. No hernia. Skin: Warm and dry. Left nephrostomy tubes. Purulent drainage. M/S: No cyanosis. No joint deformity. No clubbing. Neuro: Awake. Grossly nonfocal    Psych: Oriented x 3. No anxiety or agitation      I Madelyn Uribe MD have reviewed the chart on Andrea Purdy and personally interviewed and examined patient, reviewed the data (labs and imaging) and after discussion with my PA formulated the plan. Agree with note with the following edits. Physical exam:    BP 89/61   Pulse 75   Temp 96.5 °F (35.8 °C) (Oral)   Resp 16   Ht 5' 7\" (1.702 m)   Wt 159 lb 12.8 oz (72.5 kg)   LMP 12/01/2014   SpO2 98%   BMI 25.03 kg/m²     Gen: No distress. Alert. Eyes: PERRL. No sclera icterus. No conjunctival injection. ENT: No discharge. Pharynx clear. Neck: Trachea midline. Normal thyroid. Resp: No accessory muscle use. No crackles. No wheezes. No rhonchi.  No dullness on percussion. CV: Regular rate. Regular rhythm. No murmur or rub. No edema. GI: Non-tender. Non-distended. No masses. No organomegaly. Normal bowel sounds. No hernia. Left nephrostomy tube. Left-sided percutaneous abscess drain +  Skin: Warm and dry. No nodule on exposed extremities. No rash on exposed extremities. Lymph: No cervical LAD. No supraclavicular LAD. M/S: No cyanosis. No joint deformity. No clubbing. Neuro: Awake. Moves all 4 extremities, non focal  Psych: Oriented x 3. No anxiety or agitation. ELIAZBETH Mcleod Data:  CBC:   Recent Labs     06/27/21 1921 06/28/21  0414   WBC 11.9* 11.2*   HGB 10.5* 10.2*   HCT 32.6* 32.0*   MCV 82.1 84.1    226     BMP:   Recent Labs     06/27/21 1921 06/28/21  0414   * 131*   K 4.2 4.1   CL 97* 101   CO2 18* 18*   BUN 23* 23*   CREATININE 1.4* 1.4*     LIVER PROFILE:   Recent Labs     06/27/21 1921 06/28/21  0414   AST 20 16   ALT 6* <5*   BILITOT 0.7 0.6   ALKPHOS 106 93     PT/INR:   Recent Labs     06/28/21 0414   PROTIME 15.6*   INR 1.34*       CULTURES  Urine: mixed pathogens  Blood: NGTD  Wound: E. Coli. COVID: not detected    RADIOLOGY  IR ANTEGRADE PYELOGRAM/NEPHROSTOGRAM         CT ABDOMEN PELVIS WO CONTRAST Additional Contrast? None   Final Result   Limited exam due to the lack of IV contrast.      Large, multilobulated, 16 x 14 cm hypodense mass in the area of the left   renal fossa which has increased in size and is more well-circumscribed. There is a nephrostomy tube seen entering the mass superiorly which   presumably is entering the left kidney but the kidney is not visualized due   to the lack of IV contrast.  This mass may represent a large residual   hematoma, which may be chronic and liquefying and/or urinoma. Recommend   prompt urological surgical consultation and suggest a follow-up study with   contrast to further characterize and evaluate the left kidney and nephrostomy   tube position. Multiple calcifications along the medial aspect of the mass superiorly which   were seen previously and are unchanged. These may be within the renal pelvis   and are unchanged. Recommend follow-up. Status post placement of embolization coils in the mesentery along the right   upper abdomen, medial to the mass. Mild-to-moderate splenomegaly which is more prominent      Mild chronic liver disease which is unchanged. Absent right kidney which is unchanged      Questionable 6 cm hypodense mass in the left adnexal region which could be   related to the uterus and a 3 cm hypodense mass more superiorly which could   be within the ovary which are unchanged. Recommend ultrasound follow-up           Pertinent previous results reviewed   CT A/P 4/23  Impression   Active arterial extravasation from the lateral left kidney with a large   retroperitoneal hematoma      Echo 4/28/21   Summary   The left ventricular systolic function is mildly reduced with an ejection   fraction of 40-45 %.   There is akinesis of the apical septal, apical lateral , apical anterior and   apical inferior walls.   There is mild concentric left ventricular hypertrophy.   Grade II diastolic dysfunction with elevated left ventricular filling   pressure.   Left ventricular cavity size is normal.   Mild thickening of the anterior leaflet of mitral valve.   Mild mitral regurgitation.   Systolic pulmonary artery pressure (SPAP) is estimated at 55 mmHg consistent   with moderate pulmonary hypertension (Right atrial pressure of 3 mmHg). 6/28/2021  1. Successful placement of percutaneous abscess drain with removal of an   excess of 1000 mL of frankly purulent fluid. 2. Antegrade nephrostogram demonstrates tip of the percutaneous nephrostomy   catheter in the upper pole calyx.  Multiple large renal calculi are seen. There is significant decompression of the collecting system following abscess   drainage. Assessment/Plan:  Perinephritic fluid collection   Solitary Kidney   - residual hematoma vs abscess?  - recently had nephrostomy tube placed (for large utereal stone) and now with increased pain, drainage from the site and fevers at home   - Wound Cx with E. Coli, light growth   Urology consult  - IR placement of percutaneous abscess drain with removal of in excess of 100 mL of frankly purulent fluid. Antegrade nephrostogram demonstrated the tip of the percutaneous nephrostomy catheter in the upper pole calyx. - Continue Zosyn, Zyvox  - Trend H/H: stable   Gram-negative nancy and culture. Discontinue Zyvox.     MARY   - Baseline creatinine ~1  - Creatinine on admission 1.4  - likely 2/2 above   - IVF and trend      Recent retroperitoneal hematoma and hemorrhagic shock  - Had bleeding s/p nephrostomy tube insertion   - s/p IR embolization   - H/H stable      HTN  - BP is hypotensive this morning.  - Holding BB for now   - IV fluid bolus.      Atrial Fibrillation   Recent NSTEMI  Chronic combined CHF  - noted as new onset during last admission at the end of April   - thought to be 2/2 acute illness and Takasubo CM   - Was started on BB but no AC with recent retroperitoneal hemorrhage  - Cardiology recommended repeat Echo in the future , last Echo with EF 40-45% and grade II DD   - Resume BB and ASA when able   - order repeat Echo     Chronic Hep C     HLD  - Continue statin      L adnexal and Uterine mass  - reported as stable on scan   - Can follow up OP      DVT Prophylaxis: Lovenox    Diet: ADULT DIET; Regular  Code Status: Full Code    Lovella Castleman FNP-C  6/29/2021    Agree with above. Edits made    KIRTI Mcleod.

## 2021-06-29 NOTE — PROGRESS NOTES
Pt reassessed for low BP and pain. Pt SBP still below parameters. Gay Smith NP notified.  Eden Sandoval RN

## 2021-06-29 NOTE — FLOWSHEET NOTE
06/28/21 2005   Pain Assessment   Pain Assessment 0-10   Pain Level 7   Pain Type Acute pain   Pain Location Abdomen;Flank   Pain Orientation Left   Pain Descriptors Sharp   Pain Frequency Continuous   Pain Onset On-going   Clinical Progression Gradually worsening   Functional Pain Assessment Prevents or interferes some active activities and ADLs     Pain as shown above. Will continue to monitor. Bedside report given to [facility]. Pt. denies further needs at this time. Call light is within reach.   Kimberly Muhammad RN

## 2021-06-29 NOTE — FLOWSHEET NOTE
06/29/21 1543   Vital Signs   Temp 96.6 °F (35.9 °C)   Temp Source Oral   Pulse 74   Heart Rate Source Monitor   Resp 16   /64   BP Location Right upper arm   Patient Position Right side   Level of Consciousness Alert (0)   MEWS Score 1   Pain Assessment   Pain Assessment 0-10   Pain Level 7   Patient's Stated Pain Goal No pain   Pain Type Acute pain   Pain Location Abdomen;Flank   Pain Orientation Left   Pain Descriptors Sharp   Pain Frequency Continuous   Pain Onset On-going   Clinical Progression Gradually worsening   Functional Pain Assessment Prevents or interferes some active activities and ADLs   Oxygen Therapy   SpO2 97 %   O2 Device None (Room air)     Pt reporting increased pain. Pt lying in bed, respers easy, watching tv. Requesting pain meds.  SBP above 105 as shown in flowsheet

## 2021-06-30 ENCOUNTER — APPOINTMENT (OUTPATIENT)
Dept: CT IMAGING | Age: 56
DRG: 721 | End: 2021-06-30
Payer: COMMERCIAL

## 2021-06-30 LAB
ANION GAP SERPL CALCULATED.3IONS-SCNC: 8 MMOL/L (ref 3–16)
BODY FLUID CULTURE, STERILE: ABNORMAL
BUN BLDV-MCNC: 13 MG/DL (ref 7–20)
CALCIUM SERPL-MCNC: 8 MG/DL (ref 8.3–10.6)
CHLORIDE BLD-SCNC: 111 MMOL/L (ref 99–110)
CO2: 19 MMOL/L (ref 21–32)
CREAT SERPL-MCNC: 1.3 MG/DL (ref 0.6–1.1)
GFR AFRICAN AMERICAN: 51
GFR NON-AFRICAN AMERICAN: 43
GLUCOSE BLD-MCNC: 100 MG/DL (ref 70–99)
GRAM STAIN RESULT: ABNORMAL
ORGANISM: ABNORMAL
POTASSIUM SERPL-SCNC: 4.1 MMOL/L (ref 3.5–5.1)
SODIUM BLD-SCNC: 138 MMOL/L (ref 136–145)

## 2021-06-30 PROCEDURE — 6370000000 HC RX 637 (ALT 250 FOR IP): Performed by: INTERNAL MEDICINE

## 2021-06-30 PROCEDURE — 99232 SBSQ HOSP IP/OBS MODERATE 35: CPT | Performed by: INTERNAL MEDICINE

## 2021-06-30 PROCEDURE — 36415 COLL VENOUS BLD VENIPUNCTURE: CPT

## 2021-06-30 PROCEDURE — 74176 CT ABD & PELVIS W/O CONTRAST: CPT

## 2021-06-30 PROCEDURE — 80048 BASIC METABOLIC PNL TOTAL CA: CPT

## 2021-06-30 PROCEDURE — 6360000002 HC RX W HCPCS: Performed by: INTERNAL MEDICINE

## 2021-06-30 PROCEDURE — 6360000002 HC RX W HCPCS: Performed by: PHYSICIAN ASSISTANT

## 2021-06-30 PROCEDURE — 2580000003 HC RX 258: Performed by: INTERNAL MEDICINE

## 2021-06-30 PROCEDURE — 2060000000 HC ICU INTERMEDIATE R&B

## 2021-06-30 RX ORDER — PROCHLORPERAZINE EDISYLATE 5 MG/ML
10 INJECTION INTRAMUSCULAR; INTRAVENOUS EVERY 6 HOURS PRN
Status: DISCONTINUED | OUTPATIENT
Start: 2021-06-30 | End: 2021-07-02 | Stop reason: HOSPADM

## 2021-06-30 RX ORDER — OXYCODONE HYDROCHLORIDE AND ACETAMINOPHEN 5; 325 MG/1; MG/1
1 TABLET ORAL EVERY 4 HOURS PRN
Status: DISCONTINUED | OUTPATIENT
Start: 2021-06-30 | End: 2021-07-02 | Stop reason: HOSPADM

## 2021-06-30 RX ADMIN — OXYCODONE HYDROCHLORIDE AND ACETAMINOPHEN 1 TABLET: 5; 325 TABLET ORAL at 09:19

## 2021-06-30 RX ADMIN — ONDANSETRON HYDROCHLORIDE 4 MG: 2 INJECTION, SOLUTION INTRAMUSCULAR; INTRAVENOUS at 06:52

## 2021-06-30 RX ADMIN — MORPHINE SULFATE 2 MG: 2 INJECTION, SOLUTION INTRAMUSCULAR; INTRAVENOUS at 01:57

## 2021-06-30 RX ADMIN — SODIUM CHLORIDE: 9 INJECTION, SOLUTION INTRAVENOUS at 07:23

## 2021-06-30 RX ADMIN — SODIUM CHLORIDE: 9 INJECTION, SOLUTION INTRAVENOUS at 13:22

## 2021-06-30 RX ADMIN — OXYCODONE HYDROCHLORIDE AND ACETAMINOPHEN 1 TABLET: 5; 325 TABLET ORAL at 16:23

## 2021-06-30 RX ADMIN — ATORVASTATIN CALCIUM 40 MG: 40 TABLET, FILM COATED ORAL at 19:50

## 2021-06-30 RX ADMIN — PIPERACILLIN SODIUM AND TAZOBACTAM SODIUM 3375 MG: 3; .375 INJECTION, POWDER, LYOPHILIZED, FOR SOLUTION INTRAVENOUS at 11:46

## 2021-06-30 RX ADMIN — MORPHINE SULFATE 2 MG: 2 INJECTION, SOLUTION INTRAMUSCULAR; INTRAVENOUS at 06:52

## 2021-06-30 RX ADMIN — OXYCODONE HYDROCHLORIDE AND ACETAMINOPHEN 1 TABLET: 5; 325 TABLET ORAL at 21:31

## 2021-06-30 RX ADMIN — PIPERACILLIN SODIUM AND TAZOBACTAM SODIUM 3375 MG: 3; .375 INJECTION, POWDER, LYOPHILIZED, FOR SOLUTION INTRAVENOUS at 18:40

## 2021-06-30 RX ADMIN — PIPERACILLIN SODIUM AND TAZOBACTAM SODIUM 3375 MG: 3; .375 INJECTION, POWDER, LYOPHILIZED, FOR SOLUTION INTRAVENOUS at 02:55

## 2021-06-30 RX ADMIN — PROCHLORPERAZINE EDISYLATE 10 MG: 5 INJECTION INTRAMUSCULAR; INTRAVENOUS at 09:19

## 2021-06-30 RX ADMIN — ENOXAPARIN SODIUM 40 MG: 40 INJECTION SUBCUTANEOUS at 09:19

## 2021-06-30 ASSESSMENT — PAIN SCALES - GENERAL
PAINLEVEL_OUTOF10: 6
PAINLEVEL_OUTOF10: 6
PAINLEVEL_OUTOF10: 7
PAINLEVEL_OUTOF10: 6

## 2021-06-30 NOTE — PROGRESS NOTES
Assessment completed, see flowsheets. Night meds given. Pt with no reports of pain at this time. Bed/chair alarm on, bed in lowest position with call light in reach.     Nash Rocha RN

## 2021-06-30 NOTE — CARE COORDINATION
INTERDISCIPLINARY PLAN OF CARE CONFERENCE    Date/Time: 6/30/2021 11:47 AM  Completed by: DAVIN Layton. Case Management      Patient Name:  Caity Malik  YOB: 1965  Admitting Diagnosis: Acute pyelonephritis [N10]     Admit Date/Time:  6/27/2021  5:21 PM    Chart reviewed. Interdisciplinary team contacted or reviewed plan related to patient progress and discharge plans. Disciplines included Case Management, Nursing, and Dietitian. Current Status:Ongoing. PT/OT recommendation for discharge plan of care: n/a    Expected D/C Disposition:  Home  Confirmed plan with patient and/or family Yes confirmed with: (name) pt  Met with:pt    Discharge Plan Comments: Chart review completed. Met with pt at bedside. She confirmed she plans on returning home when discharged. At this time, she thinks she will be okay without HHC. She is aware CM will follow for possible HHC. She stated agreement. Pt aware that if she would decide she doesn't want [facility] [address] when she leaves the hospital, then she would need to follow up with her PCP once home if she wants it then. She stated agreement and denied needs or questions for CM. Pt currently on IV ABX.      Home O2 in place on admit: No

## 2021-06-30 NOTE — PROGRESS NOTES
Care plan reviewed with Dr. Kandice Lemus at bedside. New orders for Percocet q4hrs, Compazine q6h hrs for nausea, and new lab orders placed. Urology to determine if patient should be discharged with drainage tube and to address plans for outpatient urinary stone removal on July 6th.

## 2021-06-30 NOTE — PROGRESS NOTES
Assessment completed, see flowsheets. Night meds given. Pt with reports of pain from nephrostomy tube insertion, morphine given by cristiano at shift change pt says pain is tolerable at this time. Bed/chair alarm on, bed in lowest position with call light in reach.     Reuel Fothergill, RN

## 2021-06-30 NOTE — PROGRESS NOTES
Administered prn po Percocet for 6/10 left flank pain. PRN IV Compazine administered for patient's report of continued nausea. Nephrostomy tube emptied- 150 ml clear yellow urine. LLQ drain emptied of brown/milky output 15 ml. Call light in reach. Will continue to monitor.

## 2021-06-30 NOTE — PROGRESS NOTES
Morphine given for pain 7/10 in lower back, zofran also given for nausea per mar.      Viky Barber RN

## 2021-06-30 NOTE — PROGRESS NOTES
Message sent to Dr. Rod Rosa to update on K+ today and patient c/o continued pain with Morphine q2hrs.

## 2021-06-30 NOTE — PROGRESS NOTES
anterior and   apical inferior walls.   There is mild concentric left ventricular hypertrophy.   Grade II diastolic dysfunction with elevated left ventricular filling   pressure.   Left ventricular cavity size is normal.   Mild thickening of the anterior leaflet of mitral valve.   Mild mitral regurgitation.   Systolic pulmonary artery pressure (SPAP) is estimated at 55 mmHg consistent   with moderate pulmonary hypertension (Right atrial pressure of 3 mmHg). 6/28/2021  1. Successful placement of percutaneous abscess drain with removal of an   excess of 1000 mL of frankly purulent fluid. 2. Antegrade nephrostogram demonstrates tip of the percutaneous nephrostomy   catheter in the upper pole calyx.  Multiple large renal calculi are seen. There is significant decompression of the collecting system following abscess   drainage. Assessment/Plan:  Perinephritic fluid collection   Solitary Kidney   - residual hematoma vs abscess?  - recently had nephrostomy tube placed (for large utereal stone) and now with increased pain, drainage from the site and fevers at home   - Wound Cx with E. Coli, light growth   Urology consult  - IR placement of percutaneous abscess drain with removal of in excess of 100 mL of frankly purulent fluid. Antegrade nephrostogram demonstrated the tip of the percutaneous nephrostomy catheter in the upper pole calyx. - Continue Zosyn, Zyvox  - Trend H/H: stable   E. Coli in culture   Discontinue Zyvox.     MARY   Met acidosis  - Baseline creatinine ~1  - Creatinine on admission 1.4  - likely 2/2 above   - IVF and trend      Recent retroperitoneal hematoma and hemorrhagic shock  - Had bleeding s/p nephrostomy tube insertion   - s/p IR embolization   - H/H stable      HTN  - BP is hypotensive   - Holding BB for now   - IV fluid bolus.    Hypotension resolved      Atrial Fibrillation   Recent NSTEMI  Chronic combined CHF  - noted as new onset during last admission at the end of April   - thought to be 2/2 acute illness and Takasubo CM   - Was started on BB but no AC with recent retroperitoneal hemorrhage  - Cardiology recommended repeat Echo in the future , last Echo with EF 40-45% and grade II DD   - Resume BB and ASA when able      Chronic Hep C     HLD  - Continue statin      L adnexal and Uterine mass  - reported as stable on scan   - Can follow up OP      DVT Prophylaxis: Lovenox    Diet: ADULT DIET; Regular  Code Status: Full Code    KIRTI Mcleod.

## 2021-06-30 NOTE — PROGRESS NOTES
Urology Progress Note    Subjective: I am ok. HPI: Patient has been admitted for sepsis and a perinephric abscess. Cultures are growing Ecoli. P/E  /72   Pulse 84   Temp 97.7 °F (36.5 °C) (Oral)   Resp 14   Ht 5' 7\" (1.702 m)   Wt 163 lb 1.6 oz (74 kg)   LMP 12/01/2014   SpO2 95%   BMI 25.55 kg/m²   Skin: Intact  Respiratory: Breathing without difficulty, stable. GI: No acute changes, stable po intake. : Neph tub in [place, abscess drain in place. MSK: No acute changes, stable. Neuro: No acute changes, stable. Labs  Lab Results   Component Value Date    WBC 6.8 06/29/2021    HGB 9.4 06/29/2021    HCT 30.2 06/29/2021    MCV 85.2 06/29/2021     06/29/2021     Lab Results   Component Value Date     06/30/2021    K 4.1 06/30/2021    K 3.4 06/29/2021     06/30/2021    CO2 19 06/30/2021    BUN 13 06/30/2021    CREATININE 1.3 06/30/2021    CALCIUM 8.0 06/30/2021     CT scan today - reviewed, see report. Decrease in abscess size. Assessment/Plan  Overall stable and improving. WBC count down, no fevers. Abscess drain still with purulent material.  PLAN:  IV fluids and abx. Maintain drainage of abscess and left kidney. Dr Brayden Dubon is planning for a left PCNL next week.     Electronically signed by Naveed Giles MD on 6/30/2021 at 5:35 PM

## 2021-07-01 LAB
ANION GAP SERPL CALCULATED.3IONS-SCNC: 9 MMOL/L (ref 3–16)
BLOOD CULTURE, ROUTINE: NORMAL
BUN BLDV-MCNC: 11 MG/DL (ref 7–20)
CALCIUM SERPL-MCNC: 7.9 MG/DL (ref 8.3–10.6)
CHLORIDE BLD-SCNC: 111 MMOL/L (ref 99–110)
CO2: 18 MMOL/L (ref 21–32)
CREAT SERPL-MCNC: 1.2 MG/DL (ref 0.6–1.1)
CULTURE, BLOOD 2: NORMAL
GFR AFRICAN AMERICAN: 56
GFR NON-AFRICAN AMERICAN: 47
GLUCOSE BLD-MCNC: 96 MG/DL (ref 70–99)
HCT VFR BLD CALC: 29.1 % (ref 36–48)
HEMOGLOBIN: 9.2 G/DL (ref 12–16)
MCH RBC QN AUTO: 26.8 PG (ref 26–34)
MCHC RBC AUTO-ENTMCNC: 31.7 G/DL (ref 31–36)
MCV RBC AUTO: 84.8 FL (ref 80–100)
PDW BLD-RTO: 16.8 % (ref 12.4–15.4)
PLATELET # BLD: 199 K/UL (ref 135–450)
PMV BLD AUTO: 8.9 FL (ref 5–10.5)
POTASSIUM SERPL-SCNC: 3.6 MMOL/L (ref 3.5–5.1)
RBC # BLD: 3.43 M/UL (ref 4–5.2)
SODIUM BLD-SCNC: 138 MMOL/L (ref 136–145)
WBC # BLD: 5.6 K/UL (ref 4–11)

## 2021-07-01 PROCEDURE — 6370000000 HC RX 637 (ALT 250 FOR IP): Performed by: INTERNAL MEDICINE

## 2021-07-01 PROCEDURE — 85027 COMPLETE CBC AUTOMATED: CPT

## 2021-07-01 PROCEDURE — 99232 SBSQ HOSP IP/OBS MODERATE 35: CPT | Performed by: INTERNAL MEDICINE

## 2021-07-01 PROCEDURE — 2060000000 HC ICU INTERMEDIATE R&B

## 2021-07-01 PROCEDURE — 6360000002 HC RX W HCPCS: Performed by: PHYSICIAN ASSISTANT

## 2021-07-01 PROCEDURE — 6360000002 HC RX W HCPCS: Performed by: INTERNAL MEDICINE

## 2021-07-01 PROCEDURE — 2580000003 HC RX 258: Performed by: INTERNAL MEDICINE

## 2021-07-01 PROCEDURE — 36415 COLL VENOUS BLD VENIPUNCTURE: CPT

## 2021-07-01 PROCEDURE — 80048 BASIC METABOLIC PNL TOTAL CA: CPT

## 2021-07-01 RX ORDER — OXYCODONE HYDROCHLORIDE AND ACETAMINOPHEN 5; 325 MG/1; MG/1
1 TABLET ORAL EVERY 6 HOURS PRN
Qty: 20 TABLET | Refills: 0 | Status: SHIPPED | OUTPATIENT
Start: 2021-07-01 | End: 2021-07-06

## 2021-07-01 RX ORDER — CIPROFLOXACIN 500 MG/1
500 TABLET, FILM COATED ORAL 2 TIMES DAILY
Qty: 20 TABLET | Refills: 0 | Status: SHIPPED | OUTPATIENT
Start: 2021-07-01 | End: 2021-07-11

## 2021-07-01 RX ORDER — METOPROLOL SUCCINATE 25 MG/1
25 TABLET, EXTENDED RELEASE ORAL DAILY
Qty: 30 TABLET | Refills: 1 | Status: ON HOLD | OUTPATIENT
Start: 2021-07-01 | End: 2021-08-20

## 2021-07-01 RX ORDER — LACTOBACILLUS RHAMNOSUS GG 10B CELL
1 CAPSULE ORAL 2 TIMES DAILY
Qty: 20 CAPSULE | Refills: 0 | Status: SHIPPED | OUTPATIENT
Start: 2021-07-01 | End: 2021-07-02

## 2021-07-01 RX ADMIN — ONDANSETRON HYDROCHLORIDE 4 MG: 2 INJECTION, SOLUTION INTRAMUSCULAR; INTRAVENOUS at 09:33

## 2021-07-01 RX ADMIN — PIPERACILLIN SODIUM AND TAZOBACTAM SODIUM 3375 MG: 3; .375 INJECTION, POWDER, LYOPHILIZED, FOR SOLUTION INTRAVENOUS at 21:19

## 2021-07-01 RX ADMIN — PIPERACILLIN SODIUM AND TAZOBACTAM SODIUM 3375 MG: 3; .375 INJECTION, POWDER, LYOPHILIZED, FOR SOLUTION INTRAVENOUS at 14:38

## 2021-07-01 RX ADMIN — OXYCODONE HYDROCHLORIDE AND ACETAMINOPHEN 1 TABLET: 5; 325 TABLET ORAL at 23:51

## 2021-07-01 RX ADMIN — OXYCODONE HYDROCHLORIDE AND ACETAMINOPHEN 1 TABLET: 5; 325 TABLET ORAL at 12:31

## 2021-07-01 RX ADMIN — OXYCODONE HYDROCHLORIDE AND ACETAMINOPHEN 1 TABLET: 5; 325 TABLET ORAL at 04:44

## 2021-07-01 RX ADMIN — OXYCODONE HYDROCHLORIDE AND ACETAMINOPHEN 1 TABLET: 5; 325 TABLET ORAL at 19:21

## 2021-07-01 RX ADMIN — ATORVASTATIN CALCIUM 40 MG: 40 TABLET, FILM COATED ORAL at 21:18

## 2021-07-01 RX ADMIN — ENOXAPARIN SODIUM 40 MG: 40 INJECTION SUBCUTANEOUS at 07:53

## 2021-07-01 RX ADMIN — PIPERACILLIN SODIUM AND TAZOBACTAM SODIUM 3375 MG: 3; .375 INJECTION, POWDER, LYOPHILIZED, FOR SOLUTION INTRAVENOUS at 03:22

## 2021-07-01 ASSESSMENT — PAIN SCALES - GENERAL
PAINLEVEL_OUTOF10: 6
PAINLEVEL_OUTOF10: 4
PAINLEVEL_OUTOF10: 7
PAINLEVEL_OUTOF10: 5
PAINLEVEL_OUTOF10: 7
PAINLEVEL_OUTOF10: 7

## 2021-07-01 NOTE — DISCHARGE SUMMARY
Name:  Yahir Dacosta  Room:  /3012-10  MRN:    [id number]    Discharge Summary      This discharge summary is in conjunction with a complete physical exam done on the day of discharge. Discharging Physician: Dr. Matias Steele: 6/27/2021  Discharge: 7/2/2021   HPI taken from admission H&P:    The patient is a 48 y.o. female with depression, Hep C, HTN who presented to [facility] ED with complaint of flank pain. Patient reports that she was discharged home not too long ago but hasn't felt well since that time. 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 She had a retroperitoneal hematoma and IR embolizatio was performed and she went in to hemorrhagic shock. She had a UTI as well and was discharged home on Zyvox. She reports that since getting home, she has had flank pain at the site of her nephrostomy tube and has noticed drainage coming from the area as well. She has had nausea and poor PO intake and believes that she has had fevers at home as well. She reports that she followed up with Dr. Bree Gutierrez and they discussed possibly removing the nephrostomy tube on July 6th prior to the start of these severe symptoms.      Diagnoses this Admission and Hospital Course   Perinephritic fluid collection   Solitary Kidney   - residual hematoma vs abscess?  - recently had nephrostomy tube placed (for large utereal stone) and now with increased pain, drainage from the site and fevers at home   - Wound Cx with E. Coli, light growth   - Urology consult  - IR placement of percutaneous abscess drain with removal of in excess of 100 mL of frankly purulent fluid. - Antegrade nephrostogram demonstrated the tip of the percutaneous nephrostomy catheter in the upper pole calyx. - Continue Zosyn, Zyvox  - Trend H/H: stable   - E. Coli in culture-->Discontinue Zyvox. - Discharged home on Cipro for 10 more days.   - Further need for antibiotics will have to be determined based on the nature of drainage from the drain and clinical picture.     MARY   Met acidosis  - Baseline creatinine ~1  - Creatinine on admission 1.4--1.2  - likely 2/2 above   - IVF and trend   Dc IVF      Recent retroperitoneal hematoma and hemorrhagic shock  - Had bleeding s/p nephrostomy tube insertion   - s/p IR embolization   - H/H stable      HTN  - BP is hypotensive   - Holding BB for now   - IV fluid bolus. Hypotension resolved   Restart Toprol at a lower dose as her heart rate is borderline low.     Atrial Fibrillation   Recent NSTEMI  Chronic combined CHF  - noted as new onset during last admission at the end of April   - thought to be 2/2 acute illness and Takasubo CM   - Was started on BB but no AC with recent retroperitoneal hemorrhage  - last Echo with EF 40-45% and grade II DD   - Restart aspirin.     Chronic Hep C     HLD  - Continue statin      L adnexal and Uterine mass  - reported as stable on scan   - Can follow up OP     Procedures (Please Review Full Report for Details)  Perc drain     Consults    Urology  IR    Physical Exam at Discharge:    BP (!) 147/77   Pulse 67   Temp 97.5 °F (36.4 °C) (Oral)   Resp 16   Ht 5' 7\" (1.702 m)   Wt 163 lb 1.6 oz (74 kg)   LMP 12/01/2014   SpO2 96%   BMI 25.55 kg/m²   Gen: No distress. Alert. Eyes: PERRL. No sclera icterus. No conjunctival injection. ENT: No discharge. Pharynx clear. Neck: Trachea midline. Normal thyroid. Resp: No accessory muscle use. No crackles. No wheezes. No rhonchi. No dullness on percussion. CV: Regular rate. Regular rhythm. No murmur or rub. No edema. GI: Non-tender. Non-distended. No masses. No organomegaly. Normal bowel sounds. No hernia. Left nephrostomy tube. Left-sided percutaneous abscess drain +  Skin: Warm and dry. No nodule on exposed extremities. No rash on exposed extremities. Lymph: No cervical LAD. No supraclavicular LAD. M/S: No cyanosis. No joint deformity. No clubbing. Neuro: Awake. Moves all 4 extremities, non focal  Psych: Oriented x 3. No anxiety or agitation. CBC:   Recent Labs     06/29/21  1144 07/01/21  0431   WBC 6.8 5.6   HGB 9.4* 9.2*   HCT 30.2* 29.1*   MCV 85.2 84.8    199     BMP:   Recent Labs     06/29/21  1144 06/30/21  0917 07/01/21  0431   * 138 138   K 3.4* 4.1 3.6    111* 111*   CO2 17* 19* 18*   BUN 18 13 11   CREATININE 1.4* 1.3* 1.2*       CULTURES  Urine: mixed pathogens  Blood: NGTD  Wound: E. Coli. COVID: not detected    RADIOLOGY  CT ABDOMEN PELVIS WO CONTRAST Additional Contrast? None   Final Result   1. Interval decrease in size of perinephric fluid collection status post   drainage catheter placement   2. Stable positioning of percutaneous nephrostomy tube with no hydronephrosis   and persistent stones in the left renal pelvis   3. Free fluid in the pelvis and right upper quadrant, increased from the   prior study   4. Stable left adnexal soft tissue structure, indeterminate. Recommend   better characterization with pelvic ultrasound   5. Small bilateral pleural effusions         IR ANTEGRADE PYELOGRAM/NEPHROSTOGRAM   Final Result      CT ABDOMEN PELVIS WO CONTRAST Additional Contrast? None   Final Result   Limited exam due to the lack of IV contrast.      Large, multilobulated, 16 x 14 cm hypodense mass in the area of the left   renal fossa which has increased in size and is more well-circumscribed. There is a nephrostomy tube seen entering the mass superiorly which   presumably is entering the left kidney but the kidney is not visualized due   to the lack of IV contrast.  This mass may represent a large residual   hematoma, which may be chronic and liquefying and/or urinoma. Recommend   prompt urological surgical consultation and suggest a follow-up study with   contrast to further characterize and evaluate the left kidney and nephrostomy   tube position.       Multiple calcifications along the medial aspect of the mass superiorly which   were seen previously and are unchanged. These may be within the renal pelvis   and are unchanged. Recommend follow-up. Status post placement of embolization coils in the mesentery along the right   upper abdomen, medial to the mass. Mild-to-moderate splenomegaly which is more prominent      Mild chronic liver disease which is unchanged. Absent right kidney which is unchanged      Questionable 6 cm hypodense mass in the left adnexal region which could be   related to the uterus and a 3 cm hypodense mass more superiorly which could   be within the ovary which are unchanged. Recommend ultrasound follow-up             Discharge Medications     Medication List      START taking these medications    ciprofloxacin 500 MG tablet  Commonly known as: CIPRO  Take 1 tablet by mouth 2 times daily for 10 days     lactobacillus capsule  Take 1 capsule by mouth 2 times daily     oxyCODONE-acetaminophen 5-325 MG per tablet  Commonly known as: PERCOCET  Take 1 tablet by mouth every 6 hours as needed for Pain for up to 5 days.         CHANGE how you take these medications    metoprolol succinate 25 MG extended release tablet  Commonly known as: TOPROL XL  Take 1 tablet by mouth daily  What changed:   · medication strength  · how much to take        CONTINUE taking these medications    aspirin 81 MG chewable tablet     atorvastatin 40 MG tablet  Commonly known as: LIPITOR  Take 1 tablet by mouth nightly     docusate 100 MG Caps  Commonly known as: COLACE, DULCOLAX  Take 100 mg by mouth 2 times daily     vitamin D 25 MCG (1000 UT) Tabs tablet  Commonly known as: CHOLECALCIFEROL           Where to Get Your Medications      These medications were sent to [address], [address] [address]    Phone: [phone]   · ciprofloxacin 500 MG tablet  · lactobacillus capsule  · metoprolol succinate 25 MG extended release tablet  · oxyCODONE-acetaminophen 5-325 MG per tablet       Discharged in stable condition to home    Follow Up: Follow up with PCP      Total time spent on discharge is 35 minutes    KIRTI Mcleod.

## 2021-07-01 NOTE — PROGRESS NOTES
Patient still complaining of nausea and pain. Refused to order lunch. Spoke with Dr. Morales Landers and discharge order cancelled for today. No other distress noted. Call light in reach. Will continue to monitor.

## 2021-07-01 NOTE — PROGRESS NOTES
Report given to Maria Teresa Donahue RN at patient bedside. Pt is stable, call light in reach, with no needs at this time. Care transferred at this time.

## 2021-07-01 NOTE — PROGRESS NOTES
Patient resting in bed, AM assessment completed. Lungs clear. BS+. Nephrostomy tube and drain noted on L flank. No acute distress noted. Call light in reach. Will continue to monitor.

## 2021-07-01 NOTE — PROGRESS NOTES
Progress Note    Admit Date:  6/27/2021    48 y.o. female with depression, Hep C, HTN who presented to [facility] ED with complaint of flank pain. Subjective:  Ms. Ruddy Koyanagi feels slightly improved. C/o nausea          Intake/Output Summary (Last 24 hours) at 7/1/2021 0922  Last data filed at 7/1/2021 0840  Gross per 24 hour   Intake 1776 ml   Output 1155 ml   Net 621 ml     Physical exam:    BP (!) 147/77   Pulse 67   Temp 97.5 °F (36.4 °C) (Oral)   Resp 16   Ht 5' 7\" (1.702 m)   Wt 163 lb 1.6 oz (74 kg)   LMP 12/01/2014   SpO2 96%   BMI 25.55 kg/m²     Gen: No distress. Alert. Eyes: PERRL. No sclera icterus. No conjunctival injection. ENT: No discharge. Pharynx clear. Neck: Trachea midline. Normal thyroid. Resp: No accessory muscle use. No crackles. No wheezes. No rhonchi. No dullness on percussion. CV: Regular rate. Regular rhythm. No murmur or rub. No edema. GI: Non-tender. Non-distended. No masses. No organomegaly. Normal bowel sounds. No hernia. Left nephrostomy tube. Left-sided percutaneous abscess drain +  Skin: Warm and dry. No nodule on exposed extremities. No rash on exposed extremities. Lymph: No cervical LAD. No supraclavicular LAD. M/S: No cyanosis. No joint deformity. No clubbing. Neuro: Awake. Moves all 4 extremities, non focal  Psych: Oriented x 3. No anxiety or agitation. Data:  CBC:   Recent Labs     06/29/21  1144 07/01/21  0431   WBC 6.8 5.6   HGB 9.4* 9.2*   HCT 30.2* 29.1*   MCV 85.2 84.8    199     BMP:   Recent Labs     06/29/21  1144 06/30/21  0917 07/01/21  0431   * 138 138   K 3.4* 4.1 3.6    111* 111*   CO2 17* 19* 18*   BUN 18 13 11   CREATININE 1.4* 1.3* 1.2*     LIVER PROFILE:   No results for input(s): AST, ALT, LIPASE, BILIDIR, BILITOT, ALKPHOS in the last 72 hours. Invalid input(s): AMYLASE,  ALB  PT/INR:   No results for input(s): PROTIME, INR in the last 72 hours.     CULTURES  Urine: mixed pathogens  Blood: NGTD  Wound: E. Coli.   COVID: not detected    RADIOLOGY  CT ABDOMEN PELVIS WO CONTRAST Additional Contrast? None   Final Result   1. Interval decrease in size of perinephric fluid collection status post   drainage catheter placement   2. Stable positioning of percutaneous nephrostomy tube with no hydronephrosis   and persistent stones in the left renal pelvis   3. Free fluid in the pelvis and right upper quadrant, increased from the   prior study   4. Stable left adnexal soft tissue structure, indeterminate. Recommend   better characterization with pelvic ultrasound   5. Small bilateral pleural effusions         IR ANTEGRADE PYELOGRAM/NEPHROSTOGRAM   Final Result      CT ABDOMEN PELVIS WO CONTRAST Additional Contrast? None   Final Result   Limited exam due to the lack of IV contrast.      Large, multilobulated, 16 x 14 cm hypodense mass in the area of the left   renal fossa which has increased in size and is more well-circumscribed. There is a nephrostomy tube seen entering the mass superiorly which   presumably is entering the left kidney but the kidney is not visualized due   to the lack of IV contrast.  This mass may represent a large residual   hematoma, which may be chronic and liquefying and/or urinoma. Recommend   prompt urological surgical consultation and suggest a follow-up study with   contrast to further characterize and evaluate the left kidney and nephrostomy   tube position. Multiple calcifications along the medial aspect of the mass superiorly which   were seen previously and are unchanged. These may be within the renal pelvis   and are unchanged. Recommend follow-up. Status post placement of embolization coils in the mesentery along the right   upper abdomen, medial to the mass. Mild-to-moderate splenomegaly which is more prominent      Mild chronic liver disease which is unchanged.       Absent right kidney which is unchanged      Questionable 6 cm hypodense mass in the left adnexal region which could be   related to the uterus and a 3 cm hypodense mass more superiorly which could   be within the ovary which are unchanged. Recommend ultrasound follow-up           Pertinent previous results reviewed   CT A/P 4/23  Impression   Active arterial extravasation from the lateral left kidney with a large   retroperitoneal hematoma      Echo 4/28/21   Summary   The left ventricular systolic function is mildly reduced with an ejection   fraction of 40-45 %.   There is akinesis of the apical septal, apical lateral , apical anterior and   apical inferior walls.   There is mild concentric left ventricular hypertrophy.   Grade II diastolic dysfunction with elevated left ventricular filling   pressure.   Left ventricular cavity size is normal.   Mild thickening of the anterior leaflet of mitral valve.   Mild mitral regurgitation.   Systolic pulmonary artery pressure (SPAP) is estimated at 55 mmHg consistent   with moderate pulmonary hypertension (Right atrial pressure of 3 mmHg). 6/28/2021  1. Successful placement of percutaneous abscess drain with removal of an   excess of 1000 mL of frankly purulent fluid. 2. Antegrade nephrostogram demonstrates tip of the percutaneous nephrostomy   catheter in the upper pole calyx.  Multiple large renal calculi are seen. There is significant decompression of the collecting system following abscess   drainage. Assessment/Plan:  Perinephritic fluid collection   Solitary Kidney   - residual hematoma vs abscess?  - recently had nephrostomy tube placed (for large utereal stone) and now with increased pain, drainage from the site and fevers at home   - Wound Cx with E. Coli, light growth   Urology consult  - IR placement of percutaneous abscess drain with removal of in excess of 100 mL of frankly purulent fluid. Antegrade nephrostogram demonstrated the tip of the percutaneous nephrostomy catheter in the upper pole calyx.   - Continue Zosyn, Zyvox  - Trend H/H: stable   E. Coli in culture   Discontinue Zyvox. Discharged home on Cipro for 10 more days. Further need for antibiotics will have to be determined based on the nature of drainage from the drain and clinical picture.     MARY   Met acidosis  - Baseline creatinine ~1  - Creatinine on admission 1.4--1.2  - likely 2/2 above   - IVF and trend      Recent retroperitoneal hematoma and hemorrhagic shock  - Had bleeding s/p nephrostomy tube insertion   - s/p IR embolization   - H/H stable      HTN  - BP is hypotensive   - Holding BB for now   - IV fluid bolus. Hypotension resolved   Restart Toprol at a lower dose as her heart rate is borderline low.     Atrial Fibrillation   Recent NSTEMI  Chronic combined CHF  - noted as new onset during last admission at the end of April   - thought to be 2/2 acute illness and Takasubo CM   - Was started on BB but no AC with recent retroperitoneal hemorrhage  - last Echo with EF 40-45% and grade II DD   Restart aspirin.       Chronic Hep C     HLD  - Continue statin      L adnexal and Uterine mass  - reported as stable on scan   - Can follow up OP      DVT Prophylaxis: Lovenox    Diet: ADULT DIET; Regular  Code Status: Full Code    KIRTI Mcleod.

## 2021-07-01 NOTE — CARE COORDINATION
DISCHARGE ORDER  Date/Time 2021 11:12 AM  Completed by: Jose E Pelletier RN, Case Management    Patient Name: Carlos Mason      : 1965  Admitting Diagnosis: Acute pyelonephritis [N10]      Admit order Date and Status:7/3/2021 inpt  (verify MD's last order for status of admission)      Noted discharge order. If applicable PT/OT recommendation at Discharge: n/a  DME recommendation by PT/OT:n/a  Confirmed discharge plan  (pt): Yes  with whom_________pt______  If pt confirmed DC plan does family need to be contacted by CM No if yes who___n/a___  Discharge Plan: Reviewed chart. Role of discharge planner explained and patient verbalized understanding. Discharge order is noted. Pt is being d/c'd to home today. Pt's O2 sats are 96% on RA. Pt is being d/c'd on oral abx and M2B brought up her medications. Pt is aware that [facility] ([phone]) called CM to speak with CM regarding pt's discharge. CM inquired about speaking with Meryl,and pt, who is alert and oriented, states that this is her neighbor and that pt states she will call her back, and stated that CM does not need to call her back. Pt stated she knew she had a CT scan scheduled, but thought it was for 7/6, and was surprised that it was 7/2. CM gave pt [phone] to call to change it if she chose to. Pt verbalized understanding. Pt declines [facility]. Pt states that she needs a taxi voucher, as she does not have anyone to take her home. CM informed RICO Sawant, to call clinical RN to get the taxi voucher and for [facility] to set up taxi. Savana RN verbalized understanding. No further discharge needs needed or noted. Reviewed chart. Role of discharge planner explained and patient verbalized understanding. Discharge order is noted.     Has Home O2 in place on admit:  No  Informed of need to bring portable home O2 tank on day of discharge for nursing to connect prior to leaving:   Not Indicated  Verbalized agreement/Understanding:   Not Indicated    Discharge timeout done with Brittani Giordano RN. All discharge needs and concerns addressed. Discharge cancelled d/t Patient still complaining of nausea and pain.

## 2021-07-01 NOTE — PROGRESS NOTES
Urology Progress Note    Subjective: I am a little better. HPI: Patient has been admitted for sepsis and a left perinephric abscess. P/E  BP (!) 147/77   Pulse 67   Temp 97.5 °F (36.4 °C) (Oral)   Resp 16   Ht 5' 7\" (1.702 m)   Wt 163 lb 1.6 oz (74 kg)   LMP 12/01/2014   SpO2 96%   BMI 25.55 kg/m²   Skin: Intact  Respiratory: Breathing without difficulty, stable. GI: No acute changes, stable po intake. : Tubes both draining well. MSK: No acute changes, stable. Neuro: No acute changes, stable. Labs  Lab Results   Component Value Date    WBC 5.6 07/01/2021    HGB 9.2 07/01/2021    HCT 29.1 07/01/2021    MCV 84.8 07/01/2021     07/01/2021     Lab Results   Component Value Date     07/01/2021    K 3.6 07/01/2021    K 3.4 06/29/2021     07/01/2021    CO2 18 07/01/2021    BUN 11 07/01/2021    CREATININE 1.2 07/01/2021    CALCIUM 7.9 07/01/2021       Assessment/Plan  Overall stable and to be discharged per medicine. She has been set up for her surgery this coming Tuesday.     Electronically signed by Saurabh Stuart MD on 7/1/2021 at 10:30 AM

## 2021-07-02 ENCOUNTER — ANESTHESIA EVENT (OUTPATIENT)
Dept: OPERATING ROOM | Age: 56
DRG: 446 | End: 2021-07-02
Payer: COMMERCIAL

## 2021-07-02 VITALS
RESPIRATION RATE: 16 BRPM | DIASTOLIC BLOOD PRESSURE: 92 MMHG | WEIGHT: 157.13 LBS | SYSTOLIC BLOOD PRESSURE: 168 MMHG | TEMPERATURE: 97.1 F | OXYGEN SATURATION: 98 % | BODY MASS INDEX: 24.66 KG/M2 | HEART RATE: 81 BPM | HEIGHT: 67 IN

## 2021-07-02 PROCEDURE — 6360000002 HC RX W HCPCS: Performed by: INTERNAL MEDICINE

## 2021-07-02 PROCEDURE — 6360000002 HC RX W HCPCS: Performed by: PHYSICIAN ASSISTANT

## 2021-07-02 PROCEDURE — 99239 HOSP IP/OBS DSCHRG MGMT >30: CPT | Performed by: INTERNAL MEDICINE

## 2021-07-02 PROCEDURE — 2580000003 HC RX 258: Performed by: INTERNAL MEDICINE

## 2021-07-02 PROCEDURE — 6370000000 HC RX 637 (ALT 250 FOR IP): Performed by: INTERNAL MEDICINE

## 2021-07-02 RX ORDER — LISINOPRIL 10 MG/1
10 TABLET ORAL DAILY
Status: ON HOLD | COMMUNITY
End: 2021-09-28 | Stop reason: HOSPADM

## 2021-07-02 RX ORDER — HYDROXYZINE HYDROCHLORIDE 25 MG/1
25 TABLET, FILM COATED ORAL 3 TIMES DAILY PRN
COMMUNITY

## 2021-07-02 RX ORDER — HYDROCHLOROTHIAZIDE 25 MG/1
25 TABLET ORAL DAILY
Status: ON HOLD | COMMUNITY
End: 2021-09-28 | Stop reason: HOSPADM

## 2021-07-02 RX ADMIN — PIPERACILLIN SODIUM AND TAZOBACTAM SODIUM 3375 MG: 3; .375 INJECTION, POWDER, LYOPHILIZED, FOR SOLUTION INTRAVENOUS at 06:23

## 2021-07-02 RX ADMIN — ENOXAPARIN SODIUM 40 MG: 40 INJECTION SUBCUTANEOUS at 08:50

## 2021-07-02 RX ADMIN — Medication 10 ML: at 08:50

## 2021-07-02 RX ADMIN — OXYCODONE HYDROCHLORIDE AND ACETAMINOPHEN 1 TABLET: 5; 325 TABLET ORAL at 07:43

## 2021-07-02 RX ADMIN — OXYCODONE HYDROCHLORIDE AND ACETAMINOPHEN 1 TABLET: 5; 325 TABLET ORAL at 13:09

## 2021-07-02 RX ADMIN — ONDANSETRON HYDROCHLORIDE 4 MG: 2 INJECTION, SOLUTION INTRAMUSCULAR; INTRAVENOUS at 03:39

## 2021-07-02 ASSESSMENT — PAIN SCALES - GENERAL
PAINLEVEL_OUTOF10: 4
PAINLEVEL_OUTOF10: 7
PAINLEVEL_OUTOF10: 6
PAINLEVEL_OUTOF10: 7

## 2021-07-02 NOTE — FLOWSHEET NOTE
07/01/21 2031   Vital Signs   Temp 97 °F (36.1 °C)   Temp Source Oral   Pulse 72   Heart Rate Source Monitor   Resp 16   BP (!) 151/81  (high b/p)   BP Location Left upper arm   Patient Position Semi fowlers   Level of Consciousness Alert (0)   MEWS Score 1   Oxygen Therapy   SpO2 98 %   O2 Device None (Room air)   Pt assessment complete. Pt lying quietly in bed. Lung sounds clear/diminished. Pt on room air. NSR with HR of 72 per monitor. Nephrostomy tube/drainage tube to Left flank. Nightly medications given. Denies needs at this time. Call light within reach.

## 2021-07-02 NOTE — PROGRESS NOTES
Pt is lying in bed with their eyes closed. Respirations are easy and even. Call light within reach bed in lowest position with the wheels locked. Will continue to monitor.  Gilles Hardy RN

## 2021-07-02 NOTE — CARE COORDINATION
Pt was to discharge yesterday but dc was canceled 2/2 abd pain and nausea. Pt to discharge home today, declining dc needs. Spoke with bedside RN Arnulfo Kent and she is getting taxi voucher for the pt from clinical. No further dc needs identified.

## 2021-07-02 NOTE — PROGRESS NOTES
Physician Progress Note      Henrique Loo  [facility] #:                  [id number]  :                       1965  ADMIT DATE:       2021 5:21 PM  DISCH DATE:  RESPONDING  PROVIDER #:        James Rivera MD          QUERY TEXT:    Pt admitted with renal abscess. Pt noted to have recently had nephrostomy   tube placed (for large utereal stone) and now with increased pain, drainage   from the site and fevers at home. If possible, please document in progress   notes and discharge summary:    The medical record reflects the following:  Risk Factors: renal/perinephritic abscess, kidney calculi, recent nephrostomy   tube placed 21  Clinical Indicators: Wound Cx with E. Coli, IR placement of percutaneous   abscess drain with removal of in excess of 1000 mL of frankly purulent fluid. Multiple large renal calculi are seen  Treatment: urology consult, placement of percutaneous abscess drain, Zosyn,   Zyvox, monitor labs/images, monitor vitals    Thank You Esmer Llamas RN, [email or web address]  Options provided:  -- Renal/Perinephritic abscess as a postoperative complication  -- Renal/Perinephritic abscess related to other incidental risk factor (please   specify) occurring following surgery and not a complication, Please document   incidental risk factor. -- Other - I will add my own diagnosis  -- Disagree - Not applicable / Not valid  -- Disagree - Clinically unable to determine / Unknown  -- Refer to Clinical Documentation Reviewer    PROVIDER RESPONSE TEXT:    Patient has renal/perinephritic abscess as a postoperative complication. Query created by: Sukhi Leon on 2021 11:04 AM      QUERY TEXT:    Pt admitted with renal abscess. Noted documentation of  patient has been   admitted for sepsis and a perinephric abscess on  per urology consultant.     If possible, please document in progress notes and discharge summary:      The medical record reflects the following:  Risk Factors: renal abscess, noted sepsis per urology, mild leukocytosis per   ED    Clinical Indicators: admission temp 96.9 with lowest 96.5 on 6/29, HR 68 and   RR 14, WBC 11.9. Wound Cx with E. Coli  Treatment: urology consult, placement of percutaneous abscess drain, Zosyn,   Zyvox, monitor labs/images, monitor vitals    Thank You Jenna Higgins RN, [email or web address]  Options provided:  -- Renal abscess only and sepsis ruled out  -- Sepsis confirmed present on admission  -- Sepsis confirmed not present on admission  -- Other - I will add my own diagnosis  -- Disagree - Not applicable / Not valid  -- Disagree - Clinically unable to determine / Unknown  -- Refer to Clinical Documentation Reviewer    PROVIDER RESPONSE TEXT:    The diagnosis of renal abscess only and sepsis was ruled out.     Query created by: Deedee Moran on 7/1/2021 11:15 AM      Electronically signed by:  Bandar Castro MD 7/2/2021 1:21 PM

## 2021-07-02 NOTE — FLOWSHEET NOTE
07/02/21 0830   Vital Signs   Temp 97.1 °F (36.2 °C)   Temp Source Oral   Pulse 81   Heart Rate Source Monitor   Resp 16   BP (!) 168/92   BP Location Left upper arm   Patient Position Semi fowlers   Level of Consciousness Alert (0)   MEWS Score 1   Oxygen Therapy   SpO2 98 %   O2 Device None (Room air)   AM assessment completed, see flow sheet. Pt is alert and oriented. Bp elevated. Respirations are even & easy. No complaints voiced. Pt denies needs at this time. SR up x 2, and bed in low position. Call light is within reach. Pt complaining of 7/10pain, adim percocet. Drained, tubing. 300ml urine, 60ml brown milky.

## 2021-07-04 ASSESSMENT — LIFESTYLE VARIABLES: SMOKING_STATUS: 1

## 2021-07-04 NOTE — ANESTHESIA PRE PROCEDURE
Department of Anesthesiology  Preprocedure Note       Name:  Miguel Casas   Age:  55 y.o.  :  1965                                          MRN:  [id number]         Date:  2021      Surgeon:  Jean Marie Milan MD    Procedure:  LEFT PERCUTANEOUS NEPHROLITHOTOMY, POSSIBLE CYSTOSCOPY, POSSIBLE LEFT STENT PLACEMENT    HPI:   48 y.o. female with depression, Hep C, HTN who presented to [facility] ED with complaint of flank pain. Patient reports that she was discharged home not too long ago but hasn't felt well since that time. 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 She had a retroperitoneal hematoma and IR embolizatio was performed and she went in to hemorrhagic shock. She had a UTI as well and was discharged home on Zyvox. She reports that since getting home, she has had flank pain at the site of her nephrostomy tube and has noticed drainage coming from the area as well. She has had nausea and poor PO intake and believes that she has had fevers at home as well. [id number] Sinus rhythm with short WA; ST and T wave abnormality, consider anterolateral ischemia; Prolonged QT; Lateral TWI    ECHO:  The left ventricular systolic function is mildly reduced with an ejection fraction of 40-45 %. There is akinesis of the apical septal, apical lateral , apical anterior and apical inferior walls. There is mild concentric left ventricular hypertrophy. Grade II diastolic dysfunction with elevated left ventricular filling pressure. Left ventricular cavity size is normal. Mild thickening of the anterior leaflet of mitral valve. Mild mitral regurgitation.  Systolic pulmonary artery pressure (SPAP) is estimated at 55 mmHg consistent with moderate pulmonary hypertension     Medications prior to admission:    lisinopril (PRINIVIL;ZESTRIL) 10 MG tablet Take 10 mg by mouth daily   hydroCHLOROthiazide (HYDRODIURIL) 25 MG tablet Take 25 mg by mouth daily   hydrOXYzine (ATARAX) 25 MG tablet Take 25 mg by mouth 3 times daily as needed for Itching   oxyCODONE-acetaminophen (PERCOCET) 5-325 MG  Take 1 tablet by mouth every 6 hours as needed for Pain for up to 5 days.    ciprofloxacin (CIPRO) 500 MG tablet Take 1 tablet by mouth 2 times daily for 10 days   metoprolol succinate (TOPROL XL) 25 MG  Take 1 tablet by mouth daily   vitamin D (CHOLECALCIFEROL) 25 MCG (1000 UT)  Take 1,000 Units by mouth once a week   aspirin 81 MG chewable tablet Take 81 mg by mouth daily     Allergies:  No Known Allergies    Problem List:     Septicemia ([address]    Acute UTI    MARY (acute kidney injury) ([address]    Bacteremia    Kidney stone    Left flank pain    Generalized abdominal pain    Ureterolithiasis    Hydronephrosis    Obstructive uropathy    Hemorrhagic shock (HCC)    Acute blood loss anemia    NSTEMI (non-ST elevated myocardial infarction) ([address]    Atrial fibrillation ([address]    Cardiomyopathy [facility]    Acute pyelonephritis    Complication of nephrostomy ([address]    Essential hypertension     Past Medical History:     Depression     Headache(784.0)     Hepatitis C 08/21/2020    Hypertension      Past Surgical History:     CYSTOSCOPY Left 1/11/2021    CYSTOSCOPY, DIAGNOSTIC LEFT URETEROSCOPY WITH HOLMIUM LASER LITHOTRIPSY, LEFT STENT EXCHANGE performed by Joce Chan MD at [address] / REMOVAL [address] / STONE Left 8/20/2020    CYSTOSCOPY URETERAL STENT INSERTION performed by Joce Chan MD at [address] / [address] / Stacie Cowan Left 1/27/2021    CYSTOSCOPY, LEFT STENT REMOVAL performed by Joce Chan MD at [address] [facility] IR EMBOLIZATION HEMORRHAGE  4/24/2021    IR EMBOLIZATION HEMORRHAGE 4/24/2021 [address] SPECIAL PROCEDURES    IR NEPHROSTOMY PERCUTANEOUS LEFT  4/23/2021    IR NEPHROSTOMY PERCUTANEOUS LEFT [place]23[place]20[place] [facility]    OTHER SURGICAL HISTORY CYSTOSCOPY URETERAL STENT INSERTION (Left Bladder)    OTHER SURGICAL HISTORY  11/23/2020    CYSTOSCOPY, LEFT RIGID AND FLEXIBLE URETEROSCOPY, HOLMIUM LASER LITHOTRIPSY, STONE EXTRACTION, STENT EXCHANGE (Left )     OTHER SURGICAL HISTORY  01/11/2021    CYSTOSCOPY, DIAGNOSTIC LEFT URETEROSCOPY WITH HOLMIUM LASER LITHOTRIPSY, LEFT STENT EXCHANGE (Left Bladder)    OTHER SURGICAL HISTORY  01/27/2021    cystoscopy, left stent removal    URETEROSCOPY Left 11/23/2020    CYSTOSCOPY, LEFT RIGID AND FLEXIBLE URETEROSCOPY, HOLMIUM LASER LITHOTRIPSY, STONE EXTRACTION, STENT EXCHANGE, REMOVAL MIGRATED STENT performed by Hosea Freeman MD at [address] [place] [address] History:     Smoking status: Current Every Day Smoker     Packs/day: 0.50     Types: Cigarettes    Smokeless tobacco: Never Used   Substance Use Topics    Alcohol use: No     Vital Signs (Current):    BP: 192/99 Pulse: 64   Resp: 16 SpO2: 98   Temp: 97.9 °F (36.6 °C)   Height: 5' 7\" (1.702 m)  (07/06/21) Weight: 150 lb (68 kg)  (07/06/21)   BMI: 23.5           BP Readings from Last 3 Encounters:   07/02/21 (!) 168/92   04/30/21 (!) 156/93   01/27/21 (!) 128/58     NPO Status: >8 hrs                        BMI:   Wt Readings from Last 3 Encounters:   07/02/21 157 lb 2 oz (71.3 kg)   04/30/21 180 lb 9.6 oz (81.9 kg)   01/27/21 172 lb (78 kg)       CBC:    WBC 5.6 07/01/2021    HGB 9.2 07/01/2021    HCT 29.1 07/01/2021     07/01/2021     CMP:     07/01/2021    K 3.6 07/01/2021     07/01/2021    CO2 18 07/01/2021    BUN 11 07/01/2021    CREATININE 1.2 07/01/2021    GFRAA 56 07/01/2021    GLUCOSE 96 07/01/2021    PROT 6.6 06/28/2021    CALCIUM 7.9 07/01/2021    BILITOT 0.6 06/28/2021    ALKPHOS 93 06/28/2021    AST 16 06/28/2021    ALT <5 06/28/2021     Coags:    PROTIME 15.6 06/28/2021    INR 1.34 06/28/2021    APTT 47.6 04/28/2021     COVID-19 Screening (If Applicable):     COVID19 Not Detected 06/28/2021    COVID19 Not Detected 01/22/2021 Anesthesia Evaluation  Patient summary reviewed and Nursing notes reviewed no history of anesthetic complications:   Airway: Mallampati: II  TM distance: >3 FB   Neck ROM: full  Mouth opening: > = 3 FB Dental:          Pulmonary: breath sounds clear to auscultation  (+) current smoker    (-) COPD, asthma, recent URI, sleep apnea and wheezes                           Cardiovascular:  Exercise tolerance: good (>4 METS),   (+) hypertension:, dysrhythmias: atrial fibrillation, CHF: systolic and diastolic, pulmonary hypertension: moderate,     (-) murmur    ECG reviewed  Rhythm: regular  Rate: normal  Echocardiogram reviewed                  Neuro/Psych:   (+) psychiatric history:depression/anxiety    (-) seizures, TIA and CVA           GI/Hepatic/Renal:   (+) hepatitis: C, liver disease:, renal disease: CRI,      (-) GERD       Endo/Other:        (-) diabetes mellitus, hypothyroidism               Abdominal:             Vascular:     - DVT and PE. Other Findings:           Anesthesia Plan      general     ASA 3       Induction: intravenous. MIPS: Prophylactic antiemetics administered. Anesthetic plan and risks discussed with patient. Plan discussed with CRNA.             Arjun David MD

## 2021-07-06 ENCOUNTER — APPOINTMENT (OUTPATIENT)
Dept: GENERAL RADIOLOGY | Age: 56
DRG: 446 | End: 2021-07-06
Attending: UROLOGY
Payer: COMMERCIAL

## 2021-07-06 ENCOUNTER — ANESTHESIA (OUTPATIENT)
Dept: OPERATING ROOM | Age: 56
DRG: 446 | End: 2021-07-06
Payer: COMMERCIAL

## 2021-07-06 ENCOUNTER — HOSPITAL ENCOUNTER (INPATIENT)
Age: 56
LOS: 10 days | Discharge: HOME | DRG: 446 | End: 2021-07-16
Attending: UROLOGY | Admitting: UROLOGY
Payer: COMMERCIAL

## 2021-07-06 VITALS
SYSTOLIC BLOOD PRESSURE: 105 MMHG | DIASTOLIC BLOOD PRESSURE: 63 MMHG | OXYGEN SATURATION: 100 % | TEMPERATURE: 98.2 F | RESPIRATION RATE: 26 BRPM

## 2021-07-06 PROBLEM — N20.0 RENAL CALCULI: Status: ACTIVE | Noted: 2021-07-06

## 2021-07-06 LAB
ABO/RH: NORMAL
ABO/RH: NORMAL
ANTIBODY SCREEN: NORMAL

## 2021-07-06 PROCEDURE — 74420 UROGRAPHY RTRGR +-KUB: CPT

## 2021-07-06 PROCEDURE — 0T778DZ DILATION OF LEFT URETER WITH INTRALUMINAL DEVICE, VIA NATURAL OR ARTIFICIAL OPENING ENDOSCOPIC: ICD-10-PCS | Performed by: UROLOGY

## 2021-07-06 PROCEDURE — 36415 COLL VENOUS BLD VENIPUNCTURE: CPT

## 2021-07-06 PROCEDURE — 2500000003 HC RX 250 WO HCPCS: Performed by: UROLOGY

## 2021-07-06 PROCEDURE — 2580000003 HC RX 258: Performed by: UROLOGY

## 2021-07-06 PROCEDURE — C2617 STENT, NON-COR, TEM W/O DEL: HCPCS | Performed by: UROLOGY

## 2021-07-06 PROCEDURE — 7100000000 HC PACU RECOVERY - FIRST 15 MIN: Performed by: UROLOGY

## 2021-07-06 PROCEDURE — 0TC77ZZ EXTIRPATION OF MATTER FROM LEFT URETER, VIA NATURAL OR ARTIFICIAL OPENING: ICD-10-PCS | Performed by: UROLOGY

## 2021-07-06 PROCEDURE — 6370000000 HC RX 637 (ALT 250 FOR IP): Performed by: UROLOGY

## 2021-07-06 PROCEDURE — 86850 RBC ANTIBODY SCREEN: CPT

## 2021-07-06 PROCEDURE — 2500000003 HC RX 250 WO HCPCS: Performed by: NURSE ANESTHETIST, CERTIFIED REGISTERED

## 2021-07-06 PROCEDURE — 3700000001 HC ADD 15 MINUTES (ANESTHESIA): Performed by: UROLOGY

## 2021-07-06 PROCEDURE — C2628 CATHETER, OCCLUSION: HCPCS | Performed by: UROLOGY

## 2021-07-06 PROCEDURE — 88300 SURGICAL PATH GROSS: CPT

## 2021-07-06 PROCEDURE — 82365 CALCULUS SPECTROSCOPY: CPT

## 2021-07-06 PROCEDURE — C1758 CATHETER, URETERAL: HCPCS | Performed by: UROLOGY

## 2021-07-06 PROCEDURE — 86901 BLOOD TYPING SEROLOGIC RH(D): CPT

## 2021-07-06 PROCEDURE — 3600000004 HC SURGERY LEVEL 4 BASE: Performed by: UROLOGY

## 2021-07-06 PROCEDURE — 86900 BLOOD TYPING SEROLOGIC ABO: CPT

## 2021-07-06 PROCEDURE — 6370000000 HC RX 637 (ALT 250 FOR IP): Performed by: ANESTHESIOLOGY

## 2021-07-06 PROCEDURE — 2709999900 HC NON-CHARGEABLE SUPPLY: Performed by: UROLOGY

## 2021-07-06 PROCEDURE — 94150 VITAL CAPACITY TEST: CPT

## 2021-07-06 PROCEDURE — 3700000000 HC ANESTHESIA ATTENDED CARE: Performed by: UROLOGY

## 2021-07-06 PROCEDURE — 1200000000 HC SEMI PRIVATE

## 2021-07-06 PROCEDURE — 6360000002 HC RX W HCPCS: Performed by: NURSE ANESTHETIST, CERTIFIED REGISTERED

## 2021-07-06 PROCEDURE — 2580000003 HC RX 258: Performed by: ANESTHESIOLOGY

## 2021-07-06 PROCEDURE — 6360000002 HC RX W HCPCS: Performed by: ANESTHESIOLOGY

## 2021-07-06 PROCEDURE — C1769 GUIDE WIRE: HCPCS | Performed by: UROLOGY

## 2021-07-06 PROCEDURE — 6360000002 HC RX W HCPCS: Performed by: UROLOGY

## 2021-07-06 PROCEDURE — 6360000004 HC RX CONTRAST MEDICATION: Performed by: UROLOGY

## 2021-07-06 PROCEDURE — 2720000010 HC SURG SUPPLY STERILE: Performed by: UROLOGY

## 2021-07-06 PROCEDURE — 3600000014 HC SURGERY LEVEL 4 ADDTL 15MIN: Performed by: UROLOGY

## 2021-07-06 PROCEDURE — 0T913ZZ DRAINAGE OF LEFT KIDNEY, PERCUTANEOUS APPROACH: ICD-10-PCS | Performed by: UROLOGY

## 2021-07-06 PROCEDURE — 7100000001 HC PACU RECOVERY - ADDTL 15 MIN: Performed by: UROLOGY

## 2021-07-06 PROCEDURE — BT121ZZ FLUOROSCOPY OF LEFT KIDNEY USING LOW OSMOLAR CONTRAST: ICD-10-PCS | Performed by: UROLOGY

## 2021-07-06 DEVICE — URETERAL STENT
Type: IMPLANTABLE DEVICE | Site: URETER | Status: FUNCTIONAL
Brand: CONTOUR™

## 2021-07-06 RX ORDER — LIDOCAINE HYDROCHLORIDE 10 MG/ML
1 INJECTION, SOLUTION EPIDURAL; INFILTRATION; INTRACAUDAL; PERINEURAL
Status: DISCONTINUED | OUTPATIENT
Start: 2021-07-06 | End: 2021-07-06 | Stop reason: HOSPADM

## 2021-07-06 RX ORDER — ROCURONIUM BROMIDE 10 MG/ML
INJECTION, SOLUTION INTRAVENOUS PRN
Status: DISCONTINUED | OUTPATIENT
Start: 2021-07-06 | End: 2021-07-06 | Stop reason: SDUPTHER

## 2021-07-06 RX ORDER — LIDOCAINE HYDROCHLORIDE 20 MG/ML
INJECTION, SOLUTION EPIDURAL; INFILTRATION; INTRACAUDAL; PERINEURAL PRN
Status: DISCONTINUED | OUTPATIENT
Start: 2021-07-06 | End: 2021-07-06 | Stop reason: SDUPTHER

## 2021-07-06 RX ORDER — SENNA AND DOCUSATE SODIUM 50; 8.6 MG/1; MG/1
1 TABLET, FILM COATED ORAL 2 TIMES DAILY
Status: DISCONTINUED | OUTPATIENT
Start: 2021-07-06 | End: 2021-07-16 | Stop reason: HOSPADM

## 2021-07-06 RX ORDER — SODIUM CHLORIDE 9 MG/ML
25 INJECTION, SOLUTION INTRAVENOUS PRN
Status: DISCONTINUED | OUTPATIENT
Start: 2021-07-06 | End: 2021-07-06 | Stop reason: HOSPADM

## 2021-07-06 RX ORDER — MEPERIDINE HYDROCHLORIDE 25 MG/ML
12.5 INJECTION INTRAMUSCULAR; INTRAVENOUS; SUBCUTANEOUS EVERY 5 MIN PRN
Status: DISCONTINUED | OUTPATIENT
Start: 2021-07-06 | End: 2021-07-06 | Stop reason: HOSPADM

## 2021-07-06 RX ORDER — HYDROXYZINE HYDROCHLORIDE 10 MG/1
25 TABLET, FILM COATED ORAL 3 TIMES DAILY PRN
Status: DISCONTINUED | OUTPATIENT
Start: 2021-07-06 | End: 2021-07-16 | Stop reason: HOSPADM

## 2021-07-06 RX ORDER — ACETAMINOPHEN 325 MG/1
650 TABLET ORAL EVERY 6 HOURS
Status: DISCONTINUED | OUTPATIENT
Start: 2021-07-06 | End: 2021-07-16 | Stop reason: HOSPADM

## 2021-07-06 RX ORDER — HYDROCHLOROTHIAZIDE 25 MG/1
25 TABLET ORAL DAILY
Status: DISCONTINUED | OUTPATIENT
Start: 2021-07-06 | End: 2021-07-16 | Stop reason: HOSPADM

## 2021-07-06 RX ORDER — OXYCODONE HYDROCHLORIDE AND ACETAMINOPHEN 5; 325 MG/1; MG/1
2 TABLET ORAL PRN
Status: DISCONTINUED | OUTPATIENT
Start: 2021-07-06 | End: 2021-07-06 | Stop reason: HOSPADM

## 2021-07-06 RX ORDER — SODIUM CHLORIDE 0.9 % (FLUSH) 0.9 %
10 SYRINGE (ML) INJECTION EVERY 12 HOURS SCHEDULED
Status: DISCONTINUED | OUTPATIENT
Start: 2021-07-06 | End: 2021-07-06 | Stop reason: HOSPADM

## 2021-07-06 RX ORDER — VITAMIN B COMPLEX
1000 TABLET ORAL WEEKLY
Status: DISCONTINUED | OUTPATIENT
Start: 2021-07-06 | End: 2021-07-16 | Stop reason: HOSPADM

## 2021-07-06 RX ORDER — OXYCODONE HYDROCHLORIDE AND ACETAMINOPHEN 5; 325 MG/1; MG/1
1 TABLET ORAL PRN
Status: DISCONTINUED | OUTPATIENT
Start: 2021-07-06 | End: 2021-07-06 | Stop reason: HOSPADM

## 2021-07-06 RX ORDER — HYDRALAZINE HYDROCHLORIDE 20 MG/ML
5 INJECTION INTRAMUSCULAR; INTRAVENOUS EVERY 10 MIN PRN
Status: DISCONTINUED | OUTPATIENT
Start: 2021-07-06 | End: 2021-07-06 | Stop reason: HOSPADM

## 2021-07-06 RX ORDER — DIPHENHYDRAMINE HYDROCHLORIDE 50 MG/ML
25 INJECTION INTRAMUSCULAR; INTRAVENOUS EVERY 6 HOURS PRN
Status: DISCONTINUED | OUTPATIENT
Start: 2021-07-06 | End: 2021-07-16 | Stop reason: HOSPADM

## 2021-07-06 RX ORDER — SODIUM CHLORIDE 9 MG/ML
INJECTION, SOLUTION INTRAVENOUS CONTINUOUS
Status: DISCONTINUED | OUTPATIENT
Start: 2021-07-06 | End: 2021-07-13

## 2021-07-06 RX ORDER — SODIUM CHLORIDE 0.9 % (FLUSH) 0.9 %
5-40 SYRINGE (ML) INJECTION EVERY 12 HOURS SCHEDULED
Status: DISCONTINUED | OUTPATIENT
Start: 2021-07-06 | End: 2021-07-16 | Stop reason: HOSPADM

## 2021-07-06 RX ORDER — METOPROLOL SUCCINATE 25 MG/1
25 TABLET, EXTENDED RELEASE ORAL DAILY
Status: DISCONTINUED | OUTPATIENT
Start: 2021-07-06 | End: 2021-07-06 | Stop reason: DRUGHIGH

## 2021-07-06 RX ORDER — BUPIVACAINE HYDROCHLORIDE 5 MG/ML
INJECTION, SOLUTION EPIDURAL; INTRACAUDAL PRN
Status: DISCONTINUED | OUTPATIENT
Start: 2021-07-06 | End: 2021-07-06 | Stop reason: ALTCHOICE

## 2021-07-06 RX ORDER — ONDANSETRON 2 MG/ML
INJECTION INTRAMUSCULAR; INTRAVENOUS PRN
Status: DISCONTINUED | OUTPATIENT
Start: 2021-07-06 | End: 2021-07-06 | Stop reason: SDUPTHER

## 2021-07-06 RX ORDER — MAGNESIUM HYDROXIDE 1200 MG/15ML
LIQUID ORAL CONTINUOUS PRN
Status: COMPLETED | OUTPATIENT
Start: 2021-07-06 | End: 2021-07-06

## 2021-07-06 RX ORDER — LISINOPRIL 10 MG/1
10 TABLET ORAL DAILY
Status: DISCONTINUED | OUTPATIENT
Start: 2021-07-06 | End: 2021-07-16 | Stop reason: HOSPADM

## 2021-07-06 RX ORDER — OXYCODONE HYDROCHLORIDE 5 MG/1
5 TABLET ORAL EVERY 4 HOURS PRN
Status: DISCONTINUED | OUTPATIENT
Start: 2021-07-06 | End: 2021-07-16 | Stop reason: HOSPADM

## 2021-07-06 RX ORDER — PROPOFOL 10 MG/ML
INJECTION, EMULSION INTRAVENOUS PRN
Status: DISCONTINUED | OUTPATIENT
Start: 2021-07-06 | End: 2021-07-06 | Stop reason: SDUPTHER

## 2021-07-06 RX ORDER — SODIUM CHLORIDE, SODIUM LACTATE, POTASSIUM CHLORIDE, CALCIUM CHLORIDE 600; 310; 30; 20 MG/100ML; MG/100ML; MG/100ML; MG/100ML
INJECTION, SOLUTION INTRAVENOUS CONTINUOUS
Status: DISCONTINUED | OUTPATIENT
Start: 2021-07-06 | End: 2021-07-06

## 2021-07-06 RX ORDER — SODIUM CHLORIDE 0.9 % (FLUSH) 0.9 %
10 SYRINGE (ML) INJECTION PRN
Status: DISCONTINUED | OUTPATIENT
Start: 2021-07-06 | End: 2021-07-16 | Stop reason: HOSPADM

## 2021-07-06 RX ORDER — ONDANSETRON 2 MG/ML
4 INJECTION INTRAMUSCULAR; INTRAVENOUS
Status: COMPLETED | OUTPATIENT
Start: 2021-07-06 | End: 2021-07-06

## 2021-07-06 RX ORDER — FENTANYL CITRATE 50 UG/ML
25 INJECTION, SOLUTION INTRAMUSCULAR; INTRAVENOUS EVERY 5 MIN PRN
Status: DISCONTINUED | OUTPATIENT
Start: 2021-07-06 | End: 2021-07-06 | Stop reason: HOSPADM

## 2021-07-06 RX ORDER — DEXAMETHASONE SODIUM PHOSPHATE 10 MG/ML
INJECTION, SOLUTION INTRAMUSCULAR; INTRAVENOUS PRN
Status: DISCONTINUED | OUTPATIENT
Start: 2021-07-06 | End: 2021-07-06 | Stop reason: SDUPTHER

## 2021-07-06 RX ORDER — ONDANSETRON 4 MG/1
4 TABLET, ORALLY DISINTEGRATING ORAL EVERY 8 HOURS PRN
Status: DISCONTINUED | OUTPATIENT
Start: 2021-07-06 | End: 2021-07-16 | Stop reason: HOSPADM

## 2021-07-06 RX ORDER — METOPROLOL SUCCINATE 25 MG/1
25 TABLET, EXTENDED RELEASE ORAL DAILY
Status: DISCONTINUED | OUTPATIENT
Start: 2021-07-06 | End: 2021-07-16 | Stop reason: HOSPADM

## 2021-07-06 RX ORDER — FENTANYL CITRATE 50 UG/ML
INJECTION, SOLUTION INTRAMUSCULAR; INTRAVENOUS PRN
Status: DISCONTINUED | OUTPATIENT
Start: 2021-07-06 | End: 2021-07-06 | Stop reason: SDUPTHER

## 2021-07-06 RX ORDER — POLYETHYLENE GLYCOL 3350 17 G/17G
17 POWDER, FOR SOLUTION ORAL DAILY PRN
Status: DISCONTINUED | OUTPATIENT
Start: 2021-07-06 | End: 2021-07-16 | Stop reason: HOSPADM

## 2021-07-06 RX ORDER — SODIUM CHLORIDE 0.9 % (FLUSH) 0.9 %
10 SYRINGE (ML) INJECTION PRN
Status: DISCONTINUED | OUTPATIENT
Start: 2021-07-06 | End: 2021-07-06 | Stop reason: HOSPADM

## 2021-07-06 RX ORDER — PROMETHAZINE HYDROCHLORIDE 25 MG/ML
6.25 INJECTION, SOLUTION INTRAMUSCULAR; INTRAVENOUS
Status: DISCONTINUED | OUTPATIENT
Start: 2021-07-06 | End: 2021-07-06 | Stop reason: HOSPADM

## 2021-07-06 RX ORDER — ONDANSETRON 2 MG/ML
4 INJECTION INTRAMUSCULAR; INTRAVENOUS EVERY 6 HOURS PRN
Status: DISCONTINUED | OUTPATIENT
Start: 2021-07-06 | End: 2021-07-16 | Stop reason: HOSPADM

## 2021-07-06 RX ORDER — SODIUM CHLORIDE 9 MG/ML
25 INJECTION, SOLUTION INTRAVENOUS PRN
Status: DISCONTINUED | OUTPATIENT
Start: 2021-07-06 | End: 2021-07-16 | Stop reason: HOSPADM

## 2021-07-06 RX ORDER — OXYCODONE HYDROCHLORIDE 5 MG/1
10 TABLET ORAL EVERY 4 HOURS PRN
Status: DISCONTINUED | OUTPATIENT
Start: 2021-07-06 | End: 2021-07-16 | Stop reason: HOSPADM

## 2021-07-06 RX ORDER — METOPROLOL SUCCINATE 25 MG/1
25 TABLET, EXTENDED RELEASE ORAL ONCE
Status: COMPLETED | OUTPATIENT
Start: 2021-07-06 | End: 2021-07-06

## 2021-07-06 RX ADMIN — VANCOMYCIN HYDROCHLORIDE 1250 MG: 5 INJECTION, POWDER, LYOPHILIZED, FOR SOLUTION INTRAVENOUS at 16:43

## 2021-07-06 RX ADMIN — OXYCODONE HYDROCHLORIDE 10 MG: 5 TABLET ORAL at 13:32

## 2021-07-06 RX ADMIN — LIDOCAINE HYDROCHLORIDE 80 MG: 20 INJECTION, SOLUTION EPIDURAL; INFILTRATION; INTRACAUDAL; PERINEURAL at 09:05

## 2021-07-06 RX ADMIN — HYDROMORPHONE HYDROCHLORIDE 0.5 MG: 1 INJECTION, SOLUTION INTRAMUSCULAR; INTRAVENOUS; SUBCUTANEOUS at 11:25

## 2021-07-06 RX ADMIN — FENTANYL CITRATE 100 MCG: 50 INJECTION INTRAMUSCULAR; INTRAVENOUS at 09:05

## 2021-07-06 RX ADMIN — ONDANSETRON HYDROCHLORIDE 4 MG: 2 INJECTION, SOLUTION INTRAMUSCULAR; INTRAVENOUS at 14:27

## 2021-07-06 RX ADMIN — HYDROMORPHONE HYDROCHLORIDE 0.5 MG: 1 INJECTION, SOLUTION INTRAMUSCULAR; INTRAVENOUS; SUBCUTANEOUS at 12:16

## 2021-07-06 RX ADMIN — HYDROMORPHONE HYDROCHLORIDE 0.5 MG: 1 INJECTION, SOLUTION INTRAMUSCULAR; INTRAVENOUS; SUBCUTANEOUS at 11:56

## 2021-07-06 RX ADMIN — OXYCODONE HYDROCHLORIDE 10 MG: 5 TABLET ORAL at 18:12

## 2021-07-06 RX ADMIN — DOCUSATE SODIUM 50MG AND SENNOSIDES 8.6MG 1 TABLET: 8.6; 5 TABLET, FILM COATED ORAL at 20:56

## 2021-07-06 RX ADMIN — SODIUM CHLORIDE: 9 INJECTION, SOLUTION INTRAVENOUS at 13:28

## 2021-07-06 RX ADMIN — SODIUM CHLORIDE, SODIUM LACTATE, POTASSIUM CHLORIDE, AND CALCIUM CHLORIDE: .6; .31; .03; .02 INJECTION, SOLUTION INTRAVENOUS at 07:30

## 2021-07-06 RX ADMIN — HYDROMORPHONE HYDROCHLORIDE 0.5 MG: 1 INJECTION, SOLUTION INTRAMUSCULAR; INTRAVENOUS; SUBCUTANEOUS at 11:46

## 2021-07-06 RX ADMIN — HYDROCHLOROTHIAZIDE 25 MG: 25 TABLET ORAL at 14:24

## 2021-07-06 RX ADMIN — SUGAMMADEX 200 MG: 100 INJECTION, SOLUTION INTRAVENOUS at 10:56

## 2021-07-06 RX ADMIN — ACETAMINOPHEN 650 MG: 325 TABLET ORAL at 14:24

## 2021-07-06 RX ADMIN — ONDANSETRON 4 MG: 2 INJECTION INTRAMUSCULAR; INTRAVENOUS at 11:27

## 2021-07-06 RX ADMIN — LISINOPRIL 10 MG: 10 TABLET ORAL at 14:24

## 2021-07-06 RX ADMIN — Medication 2000 MG: at 08:53

## 2021-07-06 RX ADMIN — ACETAMINOPHEN 650 MG: 325 TABLET ORAL at 19:31

## 2021-07-06 RX ADMIN — HYDROMORPHONE HYDROCHLORIDE 0.5 MG: 1 INJECTION, SOLUTION INTRAMUSCULAR; INTRAVENOUS; SUBCUTANEOUS at 11:17

## 2021-07-06 RX ADMIN — HYDROMORPHONE HYDROCHLORIDE 0.5 MG: 1 INJECTION, SOLUTION INTRAMUSCULAR; INTRAVENOUS; SUBCUTANEOUS at 20:56

## 2021-07-06 RX ADMIN — HYDROMORPHONE HYDROCHLORIDE 0.5 MG: 1 INJECTION, SOLUTION INTRAMUSCULAR; INTRAVENOUS; SUBCUTANEOUS at 16:15

## 2021-07-06 RX ADMIN — VANCOMYCIN HYDROCHLORIDE 750 MG: 750 INJECTION, POWDER, LYOPHILIZED, FOR SOLUTION INTRAVENOUS at 08:55

## 2021-07-06 RX ADMIN — METOPROLOL SUCCINATE 25 MG: 25 TABLET, EXTENDED RELEASE ORAL at 07:48

## 2021-07-06 RX ADMIN — ROCURONIUM BROMIDE 50 MG: 10 INJECTION, SOLUTION INTRAVENOUS at 09:05

## 2021-07-06 RX ADMIN — ONDANSETRON 4 MG: 2 INJECTION, SOLUTION INTRAMUSCULAR; INTRAVENOUS at 09:09

## 2021-07-06 RX ADMIN — DEXAMETHASONE SODIUM PHOSPHATE 8 MG: 10 INJECTION, SOLUTION INTRAMUSCULAR; INTRAVENOUS at 09:09

## 2021-07-06 RX ADMIN — CEFAZOLIN SODIUM 1000 MG: 1 INJECTION, POWDER, FOR SOLUTION INTRAMUSCULAR; INTRAVENOUS at 18:14

## 2021-07-06 RX ADMIN — OXYCODONE HYDROCHLORIDE 10 MG: 5 TABLET ORAL at 22:12

## 2021-07-06 RX ADMIN — PROPOFOL 150 MG: 10 INJECTION, EMULSION INTRAVENOUS at 09:05

## 2021-07-06 ASSESSMENT — PULMONARY FUNCTION TESTS
PIF_VALUE: 18
PIF_VALUE: 16
PIF_VALUE: 17
PIF_VALUE: 17
PIF_VALUE: 18
PIF_VALUE: 17
PIF_VALUE: 16
PIF_VALUE: 1
PIF_VALUE: 17
PIF_VALUE: 16
PIF_VALUE: 17
PIF_VALUE: 17
PIF_VALUE: 16
PIF_VALUE: 17
PIF_VALUE: 0
PIF_VALUE: 17
PIF_VALUE: 16
PIF_VALUE: 4
PIF_VALUE: 17
PIF_VALUE: 16
PIF_VALUE: 17
PIF_VALUE: 3
PIF_VALUE: 17
PIF_VALUE: 24
PIF_VALUE: 17
PIF_VALUE: 16
PIF_VALUE: 17
PIF_VALUE: 17
PIF_VALUE: 16
PIF_VALUE: 17
PIF_VALUE: 16
PIF_VALUE: 17
PIF_VALUE: 5
PIF_VALUE: 17
PIF_VALUE: 16
PIF_VALUE: 17
PIF_VALUE: 17
PIF_VALUE: 8
PIF_VALUE: 18
PIF_VALUE: 24
PIF_VALUE: 17
PIF_VALUE: 0
PIF_VALUE: 17
PIF_VALUE: 16
PIF_VALUE: 17
PIF_VALUE: 17
PIF_VALUE: 2
PIF_VALUE: 15
PIF_VALUE: 17
PIF_VALUE: 16
PIF_VALUE: 17
PIF_VALUE: 16
PIF_VALUE: 14
PIF_VALUE: 16
PIF_VALUE: 17
PIF_VALUE: 2
PIF_VALUE: 1
PIF_VALUE: 17
PIF_VALUE: 17
PIF_VALUE: 16
PIF_VALUE: 30
PIF_VALUE: 17
PIF_VALUE: 3
PIF_VALUE: 17
PIF_VALUE: 2
PIF_VALUE: 17
PIF_VALUE: 14
PIF_VALUE: 15
PIF_VALUE: 17
PIF_VALUE: 17
PIF_VALUE: 16
PIF_VALUE: 17
PIF_VALUE: 5
PIF_VALUE: 17
PIF_VALUE: 16
PIF_VALUE: 1
PIF_VALUE: 17
PIF_VALUE: 17
PIF_VALUE: 18
PIF_VALUE: 17

## 2021-07-06 ASSESSMENT — PAIN DESCRIPTION - FREQUENCY
FREQUENCY: CONTINUOUS

## 2021-07-06 ASSESSMENT — PAIN SCALES - GENERAL
PAINLEVEL_OUTOF10: 3
PAINLEVEL_OUTOF10: 6
PAINLEVEL_OUTOF10: 8
PAINLEVEL_OUTOF10: 9
PAINLEVEL_OUTOF10: 8
PAINLEVEL_OUTOF10: 7
PAINLEVEL_OUTOF10: 6
PAINLEVEL_OUTOF10: 6
PAINLEVEL_OUTOF10: 8
PAINLEVEL_OUTOF10: 9
PAINLEVEL_OUTOF10: 7
PAINLEVEL_OUTOF10: 6
PAINLEVEL_OUTOF10: 5
PAINLEVEL_OUTOF10: 8
PAINLEVEL_OUTOF10: 6
PAINLEVEL_OUTOF10: 6
PAINLEVEL_OUTOF10: 7
PAINLEVEL_OUTOF10: 6
PAINLEVEL_OUTOF10: 7
PAINLEVEL_OUTOF10: 8
PAINLEVEL_OUTOF10: 7

## 2021-07-06 ASSESSMENT — PAIN DESCRIPTION - PAIN TYPE
TYPE: SURGICAL PAIN

## 2021-07-06 ASSESSMENT — PAIN DESCRIPTION - ORIENTATION
ORIENTATION: LEFT

## 2021-07-06 ASSESSMENT — PAIN DESCRIPTION - LOCATION
LOCATION: FLANK

## 2021-07-06 ASSESSMENT — PAIN DESCRIPTION - ONSET
ONSET: ON-GOING
ONSET: GRADUAL
ONSET: GRADUAL

## 2021-07-06 ASSESSMENT — PAIN - FUNCTIONAL ASSESSMENT
PAIN_FUNCTIONAL_ASSESSMENT: ACTIVITIES ARE NOT PREVENTED
PAIN_FUNCTIONAL_ASSESSMENT: PREVENTS OR INTERFERES SOME ACTIVE ACTIVITIES AND ADLS
PAIN_FUNCTIONAL_ASSESSMENT: PREVENTS OR INTERFERES SOME ACTIVE ACTIVITIES AND ADLS
PAIN_FUNCTIONAL_ASSESSMENT: 0-10

## 2021-07-06 ASSESSMENT — PAIN DESCRIPTION - DESCRIPTORS
DESCRIPTORS: SHARP
DESCRIPTORS: SHARP;CONSTANT
DESCRIPTORS: SHARP
DESCRIPTORS: SHARP
DESCRIPTORS: CONSTANT;SHARP
DESCRIPTORS: SHARP

## 2021-07-06 ASSESSMENT — PAIN DESCRIPTION - PROGRESSION
CLINICAL_PROGRESSION: GRADUALLY IMPROVING
CLINICAL_PROGRESSION: GRADUALLY WORSENING
CLINICAL_PROGRESSION: GRADUALLY WORSENING
CLINICAL_PROGRESSION: GRADUALLY IMPROVING

## 2021-07-06 NOTE — PROGRESS NOTES
Report given @ bedside to [facility], for transferral of care. Pt resting in bed. Call light in reach.

## 2021-07-06 NOTE — PROGRESS NOTES
4 Eyes Skin Assessment   Four eyes skin assessment completed at this time by Jenifer Pyle RN   and Erika Viera. Assessment revealed: scattered scratches, color/pigment changes    The patient is being assessed for  admission  I agree that 2 RN's have performed a thorough Head to Toe Skin Assessment on the patient. ALL assessment sites listed below have been assessed.        Areas assessed by both nurses:  [x]   Head, Face, and Ears   [x]   Shoulders, Back, and Chest  [x]   Arms, Elbows, and Hands   [x]   Coccyx, Sacrum, and Ischum  [x]   Legs, Feet, and Heels              Jenifer Pyle RN

## 2021-07-06 NOTE — PROGRESS NOTES
4 Eyes Skin Assessment   Four eyes skin assessment completed at this time by Ho Iglesias RN   and MARISEL Mitchell. Assessment revealed: Intact skin    The patient is being assessed for    I agree that 2 RN's have performed a thorough Head to Toe Skin Assessment on the patient. ALL assessment sites listed below have been assessed. Areas assessed by both nurses:  [x]   Head, Face, and Ears   [x]   Shoulders, Back, and Chest  [x]   Arms, Elbows, and Hands   [x]   Coccyx, Sacrum, and Ischum  [x]   Legs, Feet, and Heels              Ho Iglesias RN    Surgical site nephrostomy to gravity bag bloody drainage with bulky dressing. ELLIS drain site to gravity bag with tan/brown drainage. Murguia catheter with yellow urine to gravity bag.    Colby Barreto RN 7/6/2021

## 2021-07-06 NOTE — INTERVAL H&P NOTE
Update History & Physical    The patient's History and Physical  was reviewed with the patient and I examined the patient. There was no change. The surgical site was confirmed by the patient and me. Plan: The risks, benefits, expected outcome, and alternative to the recommended procedure have been discussed with the patient. Patient understands and wants to proceed with the procedure.      See office notes and hosp notes    Left pcnl, poss cysto/stent    Electronically signed by Aurelia Morales MD on 7/6/2021 at 8:24 AM

## 2021-07-06 NOTE — PROGRESS NOTES
4 Eyes Skin Assessment   Four eyes skin assessment completed at this time by hSannon Velasquez RN   and MARISEL Mitchell. Assessment revealed: Intact skin    The patient is being assessed for  Admission    I agree that 2 RN's have performed a thorough Head to Toe Skin Assessment on the patient. ALL assessment sites listed below have been assessed.        Areas assessed by both nurses:  [x]   Head, Face, and Ears   [x]   Shoulders, Back, and Chest  [x]   Arms, Elbows, and Hands   [x]   Coccyx, Sacrum, and Ischum  [x]   Legs, Feet, and Heels              Shannon Velasquez RN

## 2021-07-06 NOTE — PROGRESS NOTES
Patient admitted to room PACU from Dr. Noé Noel. Patient oriented to room, call light, bed rails, phone, lights and bathroom. Patient instructed about the schedule of the day including: vital sign frequency, lab draws, possible tests, frequency of MD and staff rounds, daily weights, I &O's and prescribed diet. Bed alarm deferred patient low fall risk and refuses alarm. Telemetry box in place, patient aware of placement and reason. Bed locked, in lowest position, side rails up 2/4, call light within reach. Recliner Assessment:   Patient is not able to demonstrated the ability to move from a reclining position to an upright position within the recliner. however patient is alert, oriented and able to provide informed consent   ? ?  4 eyes see transfer note with Simeon Farmer RN  Does the Patient have Skin Breakdown? No   Emeka Prevention initiated: No   Wound Care Orders initiated: No  [facility] nurse consulted for Pressure Injury (Stage 3,4, Unstageable, DTI, NWPT, Complex wounds)and New or Established Ostomies: No   Primary Nurse eSignature: Electronically signed by Tutu Rider RN on 7/6/21 at 2:44 PM EDT  ?

## 2021-07-06 NOTE — ACP (ADVANCE CARE PLANNING)
Advance Care Planning   Healthcare Decision Maker:    Primary Decision Maker: Sierra Estes - [facility] - [phone] 37.1

## 2021-07-06 NOTE — ANESTHESIA POSTPROCEDURE EVALUATION
Department of Anesthesiology  Postprocedure Note    Patient: Stephanie Rodgers  MRN: [id number]  YOB: 1965  Date of evaluation: 7/6/2021    Procedure Summary     Date: 07/06/21 Room / Location: [address] [place] [address] / [facility]    Anesthesia Start: 7646 Anesthesia Stop: 1114    Procedure: LEFT PERCUTANEOUS NEPHROLITHOTOMY,  LEFT STENT PLACEMENT (Left ) Diagnosis: (KIDNEY CALCULUS)    Surgeons: Arianna Zhang MD Responsible Provider: Igor Pereyra MD    Anesthesia Type: general ASA Status: 3        Anesthesia Type: general    Denise Phase I: Denise Score: 10    Denise Phase II:      Last vitals: Reviewed and per EMR flowsheets.      Anesthesia Post Evaluation   Anesthetic Problems: no   Cardiovascular System Stable: yes  Respiratory Function: Airway Patent yes  ETT no  Ventilator no  Level of consciousness: awake, alert and oriented  Post-op pain: adequate analgesia  Hydration Adequate: yes  Nausea/Vomiting:no  Other Issues:     Veverly MD Catrachita

## 2021-07-06 NOTE — PROGRESS NOTES
Pharmacy Note  Vancomycin Consult    Rosalio Sandhu is a 55 y.o. female started on Vancomycin for Surgical prophylaxis; consult received from Dr. Nupur Lema to manage therapy. Also receiving the following antibiotics: Ancef. Patient Active Problem List   Diagnosis    Septicemia ([facility]    Acute UTI    MARY (acute kidney injury) ([facility]    Bacteremia    Kidney stone    Left flank pain    Generalized abdominal pain    Ureterolithiasis    Hydronephrosis    Obstructive uropathy    Hemorrhagic shock (HCC)    Acute blood loss anemia    NSTEMI (non-ST elevated myocardial infarction) ([facility]    Atrial fibrillation (HCC)    Cardiomyopathy (HCC)    Acute pyelonephritis    Complication of nephrostomy ([facility]    Essential hypertension    Renal calculi     Allergies:  Patient has no known allergies. No results for input(s): BUN, CREATININE, WBC in the last 72 hours. Intake/Output Summary (Last 24 hours) at 7/6/2021 1600  Last data filed at 7/6/2021 1207  Gross per 24 hour   Intake --   Output 820 ml   Net -820 ml     Culture Date      Source                       Results      Ht Readings from Last 1 Encounters:   07/06/21 5' 7\" (1.702 m)        Wt Readings from Last 1 Encounters:   07/06/21 150 lb (68 kg)       Body mass index is 23.49 kg/m². Estimated Creatinine Clearance: 52 mL/min (A) (based on SCr of 1.2 mg/dL (H)). Goal AUC <500    Assessment/Plan:  Will initiate Vancomycin 1250 mg IV every 24 hours. First trough ordered for 7/8 @ 1600  Predicted AUC of 459, trough of 13.3  Timing of trough level will be determined based on culture results, renal function, and clinical response. Thank you for the consult. Will continue to follow.

## 2021-07-06 NOTE — PROGRESS NOTES
Page out to Dr. Cruz Landing clarification of drain order r/t abdomen drain thick brown drainage in tubing no output in gravity bag, reviewed with supervisor Tete Dyer no striping of tubing per best practice so call out for clarification. Nephrostomy with no further drainage so flushed with 10 ml NS. Pt tolerated well but c/o nausea given zofran 4 mg IVP. Call light in reach.

## 2021-07-06 NOTE — OP NOTE
[address]                 [address] [address]                                OPERATIVE REPORT    PATIENT NAME: Georgina Mario                     :        1965  MED REC NO:   [id number]                          ROOM:       5795  ACCOUNT NO:   [de-identified]                           ADMIT DATE: 2021  PROVIDER:     Zacarias Jarrett MD    DATE OF PROCEDURE:  2021    PREOPERATIVE DIAGNOSES:  Solitary left kidney, nephrolithiasis greater  than 2 cm, history of renal abscess. POSTOPERATIVE DIAGNOSES:  Solitary left kidney, nephrolithiasis greater  than 2 cm, history of renal abscess. OPERATIONS PERFORMED:  1. Left percutaneous nephrolithotomy, greater than 2 cm total stone  burden. 2.  Balloon dilation of nephrostomy tract. 3.  Antegrade nephrogram.  4.  Antegrade double-J ureteral stent placement, 6-Northern Irish x 26 cm.  5.  An 18-Northern Irish nephrostomy tube placement. 6.  Fluoroscopy, less than one hour. SURGEON:  Zacarias Jarrett MD    ANESTHESIA:  General.    INDICATIONS:  The patient is a 67-SBWM-UJZ who had a complicated  hospital course a little over a month ago where she had sepsis and  infection related to obstructing stone. She had a complicated  nephrostomy tube placement as well as small perinephric bleeding,  possibly related to the kidney sticks. See the radiologist notations. She did get some blood transfusion and monitoring of her kidney  function. A followup creatinine was around 1 to 1.2. She has had a  nephrostomy tube indwelling in the upper pole, it has been okay. However, she then developed a perinephric abscess, possibly due to  infection related stones. About a week ago, she had a drain placed in  the perinephric space and had about a liter of pus drained from that  kidney. She has been on cultures, specific antibiotics and improved  significantly since then.   She is now here for an attempted removal of  the stones, and possible stent and nephrostomy placement or exchange. She signed a consent prior to surgery. OPERATIVE PROCEDURE:  The patient was brought to the operating theater,  anesthesia induced, and antibiotics were administered. Murguia catheter  deployed. She was then positioned prone. Her flank was prepped and  draped in the usual sterile fashion. We used a Sensor wire to advance  through the upper pole nephrostomy tubes while fluoroscopy confirmed  three large stones of greater than 2 cm. We confirmed the wire was in  the collecting system. We then advanced an 11-Turkmen dilating ureteral  catheter into that upper area. We were able to inject contrast over the  path side of collecting system and then made sure the wire went down the  ureter. Once we had the stiff wire all the way down the ureter through  the bladder, we advanced the balloon dilator to the upper pole and into  the upper calyx. We dilated with 30-Turkmen and then advanced our  30-Turkmen sheath over top of the balloon. We then inserted the flexible  scope and redeployed the second wire for safety down the ureter. We  then had two wires in for safety and we then used nephroscope and  advanced the nephroscope using a LithoClast suction device. We were  able to pulverize and suction the stones. The stones were rather soft,  so there was a fair amount of debris. We used a basket to extract out  much of the debris. We basketed out multiple small fragments. We then  inspected with the flexible ureteroscope and there was one fragment that  was somewhat stuck on its side. We used a 365-micron fiber to pulverize  that stone, though we basketed out the fragments. We then inspected  again with a flexible cystoscope and inspected all calyces and no  obvious residual stones were seen. We could not see any stone or  fluoroscopy.   Next, we checked and confirmed our wires in the bladder  and due to her history of a solitary kidney and infection, I thought it  was best to leave a stent. We then deployed an antegrade 6-Puerto Rican x 26 cm over the stiff wire, curl  was seen in the bladder, direct vision confirmed the curl in the kidney. We then, through the sheath, deployed an 18-[place] catheter with  3 mL in the balloon and left the 18-Pu[place] Rican nephrostomy to gravity  drainage. We did do an antegrade nephrostogram, confirming  opacification of the collecting system and no extravasation and some  small flow down the ureter. Once that was done, we removed all safety  wires and her flank incision was dressed. She was returned to supine,  emerged, and brought to Recovery. COMPLICATIONS:  None apparent. BLOOD LOSS:  Less than 100 mL. SPECIMENS:  Stone for analysis. FOLLOWUP:  She will continue to be observed in the hospital and given IV  antibiotics. She may require reassessment of her perinephric drain by  Interventional Radiology tomorrow or in one to two days. She will also  need a followup for a cystoscopy and double-J stent removal in the next  two to six weeks depending on her clinical course.         Deysi Ann MD    D: 07/06/2021 11:19:22       T: 07/06/2021 14:25:33     [id number]  Job#: [id number]     Doc#: [id number]    CC:

## 2021-07-06 NOTE — PROGRESS NOTES
Talked to Dr Kacie Hernandez about pain level of #6. Pain is improving but still requesting pain meds. Instructed to continue with dilaudid.

## 2021-07-06 NOTE — BRIEF OP NOTE
Dict: [id number]    Brief Postoperative Note      Patient: Jacy Jernigan  YOB: 1965  MRN: [id number]    Date of Procedure: 7/6/2021    Pre-Op Diagnosis: KIDNEY CALCULUS >2cm stone    Post-Op Diagnosis: Same       Procedure(s):  LEFT PERCUTANEOUS NEPHROLITHOTOMY,  LEFT STENT PLACEMENT  LEFT PCNL >2cm  (see detail dictated notes)      Surgeon(s):  Jovita Grossman MD    Assistant:  * No surgical staff found *    Anesthesia: General    Estimated Blood Loss (mL): less than 366     Complications: None    Specimens:   ID Type Source Tests Collected by Time Destination   A : KIDNEY STONE Stone (Calculus) Tissue SURGICAL PATHOLOGY Jovita Grossman MD 7/6/2021 1009        Implants:  Implant Name Type Inv. Item Serial No.  Lot No. LRB No. Used Action   STENT URET 6FR L28CM PERCFLX HYDR+ PGTL TAPR TIP W/ ATTCH  STENT URET 6FR L28CM PERCFLX HYDR+ PGTL TAPR TIP W/ ATTCH  [employer] [facility] UROLOGY-WD [id number] Left 1 Implanted         Drains:   Closed/Suction Drain Left LLQ  10 [place] (Active)   Site Description Unable to view 06/30/21 1852   Dressing Status Clean;Dry; Intact; Old drainage 07/02/21 0848   Drainage Appearance Brown;Milky 07/02/21 0848   Status Open to gravity drainage 07/02/21 0848   Output (ml) 60 ml 07/02/21 0848       Nephrostomy 1 Left 8 fr (Active)   Site Assessment Other (Comment) 06/29/21 0900   Dressing Status Clean;Dry; Intact 07/02/21 0848   Dressing Type Dry dressing 07/02/21 0848   Urine Color Yellow 07/02/21 0848   Urine Appearance Clear 07/02/21 0848   Output (mL) 300 mL 07/02/21 0848       Nephrostomy Left (Active)       Urethral Catheter Non-latex 16 fr (Active)       Findings: dictated:  3 large renal pelvis stones >2cm total  Upper pole access  JJ-stent placed antegrade  18F [place] Neph tube to gravity  Srini abscess drain continued to gravity    Likely need study of abscess drain prior to removal   And eventually cysto/stent removal in 2-3 weeks pending above      Electronically signed by Armando Reynolds MD on 7/6/2021 at 11:13 AM

## 2021-07-06 NOTE — PROGRESS NOTES
Spoke with Summer in IR r/t order for IR Abscess eval thur existing Cath r/t assess abscess drain and shoot antegrade nephrogram and consider pulling nephrostomy tube since and indwelling stent exists. IR ordered NPO after midnight, CBC, INR, and hold Lovenox for procedure.

## 2021-07-07 ENCOUNTER — APPOINTMENT (OUTPATIENT)
Dept: INTERVENTIONAL RADIOLOGY/VASCULAR | Age: 56
DRG: 446 | End: 2021-07-07
Attending: UROLOGY
Payer: COMMERCIAL

## 2021-07-07 LAB
ANION GAP SERPL CALCULATED.3IONS-SCNC: 9 MMOL/L (ref 3–16)
BUN BLDV-MCNC: 10 MG/DL (ref 7–20)
CALCIUM SERPL-MCNC: 8.2 MG/DL (ref 8.3–10.6)
CHLORIDE BLD-SCNC: 104 MMOL/L (ref 99–110)
CO2: 23 MMOL/L (ref 21–32)
CREAT SERPL-MCNC: 1.6 MG/DL (ref 0.6–1.1)
GFR AFRICAN AMERICAN: 40
GFR NON-AFRICAN AMERICAN: 33
GLUCOSE BLD-MCNC: 96 MG/DL (ref 70–99)
HCT VFR BLD CALC: 33.6 % (ref 36–48)
HEMOGLOBIN: 10.4 G/DL (ref 12–16)
INR BLD: 1.17 (ref 0.88–1.12)
MCH RBC QN AUTO: 26.4 PG (ref 26–34)
MCHC RBC AUTO-ENTMCNC: 31.1 G/DL (ref 31–36)
MCV RBC AUTO: 84.9 FL (ref 80–100)
PDW BLD-RTO: 17.6 % (ref 12.4–15.4)
PLATELET # BLD: 186 K/UL (ref 135–450)
PMV BLD AUTO: 8.9 FL (ref 5–10.5)
POTASSIUM REFLEX MAGNESIUM: 4.2 MMOL/L (ref 3.5–5.1)
PROTHROMBIN TIME: 13.3 SEC (ref 9.9–12.7)
RBC # BLD: 3.96 M/UL (ref 4–5.2)
SODIUM BLD-SCNC: 136 MMOL/L (ref 136–145)
WBC # BLD: 12.5 K/UL (ref 4–11)

## 2021-07-07 PROCEDURE — 1200000000 HC SEMI PRIVATE

## 2021-07-07 PROCEDURE — 2580000003 HC RX 258: Performed by: UROLOGY

## 2021-07-07 PROCEDURE — 6360000002 HC RX W HCPCS: Performed by: UROLOGY

## 2021-07-07 PROCEDURE — 85610 PROTHROMBIN TIME: CPT

## 2021-07-07 PROCEDURE — 80048 BASIC METABOLIC PNL TOTAL CA: CPT

## 2021-07-07 PROCEDURE — 36415 COLL VENOUS BLD VENIPUNCTURE: CPT

## 2021-07-07 PROCEDURE — 6360000002 HC RX W HCPCS: Performed by: RADIOLOGY

## 2021-07-07 PROCEDURE — 85027 COMPLETE CBC AUTOMATED: CPT

## 2021-07-07 PROCEDURE — 6370000000 HC RX 637 (ALT 250 FOR IP): Performed by: UROLOGY

## 2021-07-07 PROCEDURE — C1729 CATH, DRAINAGE: HCPCS

## 2021-07-07 RX ORDER — FENTANYL CITRATE 50 UG/ML
INJECTION, SOLUTION INTRAMUSCULAR; INTRAVENOUS
Status: COMPLETED | OUTPATIENT
Start: 2021-07-07 | End: 2021-07-07

## 2021-07-07 RX ADMIN — OXYCODONE HYDROCHLORIDE 10 MG: 5 TABLET ORAL at 05:33

## 2021-07-07 RX ADMIN — ACETAMINOPHEN 650 MG: 325 TABLET ORAL at 05:32

## 2021-07-07 RX ADMIN — ACETAMINOPHEN 650 MG: 325 TABLET ORAL at 20:13

## 2021-07-07 RX ADMIN — HYDROMORPHONE HYDROCHLORIDE 0.5 MG: 1 INJECTION, SOLUTION INTRAMUSCULAR; INTRAVENOUS; SUBCUTANEOUS at 22:15

## 2021-07-07 RX ADMIN — HYDROMORPHONE HYDROCHLORIDE 0.5 MG: 1 INJECTION, SOLUTION INTRAMUSCULAR; INTRAVENOUS; SUBCUTANEOUS at 12:19

## 2021-07-07 RX ADMIN — VANCOMYCIN HYDROCHLORIDE 1250 MG: 5 INJECTION, POWDER, LYOPHILIZED, FOR SOLUTION INTRAVENOUS at 15:56

## 2021-07-07 RX ADMIN — SODIUM CHLORIDE: 9 INJECTION, SOLUTION INTRAVENOUS at 20:20

## 2021-07-07 RX ADMIN — HYDROMORPHONE HYDROCHLORIDE 0.5 MG: 1 INJECTION, SOLUTION INTRAMUSCULAR; INTRAVENOUS; SUBCUTANEOUS at 18:28

## 2021-07-07 RX ADMIN — LISINOPRIL 10 MG: 10 TABLET ORAL at 17:45

## 2021-07-07 RX ADMIN — CEFAZOLIN SODIUM 1000 MG: 1 INJECTION, POWDER, FOR SOLUTION INTRAMUSCULAR; INTRAVENOUS at 00:25

## 2021-07-07 RX ADMIN — HYDROMORPHONE HYDROCHLORIDE 0.5 MG: 1 INJECTION, SOLUTION INTRAMUSCULAR; INTRAVENOUS; SUBCUTANEOUS at 00:22

## 2021-07-07 RX ADMIN — OXYCODONE HYDROCHLORIDE 10 MG: 5 TABLET ORAL at 09:16

## 2021-07-07 RX ADMIN — CEFAZOLIN SODIUM 1000 MG: 1 INJECTION, POWDER, FOR SOLUTION INTRAMUSCULAR; INTRAVENOUS at 09:16

## 2021-07-07 RX ADMIN — OXYCODONE HYDROCHLORIDE 10 MG: 5 TABLET ORAL at 20:12

## 2021-07-07 RX ADMIN — ACETAMINOPHEN 650 MG: 325 TABLET ORAL at 00:25

## 2021-07-07 RX ADMIN — SODIUM CHLORIDE: 9 INJECTION, SOLUTION INTRAVENOUS at 05:32

## 2021-07-07 RX ADMIN — SODIUM CHLORIDE, PRESERVATIVE FREE 10 ML: 5 INJECTION INTRAVENOUS at 20:13

## 2021-07-07 RX ADMIN — FENTANYL CITRATE 50 MCG: 50 INJECTION, SOLUTION INTRAMUSCULAR; INTRAVENOUS at 14:41

## 2021-07-07 RX ADMIN — OXYCODONE HYDROCHLORIDE 10 MG: 5 TABLET ORAL at 15:50

## 2021-07-07 ASSESSMENT — PAIN SCALES - GENERAL
PAINLEVEL_OUTOF10: 0
PAINLEVEL_OUTOF10: 7
PAINLEVEL_OUTOF10: 7
PAINLEVEL_OUTOF10: 9
PAINLEVEL_OUTOF10: 4
PAINLEVEL_OUTOF10: 7
PAINLEVEL_OUTOF10: 7
PAINLEVEL_OUTOF10: 4
PAINLEVEL_OUTOF10: 7
PAINLEVEL_OUTOF10: 0
PAINLEVEL_OUTOF10: 10
PAINLEVEL_OUTOF10: 4
PAINLEVEL_OUTOF10: 4
PAINLEVEL_OUTOF10: 7
PAINLEVEL_OUTOF10: 3

## 2021-07-07 ASSESSMENT — PAIN DESCRIPTION - PAIN TYPE
TYPE: SURGICAL PAIN

## 2021-07-07 ASSESSMENT — PAIN DESCRIPTION - ORIENTATION
ORIENTATION: LEFT

## 2021-07-07 ASSESSMENT — PAIN DESCRIPTION - DESCRIPTORS
DESCRIPTORS: CONSTANT;SHARP
DESCRIPTORS: DISCOMFORT
DESCRIPTORS: CONSTANT;SHARP

## 2021-07-07 ASSESSMENT — PAIN DESCRIPTION - LOCATION
LOCATION: FLANK

## 2021-07-07 ASSESSMENT — PAIN DESCRIPTION - FREQUENCY
FREQUENCY: CONTINUOUS

## 2021-07-07 ASSESSMENT — PAIN DESCRIPTION - PROGRESSION
CLINICAL_PROGRESSION: NOT CHANGED
CLINICAL_PROGRESSION: NOT CHANGED
CLINICAL_PROGRESSION: GRADUALLY WORSENING
CLINICAL_PROGRESSION: NOT CHANGED
CLINICAL_PROGRESSION: GRADUALLY WORSENING

## 2021-07-07 ASSESSMENT — PAIN DESCRIPTION - ONSET
ONSET: ON-GOING
ONSET: GRADUAL
ONSET: GRADUAL

## 2021-07-07 NOTE — PROGRESS NOTES
Pt resting with eyes closed, respirs witnessed as e/e, no signs of distress. SR up x2, bed in lowest  position, wheels locked,bed alarm on, Call light and bedside table in easy reach.    David Reno, RN, RN

## 2021-07-07 NOTE — PROGRESS NOTES
Image guided abscess drain exchange completed. Dr. Geri Luis placed 12 Solomon Islander 25 cm Argon Skater drain LOT # [id number] [id number] 05/16/2025 in the LLQ. Drain/tube secured at the 25 cm line with sutures. 30 milliliters of serosanguinous colored withdrawn immediately. Drain/tube dressing clean, dry, and intact. Pt tolerated procedure without any signs or symptoms of distress. Vital signs stable. Report called to [facility] on [address] [facility]. Pt transported back to [facility] in stable condition via bed by transport.      Vital Signs  Vitals:    07/07/21 1507   BP:    Pulse: 62   Resp: 14   Temp:    SpO2: 97%     Post Denise  2 - Able to move 4 extremities voluntarily on command  1 - BP+/- 20-50mmHg of normal  2 - Fully awake  2 - Able to maintain oxygen saturation >92% on room air  2 - Able to breathe deeply and cough freely

## 2021-07-07 NOTE — PLAN OF CARE
Problem: Urinary Elimination:  Goal: Signs and symptoms of infection will decrease  Description: Signs and symptoms of infection will decrease  Outcome: Ongoing  Goal: Complications related to the disease process, condition or treatment will be avoided or minimized  Description: Complications related to the disease process, condition or treatment will be avoided or minimized  Outcome: Ongoing     Problem: Infection:  Goal: Will remain free from infection  Description: Will remain free from infection  7/7/2021 0922 by Ines Rainey RN  Outcome: Ongoing  7/6/2021 2054 by Brittney Drummond RN  Outcome: Met This Shift     Problem: Safety:  Goal: Free from accidental physical injury  Description: Free from accidental physical injury  7/7/2021 0922 by Ines Rainey RN  Outcome: Ongoing  7/6/2021 2054 by Brittney Drummond RN  Outcome: Met This Shift  Goal: Free from intentional harm  Description: Free from intentional harm  Outcome: Ongoing     Problem: Daily Care:  Goal: Daily care needs are met  Description: Daily care needs are met  Outcome: Ongoing     Problem: Pain:  Goal: Patient's pain/discomfort is manageable  Description: Patient's pain/discomfort is manageable  Outcome: Ongoing  Goal: Pain level will decrease  Description: Pain level will decrease  Outcome: Ongoing  Goal: Control of acute pain  Description: Control of acute pain  Outcome: Ongoing  Goal: Control of chronic pain  Description: Control of chronic pain  Outcome: Ongoing     Problem: Skin Integrity:  Goal: Skin integrity will stabilize  Description: Skin integrity will stabilize  7/7/2021 0922 by Ines Rainey RN  Outcome: Ongoing  7/6/2021 2054 by Brittney Drummond RN  Outcome: Met This Shift     Problem: Discharge Planning:  Goal: Patients continuum of care needs are met  Description: Patients continuum of care needs are met  Outcome: Ongoing     Problem: Infection - Surgical Site:  Goal: Will show no infection signs and symptoms  Description: Will show no infection signs and symptoms  7/7/2021 3679 by Lorin Licea RN  Outcome: Ongoing  7/6/2021 2054 by Piedad Kraus RN  Outcome: Met This Shift     Problem: Skin Integrity:  Goal: Will show no infection signs and symptoms  Description: Will show no infection signs and symptoms  7/7/2021 0922 by Lorin Licea RN  Outcome: Ongoing  7/6/2021 2054 by Piedad Kraus RN  Outcome: Met This Shift  Goal: Absence of new skin breakdown  Description: Absence of new skin breakdown  Outcome: Ongoing

## 2021-07-07 NOTE — PROGRESS NOTES
Brief Postoperative Note    Bri Connors  YOB: 1965  [id number]    Pre-operative Diagnosis: Perinephric abscess, renal calculi    Post-operative Diagnosis: Same    Procedure: Sinogram, drain upsizing    Anesthesia: Local    Surgeons/Assistants: Garth Stone MD    Estimated Blood Loss: less than 5    Complications: None    Specimens: Was Not Obtained    Findings: Drain injected through the abscess cavity demonstrating filling of the renal pelvis suggesting a fistula. Abscess drain was upsized to 12 Fr. Nephrostomy tube was left in place.     Electronically signed by Garth Stone MD on 7/7/2021 at 3:36 PM

## 2021-07-07 NOTE — PLAN OF CARE
Problem: Infection:  Goal: Will remain free from infection  Description: Will remain free from infection  Outcome: Met This Shift     Problem: Safety:  Goal: Free from accidental physical injury  Description: Free from accidental physical injury  Outcome: Met This Shift     Problem: Skin Integrity:  Goal: Skin integrity will stabilize  Description: Skin integrity will stabilize  Outcome: Met This Shift     Problem: Infection - Surgical Site:  Goal: Will show no infection signs and symptoms  Description: Will show no infection signs and symptoms  Outcome: Met This Shift

## 2021-07-07 NOTE — PROGRESS NOTES
Shift assessment complete - See flow sheet. Nightly medications given - See MAR    Pt requesting pain medications at this time, see MAR. All drains are draining appropriately, pt sore, but denies nausea   Patient denies any further needs. Bed alarm is set for patient safety.  Call light explained and in reach

## 2021-07-07 NOTE — PROGRESS NOTES
Pt arrived for image guided abscess drain exchange and nephrostogram with possible nephrostomy tube removal. Procedure explained including the risk and benefits of the procedure. All questions answered. Pt verbalizes understanding of the procedure and states no more questions. Consent confirmed. Vital signs stable. Labs, allergies, medications, and code status reviewed. No contraindications noted. Vital Signs  Vitals:    07/07/21 1415   BP: (!) 172/80   Pulse: 70   Resp: 15   Temp:    SpO2: 99%      Pre Denise Score  2 - Able to move 4 extremities voluntarily on command  1 - BP+/- 20-50mmHg of normal  2 - Fully awake  2 - Able to maintain oxygen saturation >92% on room air  2 - Able to breathe deeply and cough freely    Allergies  Patient has no known allergies.  (allergies)    Labs  Lab Results   Component Value Date    INR 1.17 (H) 07/07/2021    PROTIME 13.3 (H) 07/07/2021     Lab Results   Component Value Date    CREATININE 1.6 (H) 07/07/2021    BUN 10 07/07/2021     07/07/2021    K 4.2 07/07/2021     07/07/2021    CO2 23 07/07/2021     Lab Results   Component Value Date    WBC 12.5 (H) 07/07/2021    HGB 10.4 (L) 07/07/2021    HCT 33.6 (L) 07/07/2021    MCV 84.9 07/07/2021     07/07/2021

## 2021-07-07 NOTE — PROGRESS NOTES
Vancomycin Day: 2    Patient's labs, cultures, vitals, and vancomycin regimen reviewed. Scr 1.6 up from 1.2   6 days ago. Vanc trough tomorrow afternoon @ 1600. No changes today.

## 2021-07-07 NOTE — PROGRESS NOTES
AM assessment complete. Gave am meds due see mar. A/O x 4. Denies any needs. NPO. PRN pain med given for pain level 7/10 located L flank. L drain/nephrostomy tube in place. See mar/pain flowsheet. Bed locked/lowest position. Call light within reach.

## 2021-07-07 NOTE — PROGRESS NOTES
PRN pain med given for pain level 7/10 located L flank. See mar/pain flowsheet. Call light within reach.

## 2021-07-08 LAB
ANION GAP SERPL CALCULATED.3IONS-SCNC: 8 MMOL/L (ref 3–16)
BUN BLDV-MCNC: 7 MG/DL (ref 7–20)
CALCIUM SERPL-MCNC: 8.1 MG/DL (ref 8.3–10.6)
CHLORIDE BLD-SCNC: 103 MMOL/L (ref 99–110)
CO2: 24 MMOL/L (ref 21–32)
CREAT SERPL-MCNC: 1.1 MG/DL (ref 0.6–1.1)
GFR AFRICAN AMERICAN: >60
GFR NON-AFRICAN AMERICAN: 52
GLUCOSE BLD-MCNC: 93 MG/DL (ref 70–99)
HCT VFR BLD CALC: 31.3 % (ref 36–48)
HEMOGLOBIN: 9.8 G/DL (ref 12–16)
MCH RBC QN AUTO: 27.2 PG (ref 26–34)
MCHC RBC AUTO-ENTMCNC: 31.5 G/DL (ref 31–36)
MCV RBC AUTO: 86.4 FL (ref 80–100)
PDW BLD-RTO: 18.1 % (ref 12.4–15.4)
PLATELET # BLD: 160 K/UL (ref 135–450)
PMV BLD AUTO: 8.8 FL (ref 5–10.5)
POTASSIUM SERPL-SCNC: 3.4 MMOL/L (ref 3.5–5.1)
RBC # BLD: 3.62 M/UL (ref 4–5.2)
SODIUM BLD-SCNC: 135 MMOL/L (ref 136–145)
VANCOMYCIN TROUGH: 21.2 UG/ML (ref 10–20)
WBC # BLD: 7.5 K/UL (ref 4–11)

## 2021-07-08 PROCEDURE — 97161 PT EVAL LOW COMPLEX 20 MIN: CPT

## 2021-07-08 PROCEDURE — 97530 THERAPEUTIC ACTIVITIES: CPT

## 2021-07-08 PROCEDURE — 6370000000 HC RX 637 (ALT 250 FOR IP): Performed by: UROLOGY

## 2021-07-08 PROCEDURE — 36415 COLL VENOUS BLD VENIPUNCTURE: CPT

## 2021-07-08 PROCEDURE — 1200000000 HC SEMI PRIVATE

## 2021-07-08 PROCEDURE — 80048 BASIC METABOLIC PNL TOTAL CA: CPT

## 2021-07-08 PROCEDURE — 80202 ASSAY OF VANCOMYCIN: CPT

## 2021-07-08 PROCEDURE — 2580000003 HC RX 258: Performed by: UROLOGY

## 2021-07-08 PROCEDURE — 85027 COMPLETE CBC AUTOMATED: CPT

## 2021-07-08 PROCEDURE — 6360000002 HC RX W HCPCS: Performed by: UROLOGY

## 2021-07-08 PROCEDURE — 97166 OT EVAL MOD COMPLEX 45 MIN: CPT

## 2021-07-08 RX ADMIN — SODIUM CHLORIDE, PRESERVATIVE FREE 10 ML: 5 INJECTION INTRAVENOUS at 21:47

## 2021-07-08 RX ADMIN — HYDROXYZINE HYDROCHLORIDE 25 MG: 10 TABLET ORAL at 22:21

## 2021-07-08 RX ADMIN — OXYCODONE HYDROCHLORIDE 10 MG: 5 TABLET ORAL at 06:05

## 2021-07-08 RX ADMIN — ACETAMINOPHEN 650 MG: 325 TABLET ORAL at 00:36

## 2021-07-08 RX ADMIN — HYDROMORPHONE HYDROCHLORIDE 0.5 MG: 1 INJECTION, SOLUTION INTRAMUSCULAR; INTRAVENOUS; SUBCUTANEOUS at 21:48

## 2021-07-08 RX ADMIN — DOCUSATE SODIUM 50MG AND SENNOSIDES 8.6MG 1 TABLET: 8.6; 5 TABLET, FILM COATED ORAL at 21:48

## 2021-07-08 RX ADMIN — HYDROMORPHONE HYDROCHLORIDE 0.5 MG: 1 INJECTION, SOLUTION INTRAMUSCULAR; INTRAVENOUS; SUBCUTANEOUS at 03:29

## 2021-07-08 RX ADMIN — SODIUM CHLORIDE: 9 INJECTION, SOLUTION INTRAVENOUS at 08:39

## 2021-07-08 RX ADMIN — ACETAMINOPHEN 650 MG: 325 TABLET ORAL at 13:33

## 2021-07-08 RX ADMIN — LISINOPRIL 10 MG: 10 TABLET ORAL at 08:34

## 2021-07-08 RX ADMIN — HYDROMORPHONE HYDROCHLORIDE 0.5 MG: 1 INJECTION, SOLUTION INTRAMUSCULAR; INTRAVENOUS; SUBCUTANEOUS at 08:34

## 2021-07-08 RX ADMIN — ACETAMINOPHEN 650 MG: 325 TABLET ORAL at 08:34

## 2021-07-08 RX ADMIN — OXYCODONE HYDROCHLORIDE 10 MG: 5 TABLET ORAL at 00:36

## 2021-07-08 RX ADMIN — ACETAMINOPHEN 650 MG: 325 TABLET ORAL at 21:48

## 2021-07-08 RX ADMIN — OXYCODONE HYDROCHLORIDE 10 MG: 5 TABLET ORAL at 17:52

## 2021-07-08 RX ADMIN — ONDANSETRON HYDROCHLORIDE 4 MG: 2 INJECTION, SOLUTION INTRAMUSCULAR; INTRAVENOUS at 15:47

## 2021-07-08 RX ADMIN — OXYCODONE HYDROCHLORIDE 10 MG: 5 TABLET ORAL at 12:04

## 2021-07-08 RX ADMIN — SODIUM CHLORIDE: 9 INJECTION, SOLUTION INTRAVENOUS at 21:45

## 2021-07-08 RX ADMIN — HYDROCHLOROTHIAZIDE 25 MG: 25 TABLET ORAL at 08:34

## 2021-07-08 RX ADMIN — METOPROLOL SUCCINATE 25 MG: 25 TABLET, EXTENDED RELEASE ORAL at 08:34

## 2021-07-08 RX ADMIN — DOCUSATE SODIUM 50MG AND SENNOSIDES 8.6MG 1 TABLET: 8.6; 5 TABLET, FILM COATED ORAL at 08:34

## 2021-07-08 ASSESSMENT — PAIN DESCRIPTION - ORIENTATION
ORIENTATION: LEFT

## 2021-07-08 ASSESSMENT — PAIN DESCRIPTION - PAIN TYPE
TYPE: SURGICAL PAIN

## 2021-07-08 ASSESSMENT — PAIN DESCRIPTION - FREQUENCY
FREQUENCY: CONTINUOUS
FREQUENCY: CONTINUOUS

## 2021-07-08 ASSESSMENT — PAIN SCALES - GENERAL
PAINLEVEL_OUTOF10: 7
PAINLEVEL_OUTOF10: 2
PAINLEVEL_OUTOF10: 4
PAINLEVEL_OUTOF10: 7
PAINLEVEL_OUTOF10: 4
PAINLEVEL_OUTOF10: 7
PAINLEVEL_OUTOF10: 7

## 2021-07-08 ASSESSMENT — PAIN DESCRIPTION - LOCATION
LOCATION: ABDOMEN;FLANK
LOCATION: ABDOMEN;FLANK
LOCATION: FLANK

## 2021-07-08 ASSESSMENT — PAIN DESCRIPTION - PROGRESSION
CLINICAL_PROGRESSION: NOT CHANGED
CLINICAL_PROGRESSION: NOT CHANGED

## 2021-07-08 ASSESSMENT — PAIN DESCRIPTION - ONSET
ONSET: ON-GOING
ONSET: ON-GOING

## 2021-07-08 ASSESSMENT — PAIN DESCRIPTION - DESCRIPTORS
DESCRIPTORS: DISCOMFORT
DESCRIPTORS: DISCOMFORT;SORE

## 2021-07-08 NOTE — PROGRESS NOTES
Patient resting, eyes closed, respirations witnessed as e/e. No signs of distress. Drains in place. Call light and bedside table is easy reach. Will continue to monitor.

## 2021-07-08 NOTE — PROGRESS NOTES
discussion with pt we will wait for JJ stent, NT, abscess drain and cortez - try to seal fistula - CR reassuring     Da Chacon MD  [facility]

## 2021-07-08 NOTE — PROGRESS NOTES
PRN pain med given for pain level 7/10 located L Flank/abd. See mar/pain flowsheet. Up in chair. Call light within reach.

## 2021-07-08 NOTE — PROGRESS NOTES
Pharmacy Vancomycin Consult     Vancomycin Day: 3  Current Dosinmg q24h  Current indication: Perinephric abscess    Temp max:      Recent Labs     21  0511 21  0500   BUN 10 7   CREATININE 1.6* 1.1   WBC 12.5* 7.5       Intake/Output Summary (Last 24 hours) at 2021 1623  Last data filed at 2021 1503  Gross per 24 hour   Intake 730 ml   Output 3025 ml   Net -2295 ml     Culture Date      Source                       Results  NGTD    Ht Readings from Last 1 Encounters:   21 5' 7\" (1.702 m)        Wt Readings from Last 1 Encounters:   21 150 lb (68 kg)       Body mass index is 23.49 kg/m². Estimated Creatinine Clearance: 56 mL/min (based on SCr of 1.1 mg/dL).     Trough: 21.2    Assessment/Plan:  Hold Vanc dose, level in am, when less than 15mcg/ml, would consider 750mg q24h, predicated AUC 2460 Sascha ANDINO :26 PM  .

## 2021-07-08 NOTE — PROGRESS NOTES
Urology Progress Note    Subjective: I have some pain. HPI: Patient has been admitted for a left PCNL. Patient was seen and evaluated in radiology    P/E  /76   Pulse 65   Temp 97.9 °F (36.6 °C) (Oral)   Resp 16   Ht 5' 7\" (1.702 m)   Wt 150 lb (68 kg)   LMP 12/01/2014   SpO2 92%   BMI 23.49 kg/m²   Skin: Intact  Respiratory: Breathing without difficulty, stable. GI: No acute changes, stable po intake. : cortez in place, neph tube capped. MSK: No acute changes, stable. Neuro: No acute changes, stable. Labs  Lab Results   Component Value Date    WBC 7.5 07/08/2021    HGB 9.8 07/08/2021    HCT 31.3 07/08/2021    MCV 86.4 07/08/2021     07/08/2021     Lab Results   Component Value Date     07/08/2021    K 3.4 07/08/2021    K 4.2 07/07/2021     07/08/2021    CO2 24 07/08/2021    BUN 7 07/08/2021    CREATININE 1.1 07/08/2021    CALCIUM 8.1 07/08/2021       Assessment/Plan  Stable, S/P left PCNL. Nephrostogram and eval of abscess drainage shows extravasation btw the cavity and renal pelvis. PLAN:  Maximum drainage with both cortez and neph tube open to gravity.     Electronically signed by Hong Lundberg MD on 7/8/2021 at 4:09 PM

## 2021-07-08 NOTE — PROGRESS NOTES
Am assessment complete. Gave am meds due see mar. A/O x 4. PRN pain med given for pain level 7/10 located L flank. Ice water given. Bed locked/lowest position. Call light within reach.

## 2021-07-08 NOTE — PLAN OF CARE
Problem: Urinary Elimination:  Goal: Signs and symptoms of infection will decrease  Description: Signs and symptoms of infection will decrease  7/8/2021 0842 by Bulmaro Richard RN  Outcome: Ongoing  7/7/2021 2004 by Padmini Clarke RN  Outcome: Ongoing  Goal: Complications related to the disease process, condition or treatment will be avoided or minimized  Description: Complications related to the disease process, condition or treatment will be avoided or minimized  7/8/2021 0842 by Bulmaro Richard RN  Outcome: Ongoing  7/7/2021 2004 by Padmini Clarke RN  Outcome: Ongoing     Problem: Infection:  Goal: Will remain free from infection  Description: Will remain free from infection  7/8/2021 0842 by Bulmaro Richard RN  Outcome: Ongoing  7/7/2021 2004 by Padmini Clarke RN  Outcome: Ongoing     Problem: Safety:  Goal: Free from accidental physical injury  Description: Free from accidental physical injury  7/8/2021 0842 by Bulmaro Richard RN  Outcome: Ongoing  7/7/2021 2004 by Padmini Clarke RN  Outcome: Ongoing  Goal: Free from intentional harm  Description: Free from intentional harm  7/8/2021 0842 by Bulmaro Richard RN  Outcome: Ongoing  7/7/2021 2004 by Padmini Clarke RN  Outcome: Ongoing     Problem: Daily Care:  Goal: Daily care needs are met  Description: Daily care needs are met  7/8/2021 0842 by Bulmaro Richard RN  Outcome: Ongoing  7/7/2021 2004 by Padmini Clarke RN  Outcome: Ongoing     Problem: Pain:  Goal: Patient's pain/discomfort is manageable  Description: Patient's pain/discomfort is manageable  7/8/2021 0842 by Bulmaro Richard RN  Outcome: Ongoing  7/7/2021 2004 by Padmini Clarke RN  Outcome: Ongoing  Goal: Pain level will decrease  Description: Pain level will decrease  7/8/2021 0842 by Bulmaro Richard RN  Outcome: Ongoing  7/7/2021 2004 by Padmini Clarke RN  Outcome: Ongoing  Goal: Control of acute pain  Description: Control of acute pain  7/8/2021 0842 by Bulmaro Richard RN  Outcome: Ongoing  7/7/2021 2004 by Burton Humphrey RN  Outcome: Ongoing  Goal: Control of chronic pain  Description: Control of chronic pain  7/8/2021 0842 by Nuria Avila RN  Outcome: Ongoing  7/7/2021 2004 by Burton Humphrey RN  Outcome: Ongoing     Problem: Skin Integrity:  Goal: Skin integrity will stabilize  Description: Skin integrity will stabilize  7/8/2021 0842 by Nuria Avila RN  Outcome: Ongoing  7/7/2021 2004 by Burton Humphrey RN  Outcome: Ongoing     Problem: Discharge Planning:  Goal: Patients continuum of care needs are met  Description: Patients continuum of care needs are met  7/8/2021 0842 by Nuria Avila RN  Outcome: Ongoing  7/7/2021 2004 by Burton Humphrey RN  Outcome: Ongoing     Problem: Infection - Surgical Site:  Goal: Will show no infection signs and symptoms  Description: Will show no infection signs and symptoms  7/8/2021 0842 by Nuria Avila RN  Outcome: Ongoing  7/7/2021 2004 by Burton Humphrey RN  Outcome: Ongoing     Problem: Skin Integrity:  Goal: Will show no infection signs and symptoms  Description: Will show no infection signs and symptoms  7/8/2021 0842 by Nuria Avila RN  Outcome: Ongoing  7/7/2021 2004 by Burtno Humphrey RN  Outcome: Ongoing  Goal: Absence of new skin breakdown  Description: Absence of new skin breakdown  7/8/2021 0842 by Nuria Avila RN  Outcome: Ongoing  7/7/2021 2004 by Burton Humphrey RN  Outcome: Ongoing     Problem: Falls - Risk of:  Goal: Will remain free from falls  Description: Will remain free from falls  Outcome: Ongoing  Goal: Absence of physical injury  Description: Absence of physical injury  Outcome: Ongoing

## 2021-07-08 NOTE — PROGRESS NOTES
Inpatient Physical Therapy Evaluation and Treatment    Unit: [address]  Date:  7/8/2021  Patient Name:    Tom Briceno  Admitting diagnosis:  Renal calculi [N20.0]  Admit Date:  7/6/2021  Precautions/Restrictions/WB Status/ Lines/ Wounds/ Oxygen: Fall risk, Bed/chair alarm and Lines -Murguia catheter and Drains (L nephrostomy)   S/p LEFT PERCUTANEOUS NEPHROLITHOTOMY,  LEFT STENT PLACEMENT  LEFT PCNL >2cm on 7/6    Treatment Time:  11:40 - 12:05  Treatment Number:  1   Timed Code Treatment Minutes: 15 minutes  Total Treatment Minutes:  25  minutes    Patient Goals for Therapy: \" go home \"          Discharge Recommendations: Home PRN assist   DME needs for discharge: Needs Met       Therapy recommendation for EMS Transport: can transport by wheelchair    Therapy recommendations for staff:   Stand by assist with use of No AD for all transfers to/from [facility]  to/from [facility]    History of Present Illness: Per Dr. Gaviota Horan note: The patient is a 48 y.o. female with depression, Hep C, HTN who presented to [facility] ED with complaint of flank pain. Patient reports that she was discharged home not too long ago but hasn't felt well since that time. 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 She had a retroperitoneal hematoma and IR embolizatio was performed and she went in to hemorrhagic shock. She had a UTI as well and was discharged home on Zyvox. She reports that since getting home, she has had flank pain at the site of her nephrostomy tube and has noticed drainage coming from the area as well. She has had nausea and poor PO intake and believes that she has had fevers at home as well. She reports that she followed up with Dr. Brock Lemons and they discussed possibly removing the nephrostomy tube on July 6th prior to the start of these severe symptoms.      Home Health S4 Level Recommendation:  NA  AM-PAC Mobility Score    AM-PAC Inpatient Mobility Raw Score : 22       Preadmission Environment    Information obtained from PT eval on 21 and confirmed with pt. Pt. [address] [place] [address] Blvd environment:    apartment  2nd floor   Steps to enter first floor: No steps  Steps to second floor: 6-7 with two railings   Bathroom: tub/shower unit, grab bars and standard height commode  Equipment owned: none  Pt sleeps in a flat bed     Preadmission Status:  Pt. Able to drive: No friend takes pt to grocery, [facility] transportation to appts   Pt Fully independent with ADLs: Yes  Pt. Required assistance from family for: Cleaning, Cooking and [employer]- down 6-7 steps - uses basket to carry clothes   Pt. independent for transfers and gait and walked with No Device  History of falls No    Pain   Yes  Location: abdomen  Ratin and 7 /10  Pain Medicine Status: Pain med requested and RN notified    Cognition    A&O x4   Able to follow 2 step commands    Subjective  Patient lying supine in bed with no family present. Pt agreeable to this PT eval & tx. Upper Extremity ROM/Strength  Please see OT evaluation.       Lower Extremity ROM / Strength   AROM WFL: Yes  ROM limitations:     Strength Assessment (measured on a 0-5 scale):  R LE   Quad   4   Ant Tib  4   Hamstring 4-   Iliopsoas 3+  L LE  Quad   4   Ant Tib  4   Hamstring 4   Iliopsoas 3+    Lower Extremity Sensation    WFL    Lower Extremity Proprioception:   NT    Coordination and Tone  NT    Balance  Sitting:  Normal; Independent  Comments:     Standing: Good - ; SBA  Comments:     Bed Mobility   Supine to Sit:    SBA  Sit to Supine:   SBA  Rolling:   SBA  Scooting in sitting: SBA  Scooting in supine:  Not Tested    Transfer Training     Sit to stand:   SBA  Stand to sit:   SBA  Bed to Chair:   SBA with use of No AD    Gait gait completed as indicated below  Distance:      3 ft  Deviations (firm surface/linoleum):  decreased laura and increased MARIEL  Assistive Device Used:    No AD  Level of Assist:    SBA  Comment:     Stair Training deferred, pt unsafe/ not appropriate to complete stairs at this time    Activity Tolerance   Pt completed therapy session with Pain noted with transfer    Positioning Needs   Pt up in chair, alarm set, positioned in proper neutral alignment and pressure relief provided. Call light provided and all needs within reach    Exercises Initiated  Darryl deferred secondary to treatment focus on functional mobility  NA    Other  None. Patient/Family Education   Pt educated on role of inpatient PT, POC, importance of continued activity, DC recommendations, safety awareness, transfer techniques, pacing activity and calling for assist with mobility. Assessment  Pt seen for Physical Therapy evaluation in acute care setting. Pt demonstrated decreased Activity tolerance, Balance, Safety and Strength as well as decreased independence with Ambulation, Bed Mobility  and Transfers. Recommending Home PRN assist upon discharge as patient functioning close to baseline level    Goals : To be met in 3 visits:  1). Independent with LE Ex x 10 reps    To be met in 6 visits:  1). Supine to/from sit: Independent  2). Sit to/from stand: Independent  3). Bed to chair: Independent  4). Gait: Ambulate 50 ft.  with  Independent and use of No AD  5). Tolerate B LE exercises 3 sets of 10-15 reps  6). Ascend/descend 7 steps with Supervision with use of hand rail bilateral and No AD    Rehabilitation Potential: Good  Strengths for achieving goals include:   PLOF and Pt cooperative   Barriers to achieving goals include:    No Barriers    Plan    To be seen 3-5 x / week  while in acute care setting for therapeutic exercises, bed mobility, transfers, progressive gait training, balance training, and family/patient education. Signature: Abner Gonzales, PT, DPT, OMT-C  #[id number]      If patient discharges from this facility prior to next visit, this note will serve as the Discharge Summary.

## 2021-07-08 NOTE — PROGRESS NOTES
Shift assessment complete. See doc flow. Nightly medications given see MAR. IVF infusing. Patient resting in bed. Murguia in place. Nephro-tube draining from left back/ flank area, drainage is bright red. Abdominal drain to left abdomen in place, very little to no drainage at this time. Left Abdominal / flank pain 7/10 PRN Oxy given along with scheduled Tylenol. Call light and bedside table within easy reach. Will continue to monitor.

## 2021-07-08 NOTE — PROGRESS NOTES
Bedside report given and pt care transferred to [facility]. Pt denies needs at this time. Call light within reach.

## 2021-07-08 NOTE — PROGRESS NOTES
Inpatient Occupational Therapy  Evaluation and Treatment    Unit: [facility]  Date:  7/8/2021  Patient Name:    Vijaya Watts  Admitting diagnosis:  Renal calculi [N20.0]  Admit Date:  7/6/2021  Precautions/Restrictions/WB Status/ Lines/ Wounds/ Oxygen:    Fall risk, Bed/chair alarm and Lines -Murguia catheter and Drains (L nephrostomy)   LEFT PERCUTANEOUS NEPHROLITHOTOMY,  LEFT STENT PLACEMENT  LEFT PCNL >2cm- 7-3-21   Treatment Time:  6929-0033  Treatment Number: 1   Timed code treatment minutes 15 minutes   Total Treatment minutes:   25  minutes    Patient Goals for Therapy:  \" go home\"      Discharge Recommendations: Home independently and PRN assist   DME needs for discharge: continue to assess       Therapy recommendations for staff:   Assist of 1 with use of No AD for all transfers to/from [facility]  to/from [facility]    History of Present Illness: per H&P    55 y. o. female with depression, Hep C, HTN who presented to [facility] with complaint of flank pain. Patient reports that she was discharged home not too long ago but hasn't felt well since that time. 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 She had a retroperitoneal hematoma and IR embolizatio was performed and she went in to hemorrhagic shock. She had a UTI as well and was discharged home on Zyvox. She reports that since getting home, she has had flank pain at the site of her nephrostomy tube and has noticed drainage coming from the area as well. She has had nausea and poor PO intake and believes that she has had fevers at home as well. Home Health S4 Level Recommendation:  Level 1 Standard  AM-PAC Score: 20    Preadmission Environment    Information obtained from OT eval on 4/27/21 and confirmed with pt  Pt.  Lives Alone  Home environment:  apartment   Steps to enter first floor: 7 steps to enter  Steps to second floor: N/A  Steps to second floor: 6-7 with two will likely benefit from skilled occupational therapy services to maximize safety and independence. Goal(s) : To be met in 3 Visits:  1). Bed to toilet/BSC: Supervision    To be met in 5 Visits:  1). Supine to/from Sit:  Independent  2). Upper Body Bathing:   Independent  3). Lower Body Bathing:   Supervision  4). Upper Body Dressing:  Independent  5). Lower Body Dressing:  Supervision  6). Pt to demonstrate UE exs x 15 reps with minimal cues    Rehabilitation Potential:  Good for goals listed above. Strengths for achieving goals include: PLOF  Barriers to achieving goals include:  Complexity of condition     Plan: To be seen 3-5 x/wk while in acute care setting for therapeutic exercises, bed mobility, transfers, dressing, bathing, family/patient education, ADL/IADL retraining, energy conservation training.      Sean Lopez OTR/L [id number]          If patient discharges from this facility prior to next visit, this note will serve as the Discharge Summary

## 2021-07-08 NOTE — PLAN OF CARE
Problem: Urinary Elimination:  Goal: Signs and symptoms of infection will decrease  Description: Signs and symptoms of infection will decrease  7/7/2021 2004 by Alin Castro RN  Outcome: Ongoing  7/7/2021 0922 by Lazaro Rojas RN  Outcome: Ongoing  Goal: Complications related to the disease process, condition or treatment will be avoided or minimized  Description: Complications related to the disease process, condition or treatment will be avoided or minimized  7/7/2021 2004 by Alin Castro RN  Outcome: Ongoing  7/7/2021 0922 by Lazaro Rojas RN  Outcome: Ongoing     Problem: Infection:  Goal: Will remain free from infection  Description: Will remain free from infection  7/7/2021 2004 by Alin Castro RN  Outcome: Ongoing  7/7/2021 0922 by Lazaro Rojas RN  Outcome: Ongoing     Problem: Safety:  Goal: Free from accidental physical injury  Description: Free from accidental physical injury  7/7/2021 2004 by Alin Castro RN  Outcome: Ongoing  7/7/2021 0922 by Lazaro Rojas RN  Outcome: Ongoing  Goal: Free from intentional harm  Description: Free from intentional harm  7/7/2021 2004 by Alin Castro RN  Outcome: Ongoing  7/7/2021 0922 by Lazaro Rojas RN  Outcome: Ongoing     Problem: Daily Care:  Goal: Daily care needs are met  Description: Daily care needs are met  7/7/2021 2004 by Alin Castro RN  Outcome: Ongoing  7/7/2021 0922 by Lazaro Rojas RN  Outcome: Ongoing     Problem: Pain:  Goal: Patient's pain/discomfort is manageable  Description: Patient's pain/discomfort is manageable  7/7/2021 2004 by Alin Castro RN  Outcome: Ongoing  7/7/2021 0922 by Lazaro Rojas RN  Outcome: Ongoing  Goal: Pain level will decrease  Description: Pain level will decrease  7/7/2021 2004 by Alin Castro RN  Outcome: Ongoing  7/7/2021 0922 by Lazaro Rojas RN  Outcome: Ongoing  Goal: Control of acute pain  Description: Control of acute pain  7/7/2021 2004 by Alin Castro RN  Outcome: Ongoing  7/7/2021 0922 by Aparna Berry RN  Outcome: Ongoing  Goal: Control of chronic pain  Description: Control of chronic pain  7/7/2021 2004 by Art Duffy RN  Outcome: Ongoing  7/7/2021 0922 by Aparna Berry RN  Outcome: Ongoing     Problem: Skin Integrity:  Goal: Skin integrity will stabilize  Description: Skin integrity will stabilize  7/7/2021 2004 by Art Duffy RN  Outcome: Ongoing  7/7/2021 0922 by Aparna Berry RN  Outcome: Ongoing     Problem: Discharge Planning:  Goal: Patients continuum of care needs are met  Description: Patients continuum of care needs are met  7/7/2021 2004 by Art Duffy RN  Outcome: Ongoing  7/7/2021 0922 by Aparna Berry RN  Outcome: Ongoing     Problem: Infection - Surgical Site:  Goal: Will show no infection signs and symptoms  Description: Will show no infection signs and symptoms  7/7/2021 2004 by Art Duffy RN  Outcome: Ongoing  7/7/2021 0922 by Aparna Berry RN  Outcome: Ongoing     Problem: Skin Integrity:  Goal: Will show no infection signs and symptoms  Description: Will show no infection signs and symptoms  7/7/2021 2004 by Art Duffy RN  Outcome: Ongoing  7/7/2021 0922 by Aparna Berry RN  Outcome: Ongoing  Goal: Absence of new skin breakdown  Description: Absence of new skin breakdown  7/7/2021 2004 by Art Duffy RN  Outcome: Ongoing  7/7/2021 0922 by Aparna Berry RN  Outcome: Ongoing

## 2021-07-09 LAB
ANION GAP SERPL CALCULATED.3IONS-SCNC: 8 MMOL/L (ref 3–16)
BUN BLDV-MCNC: 6 MG/DL (ref 7–20)
CALCIUM SERPL-MCNC: 7.9 MG/DL (ref 8.3–10.6)
CALCULI COMPOSITION: NORMAL
CHLORIDE BLD-SCNC: 103 MMOL/L (ref 99–110)
CO2: 25 MMOL/L (ref 21–32)
CREAT SERPL-MCNC: 1 MG/DL (ref 0.6–1.1)
GFR AFRICAN AMERICAN: >60
GFR NON-AFRICAN AMERICAN: 58
GLUCOSE BLD-MCNC: 92 MG/DL (ref 70–99)
MASS: NORMAL MG
POTASSIUM SERPL-SCNC: 3.3 MMOL/L (ref 3.5–5.1)
SODIUM BLD-SCNC: 136 MMOL/L (ref 136–145)
STONE DESCRIPTION: NORMAL
STONE NUMBER: NORMAL
STONE SIZE: NORMAL MM
VANCOMYCIN RANDOM: 13.4 UG/ML

## 2021-07-09 PROCEDURE — 2580000003 HC RX 258: Performed by: UROLOGY

## 2021-07-09 PROCEDURE — 36415 COLL VENOUS BLD VENIPUNCTURE: CPT

## 2021-07-09 PROCEDURE — 80202 ASSAY OF VANCOMYCIN: CPT

## 2021-07-09 PROCEDURE — 97535 SELF CARE MNGMENT TRAINING: CPT

## 2021-07-09 PROCEDURE — 97530 THERAPEUTIC ACTIVITIES: CPT

## 2021-07-09 PROCEDURE — 6360000002 HC RX W HCPCS: Performed by: UROLOGY

## 2021-07-09 PROCEDURE — 1200000000 HC SEMI PRIVATE

## 2021-07-09 PROCEDURE — 80048 BASIC METABOLIC PNL TOTAL CA: CPT

## 2021-07-09 PROCEDURE — 6370000000 HC RX 637 (ALT 250 FOR IP): Performed by: UROLOGY

## 2021-07-09 RX ORDER — LEVOFLOXACIN 500 MG/1
500 TABLET, FILM COATED ORAL EVERY 24 HOURS
Status: DISCONTINUED | OUTPATIENT
Start: 2021-07-09 | End: 2021-07-16

## 2021-07-09 RX ADMIN — OXYCODONE HYDROCHLORIDE 10 MG: 5 TABLET ORAL at 21:26

## 2021-07-09 RX ADMIN — SODIUM CHLORIDE: 9 INJECTION, SOLUTION INTRAVENOUS at 10:54

## 2021-07-09 RX ADMIN — ACETAMINOPHEN 650 MG: 325 TABLET ORAL at 03:47

## 2021-07-09 RX ADMIN — HYDROMORPHONE HYDROCHLORIDE 0.5 MG: 1 INJECTION, SOLUTION INTRAMUSCULAR; INTRAVENOUS; SUBCUTANEOUS at 16:21

## 2021-07-09 RX ADMIN — OXYCODONE HYDROCHLORIDE 10 MG: 5 TABLET ORAL at 03:47

## 2021-07-09 RX ADMIN — DOCUSATE SODIUM 50MG AND SENNOSIDES 8.6MG 1 TABLET: 8.6; 5 TABLET, FILM COATED ORAL at 08:29

## 2021-07-09 RX ADMIN — ACETAMINOPHEN 650 MG: 325 TABLET ORAL at 08:30

## 2021-07-09 RX ADMIN — ENOXAPARIN SODIUM 40 MG: 40 INJECTION SUBCUTANEOUS at 14:17

## 2021-07-09 RX ADMIN — METOPROLOL SUCCINATE 25 MG: 25 TABLET, EXTENDED RELEASE ORAL at 08:30

## 2021-07-09 RX ADMIN — LISINOPRIL 10 MG: 10 TABLET ORAL at 08:30

## 2021-07-09 RX ADMIN — HYDROMORPHONE HYDROCHLORIDE 0.5 MG: 1 INJECTION, SOLUTION INTRAMUSCULAR; INTRAVENOUS; SUBCUTANEOUS at 08:27

## 2021-07-09 RX ADMIN — ACETAMINOPHEN 650 MG: 325 TABLET ORAL at 19:51

## 2021-07-09 RX ADMIN — HYDROCHLOROTHIAZIDE 25 MG: 25 TABLET ORAL at 08:30

## 2021-07-09 RX ADMIN — ACETAMINOPHEN 650 MG: 325 TABLET ORAL at 12:57

## 2021-07-09 RX ADMIN — HYDROMORPHONE HYDROCHLORIDE 0.5 MG: 1 INJECTION, SOLUTION INTRAMUSCULAR; INTRAVENOUS; SUBCUTANEOUS at 23:43

## 2021-07-09 RX ADMIN — VANCOMYCIN HYDROCHLORIDE 750 MG: 750 INJECTION, POWDER, LYOPHILIZED, FOR SOLUTION INTRAVENOUS at 14:53

## 2021-07-09 RX ADMIN — HYDROMORPHONE HYDROCHLORIDE 0.5 MG: 1 INJECTION, SOLUTION INTRAMUSCULAR; INTRAVENOUS; SUBCUTANEOUS at 11:33

## 2021-07-09 RX ADMIN — LEVOFLOXACIN 500 MG: 500 TABLET, FILM COATED ORAL at 19:51

## 2021-07-09 ASSESSMENT — PAIN SCALES - GENERAL
PAINLEVEL_OUTOF10: 7
PAINLEVEL_OUTOF10: 4
PAINLEVEL_OUTOF10: 7
PAINLEVEL_OUTOF10: 6
PAINLEVEL_OUTOF10: 7
PAINLEVEL_OUTOF10: 7
PAINLEVEL_OUTOF10: 6

## 2021-07-09 ASSESSMENT — PAIN DESCRIPTION - LOCATION: LOCATION: FLANK

## 2021-07-09 ASSESSMENT — PAIN DESCRIPTION - ORIENTATION: ORIENTATION: LEFT

## 2021-07-09 ASSESSMENT — PAIN DESCRIPTION - PAIN TYPE: TYPE: SURGICAL PAIN

## 2021-07-09 NOTE — PROGRESS NOTES
Tested    Activity Tolerance   Pt completed therapy session with Pain noted with transitional movements  SpO2:   HR:   BP:     ADL Training:   Upper body dressing:  Not Tested  Upper body bathing:  Not Tested  Lower body dressing:  Not Tested  Lower body bathing:  Not Tested  Toileting:   Not Tested  Grooming/Hygiene:  SBA standing at sink    Therapeutic Exercise:   N/A    Patient Education:   Role of OT  Recommendations for DC    Positioning Needs: In bed, partial sidelying with pillows to decrease pressure to ai prominences. Call light and needs in reach. Family Present:  No    Assessment: Pt agreeable to therapy and able to ambulate with SBA holding onto IV pole for balance to bathroom from bed. Pt stood at sink for 5 minutes to brush teeth. Pt educated on bed mobility techniques and transfer techniques to reduce bending and pain. Pt will benefit from continued acute OT. GOALS  To be met in 3 Visits:  1). Bed to toilet/BSC: Supervision     To be met in 5 Visits:  1). Supine to/from Sit:             Independent  2). Upper Body Bathing:         Independent  3). Lower Body Bathing:         Supervision  4). Upper Body Dressing:       Independent  5). Lower Body Dressing:       Supervision  6).  Pt to demonstrate UE exs x 15 reps with minimal cues     Plan: cont with IRA Carcamo, OTR/L [id number]    If patient discharges from this facility prior to next visit, this note will serve as the Discharge Summary

## 2021-07-09 NOTE — PLAN OF CARE
Ongoing  7/8/2021 0842 by Madhuri Barth RN  Outcome: Ongoing  Goal: Control of chronic pain  Description: Control of chronic pain  7/8/2021 2137 by Donald Bejarano RN  Outcome: Ongoing  7/8/2021 0842 by Madhuri Barth RN  Outcome: Ongoing     Problem: Skin Integrity:  Goal: Skin integrity will stabilize  Description: Skin integrity will stabilize  7/8/2021 2137 by Donald Bejarano RN  Outcome: Ongoing  7/8/2021 0842 by Madhuri Barth RN  Outcome: Ongoing     Problem: Discharge Planning:  Goal: Patients continuum of care needs are met  Description: Patients continuum of care needs are met  7/8/2021 2137 by Donald Bejarano RN  Outcome: Ongoing  7/8/2021 0842 by Maduhri Barth RN  Outcome: Ongoing     Problem: Infection - Surgical Site:  Goal: Will show no infection signs and symptoms  Description: Will show no infection signs and symptoms  7/8/2021 2137 by Donald Bejarano RN  Outcome: Ongoing  7/8/2021 0842 by Madhuri Barth RN  Outcome: Ongoing     Problem: Skin Integrity:  Goal: Will show no infection signs and symptoms  Description: Will show no infection signs and symptoms  7/8/2021 2137 by Donald Bejarano RN  Outcome: Ongoing  7/8/2021 0842 by Madhuri Barth RN  Outcome: Ongoing  Goal: Absence of new skin breakdown  Description: Absence of new skin breakdown  7/8/2021 2137 by Donald Bejarano RN  Outcome: Ongoing  7/8/2021 0842 by Madhuri Barth RN  Outcome: Ongoing     Problem: Falls - Risk of:  Goal: Will remain free from falls  Description: Will remain free from falls  7/8/2021 2137 by Donald Bejarano RN  Outcome: Ongoing  7/8/2021 0842 by Madhuri Barth RN  Outcome: Ongoing  Goal: Absence of physical injury  Description: Absence of physical injury  7/8/2021 2137 by Donald Bejarano RN  Outcome: Ongoing  7/8/2021 0842 by Madhuri Barth RN  Outcome: Ongoing

## 2021-07-09 NOTE — PLAN OF CARE
Problem: Urinary Elimination:  Goal: Signs and symptoms of infection will decrease  Description: Signs and symptoms of infection will decrease  7/9/2021 0922 by Orlin Desir RN  Outcome: Ongoing     Problem: Infection:  Goal: Will remain free from infection  Description: Will remain free from infection  7/9/2021 0922 by Orlin Desir RN  Outcome: Ongoing     Problem: Pain:  Goal: Patient's pain/discomfort is manageable  Description: Patient's pain/discomfort is manageable  7/8/2021 2137 by Krissy Morelos RN  Outcome: Ongoing

## 2021-07-09 NOTE — PROGRESS NOTES
Patient:  Tila Robledo  YOB: 1965   Date of Service:  07/09/21      Urology Attending Daily Progress Note    Chief complaint: \"in pain\"    Interval HPI:  The patient is in pain, cannot lie on back    CR 1.1  Good UOP from NT and cortez  Scant drainage abscess drain    Objective:    Patient Vitals for the past 8 hrs:   BP Temp Temp src Pulse Resp SpO2   07/09/21 1325 (!) 149/74 97.1 °F (36.2 °C) Oral 63 16 98 %     I/O last 3 completed shifts: In: 5 [P.O.:540]  Out: 2870 [Urine:2850; Drains:20]     Physical Exam:   Constitutional: comfortable in hospital bed, no acute distress  Abdomen: soft, nontender, no guarding,  Genitourinary: urethal cortez draining clear urine, NT cyu, scant output abscess drain  Psych: normal mood and affect, appropriately answers questions   Skin, extremities: stable, exposed skin intact, no digital cyanosis     Labs:     No results found for: PSA    Lab Results   Component Value Date    CREATININE 1.0 07/09/2021    CREATININE 1.1 07/08/2021    CREATININE 1.6 (H) 07/07/2021       Lab Results   Component Value Date    COLORU DK YELLOW 06/27/2021    NITRU Negative 06/27/2021    GLUCOSEU Negative 06/27/2021    KETUA Negative 06/27/2021    UROBILINOGEN 2.0 06/27/2021    BILIRUBINUR MODERATE 06/27/2021       Lab Results   Component Value Date    WBC 7.5 07/08/2021    HGB 9.8 (L) 07/08/2021    HCT 31.3 (L) 07/08/2021    MCV 86.4 07/08/2021     07/08/2021       Radiology:  \"Imaging was independently reviewed by myself and I agree with the radiology interpretation  Very small connection from the lower pole calyx to abscess cavity on abscessogram but hard to interpret without live images    Assessment:  Tila Robledo is a 55 y.o. 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

## 2021-07-09 NOTE — PROGRESS NOTES
Shift assessment complete. See doc flow. Nightly medications given see MAR. IVF infusing. Patient resting in bed at this time. Patient c/o left flank pain PRN Dilaudid given. New bloody drainage noted to nephro-tube dressing. Dressing reinforced with ABD pad & Tegaderm. Call light and bedside table within easy reach. Will continue to monitor.

## 2021-07-10 PROCEDURE — 6370000000 HC RX 637 (ALT 250 FOR IP): Performed by: UROLOGY

## 2021-07-10 PROCEDURE — 1200000000 HC SEMI PRIVATE

## 2021-07-10 PROCEDURE — 6360000002 HC RX W HCPCS: Performed by: UROLOGY

## 2021-07-10 PROCEDURE — 2580000003 HC RX 258: Performed by: UROLOGY

## 2021-07-10 RX ADMIN — OXYCODONE HYDROCHLORIDE 10 MG: 5 TABLET ORAL at 12:34

## 2021-07-10 RX ADMIN — ACETAMINOPHEN 650 MG: 325 TABLET ORAL at 08:25

## 2021-07-10 RX ADMIN — OXYCODONE HYDROCHLORIDE 10 MG: 5 TABLET ORAL at 02:13

## 2021-07-10 RX ADMIN — VANCOMYCIN HYDROCHLORIDE 750 MG: 750 INJECTION, POWDER, LYOPHILIZED, FOR SOLUTION INTRAVENOUS at 15:55

## 2021-07-10 RX ADMIN — LISINOPRIL 10 MG: 10 TABLET ORAL at 08:25

## 2021-07-10 RX ADMIN — HYDROMORPHONE HYDROCHLORIDE 0.5 MG: 1 INJECTION, SOLUTION INTRAMUSCULAR; INTRAVENOUS; SUBCUTANEOUS at 05:50

## 2021-07-10 RX ADMIN — ACETAMINOPHEN 650 MG: 325 TABLET ORAL at 12:34

## 2021-07-10 RX ADMIN — OXYCODONE HYDROCHLORIDE 10 MG: 5 TABLET ORAL at 18:39

## 2021-07-10 RX ADMIN — ONDANSETRON HYDROCHLORIDE 4 MG: 2 INJECTION, SOLUTION INTRAMUSCULAR; INTRAVENOUS at 09:59

## 2021-07-10 RX ADMIN — OXYCODONE HYDROCHLORIDE 5 MG: 5 TABLET ORAL at 08:24

## 2021-07-10 RX ADMIN — SODIUM CHLORIDE, PRESERVATIVE FREE 10 ML: 5 INJECTION INTRAVENOUS at 08:25

## 2021-07-10 RX ADMIN — METOPROLOL SUCCINATE 25 MG: 25 TABLET, EXTENDED RELEASE ORAL at 08:24

## 2021-07-10 RX ADMIN — LEVOFLOXACIN 500 MG: 500 TABLET, FILM COATED ORAL at 18:39

## 2021-07-10 RX ADMIN — HYDROCHLOROTHIAZIDE 25 MG: 25 TABLET ORAL at 08:25

## 2021-07-10 RX ADMIN — ACETAMINOPHEN 650 MG: 325 TABLET ORAL at 20:26

## 2021-07-10 RX ADMIN — ACETAMINOPHEN 650 MG: 325 TABLET ORAL at 02:13

## 2021-07-10 RX ADMIN — SODIUM CHLORIDE: 9 INJECTION, SOLUTION INTRAVENOUS at 15:49

## 2021-07-10 RX ADMIN — ENOXAPARIN SODIUM 40 MG: 40 INJECTION SUBCUTANEOUS at 15:55

## 2021-07-10 ASSESSMENT — PAIN SCALES - GENERAL
PAINLEVEL_OUTOF10: 7
PAINLEVEL_OUTOF10: 7
PAINLEVEL_OUTOF10: 6
PAINLEVEL_OUTOF10: 7
PAINLEVEL_OUTOF10: 6
PAINLEVEL_OUTOF10: 7

## 2021-07-10 NOTE — PROGRESS NOTES
Urology Progress Note    Subjective: I feel a little better. HPI: Patient has been admitted for care after her PCNL. P/E  BP (!) 157/74   Pulse 61   Temp 97.1 °F (36.2 °C) (Oral)   Resp 16   Ht 5' 7\" (1.702 m)   Wt 150 lb (68 kg)   LMP 12/01/2014   SpO2 98%   BMI 23.49 kg/m²   Skin: Intact  Respiratory: Breathing without difficulty, stable. GI: No acute changes, stable po intake. : Catheters draining. MSK: No acute changes, stable. Neuro: No acute changes, stable. Labs  Lab Results   Component Value Date    WBC 7.5 07/08/2021    HGB 9.8 07/08/2021    HCT 31.3 07/08/2021    MCV 86.4 07/08/2021     07/08/2021     Lab Results   Component Value Date     07/09/2021    K 3.3 07/09/2021    K 4.2 07/07/2021     07/09/2021    CO2 25 07/09/2021    BUN 6 07/09/2021    CREATININE 1.0 07/09/2021    CALCIUM 7.9 07/09/2021       Assessment/Plan  Overall stable. Drainage from abscess is decreasing. Continue with fluids and abx. Maintain maximum kidney drainage. Plan for repeat imaging on Monday.   Recheck labs in the am.    Electronically signed by Ashlyn Winter MD on 7/10/2021 at 12:40 PM

## 2021-07-10 NOTE — PLAN OF CARE
Problem: Urinary Elimination:  Goal: Signs and symptoms of infection will decrease  Description: Signs and symptoms of infection will decrease  Outcome: Ongoing  Goal: Complications related to the disease process, condition or treatment will be avoided or minimized  Description: Complications related to the disease process, condition or treatment will be avoided or minimized  Outcome: Ongoing     Problem: Infection:  Goal: Will remain free from infection  Description: Will remain free from infection  Outcome: Ongoing     Problem: Safety:  Goal: Free from accidental physical injury  Description: Free from accidental physical injury  Outcome: Ongoing  Goal: Free from intentional harm  Description: Free from intentional harm  Outcome: Ongoing     Problem: Daily Care:  Goal: Daily care needs are met  Description: Daily care needs are met  Outcome: Ongoing     Problem: Pain:  Goal: Patient's pain/discomfort is manageable  Description: Patient's pain/discomfort is manageable  Outcome: Ongoing  Goal: Pain level will decrease  Description: Pain level will decrease  Outcome: Ongoing  Goal: Control of acute pain  Description: Control of acute pain  Outcome: Ongoing  Goal: Control of chronic pain  Description: Control of chronic pain  Outcome: Ongoing     Problem: Skin Integrity:  Goal: Skin integrity will stabilize  Description: Skin integrity will stabilize  Outcome: Ongoing     Problem: Discharge Planning:  Goal: Patients continuum of care needs are met  Description: Patients continuum of care needs are met  Outcome: Ongoing     Problem: Infection - Surgical Site:  Goal: Will show no infection signs and symptoms  Description: Will show no infection signs and symptoms  Outcome: Ongoing     Problem: Skin Integrity:  Goal: Will show no infection signs and symptoms  Description: Will show no infection signs and symptoms  Outcome: Ongoing  Goal: Absence of new skin breakdown  Description: Absence of new skin

## 2021-07-10 NOTE — PLAN OF CARE
Problem: Urinary Elimination:  Goal: Signs and symptoms of infection will decrease  Description: Signs and symptoms of infection will decrease  7/10/2021 0923 by Diana Muro RN  Outcome: Ongoing     Problem: Infection:  Goal: Will remain free from infection  Description: Will remain free from infection  7/10/2021 0923 by Diana Muro RN  Outcome: Ongoing     Problem: Safety:  Goal: Free from accidental physical injury  Description: Free from accidental physical injury  7/10/2021 0923 by Diana Muro RN  Outcome: Ongoing     Problem: Daily Care:  Goal: Daily care needs are met  Description: Daily care needs are met  7/10/2021 0923 by Diana Muro RN  Outcome: Ongoing

## 2021-07-10 NOTE — PROGRESS NOTES
Vancomycin Day # 5  Current dose = 750 mg IVPB q24h  No new labs  Vancomycin trough ordered for 7/12. Continue same until then.

## 2021-07-11 LAB
ANION GAP SERPL CALCULATED.3IONS-SCNC: 9 MMOL/L (ref 3–16)
BUN BLDV-MCNC: 5 MG/DL (ref 7–20)
CALCIUM SERPL-MCNC: 8 MG/DL (ref 8.3–10.6)
CHLORIDE BLD-SCNC: 100 MMOL/L (ref 99–110)
CO2: 25 MMOL/L (ref 21–32)
CREAT SERPL-MCNC: 1 MG/DL (ref 0.6–1.1)
GFR AFRICAN AMERICAN: >60
GFR NON-AFRICAN AMERICAN: 58
GLUCOSE BLD-MCNC: 96 MG/DL (ref 70–99)
HCT VFR BLD CALC: 28.8 % (ref 36–48)
HEMOGLOBIN: 9.2 G/DL (ref 12–16)
MCH RBC QN AUTO: 26.8 PG (ref 26–34)
MCHC RBC AUTO-ENTMCNC: 31.9 G/DL (ref 31–36)
MCV RBC AUTO: 83.9 FL (ref 80–100)
PDW BLD-RTO: 17.4 % (ref 12.4–15.4)
PLATELET # BLD: 134 K/UL (ref 135–450)
PMV BLD AUTO: 8.8 FL (ref 5–10.5)
POTASSIUM SERPL-SCNC: 3.2 MMOL/L (ref 3.5–5.1)
RBC # BLD: 3.43 M/UL (ref 4–5.2)
SODIUM BLD-SCNC: 134 MMOL/L (ref 136–145)
WBC # BLD: 5 K/UL (ref 4–11)

## 2021-07-11 PROCEDURE — 36415 COLL VENOUS BLD VENIPUNCTURE: CPT

## 2021-07-11 PROCEDURE — 6370000000 HC RX 637 (ALT 250 FOR IP): Performed by: INTERNAL MEDICINE

## 2021-07-11 PROCEDURE — 80048 BASIC METABOLIC PNL TOTAL CA: CPT

## 2021-07-11 PROCEDURE — 6370000000 HC RX 637 (ALT 250 FOR IP): Performed by: UROLOGY

## 2021-07-11 PROCEDURE — 6360000002 HC RX W HCPCS: Performed by: UROLOGY

## 2021-07-11 PROCEDURE — 97530 THERAPEUTIC ACTIVITIES: CPT

## 2021-07-11 PROCEDURE — 2580000003 HC RX 258: Performed by: UROLOGY

## 2021-07-11 PROCEDURE — 1200000000 HC SEMI PRIVATE

## 2021-07-11 PROCEDURE — 97535 SELF CARE MNGMENT TRAINING: CPT

## 2021-07-11 PROCEDURE — 85027 COMPLETE CBC AUTOMATED: CPT

## 2021-07-11 RX ORDER — POTASSIUM CHLORIDE 7.45 MG/ML
10 INJECTION INTRAVENOUS PRN
Status: DISCONTINUED | OUTPATIENT
Start: 2021-07-11 | End: 2021-07-16 | Stop reason: HOSPADM

## 2021-07-11 RX ORDER — POTASSIUM CHLORIDE 20 MEQ/1
40 TABLET, EXTENDED RELEASE ORAL PRN
Status: DISCONTINUED | OUTPATIENT
Start: 2021-07-11 | End: 2021-07-16 | Stop reason: HOSPADM

## 2021-07-11 RX ADMIN — HYDROCHLOROTHIAZIDE 25 MG: 25 TABLET ORAL at 08:34

## 2021-07-11 RX ADMIN — OXYCODONE HYDROCHLORIDE 10 MG: 5 TABLET ORAL at 00:34

## 2021-07-11 RX ADMIN — ACETAMINOPHEN 650 MG: 325 TABLET ORAL at 00:33

## 2021-07-11 RX ADMIN — LISINOPRIL 10 MG: 10 TABLET ORAL at 08:34

## 2021-07-11 RX ADMIN — DOCUSATE SODIUM 50MG AND SENNOSIDES 8.6MG 1 TABLET: 8.6; 5 TABLET, FILM COATED ORAL at 08:34

## 2021-07-11 RX ADMIN — POTASSIUM CHLORIDE 40 MEQ: 1500 TABLET, EXTENDED RELEASE ORAL at 16:19

## 2021-07-11 RX ADMIN — VANCOMYCIN HYDROCHLORIDE 750 MG: 750 INJECTION, POWDER, LYOPHILIZED, FOR SOLUTION INTRAVENOUS at 14:48

## 2021-07-11 RX ADMIN — METOPROLOL SUCCINATE 25 MG: 25 TABLET, EXTENDED RELEASE ORAL at 08:35

## 2021-07-11 RX ADMIN — OXYCODONE HYDROCHLORIDE 10 MG: 5 TABLET ORAL at 22:26

## 2021-07-11 RX ADMIN — LEVOFLOXACIN 500 MG: 500 TABLET, FILM COATED ORAL at 19:40

## 2021-07-11 RX ADMIN — SODIUM CHLORIDE: 9 INJECTION, SOLUTION INTRAVENOUS at 08:28

## 2021-07-11 RX ADMIN — ACETAMINOPHEN 650 MG: 325 TABLET ORAL at 13:03

## 2021-07-11 RX ADMIN — OXYCODONE HYDROCHLORIDE 10 MG: 5 TABLET ORAL at 16:09

## 2021-07-11 RX ADMIN — ENOXAPARIN SODIUM 40 MG: 40 INJECTION SUBCUTANEOUS at 14:45

## 2021-07-11 RX ADMIN — ACETAMINOPHEN 650 MG: 325 TABLET ORAL at 19:40

## 2021-07-11 RX ADMIN — OXYCODONE HYDROCHLORIDE 10 MG: 5 TABLET ORAL at 11:48

## 2021-07-11 RX ADMIN — OXYCODONE HYDROCHLORIDE 10 MG: 5 TABLET ORAL at 05:27

## 2021-07-11 RX ADMIN — ACETAMINOPHEN 650 MG: 325 TABLET ORAL at 08:35

## 2021-07-11 ASSESSMENT — PAIN SCALES - GENERAL
PAINLEVEL_OUTOF10: 7
PAINLEVEL_OUTOF10: 6
PAINLEVEL_OUTOF10: 7
PAINLEVEL_OUTOF10: 6
PAINLEVEL_OUTOF10: 6
PAINLEVEL_OUTOF10: 7
PAINLEVEL_OUTOF10: 7

## 2021-07-11 NOTE — PROGRESS NOTES
Urology Progress Note    Subjective: I am a little better. HPI: Patient has been admitted for care after a left PCNL. P/E  /78   Pulse 74   Temp 97.1 °F (36.2 °C) (Oral)   Resp 18   Ht 5' 7\" (1.702 m)   Wt 150 lb (68 kg)   LMP 12/01/2014   SpO2 98%   BMI 23.49 kg/m²   Skin: Intact  Respiratory: Breathing without difficulty, stable. GI: No acute changes, stable po intake. : Murguia and NT with clear urine. MSK: No acute changes, stable. Neuro: No acute changes, stable. Labs  Lab Results   Component Value Date    WBC 5.0 07/11/2021    HGB 9.2 07/11/2021    HCT 28.8 07/11/2021    MCV 83.9 07/11/2021     07/11/2021     Lab Results   Component Value Date     07/11/2021    K 3.2 07/11/2021    K 4.2 07/07/2021     07/11/2021    CO2 25 07/11/2021    BUN 5 07/11/2021    CREATININE 1.0 07/11/2021    CALCIUM 8.0 07/11/2021       Assessment/Plan  Stable, labs checked and improving. Repeat radiology studies tomorrow.       Electronically signed by Michela Valero MD on 7/11/2021 at 12:33 PM

## 2021-07-11 NOTE — PROGRESS NOTES
Vancomycin Day: 6    Patient's labs, cultures, vitals, and vancomycin regimen reviewed. No changes today.

## 2021-07-11 NOTE — PLAN OF CARE
Problem: Urinary Elimination:  Goal: Signs and symptoms of infection will decrease  Description: Signs and symptoms of infection will decrease  Outcome: Ongoing  Goal: Complications related to the disease process, condition or treatment will be avoided or minimized  Description: Complications related to the disease process, condition or treatment will be avoided or minimized  Outcome: Ongoing     Problem: Infection:  Goal: Will remain free from infection  Description: Will remain free from infection  Outcome: Ongoing     Problem: Safety:  Goal: Free from accidental physical injury  Description: Free from accidental physical injury  Outcome: Ongoing  Goal: Free from intentional harm  Description: Free from intentional harm  Outcome: Ongoing     Problem: Daily Care:  Goal: Daily care needs are met  Description: Daily care needs are met  Outcome: Ongoing     Problem: Pain:  Goal: Patient's pain/discomfort is manageable  Description: Patient's pain/discomfort is manageable  Outcome: Ongoing  Goal: Pain level will decrease  Description: Pain level will decrease  Outcome: Ongoing  Goal: Control of acute pain  Description: Control of acute pain  Outcome: Ongoing  Goal: Control of chronic pain  Description: Control of chronic pain  Outcome: Ongoing     Problem: Skin Integrity:  Goal: Skin integrity will stabilize  Description: Skin integrity will stabilize  Outcome: Ongoing     Problem: Discharge Planning:  Goal: Patients continuum of care needs are met  Description: Patients continuum of care needs are met  Outcome: Ongoing     Problem: Infection - Surgical Site:  Goal: Will show no infection signs and symptoms  Description: Will show no infection signs and symptoms  Outcome: Ongoing     Problem: Skin Integrity:  Goal: Will show no infection signs and symptoms  Description: Will show no infection signs and symptoms  Outcome: Ongoing  Goal: Absence of new skin breakdown  Description: Absence of new skin breakdown  Outcome: Ongoing     Problem: Falls - Risk of:  Goal: Will remain free from falls  Description: Will remain free from falls  Outcome: Ongoing  Goal: Absence of physical injury  Description: Absence of physical injury  Outcome: Ongoing

## 2021-07-11 NOTE — PLAN OF CARE
Problem: Urinary Elimination:  Goal: Signs and symptoms of infection will decrease  Description: Signs and symptoms of infection will decrease  7/11/2021 0924 by Ivon Silva RN  Outcome: Ongoing     Problem: Infection:  Goal: Will remain free from infection  Description: Will remain free from infection  7/11/2021 0924 by Ivon Silva RN  Outcome: Ongoing     Problem: Safety:  Goal: Free from accidental physical injury  Description: Free from accidental physical injury  7/11/2021 0924 by Ivon Silva RN  Outcome: Ongoing     Problem: Pain:  Goal: Patient's pain/discomfort is manageable  Description: Patient's pain/discomfort is manageable  7/11/2021 0924 by Ivon Silva RN  Outcome: Ongoing

## 2021-07-11 NOTE — PROGRESS NOTES
PM assessment completed, see flowsheet. Patient has no complaints or needs at this time. Nephrostomy tube dressing is clean, dry and intact. Call light within reach, bed in lowest position.

## 2021-07-11 NOTE — PROGRESS NOTES
Occupational Therapy Daily Treatment Note    [facility]  Date:  7/11/2021  Patient Name:    Eli Meza  Admitting diagnosis:  Renal calculi [N20.0]  Admit Date:  7/6/2021  Precautions/Restrictions:   fall risk, IV and cortez catheter  Drains (L nephrostomy)   LEFT PERCUTANEOUS NEPHROLITHOTOMY,  LEFT STENT PLACEMENT  LEFT PCNL >2cm- 7-3-21     Treatment Time:  900-875  Treatment number:  3   Total Treatment Time:   25 minutes    Patient Goals for Session:  \" to brush my teeth \"      Discharge Recommendations: Home with PRN assistance   DME needs for discharge: shower chair - discussed with pt this date        Therapy recommendations for staff:  Assist of 1 (stand by assist) with use of No AD for all ambulation within room     History of Present Illness: per H&P    55 y. o. female with depression, Hep C, HTN who presented to [facility] with complaint of flank pain. Patient reports that she was discharged home not too long ago but hasn't felt well since that time. 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 She had a retroperitoneal hematoma and IR embolizatio was performed and she went in to hemorrhagic shock. She had a UTI as well and was discharged home on Zyvox. She reports that since getting home, she has had flank pain at the site of her nephrostomy tube and has noticed drainage coming from the area as well. She has had nausea and poor PO intake and believes that she has had fevers at home as well.     Home Health S4 Level Recommendation:  NA    AM-PAC Score: AM-PAC Inpatient Daily Activity Raw Score: 20  Pt scored a 20/24 on the AM-PAC ADL Inpatient form. Current research shows that an AM-PAC score of 18 or greater is associated with a discharge to the patient's home setting. Subjective:  Pt supine in bed upon therapist arrival. Pt agreeable to work with therapy this date.       Pain   Yes  Rating: mild  Location: back   Pain Medicine Status: No request made      Cognition:    A&O Person, Place, Time and Situation   Able to follow 2 step commands, consistently. Balance:  Functional Sitting Balance: WNL   Comments: Good at EOB   Functional Standing Balance:WFL   Comments: Good standing at sink     Bed mobility:    Supine to sit: Independent  Sit to supine:   Independent  Rolling:    Independent  Scooting in sitting:  Independent  Scooting to head of bed: Independent   Bridging:   No - related to back pain/drains     Transfers:    Sit to stand:  supervision  Stand to sit:  supervision  Pt completed functional mobility of household distance in preparation for standing ADL/IADLs this date, SBA to manage lines. Activities of Daily Living:   UB Dressing:   Not Tested  LB Dressing:    Independent  UB Bathing:  Not Tested  LB Bathing:  Not Tested  Feeding:  Independent  Grooming:   supervision to brush teeth at the sink   Toileting:  Not Tested    Activity Tolerance:   Pt completed therapy session with No adverse symptoms noted w/activity    Therapeutic Exercise:   N/A    Patient Education:   Role of OT  Recommendations for DC    Positioning Needs: In bed, call light and needs in reach. Family Present:  No    Assessment: Pt with good progress this date. Pt benefits from moving slowly in order to limit pain. Pt manages her drains well with movement. She does reach out for chairs/wall intermittently for balance. Appears to be pain related. Pt may benefit from continued skilled occupational therapy while in the hospital in order to progress to a safe and more indpt level of functioning. GOALS  To be met in 3 Visits:  1). Bed to toilet/BSC: Supervision     To be met in 5 Visits:  1). Supine to/from Sit:             Independent  (goal met 7/11/2021)   2). Upper Body Bathing:         Independent  3). Lower Body Bathing:         Supervision  4). Upper Body Dressing:       Independent  5).  Lower Body Dressing:       Supervision  6).  Pt to demonstrate UE exs x 15 reps with minimal cues      Plan: cont with POC      RADHA Lamb, OTR/L   [id number]       If patient discharges from this facility prior to next visit, this note will serve as the Discharge Summary

## 2021-07-12 ENCOUNTER — APPOINTMENT (OUTPATIENT)
Dept: INTERVENTIONAL RADIOLOGY/VASCULAR | Age: 56
DRG: 446 | End: 2021-07-12
Attending: UROLOGY
Payer: COMMERCIAL

## 2021-07-12 LAB — VANCOMYCIN TROUGH: 16.2 UG/ML (ref 10–20)

## 2021-07-12 PROCEDURE — 6370000000 HC RX 637 (ALT 250 FOR IP): Performed by: INTERNAL MEDICINE

## 2021-07-12 PROCEDURE — 97530 THERAPEUTIC ACTIVITIES: CPT

## 2021-07-12 PROCEDURE — 97116 GAIT TRAINING THERAPY: CPT

## 2021-07-12 PROCEDURE — 50431 NJX PX NFROSGRM &/URTRGRM: CPT

## 2021-07-12 PROCEDURE — 2580000003 HC RX 258: Performed by: UROLOGY

## 2021-07-12 PROCEDURE — 36415 COLL VENOUS BLD VENIPUNCTURE: CPT

## 2021-07-12 PROCEDURE — 80202 ASSAY OF VANCOMYCIN: CPT

## 2021-07-12 PROCEDURE — 6370000000 HC RX 637 (ALT 250 FOR IP): Performed by: UROLOGY

## 2021-07-12 PROCEDURE — 6360000002 HC RX W HCPCS: Performed by: UROLOGY

## 2021-07-12 PROCEDURE — 1200000000 HC SEMI PRIVATE

## 2021-07-12 RX ADMIN — ONDANSETRON HYDROCHLORIDE 4 MG: 2 INJECTION, SOLUTION INTRAMUSCULAR; INTRAVENOUS at 12:39

## 2021-07-12 RX ADMIN — HYDROCHLOROTHIAZIDE 25 MG: 25 TABLET ORAL at 09:13

## 2021-07-12 RX ADMIN — ACETAMINOPHEN 650 MG: 325 TABLET ORAL at 18:31

## 2021-07-12 RX ADMIN — LISINOPRIL 10 MG: 10 TABLET ORAL at 09:13

## 2021-07-12 RX ADMIN — VANCOMYCIN HYDROCHLORIDE 750 MG: 750 INJECTION, POWDER, LYOPHILIZED, FOR SOLUTION INTRAVENOUS at 15:25

## 2021-07-12 RX ADMIN — HYDROXYZINE HYDROCHLORIDE 25 MG: 10 TABLET ORAL at 21:39

## 2021-07-12 RX ADMIN — DOCUSATE SODIUM 50MG AND SENNOSIDES 8.6MG 1 TABLET: 8.6; 5 TABLET, FILM COATED ORAL at 09:13

## 2021-07-12 RX ADMIN — ACETAMINOPHEN 650 MG: 325 TABLET ORAL at 12:39

## 2021-07-12 RX ADMIN — OXYCODONE HYDROCHLORIDE 10 MG: 5 TABLET ORAL at 06:38

## 2021-07-12 RX ADMIN — ACETAMINOPHEN 650 MG: 325 TABLET ORAL at 09:15

## 2021-07-12 RX ADMIN — OXYCODONE HYDROCHLORIDE 10 MG: 5 TABLET ORAL at 15:22

## 2021-07-12 RX ADMIN — ACETAMINOPHEN 650 MG: 325 TABLET ORAL at 02:15

## 2021-07-12 RX ADMIN — OXYCODONE HYDROCHLORIDE 10 MG: 5 TABLET ORAL at 10:23

## 2021-07-12 RX ADMIN — SODIUM CHLORIDE: 9 INJECTION, SOLUTION INTRAVENOUS at 12:30

## 2021-07-12 RX ADMIN — LEVOFLOXACIN 500 MG: 500 TABLET, FILM COATED ORAL at 18:31

## 2021-07-12 RX ADMIN — METOPROLOL SUCCINATE 25 MG: 25 TABLET, EXTENDED RELEASE ORAL at 09:13

## 2021-07-12 RX ADMIN — OXYCODONE HYDROCHLORIDE 10 MG: 5 TABLET ORAL at 02:15

## 2021-07-12 RX ADMIN — POTASSIUM BICARBONATE 40 MEQ: 782 TABLET, EFFERVESCENT ORAL at 12:20

## 2021-07-12 RX ADMIN — HYDROMORPHONE HYDROCHLORIDE 0.5 MG: 1 INJECTION, SOLUTION INTRAMUSCULAR; INTRAVENOUS; SUBCUTANEOUS at 18:31

## 2021-07-12 RX ADMIN — ENOXAPARIN SODIUM 40 MG: 40 INJECTION SUBCUTANEOUS at 15:22

## 2021-07-12 RX ADMIN — OXYCODONE HYDROCHLORIDE 5 MG: 5 TABLET ORAL at 21:39

## 2021-07-12 ASSESSMENT — PAIN SCALES - GENERAL
PAINLEVEL_OUTOF10: 7
PAINLEVEL_OUTOF10: 3
PAINLEVEL_OUTOF10: 7
PAINLEVEL_OUTOF10: 7
PAINLEVEL_OUTOF10: 0
PAINLEVEL_OUTOF10: 5
PAINLEVEL_OUTOF10: 7
PAINLEVEL_OUTOF10: 8
PAINLEVEL_OUTOF10: 7

## 2021-07-12 ASSESSMENT — PAIN DESCRIPTION - PROGRESSION
CLINICAL_PROGRESSION: NOT CHANGED

## 2021-07-12 ASSESSMENT — PAIN DESCRIPTION - PAIN TYPE: TYPE: ACUTE PAIN

## 2021-07-12 ASSESSMENT — PAIN DESCRIPTION - ORIENTATION
ORIENTATION: LEFT

## 2021-07-12 ASSESSMENT — PAIN DESCRIPTION - DESCRIPTORS
DESCRIPTORS: ACHING;CONSTANT
DESCRIPTORS: ACHING
DESCRIPTORS: ACHING
DESCRIPTORS: ACHING;CONSTANT

## 2021-07-12 ASSESSMENT — PAIN DESCRIPTION - LOCATION
LOCATION: ABDOMEN
LOCATION: ABDOMEN;BACK;FLANK

## 2021-07-12 ASSESSMENT — PAIN DESCRIPTION - FREQUENCY
FREQUENCY: CONTINUOUS

## 2021-07-12 NOTE — PLAN OF CARE
Problem: Urinary Elimination:  Goal: Signs and symptoms of infection will decrease  7/12/2021 1027 by Myra Patrick RN  Outcome: Ongoing  7/12/2021 0403 by Alayna Olson RN  Outcome: Ongoing  Goal: Complications related to the disease process, condition or treatment will be avoided or minimized  7/12/2021 1027 by Myra Patrick RN  Outcome: Ongoing  7/12/2021 0403 by Alayna Olson RN  Outcome: Ongoing     Problem: Infection:  Goal: Will remain free from infection  7/12/2021 1027 by Myra Patrick RN  Outcome: Ongoing  7/12/2021 0403 by Alayna Olson RN  Outcome: Ongoing     Problem: Safety:  Goal: Free from accidental physical injury  7/12/2021 1027 by Myra Patrick RN  Outcome: Ongoing  7/12/2021 0403 by Alayna Olson RN  Outcome: Ongoing  Goal: Free from intentional harm  7/12/2021 1027 by Myra Patrick RN  Outcome: Ongoing  7/12/2021 0403 by Alayna Olson RN  Outcome: Ongoing     Problem: Daily Care:  Goal: Daily care needs are met  7/12/2021 1027 by Myra Patrick RN  Outcome: Ongoing  7/12/2021 0403 by Alayna Olson RN  Outcome: Ongoing     Problem: Pain:  Goal: Patient's pain/discomfort is manageable  7/12/2021 1027 by Myra Patrick RN  Outcome: Ongoing  7/12/2021 0403 by Alayna Olson RN  Outcome: Ongoing  Goal: Pain level will decrease  7/12/2021 1027 by Myra Patrick RN  Outcome: Ongoing  7/12/2021 0403 by Alayna Olson RN  Outcome: Ongoing  Goal: Control of acute pain  7/12/2021 1027 by Myra Patrick RN  Outcome: Ongoing  7/12/2021 0403 by Alayna Olson RN  Outcome: Ongoing  Goal: Control of chronic pain  7/12/2021 1027 by Myra Patrick RN  Outcome: Ongoing  7/12/2021 0403 by Alayna Olson RN  Outcome: Ongoing     Problem: Skin Integrity:  Goal: Skin integrity will stabilize  7/12/2021 1027 by Myra Patrick RN  Outcome: Ongoing  7/12/2021 0403 by Alayna Olson RN  Outcome: Ongoing     Problem: Discharge Planning:  Goal: Patients continuum of care needs are met  7/12/2021 1027 by Carson Colunga RN  Outcome: Ongoing  7/12/2021 0403 by Edy Crenshaw RN  Outcome: Ongoing     Problem: Infection - Surgical Site:  Goal: Will show no infection signs and symptoms  7/12/2021 1027 by Carson Colunga RN  Outcome: Ongoing  7/12/2021 0403 by Edy Crenshaw RN  Outcome: Ongoing     Problem: Skin Integrity:  Goal: Will show no infection signs and symptoms  7/12/2021 1027 by Carson Colunga RN  Outcome: Ongoing  7/12/2021 0403 by Edy Crenshaw RN  Outcome: Ongoing  Goal: Absence of new skin breakdown  7/12/2021 1027 by Carson Colunga RN  Outcome: Ongoing  7/12/2021 0403 by Edy Crenshaw RN  Outcome: Ongoing     Problem: Falls - Risk of:  Goal: Will remain free from falls  7/12/2021 1027 by Carson Colunga RN  Outcome: Ongoing  7/12/2021 0403 by Edy Crenshaw RN  Outcome: Ongoing  Goal: Absence of physical injury  7/12/2021 1027 by Carson Colunga RN  Outcome: Ongoing  7/12/2021 0403 by Edy rCenshaw RN  Outcome: Ongoing

## 2021-07-12 NOTE — PROGRESS NOTES
PM assessment completed, see flowsheet. Patient has no complaints or needs at this time, call light within reach. Dressing intact and clean. Call light within reach, bed in lowest position.

## 2021-07-12 NOTE — PROGRESS NOTES
Urology Progress Note    Subjective: I am better. HPI: Patient has been admitted for care after a left PCNL. P/E  /72   Pulse 71   Temp 97.6 °F (36.4 °C) (Oral)   Resp 16   Ht 5' 7\" (1.702 m)   Wt 150 lb (68 kg)   LMP 12/01/2014   SpO2 99%   BMI 23.49 kg/m²   Skin: Intact  Respiratory: Breathing without difficulty, stable. GI: No acute changes, stable po intake. : urine clear. MSK: No acute changes, stable. Neuro: No acute changes, stable. Labs  Lab Results   Component Value Date    WBC 5.0 07/11/2021    HGB 9.2 07/11/2021    HCT 28.8 07/11/2021    MCV 83.9 07/11/2021     07/11/2021     Lab Results   Component Value Date     07/11/2021    K 3.2 07/11/2021    K 4.2 07/07/2021     07/11/2021    CO2 25 07/11/2021    BUN 5 07/11/2021    CREATININE 1.0 07/11/2021    CALCIUM 8.0 07/11/2021       Assessment/Plan  Stable. Reviewed with radiology the xrays from today. No communication was seen with the kidney and the abscess cavity. PLAN:  NT to be removed. Keep cortez in place to ensure maximum drainage of kidney.     Electronically signed by Hong Lundberg MD on 7/12/2021 at 4:13 PM

## 2021-07-12 NOTE — PROGRESS NOTES
Inpatient Physical Therapy Daily Treatment Note    Unit: [facility]  Date:  7/12/2021  Patient Name:    Monica Mcnair  Admitting diagnosis:  Renal calculi [N20.0]  Admit Date:  7/6/2021  Precautions/Restrictions:  Fall risk, Bed/chair alarm and Lines -IV, Murguia catheter and Drains (L nephrostomy)   S/p LEFT PERCUTANEOUS NEPHROLITHOTOMY,  LEFT STENT PLACEMENT  LEFT PCNL >2cm on 7/6      Discharge Recommendations: Home PRN assist   DME needs for discharge: Needs Met       Therapy recommendation for EMS Transport: can transport by wheelchair    Therapy recommendations for staff:   Stand by assist with use of No AD for all transfers and ambulation within room    History of Present Illness: Per Dr. Kessler Post note: \"The patient is a 51 y. o. female with depression, Hep C, HTN who presented to [facility] with complaint of flank pain. Patient reports that she was discharged home not too long ago but hasn't felt well since that time. 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 She had a retroperitoneal hematoma and IR embolizatio was performed and she went in to hemorrhagic shock. She had a UTI as well and was discharged home on Zyvox. She reports that since getting home, she has had flank pain at the site of her nephrostomy tube and has noticed drainage coming from the area as well. She has had nausea and poor PO intake and believes that she has had fevers at home as well. She reports that she followed up with Dr. Martinez Plaza and they discussed possibly removing the nephrostomy tube on July 6th prior to the start of these severe symptoms. \"    Home Health S4 Level Recommendation: NA  AM-PAC Mobility Score   AM-PAC Inpatient Mobility Raw Score : 22       Treatment Time:  5347-0953  Treatment number: 2  Timed Code Treatment Minutes: 33 minutes  Total Treatment Minutes:  33  minutes    Cognition    A&O orientation not directly assessed. Able to follow 2 step commands    Subjective  Patient lying 3/4 turn to right with no family present   Pt agreeable to this PT tx. Pain   Yes  Location: L flank  Rating:    moderate/10  Pain Medicine Status: No request made     Bed Mobility   Supine to Sit:    Supervision with HOB elevated 30* and use of bedrail  Sit to Supine:   Supervision with HOB elevated 30* and use of bedrail  Rolling:   Not Tested  Scooting:   Supervision    Transfer Training     Sit to stand:   SBA  Stand to sit:   SBA  Bed to Chair:   Not Tested with use of N/A    Gait Training gait completed as indicated below  Distance:      100 ft  Deviations (firm surface/linoleum):  decreased laura, forward flexed posture, shuffles and decreased step length bilaterally  Assistive Device Used:    No AD; light use of chair rail in hallway for ~40 ft, pt reports to help with balance and pain control. Pt able to complete without use of chair rail after cuing. Level of Assist:    SBA  Comment: minimal/no SLS time. Stair Training pt deferred  # of Steps:   N/A  Level of Assist:  N/A  UE Support:  NA  Assistive Device:  N/A  Pattern:   N/A  Comments: N/A    Therapeutic Exercise completed in standing position  marching x 6 reps    Balance  Sitting:  Good - ; Modified Independent  Comments: R leaning static posture, impacted by pain    Standing: Good - ; SBA  Comments: without AD    Patient Education      Role of PT, POC, Discharge recommendations, safety awareness and calling for assist with mobility. Positioning Needs       Pt in bed, alarm set, positioned in proper neutral alignment and pressure relief provided. Call light provided and all needs within reach    Activity Tolerance   Pt completed therapy session with Pain noted with activity. Pain at rest as well - not significantly increased with activity. Assessment :  Patient participates well in therapy but is limited by pain.  She is able to ambulate 100 ft without AD however she does prefer to use chair rail for light balance/support and pain mitigation. Pt declined stairs trial today; will initiate NV. Pt reports having help of friend/neighbor and a few family members at baseline. Recommending Home 24 hr supervision upon discharge as patient functioning below baseline level    Goals (all goals ongoing unless otherwise indicated)  To be met in 3 visits:  1). Independent with LE Ex x 10 reps     To be met in 6 visits:  1). Supine to/from sit: Independent  2). Sit to/from stand: Independent  3). Bed to chair: Independent  4). Gait: Ambulate 50 ft.  with  Independent and use of No AD  5). Tolerate B LE exercises 3 sets of 10-15 reps  6). Ascend/descend 7 steps with Supervision with use of hand rail bilateral and No AD    Plan   Continue with plan of care. Signature: Lynn Askew, PT, DPT    If patient discharges from this facility prior to next visit, this note will serve as the Discharge Summary.

## 2021-07-12 NOTE — PROGRESS NOTES
Pharmacy Vancomycin Consult     Vancomycin Day: 7  Current Dosinmg q24h  Current indication: Surgical abscess    Temp max:     Recent Labs     21  0559   BUN 5*   CREATININE 1.0   WBC 5.0       Intake/Output Summary (Last 24 hours) at 2021 1450  Last data filed at 2021 1358  Gross per 24 hour   Intake 1778.69 ml   Output 2360 ml   Net -581.31 ml     Culture Date      Source                       Results      Ht Readings from Last 1 Encounters:   21 5' 7\" (1.702 m)        Wt Readings from Last 1 Encounters:   21 150 lb (68 kg)       Body mass index is 23.49 kg/m². Estimated Creatinine Clearance: 62 mL/min (based on SCr of 1 mg/dL).     Trough: 16.2    Assessment/Plan:  Continue with 750mg q24h, predicted AUC 63 Muriel Swartz :53 PM  .

## 2021-07-13 ENCOUNTER — APPOINTMENT (OUTPATIENT)
Dept: CT IMAGING | Age: 56
DRG: 446 | End: 2021-07-13
Attending: UROLOGY
Payer: COMMERCIAL

## 2021-07-13 PROBLEM — E44.0 MODERATE PROTEIN-CALORIE MALNUTRITION (HCC): Status: ACTIVE | Noted: 2021-07-13

## 2021-07-13 PROCEDURE — 6360000004 HC RX CONTRAST MEDICATION: Performed by: UROLOGY

## 2021-07-13 PROCEDURE — 6370000000 HC RX 637 (ALT 250 FOR IP): Performed by: UROLOGY

## 2021-07-13 PROCEDURE — 1200000000 HC SEMI PRIVATE

## 2021-07-13 PROCEDURE — 74177 CT ABD & PELVIS W/CONTRAST: CPT

## 2021-07-13 PROCEDURE — 2580000003 HC RX 258: Performed by: UROLOGY

## 2021-07-13 PROCEDURE — 6360000002 HC RX W HCPCS: Performed by: UROLOGY

## 2021-07-13 RX ORDER — SODIUM CHLORIDE 9 MG/ML
INJECTION, SOLUTION INTRAVENOUS ONCE
Status: COMPLETED | OUTPATIENT
Start: 2021-07-14 | End: 2021-07-13

## 2021-07-13 RX ADMIN — OXYCODONE HYDROCHLORIDE 10 MG: 5 TABLET ORAL at 17:13

## 2021-07-13 RX ADMIN — SODIUM CHLORIDE: 9 INJECTION, SOLUTION INTRAVENOUS at 04:51

## 2021-07-13 RX ADMIN — METOPROLOL SUCCINATE 25 MG: 25 TABLET, EXTENDED RELEASE ORAL at 08:16

## 2021-07-13 RX ADMIN — ACETAMINOPHEN 650 MG: 325 TABLET ORAL at 18:46

## 2021-07-13 RX ADMIN — DOCUSATE SODIUM 50MG AND SENNOSIDES 8.6MG 1 TABLET: 8.6; 5 TABLET, FILM COATED ORAL at 08:16

## 2021-07-13 RX ADMIN — ACETAMINOPHEN 650 MG: 325 TABLET ORAL at 13:06

## 2021-07-13 RX ADMIN — HYDROCHLOROTHIAZIDE 25 MG: 25 TABLET ORAL at 08:15

## 2021-07-13 RX ADMIN — LEVOFLOXACIN 500 MG: 500 TABLET, FILM COATED ORAL at 18:53

## 2021-07-13 RX ADMIN — LISINOPRIL 10 MG: 10 TABLET ORAL at 08:15

## 2021-07-13 RX ADMIN — Medication 1000 UNITS: at 08:15

## 2021-07-13 RX ADMIN — ACETAMINOPHEN 650 MG: 325 TABLET ORAL at 01:33

## 2021-07-13 RX ADMIN — OXYCODONE HYDROCHLORIDE 10 MG: 5 TABLET ORAL at 23:18

## 2021-07-13 RX ADMIN — ACETAMINOPHEN 650 MG: 325 TABLET ORAL at 08:15

## 2021-07-13 RX ADMIN — SODIUM CHLORIDE, PRESERVATIVE FREE 10 ML: 5 INJECTION INTRAVENOUS at 20:52

## 2021-07-13 RX ADMIN — VANCOMYCIN HYDROCHLORIDE 750 MG: 750 INJECTION, POWDER, LYOPHILIZED, FOR SOLUTION INTRAVENOUS at 14:06

## 2021-07-13 RX ADMIN — SODIUM CHLORIDE 25 ML: 9 INJECTION, SOLUTION INTRAVENOUS at 14:03

## 2021-07-13 RX ADMIN — OXYCODONE HYDROCHLORIDE 10 MG: 5 TABLET ORAL at 08:16

## 2021-07-13 RX ADMIN — ENOXAPARIN SODIUM 40 MG: 40 INJECTION SUBCUTANEOUS at 14:07

## 2021-07-13 RX ADMIN — SODIUM CHLORIDE: 9 INJECTION, SOLUTION INTRAVENOUS at 11:22

## 2021-07-13 RX ADMIN — ONDANSETRON HYDROCHLORIDE 4 MG: 2 INJECTION, SOLUTION INTRAMUSCULAR; INTRAVENOUS at 20:52

## 2021-07-13 RX ADMIN — IOPAMIDOL 75 ML: 755 INJECTION, SOLUTION INTRAVENOUS at 13:41

## 2021-07-13 ASSESSMENT — PAIN SCALES - GENERAL
PAINLEVEL_OUTOF10: 6
PAINLEVEL_OUTOF10: 7
PAINLEVEL_OUTOF10: 2
PAINLEVEL_OUTOF10: 7
PAINLEVEL_OUTOF10: 0
PAINLEVEL_OUTOF10: 7
PAINLEVEL_OUTOF10: 0

## 2021-07-13 ASSESSMENT — PAIN DESCRIPTION - ORIENTATION
ORIENTATION: LEFT

## 2021-07-13 ASSESSMENT — PAIN DESCRIPTION - PROGRESSION
CLINICAL_PROGRESSION: NOT CHANGED
CLINICAL_PROGRESSION: GRADUALLY IMPROVING

## 2021-07-13 ASSESSMENT — PAIN DESCRIPTION - PAIN TYPE
TYPE: ACUTE PAIN

## 2021-07-13 ASSESSMENT — PAIN DESCRIPTION - FREQUENCY
FREQUENCY: INTERMITTENT
FREQUENCY: CONTINUOUS

## 2021-07-13 ASSESSMENT — PAIN DESCRIPTION - LOCATION
LOCATION: ABDOMEN;FLANK
LOCATION: ABDOMEN;BACK;FLANK
LOCATION: ABDOMEN;BACK;FLANK
LOCATION: ABDOMEN

## 2021-07-13 ASSESSMENT — PAIN DESCRIPTION - DESCRIPTORS
DESCRIPTORS: ACHING
DESCRIPTORS: ACHING;CONSTANT

## 2021-07-13 ASSESSMENT — PAIN DESCRIPTION - ONSET: ONSET: GRADUAL

## 2021-07-13 NOTE — PLAN OF CARE
Nutrition Problem #1: Inadequate oral intake  Intervention: Food and/or Nutrient Delivery: Continue Current Diet, Start Oral Nutrition Supplement  Nutritional Goals: Patient will consume 75% or greater of meals on ADULT DIET;  Reglar + 75% or greater of Ensure Enlive with breakfast and lunch meals

## 2021-07-13 NOTE — PROGRESS NOTES
Occupational Therapy    Attempted therapy visit this afternoon. Pt was speaking with another provider. Will re-attempt as time allows.     Madhuri Whitehead Toni 88, OTR/L  #[id number]

## 2021-07-13 NOTE — PLAN OF CARE
Problem: Urinary Elimination:  Goal: Signs and symptoms of infection will decrease  Description: Signs and symptoms of infection will decrease  Outcome: Ongoing     Problem: Infection:  Goal: Will remain free from infection  Description: Will remain free from infection  Outcome: Ongoing     Problem: Daily Care:  Goal: Daily care needs are met  Description: Daily care needs are met  Outcome: Ongoing     Problem: Pain:  Goal: Patient's pain/discomfort is manageable  Description: Patient's pain/discomfort is manageable  Outcome: Ongoing  Goal: Pain level will decrease  Description: Pain level will decrease  Outcome: Ongoing  Goal: Control of acute pain  Description: Control of acute pain  Outcome: Ongoing     Problem: Skin Integrity:  Goal: Skin integrity will stabilize  Description: Skin integrity will stabilize  Outcome: Ongoing

## 2021-07-13 NOTE — PROGRESS NOTES
Vancomycin Day: 8    Patient's labs, cultures, vitals, and vancomycin regimen reviewed. No changes today.   Madison Richards D 1/08/689176:01 AM  .

## 2021-07-13 NOTE — PLAN OF CARE
Problem: Urinary Elimination:  Goal: Signs and symptoms of infection will decrease  7/13/2021 1055 by Carson Colunga RN  Outcome: Ongoing  7/13/2021 0229 by Kelly Laurent RN  Outcome: Ongoing  Goal: Complications related to the disease process, condition or treatment will be avoided or minimized  Outcome: Ongoing     Problem: Infection:  Goal: Will remain free from infection  7/13/2021 1055 by Carson Colunga RN  Outcome: Ongoing  7/13/2021 0229 by Kelly Laurent RN  Outcome: Ongoing     Problem: Safety:  Goal: Free from accidental physical injury  Outcome: Ongoing  Goal: Free from intentional harm  Outcome: Ongoing     Problem: Daily Care:  Goal: Daily care needs are met  7/13/2021 1055 by Carson Colunga RN  Outcome: Ongoing  7/13/2021 0229 by Kelly Laurent RN  Outcome: Ongoing     Problem: Pain:  Goal: Patient's pain/discomfort is manageable  7/13/2021 1055 by Carson Colunga RN  Outcome: Ongoing  7/13/2021 0229 by Kelly Laurent RN  Outcome: Ongoing  Goal: Pain level will decrease  7/13/2021 1055 by Carson Colunga RN  Outcome: Ongoing  7/13/2021 0229 by Kelly Laurnet RN  Outcome: Ongoing  Goal: Control of acute pain  7/13/2021 1055 by Carson Colunga RN  Outcome: Ongoing  7/13/2021 0229 by Kelly Laurent RN  Outcome: Ongoing  Goal: Control of chronic pain  Outcome: Ongoing     Problem: Skin Integrity:  Goal: Skin integrity will stabilize  7/13/2021 1055 by Carson Colunga RN  Outcome: Ongoing  7/13/2021 0229 by Kelly Laurent RN  Outcome: Ongoing     Problem: Discharge Planning:  Goal: Patients continuum of care needs are met  Outcome: Ongoing     Problem: Infection - Surgical Site:  Goal: Will show no infection signs and symptoms  Outcome: Ongoing     Problem: Skin Integrity:  Goal: Will show no infection signs and symptoms  Outcome: Ongoing  Goal: Absence of new skin breakdown  Outcome: Ongoing     Problem: Falls - Risk of:  Goal: Will remain free from falls  Outcome: Ongoing  Goal: Absence of physical injury  Outcome: Ongoing

## 2021-07-13 NOTE — PROGRESS NOTES
Comprehensive Nutrition Assessment    Type and Reason for Visit:  Initial, RD Nutrition Re-Screen/LOS (LOS x7)    Nutrition Recommendations/Plan:   1. Continue ADULT DIET; Regular   2. Add Ensure Enlive to breakfast and lunch meals. 3. Monitor appetite, meal intake, and ONS acceptance/intake. 4. Monitor weight trends, bowel function, nutrition related lab values, and POC. Nutrition Assessment:  patient was nutritional compromised upon admission AEB poor appetite/intake PTA, weight loss PTA, recent hospitalizations ([facility] 4/22-4/30, [facility] 6/27-7/2), and life stressors PTA and she is at risk for further compromise d/t mild fat lass/muscle wasting, poor appetite/intake during admission and s/p left percutaneous nephrolithotomy left stent placement; Will continue ADULT DIET; Regular and add Ensure Enlive to breakfast and lunch meals    Malnutrition Assessment:  Malnutrition Status: Moderate malnutrition    Context:  Acute Illness     Findings of the 6 clinical characteristics of malnutrition:  Energy Intake:  Mild decrease in energy intake (Comment) (x 7 days admission)  Weight Loss:   (8.8% or -14. 3# loss x 2 weeks)     Body Fat Loss:  1 - Mild body fat loss Triceps, Orbital   Muscle Mass Loss:  1 - Mild muscle mass loss Temples (temporalis), Thigh (quadraceps), Hand (interosseous), Clavicles (pectoralis & deltoids)  Fluid Accumulation:  No significant fluid accumulation     Strength:  Not Performed    Estimated Daily Nutrient Needs:  Energy (kcal):  1042-2279 kcal based on 25-28 kcal/kg/CBW; Weight Used for Energy Requirements:  Current     Protein (g):  68-81 g protein based on 1-1.2 g/kg/CBW;  Weight Used for Protein Requirements:  Current        Fluid (ml/day):  5712-5923 ml; Method Used for Fluid Requirements:  1 ml/kcal      Nutrition Related Findings:  patient in bed at time of assessment; skin is pale, dry, warm;  pt reports she has had weight loss since her kidney problems started in august of 2020, however d/t recent hospitalizations and s/p surgery on 7/6, pt has had significant recent weight loss x 2 weeks; pt reports she has been very stressed r/t her kidney issues, +her aunt passed away in November and her sister passed away in March which has caused her more stress; pt reports that her appetite has been \"up and down\", however has slightly improved during admission. +she typicaly tries to eat a few meals a day. she reports that she is \"bad\" with snacking and tends to snack alot; reports UBW between 150-160#; no difficult chewing and/or swallowing; pt lives at home alone, IPTA; Na, K+, BUN, Ca are low; GFR = 58; abd tender, BS active, BM today 7/13; pt reports occasional nausea, especially today; Wounds:  Surgical Incision (surgical incision left lower back s/p left percutaneous nephrolithotomy left stent replacement 7/6)       Current Nutrition Therapies:    ADULT DIET; Regular    Anthropometric Measures:  · Height: 5' 7\" (170.2 cm)  · Current Body Weight: 148 lb 12.8 oz (67.5 kg) (obtained 7/13; actual)   · Admission Body Weight: 150 lb (68 kg) (stated weight)    · Usual Body Weight: 163 lb 1.6 oz (74 kg) (obtained 6/27/21 per past encounters)     · Ideal Body Weight: 135 lbs; % Ideal Body Weight 110.2 %   · BMI: 23.3  · BMI Categories: Normal Weight (BMI 18.5-24. 9)       Nutrition Diagnosis:   · Inadequate oral intake related to inadequate protein-energy intake, psychological cause or life stress as evidenced by intake 0-25%, intake 26-50%, intake 51-75%, poor intake prior to admission, weight loss, mild loss of subcutaneous fat, mild muscle loss, lab values, nausea    Nutrition Interventions:   Food and/or Nutrient Delivery:  Continue Current Diet, Start Oral Nutrition Supplement  Nutrition Education/Counseling:  No recommendation at this time   Coordination of Nutrition Care:  Continue to monitor while inpatient    Goals:  Patient will consume 75% or greater of meals on ADULT DIET;  Reglar + 75% or greater of Ensure Enlive with breakfast and lunch meals       Nutrition Monitoring and Evaluation:   Behavioral-Environmental Outcomes:  None Identified   Food/Nutrient Intake Outcomes:  Supplement Intake, Food and Nutrient Intake  Physical Signs/Symptoms Outcomes:  Biochemical Data, Nutrition Focused Physical Findings, Skin, Weight, Nausea or Vomiting     Discharge Planning:    Continue current diet     Electronically signed by Shay Manzano RD, LD on 7/13/21 at 3:30 PM EDT    Contact: [phone]

## 2021-07-13 NOTE — PROGRESS NOTES
Teaching done on care of cortez catheter and leg bag. Patient demonstrated correctly on how to empty leg bag.

## 2021-07-14 PROCEDURE — 97110 THERAPEUTIC EXERCISES: CPT

## 2021-07-14 PROCEDURE — 97116 GAIT TRAINING THERAPY: CPT

## 2021-07-14 PROCEDURE — 6360000002 HC RX W HCPCS: Performed by: UROLOGY

## 2021-07-14 PROCEDURE — 1200000000 HC SEMI PRIVATE

## 2021-07-14 PROCEDURE — 2580000003 HC RX 258: Performed by: UROLOGY

## 2021-07-14 PROCEDURE — 6370000000 HC RX 637 (ALT 250 FOR IP): Performed by: UROLOGY

## 2021-07-14 RX ADMIN — ACETAMINOPHEN 650 MG: 325 TABLET ORAL at 00:37

## 2021-07-14 RX ADMIN — OXYCODONE HYDROCHLORIDE 10 MG: 5 TABLET ORAL at 21:29

## 2021-07-14 RX ADMIN — OXYCODONE HYDROCHLORIDE 10 MG: 5 TABLET ORAL at 08:24

## 2021-07-14 RX ADMIN — VANCOMYCIN HYDROCHLORIDE 750 MG: 750 INJECTION, POWDER, LYOPHILIZED, FOR SOLUTION INTRAVENOUS at 15:10

## 2021-07-14 RX ADMIN — ACETAMINOPHEN 650 MG: 325 TABLET ORAL at 08:24

## 2021-07-14 RX ADMIN — ONDANSETRON 4 MG: 4 TABLET, ORALLY DISINTEGRATING ORAL at 08:24

## 2021-07-14 RX ADMIN — DOCUSATE SODIUM 50MG AND SENNOSIDES 8.6MG 1 TABLET: 8.6; 5 TABLET, FILM COATED ORAL at 08:24

## 2021-07-14 RX ADMIN — HYDROCHLOROTHIAZIDE 25 MG: 25 TABLET ORAL at 08:24

## 2021-07-14 RX ADMIN — ACETAMINOPHEN 650 MG: 325 TABLET ORAL at 13:17

## 2021-07-14 RX ADMIN — Medication 10 ML: at 08:25

## 2021-07-14 RX ADMIN — ONDANSETRON HYDROCHLORIDE 4 MG: 2 INJECTION, SOLUTION INTRAMUSCULAR; INTRAVENOUS at 21:02

## 2021-07-14 RX ADMIN — ENOXAPARIN SODIUM 40 MG: 40 INJECTION SUBCUTANEOUS at 15:06

## 2021-07-14 RX ADMIN — METOPROLOL SUCCINATE 25 MG: 25 TABLET, EXTENDED RELEASE ORAL at 08:24

## 2021-07-14 RX ADMIN — ONDANSETRON 4 MG: 4 TABLET, ORALLY DISINTEGRATING ORAL at 13:17

## 2021-07-14 RX ADMIN — SODIUM CHLORIDE, PRESERVATIVE FREE 10 ML: 5 INJECTION INTRAVENOUS at 08:26

## 2021-07-14 RX ADMIN — SODIUM CHLORIDE, PRESERVATIVE FREE 10 ML: 5 INJECTION INTRAVENOUS at 21:11

## 2021-07-14 RX ADMIN — LISINOPRIL 10 MG: 10 TABLET ORAL at 08:24

## 2021-07-14 RX ADMIN — LEVOFLOXACIN 500 MG: 500 TABLET, FILM COATED ORAL at 18:10

## 2021-07-14 RX ADMIN — ACETAMINOPHEN 650 MG: 325 TABLET ORAL at 18:10

## 2021-07-14 RX ADMIN — SODIUM CHLORIDE 25 ML: 9 INJECTION, SOLUTION INTRAVENOUS at 15:08

## 2021-07-14 ASSESSMENT — PAIN SCALES - GENERAL
PAINLEVEL_OUTOF10: 7
PAINLEVEL_OUTOF10: 7
PAINLEVEL_OUTOF10: 4
PAINLEVEL_OUTOF10: 0
PAINLEVEL_OUTOF10: 3
PAINLEVEL_OUTOF10: 7
PAINLEVEL_OUTOF10: 2

## 2021-07-14 ASSESSMENT — PAIN DESCRIPTION - PAIN TYPE
TYPE: ACUTE PAIN

## 2021-07-14 ASSESSMENT — PAIN DESCRIPTION - PROGRESSION
CLINICAL_PROGRESSION: NOT CHANGED
CLINICAL_PROGRESSION: NOT CHANGED

## 2021-07-14 ASSESSMENT — PAIN DESCRIPTION - DESCRIPTORS
DESCRIPTORS: ACHING;CONSTANT
DESCRIPTORS: ACHING;DISCOMFORT

## 2021-07-14 ASSESSMENT — PAIN DESCRIPTION - ORIENTATION
ORIENTATION: LEFT

## 2021-07-14 ASSESSMENT — PAIN DESCRIPTION - ONSET
ONSET: AWAKENED FROM SLEEP
ONSET: GRADUAL

## 2021-07-14 ASSESSMENT — PAIN DESCRIPTION - LOCATION
LOCATION: ABDOMEN;FLANK
LOCATION: ABDOMEN
LOCATION: ABDOMEN

## 2021-07-14 ASSESSMENT — PAIN - FUNCTIONAL ASSESSMENT
PAIN_FUNCTIONAL_ASSESSMENT: PREVENTS OR INTERFERES SOME ACTIVE ACTIVITIES AND ADLS
PAIN_FUNCTIONAL_ASSESSMENT: PREVENTS OR INTERFERES SOME ACTIVE ACTIVITIES AND ADLS

## 2021-07-14 NOTE — PLAN OF CARE
physical injury  Outcome: Ongoing     Problem: Nutrition  Goal: Optimal nutrition therapy  Outcome: Ongoing  Goal: Understanding of nutritional guidelines  Outcome: Ongoing

## 2021-07-14 NOTE — PROGRESS NOTES
Cortez Catheter patent and drng yellow clear urine. Patient emptying leg bag on own, and giving self cortez catheter care. Patient understands the increased risk of infection due to the cortez and the importance of catheter care.

## 2021-07-14 NOTE — PLAN OF CARE
Problem: Urinary Elimination:  Goal: Signs and symptoms of infection will decrease  Description: Signs and symptoms of infection will decrease  7/13/2021 2321 by Adwoa Ramírez RN  Outcome: Ongoing  7/13/2021 1055 by Robin Alcala RN  Outcome: Ongoing  Goal: Complications related to the disease process, condition or treatment will be avoided or minimized  Description: Complications related to the disease process, condition or treatment will be avoided or minimized  7/13/2021 2321 by Adwoa Ramírez RN  Outcome: Ongoing  7/13/2021 1055 by Robin Alcala RN  Outcome: Ongoing     Problem: Infection:  Goal: Will remain free from infection  Description: Will remain free from infection  7/13/2021 2321 by Adwoa Ramírez RN  Outcome: Ongoing  7/13/2021 1055 by Robin Alcala RN  Outcome: Ongoing     Problem: Safety:  Goal: Free from accidental physical injury  Description: Free from accidental physical injury  7/13/2021 1055 by Robin Alcala RN  Outcome: Ongoing  Goal: Free from intentional harm  Description: Free from intentional harm  7/13/2021 1055 by Robin Alcala RN  Outcome: Ongoing     Problem: Daily Care:  Goal: Daily care needs are met  Description: Daily care needs are met  7/13/2021 2321 by Adwoa Ramírez RN  Outcome: Ongoing  7/13/2021 1055 by Robin Alcala RN  Outcome: Ongoing     Problem: Pain:  Goal: Patient's pain/discomfort is manageable  Description: Patient's pain/discomfort is manageable  7/13/2021 2321 by Adwoa Ramírez RN  Outcome: Ongoing  7/13/2021 1055 by Robin Alcala RN  Outcome: Ongoing  Goal: Pain level will decrease  Description: Pain level will decrease  7/13/2021 2321 by Adwoa Ramírez RN  Outcome: Ongoing  7/13/2021 1055 by Robin Alcala RN  Outcome: Ongoing  Goal: Control of acute pain  Description: Control of acute pain  7/13/2021 2321 by Adwoa Ramírez RN  Outcome: Ongoing  7/13/2021 1055 by Robin Alcala RN  Outcome: Ongoing  Goal: Control of chronic

## 2021-07-14 NOTE — PROGRESS NOTES
Physical Therapy  Inpatient Physical Therapy Daily Treatment Note    Unit: [address]  Date:  7/14/2021  Patient Name:    Elenita Jimenez  Admitting diagnosis:  Renal calculi [N20.0]  Admit Date:  7/6/2021  Precautions/Restrictions:  Fall risk, Bed/chair alarm and Lines -IV, Murguia catheter and Drains (L nephrostomy)   S/p LEFT PERCUTANEOUS NEPHROLITHOTOMY,  LEFT STENT PLACEMENT  LEFT PCNL >2cm on 7/6      Discharge Recommendations: Home PRN assist   DME needs for discharge: Needs Met       Therapy recommendation for EMS Transport: can transport by wheelchair    Therapy recommendations for staff:   Stand by assist with use of No AD for all transfers and ambulation to/from chair  to/from bathroom  within room    History of Present Illness: Per Dr. Susan Sellers note: \"The patient is a 51 y. o. female with depression, Hep C, HTN who presented to [facility] with complaint of flank pain. Patient reports that she was discharged home not too long ago but hasn't felt well since that time. 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 She had a retroperitoneal hematoma and IR embolizatio was performed and she went in to hemorrhagic shock. She had a UTI as well and was discharged home on Zyvox. She reports that since getting home, she has had flank pain at the site of her nephrostomy tube and has noticed drainage coming from the area as well. She has had nausea and poor PO intake and believes that she has had fevers at home as well. She reports that she followed up with Dr. Raymundo Saucedo and they discussed possibly removing the nephrostomy tube on July 6th prior to the start of these severe symptoms. \"    Home Health S4 Level Recommendation: NA  AM-PAC Mobility Score   AM-PAC Inpatient Mobility Raw Score : 22       Treatment Time:  7080-5727  Treatment number: 3  Timed Code Treatment Minutes: 25 minutes  Total Treatment Minutes:  25 therapy. Pt ambulates up to 120 ft with no AD and SBA, pt tolerates performing 4 steps with BHR to simulate home environment. Pt performs standing HEP with UE support x10 reps each. Pt educated on continuing exercises at home, and how to progress and perform safely. Recommending Home PRN assist upon discharge as patient functioning close to baseline level    Goals (all goals ongoing unless otherwise indicated)  To be met in 3 visits:  1). Independent with LE Ex x 10 reps     To be met in 6 visits:  1).  Supine to/from sit: Independent  2).  Sit to/from stand: Independent  3).  Bed to chair: Independent  4).  Gait: Ambulate 50 ft.  with  Independent and use of No AD  5).  Tolerate B LE exercises 3 sets of 10-15 reps  6).  Ascend/descend 7 steps with Supervision with use of hand rail bilateral and No AD    Plan   Continue with plan of care. Signature: Lenny Ruiz PT, DPT      If patient discharges from this facility prior to next visit, this note will serve as the Discharge Summary.

## 2021-07-14 NOTE — PROGRESS NOTES
Bedside report given to Judy Guzman RN  pt in stable condition no needs at this time.  Call light within reach

## 2021-07-15 DIAGNOSIS — N15.1 ABSCESS OF LEFT KIDNEY: Primary | ICD-10-CM

## 2021-07-15 LAB
ANION GAP SERPL CALCULATED.3IONS-SCNC: 10 MMOL/L (ref 3–16)
ANION GAP SERPL CALCULATED.3IONS-SCNC: 7 MMOL/L (ref 3–16)
BUN BLDV-MCNC: 11 MG/DL (ref 7–20)
BUN BLDV-MCNC: 13 MG/DL (ref 7–20)
CALCIUM SERPL-MCNC: 8.8 MG/DL (ref 8.3–10.6)
CALCIUM SERPL-MCNC: 9 MG/DL (ref 8.3–10.6)
CHLORIDE BLD-SCNC: 102 MMOL/L (ref 99–110)
CHLORIDE BLD-SCNC: 94 MMOL/L (ref 99–110)
CO2: 26 MMOL/L (ref 21–32)
CO2: 29 MMOL/L (ref 21–32)
CREAT SERPL-MCNC: 1.5 MG/DL (ref 0.6–1.1)
CREAT SERPL-MCNC: 1.7 MG/DL (ref 0.6–1.1)
GFR AFRICAN AMERICAN: 38
GFR AFRICAN AMERICAN: 44
GFR NON-AFRICAN AMERICAN: 31
GFR NON-AFRICAN AMERICAN: 36
GLUCOSE BLD-MCNC: 136 MG/DL (ref 70–99)
GLUCOSE BLD-MCNC: 82 MG/DL (ref 70–99)
HCT VFR BLD CALC: 28.4 % (ref 36–48)
HEMOGLOBIN: 9.2 G/DL (ref 12–16)
MCH RBC QN AUTO: 26.8 PG (ref 26–34)
MCHC RBC AUTO-ENTMCNC: 32.4 G/DL (ref 31–36)
MCV RBC AUTO: 82.9 FL (ref 80–100)
PDW BLD-RTO: 18 % (ref 12.4–15.4)
PLATELET # BLD: 162 K/UL (ref 135–450)
PMV BLD AUTO: 9 FL (ref 5–10.5)
POTASSIUM REFLEX MAGNESIUM: 4.1 MMOL/L (ref 3.5–5.1)
POTASSIUM SERPL-SCNC: 4.4 MMOL/L (ref 3.5–5.1)
RBC # BLD: 3.42 M/UL (ref 4–5.2)
SODIUM BLD-SCNC: 130 MMOL/L (ref 136–145)
SODIUM BLD-SCNC: 138 MMOL/L (ref 136–145)
VANCOMYCIN TROUGH: 44.1 UG/ML (ref 10–20)
WBC # BLD: 4.8 K/UL (ref 4–11)

## 2021-07-15 PROCEDURE — 97530 THERAPEUTIC ACTIVITIES: CPT

## 2021-07-15 PROCEDURE — 80048 BASIC METABOLIC PNL TOTAL CA: CPT

## 2021-07-15 PROCEDURE — 6370000000 HC RX 637 (ALT 250 FOR IP): Performed by: UROLOGY

## 2021-07-15 PROCEDURE — 80202 ASSAY OF VANCOMYCIN: CPT

## 2021-07-15 PROCEDURE — 1200000000 HC SEMI PRIVATE

## 2021-07-15 PROCEDURE — 97535 SELF CARE MNGMENT TRAINING: CPT

## 2021-07-15 PROCEDURE — 85027 COMPLETE CBC AUTOMATED: CPT

## 2021-07-15 PROCEDURE — 36415 COLL VENOUS BLD VENIPUNCTURE: CPT

## 2021-07-15 PROCEDURE — 6360000002 HC RX W HCPCS: Performed by: UROLOGY

## 2021-07-15 PROCEDURE — 2580000003 HC RX 258: Performed by: UROLOGY

## 2021-07-15 RX ADMIN — ACETAMINOPHEN 650 MG: 325 TABLET ORAL at 12:37

## 2021-07-15 RX ADMIN — LISINOPRIL 10 MG: 10 TABLET ORAL at 08:11

## 2021-07-15 RX ADMIN — ACETAMINOPHEN 650 MG: 325 TABLET ORAL at 18:19

## 2021-07-15 RX ADMIN — SODIUM CHLORIDE, PRESERVATIVE FREE 10 ML: 5 INJECTION INTRAVENOUS at 08:11

## 2021-07-15 RX ADMIN — LEVOFLOXACIN 500 MG: 500 TABLET, FILM COATED ORAL at 18:18

## 2021-07-15 RX ADMIN — METOPROLOL SUCCINATE 25 MG: 25 TABLET, EXTENDED RELEASE ORAL at 08:11

## 2021-07-15 RX ADMIN — OXYCODONE HYDROCHLORIDE 10 MG: 5 TABLET ORAL at 08:10

## 2021-07-15 RX ADMIN — VANCOMYCIN HYDROCHLORIDE 750 MG: 750 INJECTION, POWDER, LYOPHILIZED, FOR SOLUTION INTRAVENOUS at 14:14

## 2021-07-15 RX ADMIN — ACETAMINOPHEN 650 MG: 325 TABLET ORAL at 08:10

## 2021-07-15 RX ADMIN — DOCUSATE SODIUM 50MG AND SENNOSIDES 8.6MG 1 TABLET: 8.6; 5 TABLET, FILM COATED ORAL at 08:10

## 2021-07-15 RX ADMIN — SODIUM CHLORIDE 25 ML: 9 INJECTION, SOLUTION INTRAVENOUS at 14:14

## 2021-07-15 RX ADMIN — ENOXAPARIN SODIUM 40 MG: 40 INJECTION SUBCUTANEOUS at 14:08

## 2021-07-15 RX ADMIN — HYDROCHLOROTHIAZIDE 25 MG: 25 TABLET ORAL at 08:10

## 2021-07-15 RX ADMIN — ACETAMINOPHEN 650 MG: 325 TABLET ORAL at 01:10

## 2021-07-15 RX ADMIN — SODIUM CHLORIDE, PRESERVATIVE FREE 10 ML: 5 INJECTION INTRAVENOUS at 20:17

## 2021-07-15 RX ADMIN — OXYCODONE HYDROCHLORIDE 10 MG: 5 TABLET ORAL at 22:07

## 2021-07-15 ASSESSMENT — PAIN DESCRIPTION - LOCATION
LOCATION: ABDOMEN;FLANK

## 2021-07-15 ASSESSMENT — PAIN DESCRIPTION - PAIN TYPE
TYPE: ACUTE PAIN

## 2021-07-15 ASSESSMENT — PAIN SCALES - GENERAL
PAINLEVEL_OUTOF10: 7
PAINLEVEL_OUTOF10: 7
PAINLEVEL_OUTOF10: 0
PAINLEVEL_OUTOF10: 0
PAINLEVEL_OUTOF10: 5
PAINLEVEL_OUTOF10: 7
PAINLEVEL_OUTOF10: 7

## 2021-07-15 ASSESSMENT — PAIN DESCRIPTION - PROGRESSION
CLINICAL_PROGRESSION: GRADUALLY WORSENING
CLINICAL_PROGRESSION: GRADUALLY WORSENING

## 2021-07-15 ASSESSMENT — PAIN DESCRIPTION - ORIENTATION
ORIENTATION: LEFT

## 2021-07-15 ASSESSMENT — PAIN DESCRIPTION - FREQUENCY
FREQUENCY: INTERMITTENT
FREQUENCY: INTERMITTENT

## 2021-07-15 ASSESSMENT — PAIN DESCRIPTION - ONSET
ONSET: ON-GOING
ONSET: ON-GOING

## 2021-07-15 ASSESSMENT — PAIN DESCRIPTION - DESCRIPTORS
DESCRIPTORS: BURNING;CRAMPING
DESCRIPTORS: BURNING;CRAMPING

## 2021-07-15 NOTE — PLAN OF CARE
Problem: Urinary Elimination:  Goal: Signs and symptoms of infection will decrease  Description: Signs and symptoms of infection will decrease  Outcome: Ongoing  Goal: Complications related to the disease process, condition or treatment will be avoided or minimized  Description: Complications related to the disease process, condition or treatment will be avoided or minimized  Outcome: Ongoing     Problem: Daily Care:  Goal: Daily care needs are met  Description: Daily care needs are met  Outcome: Ongoing     Problem: Pain:  Goal: Patient's pain/discomfort is manageable  Description: Patient's pain/discomfort is manageable  Outcome: Ongoing  Goal: Pain level will decrease  Description: Pain level will decrease  Outcome: Ongoing     Problem: Discharge Planning:  Goal: Patients continuum of care needs are met  Description: Patients continuum of care needs are met  Outcome: Ongoing

## 2021-07-15 NOTE — PROGRESS NOTES
Millicent c/o nausea when RN entereed room. Zofran given per eMar.    Patient c/o pain 7/10 on the left side at neprostomy site. Dressing is clean, dry and intact. Medication given per eMar 20 minutes after Zofran to prevent vomiting. Patient needed clarification if additional tests needed to be run and discharge information (ie will she be discharged home soon). Patient verbalized understanding. Patient states the leg bag is irritating and requests to take it off. Educated patient on need for leg bag. Stool softner held, patient states she had 2-3 BM today. Denies any needs and does not have any additional questions at this time. See flowsheet for additional documentation.

## 2021-07-15 NOTE — PROGRESS NOTES
Urology Attending Progress Note      Subjective: pt denies pain or fever    Vitals:  /68   Pulse 63   Temp 97.6 °F (36.4 °C) (Oral)   Resp 16   Ht 5' 7\" (1.702 m)   Wt 148 lb 12.8 oz (67.5 kg)   LMP 2014   SpO2 96%   BMI 23.31 kg/m²   Temp  Av °F (36.7 °C)  Min: 97.6 °F (36.4 °C)  Max: 98.6 °F (37 °C)    Intake/Output Summary (Last 24 hours) at 7/15/2021 1147  Last data filed at 7/15/2021 1100  Gross per 24 hour   Intake 502.03 ml   Output 1385 ml   Net -882.97 ml       Exam: abd soft    Labs:  WBC:    Lab Results   Component Value Date    WBC 4.8 07/15/2021     Hemoglobin/Hematocrit:    Lab Results   Component Value Date    HGB 9.2 07/15/2021    HCT 28.4 07/15/2021     BMP:    Lab Results   Component Value Date     07/15/2021    K 4.4 07/15/2021    K 4.2 2021     07/15/2021    CO2 29 07/15/2021    BUN 11 07/15/2021    LABALBU 1.8 2021    CREATININE 1.5 07/15/2021    CALCIUM 8.8 07/15/2021    GFRAA 44 07/15/2021    LABGLOM 36 07/15/2021     PT/INR:    Lab Results   Component Value Date    PROTIME 13.3 2021    INR 1.17 2021     PTT:    Lab Results   Component Value Date    APTT 47.6 2021   [APTT    Impression/Plan: retained stent and perinephric fluid collections  1. D/c cortez  2. Radiology feels fluid collections are smaller.   Will discharge home tomorrow with drains and f/u in 1 week with CT scan as outpatient with Dr Servando Lan MD

## 2021-07-15 NOTE — PLAN OF CARE
Problem: Urinary Elimination:  Goal: Signs and symptoms of infection will decrease  Description: Signs and symptoms of infection will decrease  7/15/2021 1021 by Luanne Brown RN  Outcome: Ongoing  7/15/2021 0032 by Shital Navarro RN  Outcome: Ongoing  Goal: Complications related to the disease process, condition or treatment will be avoided or minimized  Description: Complications related to the disease process, condition or treatment will be avoided or minimized  7/15/2021 0032 by Shital Navarro RN  Outcome: Ongoing     Problem: Daily Care:  Goal: Daily care needs are met  Description: Daily care needs are met  7/15/2021 1021 by Luanne Brown RN  Outcome: Ongoing  7/15/2021 0032 by Shital Navarro RN  Outcome: Ongoing     Problem: Pain:  Goal: Patient's pain/discomfort is manageable  Description: Patient's pain/discomfort is manageable  7/15/2021 0032 by Shital Navarro RN  Outcome: Ongoing  Goal: Pain level will decrease  Description: Pain level will decrease  7/15/2021 1021 by Luanne Brown RN  Outcome: Ongoing  7/15/2021 0032 by Shital Navarro RN  Outcome: Ongoing     Problem: Discharge Planning:  Goal: Patients continuum of care needs are met  Description: Patients continuum of care needs are met  7/15/2021 0032 by Shital Navarro RN  Outcome: Ongoing

## 2021-07-15 NOTE — PROGRESS NOTES
Occupational Therapy Daily Treatment Note    Unit: [facility]  Date:  7/15/2021  Patient Name:    Usha Mckeon  Admitting diagnosis:  Renal calculi [N20.0]  Admit Date:  7/6/2021  Precautions/Restrictions:   fall risk, IV and cortez catheter  Drains (L nephrostomy)   LEFT PERCUTANEOUS NEPHROLITHOTOMY,  LEFT STENT PLACEMENT  LEFT PCNL >2cm- 7-3-21     Treatment Time:  0660-7499  Treatment number: 4    Total Treatment Time:  35  minutes    Patient Goals for Session:  \" I would like to walk  \"      Discharge Recommendations: Home with PRN assistance   DME needs for discharge: shower chair - discussed with pt this date        Therapy recommendations for staff:  Assist of 1 (stand by assist) with use of No AD for all ambulation in room and hallway     History of Present Illness: per H&P    55 y. o. female with depression, Hep C, HTN who presented to [facility] with complaint of flank pain. Patient reports that she was discharged home not too long ago but hasn't felt well since that time. 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 She had a retroperitoneal hematoma and IR embolizatio was performed and she went in to hemorrhagic shock. She had a UTI as well and was discharged home on Zyvox. She reports that since getting home, she has had flank pain at the site of her nephrostomy tube and has noticed drainage coming from the area as well. She has had nausea and poor PO intake and believes that she has had fevers at home as well.     Home Health S4 Level Recommendation:  NA    AM-PAC Score: AM-PAC Inpatient Daily Activity Raw Score: 20  Pt scored a 20/24 on the AM-PAC ADL Inpatient form. Current research shows that an AM-PAC score of 18 or greater is associated with a discharge to the patient's home setting. Subjective:  Pt supine in bed upon therapist arrival. Pt agreeable to work with therapy this date.       Pain   Yes  Rating: mild  Location: back   Pain Medicine Status: No request made      Cognition:    A&O Person, Place, Time and Situation   Able to follow 2 step commands, consistently. Balance:  Functional Sitting Balance: WNL  Functional Standing Balance:WFL    Bed mobility:    Supine to sit: Independent  Sit to supine:   NT  Rolling:    Independent  Scooting in sitting:  Independent  Scooting to head of bed:   NT   Bridging:   No - related to back pain/drains     Transfers:    Sit to stand:  supervision- IND  Stand to sit:  supervision- IND  Pt completed functional mobility of household distance in preparation for standing ADL/IADLs this date,    Activities of Daily Living:   UB Dressing:   Not Tested  LB Dressing:    Independent  UB Bathing:  Not Tested  LB Bathing:  Not Tested  Feeding:  Independent  Grooming:   NT  Toileting:  Not Tested    Activity Tolerance:   Pt completed therapy session with No adverse symptoms noted w/activity    Therapeutic Exercise:   Red thera band  Shoulder horizontal abduction 10x  Shoulder flex 10x  Shoulder punches 10x  Elbow flex/ ext 10x   Patient Education:   Role of OT  Recommendations for DC  Instructed in energy conservation  Positioning Needs: In bed, call light and needs in reach. Family Present:  No    Assessment: Pt with good progress this date. Pt ind with bed mobility and sup to IND with transfers Pt ind with LB dressing. The pt was instructed in energy conservation. The pt performed UB exercises using red theraband and tolerated well. Jovanny Jacinto Appears to be pain related. Pt may benefit from continued skilled occupational therapy while in the hospital in order to progress to a safe and more indpt level of functioning. GOALS  To be met in 3 Visits:  1). Bed to toilet/BSC: Supervision - goal met 7-15  New goal IND      To be met in 5 Visits:  1). Supine to/from Sit:             Independent  (goal met 7/11/2021)   2). Upper Body Bathing:         Independent  3).  Lower Body Bathing:         Supervision  4). Upper Body Dressing:       Independent- goal met 7-15   5). Lower Body Dressing:       Supervision  6).  Pt to demonstrate UE exs x 15 reps with minimal cues      Plan: cont with IRA Hawkins OTR/L [id number]      If patient discharges from this facility prior to next visit, this note will serve as the Discharge Summary

## 2021-07-15 NOTE — PROGRESS NOTES
Bedside report given to [facility] R[facility] RN pt in stable condition no needs at this time.  Call light within reach Pt seen today in anticoagulation clinic. Past INR and dosing reviewed with patient. Patient denies any missed or additional doses of warfarin. Patient denies any changes to meds or health. Patient states she is starting 21 day fix diet and will be eating more greens. Due to INR lower side of range and increase in vit K foods, will increase dosing slightly and recheck in two weeks. Patient reminded to call with any changes. Warfarin dosed per protocol. On site provider today Dr. Morales.

## 2021-07-16 VITALS
BODY MASS INDEX: 22.18 KG/M2 | HEIGHT: 67 IN | TEMPERATURE: 98.1 F | HEART RATE: 70 BPM | DIASTOLIC BLOOD PRESSURE: 65 MMHG | WEIGHT: 141.31 LBS | RESPIRATION RATE: 16 BRPM | SYSTOLIC BLOOD PRESSURE: 106 MMHG | OXYGEN SATURATION: 97 %

## 2021-07-16 PROBLEM — N15.1 PERINEPHRIC ABSCESS: Status: ACTIVE | Noted: 2021-07-16

## 2021-07-16 PROBLEM — E43 SEVERE PROTEIN-CALORIE MALNUTRITION (HCC): Status: ACTIVE | Noted: 2021-07-13

## 2021-07-16 LAB
ANION GAP SERPL CALCULATED.3IONS-SCNC: 11 MMOL/L (ref 3–16)
BUN BLDV-MCNC: 13 MG/DL (ref 7–20)
CALCIUM SERPL-MCNC: 8.9 MG/DL (ref 8.3–10.6)
CHLORIDE BLD-SCNC: 96 MMOL/L (ref 99–110)
CO2: 24 MMOL/L (ref 21–32)
CREAT SERPL-MCNC: 1.6 MG/DL (ref 0.6–1.1)
GFR AFRICAN AMERICAN: 40
GFR NON-AFRICAN AMERICAN: 33
GLUCOSE BLD-MCNC: 96 MG/DL (ref 70–99)
POTASSIUM SERPL-SCNC: 4.4 MMOL/L (ref 3.5–5.1)
SODIUM BLD-SCNC: 131 MMOL/L (ref 136–145)
VANCOMYCIN TROUGH: 23.3 UG/ML (ref 10–20)

## 2021-07-16 PROCEDURE — 2580000003 HC RX 258: Performed by: UROLOGY

## 2021-07-16 PROCEDURE — 36415 COLL VENOUS BLD VENIPUNCTURE: CPT

## 2021-07-16 PROCEDURE — 6360000002 HC RX W HCPCS: Performed by: UROLOGY

## 2021-07-16 PROCEDURE — 6370000000 HC RX 637 (ALT 250 FOR IP): Performed by: UROLOGY

## 2021-07-16 PROCEDURE — 80202 ASSAY OF VANCOMYCIN: CPT

## 2021-07-16 PROCEDURE — 80048 BASIC METABOLIC PNL TOTAL CA: CPT

## 2021-07-16 RX ORDER — LEVOFLOXACIN 500 MG/1
250 TABLET, FILM COATED ORAL EVERY 24 HOURS
Status: DISCONTINUED | OUTPATIENT
Start: 2021-07-16 | End: 2021-07-16 | Stop reason: HOSPADM

## 2021-07-16 RX ORDER — LEVOFLOXACIN 250 MG/1
250 TABLET ORAL DAILY
Qty: 14 TABLET | Refills: 0 | Status: SHIPPED | OUTPATIENT
Start: 2021-07-16 | End: 2021-07-30

## 2021-07-16 RX ADMIN — ONDANSETRON 4 MG: 4 TABLET, ORALLY DISINTEGRATING ORAL at 17:13

## 2021-07-16 RX ADMIN — SODIUM CHLORIDE, PRESERVATIVE FREE 10 ML: 5 INJECTION INTRAVENOUS at 09:17

## 2021-07-16 RX ADMIN — ACETAMINOPHEN 650 MG: 325 TABLET ORAL at 14:46

## 2021-07-16 RX ADMIN — ACETAMINOPHEN 650 MG: 325 TABLET ORAL at 01:48

## 2021-07-16 RX ADMIN — ENOXAPARIN SODIUM 40 MG: 40 INJECTION SUBCUTANEOUS at 14:45

## 2021-07-16 RX ADMIN — ACETAMINOPHEN 650 MG: 325 TABLET ORAL at 09:15

## 2021-07-16 RX ADMIN — OXYCODONE HYDROCHLORIDE 10 MG: 5 TABLET ORAL at 06:12

## 2021-07-16 ASSESSMENT — PAIN DESCRIPTION - ONSET
ONSET: ON-GOING
ONSET: ON-GOING

## 2021-07-16 ASSESSMENT — PAIN DESCRIPTION - PAIN TYPE
TYPE: ACUTE PAIN

## 2021-07-16 ASSESSMENT — PAIN DESCRIPTION - ORIENTATION
ORIENTATION: LEFT

## 2021-07-16 ASSESSMENT — PAIN DESCRIPTION - LOCATION
LOCATION: ABDOMEN;FLANK
LOCATION: ABDOMEN;FLANK
LOCATION: ABDOMEN;BACK
LOCATION: ABDOMEN;BACK

## 2021-07-16 ASSESSMENT — PAIN DESCRIPTION - FREQUENCY
FREQUENCY: INTERMITTENT
FREQUENCY: INTERMITTENT

## 2021-07-16 ASSESSMENT — PAIN SCALES - GENERAL
PAINLEVEL_OUTOF10: 0
PAINLEVEL_OUTOF10: 0
PAINLEVEL_OUTOF10: 2
PAINLEVEL_OUTOF10: 3
PAINLEVEL_OUTOF10: 6
PAINLEVEL_OUTOF10: 0
PAINLEVEL_OUTOF10: 0
PAINLEVEL_OUTOF10: 1
PAINLEVEL_OUTOF10: 0
PAINLEVEL_OUTOF10: 8

## 2021-07-16 ASSESSMENT — PAIN DESCRIPTION - PROGRESSION
CLINICAL_PROGRESSION: GRADUALLY IMPROVING
CLINICAL_PROGRESSION: GRADUALLY WORSENING

## 2021-07-16 ASSESSMENT — PAIN DESCRIPTION - DESCRIPTORS
DESCRIPTORS: BURNING;CRAMPING
DESCRIPTORS: BURNING;CRAMPING

## 2021-07-16 NOTE — PLAN OF CARE
Problem: Urinary Elimination:  Goal: Signs and symptoms of infection will decrease  Description: Signs and symptoms of infection will decrease  Outcome: Ongoing     Problem: Pain:  Goal: Control of acute pain  Description: Control of acute pain  Outcome: Ongoing     Problem: Skin Integrity:  Goal: Skin integrity will stabilize  Description: Skin integrity will stabilize  Outcome: Ongoing

## 2021-07-16 NOTE — CARE COORDINATION
INTERDISCIPLINARY PLAN OF CARE CONFERENCE    Date/Time: 7/12/2021 10:44 AM  Completed by: DAVIN Mccoy. Case Management      Patient Name:  Man Soto  YOB: 1965  Admitting Diagnosis: Renal calculi [N20.0]     Admit Date/Time:  7/6/2021  6:27 AM    Chart reviewed. Interdisciplinary team contacted or reviewed plan related to patient progress and discharge plans. Disciplines included Case Management, Nursing, and Dietitian. Current Status:Ongoing. Repeat imaging per note from Dr. Sho Garcia on 07/10/2021  PT/OT recommendation for discharge plan of care: home with PRN assist and Shower Chair    Expected D/C Disposition:  Home  Confirmed plan with patient and/or family Yes confirmed with: (name) pt  Met with:pt    Discharge Plan Comments: Chart review completed. Met with pt at bedside. She confirmed she plans on returning home. Inquired about HHC and explained that it may be beneficial to assist with strength and medications. She stated agreement but is declining HHC. She stated she feels comfortable returning home without [facility] and will notify her PCP if she decides she wants [facility] once home. She denied needs or questions for CM.     Home O2 in place on admit: No
INTERDISCIPLINARY PLAN OF CARE CONFERENCE    Date/Time: 7/13/2021 11:41 AM  Completed by:  DAVIN Rodriguez. Case Management      Patient Name:  Sravan Pena  YOB: 1965  Admitting Diagnosis: Renal calculi [N20.0]     Admit Date/Time:  7/6/2021  6:27 AM    Chart reviewed. Interdisciplinary team contacted or reviewed plan related to patient progress and discharge plans. Disciplines included Case Management, Nursing, and Dietitian. Current Status: 07/06/2021  PT/OT recommendation for discharge plan of care: home with PRN assist and Shower Chair    Expected D/C Disposition:  Home  Confirmed plan with patient and/or family Yes confirmed with: (name) pt  Met with:pt    Discharge Plan Comments: Chart review completed. Met with pt at bedside. Discussed the order to Case Management and order to Social Work with the pt. Pt was given a [facility] list. Pt is unsure if she wants [facility]. Explained it would be beneficial if she is discharged home with the drain as an RN can assist with it. She stated that she had one before she came to the hospital and didn't have [facility]. Encouraged her to think about [facility] and she stated agreement. Pt stated that she uses [facility] for transportation to her appointments but they can't take her if she has certain procedures requiring sedation. She stated that she has some family and a few people that can assist with transport. Inquired about her insurance for transportation and she stated that she has called them in the past and they aren't able to transport her for certain reasons. She denied needs or questions for CM. Resources placed on AVS for transportation assistance in [facility] including [address] and [employer].      Home O2 in place on admit: No
INTERDISCIPLINARY PLAN OF CARE CONFERENCE    Date/Time: 7/16/2021 8:49 AM  Completed by:  DAVIN Mccoy. Case Management      Patient Name:  Man Soto  YOB: 1965  Admitting Diagnosis: Renal calculi [N20.0]     Admit Date/Time:  7/6/2021  6:27 AM    Chart reviewed. Interdisciplinary team contacted or reviewed plan related to patient progress and discharge plans. Disciplines included Case Management, Nursing, and Dietitian. Current Status:Ongoing   PT/OT recommendation for discharge plan of care: Home with PRN assist and a Shower Chair    Expected D/C Disposition:  Home  Confirmed plan with patient and/or family Yes confirmed with: (name) pt  Met with:pt    Discharge Plan Comments: Chart review completed. Met with pt at bedside. She confirmed she is returning home. Again discussed HHC with the pt for RN/PT/OT and pt declined stating \"I will be fine\". Pt aware to follow up with her PCP and MD if she decides she wants it once home. She stated agreement. Discussed the Shower Chair recommendation with pt and explained writer can see if her insurance will cover one through a DME company. Pt declined stated she will obtain one on her own from a store; she mentioned [employer]. She denied needs or questions for CM.     Home O2 in place on admit: No
INTERDISCIPLINARY PLAN OF CARE CONFERENCE    Date/Time: 7/7/2021 2:57 PM  Completed by: Natasha Patel RN, Case Management      Patient Name:  Anabelle Sexton  YOB: 1965  Admitting Diagnosis: Renal calculi [N20.0]     Admit Date/Time:  7/6/2021  6:27 AM    Chart reviewed. Interdisciplinary team contacted or reviewed plan related to patient progress and discharge plans. Disciplines included Case Management, Nursing, and Dietitian. Current Status:2west stable  PT/OT recommendation for discharge plan of care: NA    Expected D/C Disposition:  Home  Confirmed plan with patient and/or family Yes confirmed with: (name) pt  Met with:pt  Discharge Plan Comments: Chart reviewed. Pt continues with plan to return home alone. Pt continues to refuse HHC needs. Will follow for dc needs.      Home O2 in place on admit: No
INTERDISCIPLINARY PLAN OF CARE CONFERENCE    Date/Time: 7/9/2021 9:01 AM  Completed by:  DAVIN Ryder. Case Management      Patient Name:  Polly Harvey  YOB: 1965  Admitting Diagnosis: Renal calculi [N20.0]     Admit Date/Time:  7/6/2021  6:27 AM    Chart reviewed. Interdisciplinary team contacted or reviewed plan related to patient progress and discharge plans. Disciplines included Case Management, Nursing, and Dietitian. Current Status:Ongoing  PT/OT recommendation for discharge plan of care: home with PRN assist and Home independently     Expected D/C Disposition:  Home  Confirmed plan with patient and/or family Yes confirmed with: (name) pt  Met with:pt    Discharge Plan Comments: Chart review completed. Met with pt at bedside. She confirmed she plans on returning home when discharged. Inquired about PRN assist and she stated her neighbor will be able to assist PRN. Inquired about [address] for RN/PT/OT and pt declined [address] at this time stating she doesn't need it. She denied current needs or questions for CM.     Home O2 in place on admit: No
refusing at this time. Will follow for dc needs.

## 2021-07-16 NOTE — PROGRESS NOTES
Pt given written and verbal discharge instructions. Pt indicated understanding of home medication and care instructions. Prescriptions sent to pt preferred pharmacy. Pt packed own belongings. Pt taken down to taxi cab via wheelchair.

## 2021-07-16 NOTE — PROGRESS NOTES
Patient c/o pain in abdomen and left flank at 7/10. Pain medication given per MAR. Vitals per flow sheet. Patient satisfied/alert and oriented with no signs of distress. Denied other needs at this time. Call light in reach.

## 2021-07-16 NOTE — DISCHARGE SUMMARY
Physician Discharge Summary     Patient ID:  Man Soto  [id number]  55 y.o.  1965    Admit date: 7/6/2021    Discharge date and time: 7/16/2021  5:23 PM     Admitting Physician: Thomas Barlow MD     Discharge Physician: Jesus Lopez MD    Admission Diagnoses: Renal calculi [N20.0]    Discharge Diagnoses: renal abscess, jeff-renal pelvis - abscess fistula    Admission Condition: fair    Discharged Condition: fair    Indication for Admission: renal abscess, large renal stones    Hospital Course: s/p PCNL, stone extraction (~3cm stone) and ureteral stent placement. She had known renal abscess perinephric area prior to admit. We check neph tube and a small connection was seen with abscess cavity so we kept nephrostomy to gravity a few days and repeat nephrogram and it closed so neph tube removed by IR. See IR notes. Then Abscess-o-gram checked and abscess tube upsize and cavity did shrink some. IV abx continued  ID team called and rec'd okay to dc with levofloxacin  Home health offered  Pts cortez removed  Stent indwelling and abscess drain on dc to home -  Check abscess drain in 1 week with CT and try to remove it. Levoflox 2-3 wks until all tubes removed. Consults: IR    Discharge Exam:  rrr  nad  resp unlabor  Legs supple    Disposition: home    In process/preliminary results:  Outstanding Order Results     No orders found from 6/7/2021 to 7/7/2021.           Patient Instructions:   Discharge Medication List as of 7/16/2021  4:54 PM      START taking these medications    Details   levoFLOXacin (LEVAQUIN) 250 MG tablet Take 1 tablet by mouth daily for 14 days, Disp-14 tablet, R-0Normal         CONTINUE these medications which have NOT CHANGED    Details   lisinopril (PRINIVIL;ZESTRIL) 10 MG tablet Take 10 mg by mouth dailyHistorical Med      hydroCHLOROthiazide (HYDRODIURIL) 25 MG tablet Take 25 mg by mouth dailyHistorical Med      hydrOXYzine (ATARAX) 25 MG tablet Take 25 mg by mouth 3 times daily as needed for ItchingHistorical Med      metoprolol succinate (TOPROL XL) 25 MG extended release tablet Take 1 tablet by mouth daily, Disp-30 tablet, R-1Normal      aspirin 81 MG chewable tablet Take 81 mg by mouth dailyHistorical Med      vitamin D (CHOLECALCIFEROL) 25 MCG (1000 UT) TABS tablet Take 1,000 Units by mouth once a weekHistorical Med         STOP taking these medications       oxyCODONE-acetaminophen (PERCOCET) 5-325 MG per tablet Comments:   Reason for Stopping:         ciprofloxacin (CIPRO) 500 MG tablet Comments:   Reason for Stopping:             Activity: light walking  Diet: regular diet  Wound Care: keep wound clean and dry      Fu as above      7/19/2021  3:53 PM

## 2021-07-16 NOTE — FLOWSHEET NOTE
07/09/21 0818   Vital Signs   Temp 97.6 °F (36.4 °C)   Temp Source Oral   Pulse 62   Heart Rate Source Monitor   Resp 16   BP (!) 156/78   BP Location Left upper arm   Patient Position Right side   Level of Consciousness Alert (0)   MEWS Score 1   Oxygen Therapy   SpO2 99 %   O2 Device None (Room air)   AM assessment completed, see flow sheet. Pt is alert and oriented. BP elevated. Respirations are even & easy. No complaints voiced. Pt denies needs at this time. SR up x 2, and bed in low position. Call light is within reach. Pt states 7/10pain, admin dilaudid.
07/10/21 0815   Vital Signs   Temp 97.1 °F (36.2 °C)   Temp Source Oral   Pulse 61   Heart Rate Source Monitor   Resp 16   BP (!) 157/74   BP Location Left upper arm   Patient Position Right side   Level of Consciousness Alert (0)   MEWS Score 1   Oxygen Therapy   SpO2 98 %   O2 Device None (Room air)   AM assessment completed, see flow sheet. Pt is alert and oriented. BP elevated. Respirations are even & easy. No complaints voiced. Pt denies needs at this time. SR up x 2, and bed in low position. Call light is within reach. Pt reports pain 7/10, admin oxy.
07/11/21 0815   Vital Signs   Temp 97.1 °F (36.2 °C)   Temp Source Oral   Pulse 74   Heart Rate Source Monitor   Resp 18   /78   BP Location Left upper arm   Patient Position Semi fowlers   Level of Consciousness Alert (0)   MEWS Score 1   Oxygen Therapy   SpO2 98 %   O2 Device None (Room air)   AM assessment completed, see flow sheet. Pt is alert and oriented. Vital signs are WNL. Respirations are even & easy. No complaints voiced. Pt denies needs at this time. SR up x 2, and bed in low position. Call light is within reach. Replaced stat lock. No drainage noted around nephrostomy tube.
07/12/21 0900   Vital Signs   Temp 97.2 °F (36.2 °C)   Temp Source Oral   Pulse 72   Heart Rate Source Monitor   Resp 16   /76   BP Location Left upper arm   Patient Position Supine   Level of Consciousness Alert (0)   MEWS Score 1   Patient Currently in Pain Yes   Pain Assessment   Pain Assessment 0-10   Pain Level 7   Pain Location Abdomen   Pain Orientation Left   Pain Descriptors Aching;Constant   Pain Frequency Continuous   Clinical Progression Not changed   Patient alert and oriented. Fed self 100% of breakfast. No nausea/emesis. VSS. Patient off floor at this time for a procedure.
07/12/21 1358   Vital Signs   Temp 97.6 °F (36.4 °C)   Temp Source Oral   Pulse 71   Heart Rate Source Monitor   Resp 16   /72   BP Location Left upper arm   MAP (mmHg) 93   Patient Position Semi fowlers   Level of Consciousness Alert (0)   MEWS Score 1   Patient Currently in Pain Yes   Pain Assessment   Pain Assessment 0-10   Pain Level 7   Pain Location Abdomen   Pain Orientation Left   Pain Descriptors Aching   Pain Frequency Continuous   Clinical Progression Not changed   Oxygen Therapy   SpO2 99 %   O2 Device None (Room air)   Patient medicated for pain level 7 on scale 0-10. Nephrostomy tube removed per Physician, site with small amount of clear drng, drsg changed. VSS.
07/12/21 1358   Vital Signs   Temp 97.6 °F (36.4 °C)   Temp Source Oral   Pulse 71   Heart Rate Source Monitor   Resp 16   /72   BP Location Left upper arm   MAP (mmHg) 93   Patient Position Semi fowlers   Level of Consciousness Alert (0)   MEWS Score 1   Patient Currently in Pain Yes   Pain Assessment   Pain Assessment 0-10   Pain Level 7   Pain Location Abdomen   Pain Orientation Left   Pain Descriptors Aching   Pain Frequency Continuous   Clinical Progression Not changed   Oxygen Therapy   SpO2 99 %   O2 Device None (Room air)   Patient resting in bed denies complains of, patient ambulated iyer x 2 tolerated well. Will continue to monitor.
07/12/21 2014   Vital Signs   Temp 97.3 °F (36.3 °C)   Temp Source Oral   Pulse 60   Heart Rate Source Monitor   Resp 16   BP (!) 155/76   BP Location Left upper arm   MAP (mmHg) 94   Patient Position Semi fowlers   Level of Consciousness Alert (0)   MEWS Score 1   Oxygen Therapy   SpO2 94 %   O2 Device None (Room air)   Pt A/O x 4 assessment completed. Pt informed that Dr would like her up walking with leg bag. Pt would rather keep the bag she has so she doesn't have to empty it as often. New dressing applied to Nephro site d/t leaking. Meds given per MAR. Pt denies any needs at this time.  Call light within reach
07/13/21 0800   Vital Signs   Temp 97.6 °F (36.4 °C)   Temp Source Oral   Pulse 74   Heart Rate Source Monitor   BP (!) 148/88   BP Location Right upper arm   Patient Position Supine   Level of Consciousness Alert (0)   Patient Currently in Pain Yes   Pain Assessment   Pain Assessment 0-10   Pain Level 7   Pain Type Acute pain   Pain Location Abdomen;Back;Flank   Pain Orientation Left   Response to Pain Intervention None   Pain Descriptors Aching;Constant   Pain Frequency Continuous   Clinical Progression Not changed   Functional Pain Assessment Activities are not prevented   Non-Pharmaceutical Pain Intervention(s) Distraction   Oxygen Therapy   SpO2 99 %   O2 Device None (Room air)   VSS. Patient alert and oriented. Incision to old nephrostomy tube site with clear drng, DSD applied. Patient medicated for pain as ordered level 7 on scale 0-10. Patient gotten up in chair for breakfast. Leg bag applied. Patient tolerating well. Will continue to monitor.
07/13/21 1846   Pain Assessment   Pain Level 0   Pt A/O x 4 assessment completed. Dressing to back intact at this time Meds given per [facility]. Pt noticed urine output slightly pink at this time. Pt denies any pain,Will continue to monitor.  Call light within reach
07/14/21 0815   Vital Signs   Temp 97.3 °F (36.3 °C)   Temp Source Oral   Pulse 84   Heart Rate Source Monitor   Resp 18   /69   BP Location Left upper arm   Level of Consciousness Alert (0)   MEWS Score 1   Patient Currently in Pain Yes   Pain Assessment   Pain Assessment 0-10   Pain Level 7   Pain Type Acute pain   Pain Location Abdomen   Pain Orientation Left   Response to Pain Intervention None   Pain Descriptors Aching;Constant   Pain Onset Awakened from sleep   Clinical Progression Not changed   Functional Pain Assessment Prevents or interferes some active activities and ADLs   Non-Pharmaceutical Pain Intervention(s) Distraction;Food;Repositioned   Oxygen Therapy   SpO2 98 %   O2 Device None (Room air)   Patient alert and oriented, complains of nausea and abdominal pain. Medicated as ordered. VSS. Patient with no drng to old nephrostomy site on back, drsg CDI. Abscess drng tube patent with light brown drng in bag. Murguia catheter patent and drng clear pink colored urine. Cath care given. Will continue to monitor.
07/14/21 1317   Vital Signs   Temp 97.6 °F (36.4 °C)   Temp Source Oral   Pulse 75   Heart Rate Source Monitor   Resp 18   /66   BP Location Left upper arm   Level of Consciousness Alert (0)   MEWS Score 1   Patient Currently in Pain Yes   Pain Assessment   Pain Assessment 0-10   Pain Level 4   Pain Type Acute pain   Pain Location Abdomen   Pain Orientation Left   Response to Pain Intervention None   Pain Descriptors Aching;Discomfort   Pain Onset Gradual   Clinical Progression Not changed   Functional Pain Assessment Prevents or interferes some active activities and ADLs   Non-Pharmaceutical Pain Intervention(s) Cold applied;Distraction   Patient given Zofran for complains of nausea, ate 100% of lunch. Urine remains pink in color and improving, patient up and about in room. Gait steady. Will continue to monitor.
07/15/21 0206   Vital Signs   Temp 98.2 °F (36.8 °C)   Temp Source Oral   Pulse 65   Heart Rate Source Monitor   Resp 18   /64   Patient Position Semi fowlers   Level of Consciousness Alert (0)   MEWS Score 1   Oxygen Therapy   SpO2 95 %   O2 Device None (Room air)   Pt awake at this time. Pt denies any pain or nausea.  Call light within reach
07/15/21 0800   Vital Signs   Temp 97.6 °F (36.4 °C)   Temp Source Oral   Pulse 63   Heart Rate Source Monitor   Resp 16   /68   BP Location Left upper arm   Patient Position Semi fowlers   Level of Consciousness Alert (0)   MEWS Score 1   Patient Currently in Pain Yes   Pain Assessment   Pain Assessment 0-10   Pain Level 6   Pain Type Acute pain   Pain Location Abdomen;Flank   Pain Orientation Left   Patient's Stated Pain Goal No pain   Oxygen Therapy   SpO2 96 %   O2 Device None (Room air)   AM assessment completed, see flow sheet. Pt is alert and oriented. Vital signs are WNL. Respirations are even & easy. Pt. Has complaints of flank pain. Pt denies needs at this time. SR up x 2, and bed in low position. Call light is within reach. Bedside Mobility Assessment Tool (BMAT):     Assessment Level 1- Sit and Shake    1. From a semi-reclined position, ask patient to sit up and rotate to a seated position at the side of the bed. Can use the bedrail. 2. Ask patient to reach out and grab your hand and shake making sure patient reaches across his/her midline. Pass- Patient is able to come to a seated position, maintain core strength. Maintains seated balance while reaching across midline. Move on to Assessment Level 2. Assessment Level 2- Stretch and Point   1. With patient in seated position at the side of the bed, have patient place both feet on the floor (or stool) with knees no higher than hips. 2. Ask patient to stretch one leg and straighten the knee, then bend the ankle/flex and point the toes. If appropriate, repeat with the other leg. Pass- Patient is able to demonstrate appropriate quad strength on intended weight bearing limb(s). Move onto Assessment Level 3. Assessment Level 3- Stand   1. Ask patient to elevate off the bed or chair (seated to standing) using an assistive device (cane, bedrail). 2. Patient should be able to raise buttocks off be and hold for a count of five.  May
07/15/21 1901   Vital Signs   Temp 97 °F (36.1 °C)   Temp Source Oral   Pulse 72   Heart Rate Source Monitor   Resp 16   /64   BP Location Left upper arm   Patient Position Lying right side   Oxygen Therapy   SpO2 96 %   O2 Device None (Room air)   PM assessment completed see flow sheet. Vitals per above. Patient awake, alert and oriented x 4 in bed. Denies pain at time of assessment. No distress noted, respirations even and unlabored. Night medication given per MAR. Denies other needs at time of assessment. Bed in lowest position for patient satfey. Bed alarm deferred/patient is call light appropriate.
Attempted to visit with Pt. Pt out of the room. Left Spiritual Care note, informing her of our availability for support.     [facility]       07/13/21 0197   Encounter Summary   Services provided to: Patient not available   Referral/Consult From: Rounding   Continue Visiting   (7/13 attempted visit, OOR, left note)   Complexity of Encounter Low   Length of Encounter 15 minutes
Outpatient Radiology
5 seconds, proceed to assessment level 4. Assessment Level 4- Walk   1. Ask patient to march in place at bedside. 2. Then ask patient to advance step and return each foot. Some medical conditions may render a patient from stepping backwards, use your best clinical judgement. Pass- Patient demonstrates balance while shifting weight and ability to step, takes independent steps, does not use assistive device patient is MOBILITY LEVEL 4. Mobility Level- 4    Patient is able to demonstrate the ability to move from a reclining position to an upright position within the recliner.

## 2021-07-16 NOTE — PROGRESS NOTES
Comprehensive Nutrition Assessment    Type and Reason for Visit:  Reassess    Nutrition Recommendations/Plan:   1. Continue ADULT DIET; Regular diet order. 2. Monitor appetite and po intake. 3. Monitor for s/s of GI distress r/t po intake. 4. Monitor nutrition-related labs, bowel function, and weight trends. Nutrition Assessment:  patient is slowly improving from a nutritional standpoint AEB improved appetite/po intake during the past couple of days of admission, however, she remains at risk for further compromise d/t < 75% of po intake x 7 days admission, life stressors, weight loss during this admission, and fat loss/muscle wasting present; will continue ADULT DIET; Regular diet order and monitor nutrition status    Malnutrition Assessment:  Malnutrition Status:  Severe malnutrition (severe malnutrition in the context of acute illness or injury < 3 months based on 75% or less of estimated energy requirements for 7 or more days, greater than 5% weight loss over 1 month, moderate fat loss, and moderate muscle wasting present during NFPE), per guidelines from Academy of Nutrition and Dietetics (Academy)/American Society for Parenteral and Enteral Nutrition (A.S.P.E.N.) - clinical characteristics that the clinician can  obtain and document to support a diagnosis of malnutrition.    Context:  Acute Illness     Findings of the 6 clinical characteristics of malnutrition:  Energy Intake:  1 - 75% or less of estimated energy requirements for 7 or more days  Weight Loss:  7 - Greater than 5% over 1 month (- 22# or 13.4% weight loss since 6/27/21 (< 1 month))     Body Fat Loss:  7 - Moderate body fat loss Triceps   Muscle Mass Loss:  7 - Moderate muscle mass loss Hand (interosseous), Calf (gastrocnemius), Clavicles (pectoralis & deltoids), Temples (temporalis)  Fluid Accumulation:  No significant fluid accumulation     Strength:  Not Performed    Estimated Daily Nutrient Needs:  Energy (kcal):  2700 - 1920 kcals based on 28-30 kcals/kg/CBW; Weight Used for Energy Requirements:  Current     Protein (g):  83 - 90 g protein based on 1.3-1.4 g/kg/CBW; Weight Used for Protein Requirements:  Current        Fluid (ml/day):  1792 - 1920 ml; Method Used for Fluid Requirements:  1 ml/kcal      Nutrition Related Findings:  patient is A & O x 4; she was awake upon entering her room for f/u assessment today; patient sat up on the edge of the bed to speak with this RD this am; patient reported that she has had 2 family members pass away recently + she hasn't felt well and this has led to a decreased appetite/po intake and weight loss; UBW is 150-160#, per patient; abdomen is tender and bowel sounds are active; nausea \"comes and goes\"; + BM on 7/15/21; Na, Cl, and h/h are low; Cr is elevated and GFR = 31; patient has Senokot-S, vanc in D5, and vitamin D ordered at this time; patient can ambulate on her own okay; + own teeth intact and no difficulties chewing and/or swallowing noted; she reported that she consumed ~ 50% of her breakfast this am; + L sided abdominal drain      Wounds:  Surgical Incision (surgical incision left lower back s/p left percutaneous nephrolithotomy left stent replacement 7/6)       Current Nutrition Therapies:    ADULT DIET; Regular    Anthropometric Measures:  · Height: 5' 7\" (170.2 cm)  · Current Body Weight: 141 lb 5 oz (64.1 kg) (obtained on 7/16/21 by RD)   · Admission Body Weight: 148 lb 12.8 oz (67.5 kg) (first actual weight obtained on 7/13/21 for this admission)    · Usual Body Weight: 163 lb 1.6 oz (74 kg) (obtained on 6/27/21; actual weight)     · Ideal Body Weight: 135 lbs; % Ideal Body Weight 104.7 %   · BMI: 22.1   · BMI Categories: Normal Weight (BMI 18.5-24. 9)       Nutrition Diagnosis:   · Severe malnutrition related to inadequate protein-energy intake, psychological cause or life stress, renal dysfunction, increase demand for energy/nutrients as evidenced by intake 26-50%, intake 51-75%, poor intake prior to admission, weight loss greater than or equal to 5% in 1 month, lab values, nausea, moderate loss of subcutaneous fat, moderate muscle loss      Nutrition Interventions:   Food and/or Nutrient Delivery:  Continue Current Diet  Nutrition Education/Counseling:  No recommendation at this time   Coordination of Nutrition Care:  Continue to monitor while inpatient    Goals:  patient will consume 75% or greater of meals on ADULT DIET;  Regular diet order x 3 meals per day without s/s of GI distress       Nutrition Monitoring and Evaluation:   Behavioral-Environmental Outcomes:  None Identified   Food/Nutrient Intake Outcomes:  Food and Nutrient Intake  Physical Signs/Symptoms Outcomes:  Biochemical Data, GI Status, Nausea or Vomiting, Meal Time Behavior, Nutrition Focused Physical Findings, Skin, Weight     Discharge Planning:    Continue current diet     Electronically signed by Katelyn Cowan RD, LD on 7/16/21 at 12:19 PM EDT    Contact: [phone]

## 2021-07-16 NOTE — PROGRESS NOTES
Patient c/o pain in abdomen and left flank at 8/10. Pain medication given per MAR. Vitals per flow sheet. No distress noted, respirations are even and unlabored. Requested Tylenol be moved a \"couple of hours\" as she just received pain medication. Moved to 0830. No other needs voiced. Call light in reach.

## 2021-07-16 NOTE — PLAN OF CARE
Nutrition Problem #1: Severe malnutrition  Intervention: Food and/or Nutrient Delivery: Continue Current Diet  Nutritional Goals: patient will consume 75% or greater of meals on ADULT DIET;  Regular diet order x 3 meals per day without s/s of GI distress

## 2021-07-16 NOTE — PROGRESS NOTES
Levaquin-Day 8  Srcr 1.7, crcl 36ml/min  Change Levaquin to 50mg daily  Jared Srivastava Pharm D 7/16/202111:39 AM  .

## 2021-07-28 ENCOUNTER — HOSPITAL ENCOUNTER (OUTPATIENT)
Age: 56
Discharge: HOME | End: 2021-07-28
Payer: COMMERCIAL

## 2021-07-28 ENCOUNTER — HOSPITAL ENCOUNTER (OUTPATIENT)
Dept: INTERVENTIONAL RADIOLOGY/VASCULAR | Age: 56
Discharge: HOME | End: 2021-07-28
Payer: COMMERCIAL

## 2021-07-28 ENCOUNTER — HOSPITAL ENCOUNTER (OUTPATIENT)
Dept: CT IMAGING | Age: 56
Discharge: HOME | End: 2021-07-28
Payer: COMMERCIAL

## 2021-07-28 VITALS
DIASTOLIC BLOOD PRESSURE: 73 MMHG | HEART RATE: 58 BPM | SYSTOLIC BLOOD PRESSURE: 149 MMHG | RESPIRATION RATE: 15 BRPM | OXYGEN SATURATION: 100 %

## 2021-07-28 DIAGNOSIS — N15.1 ABSCESS OF LEFT KIDNEY: ICD-10-CM

## 2021-07-28 LAB
ALBUMIN SERPL-MCNC: 3.6 G/DL (ref 3.4–5)
ANION GAP SERPL CALCULATED.3IONS-SCNC: 13 MMOL/L (ref 3–16)
BUN BLDV-MCNC: 33 MG/DL (ref 7–20)
CALCIUM SERPL-MCNC: 9.8 MG/DL (ref 8.3–10.6)
CHLORIDE BLD-SCNC: 99 MMOL/L (ref 99–110)
CO2: 21 MMOL/L (ref 21–32)
CREAT SERPL-MCNC: 1.7 MG/DL (ref 0.6–1.1)
GFR AFRICAN AMERICAN: 38
GFR NON-AFRICAN AMERICAN: 31
GLUCOSE BLD-MCNC: 108 MG/DL (ref 70–99)
PHOSPHORUS: 5.1 MG/DL (ref 2.5–4.9)
POTASSIUM SERPL-SCNC: 4.1 MMOL/L (ref 3.5–5.1)
SODIUM BLD-SCNC: 133 MMOL/L (ref 136–145)

## 2021-07-28 PROCEDURE — 49405 IMAGE CATH FLUID COLXN VISC: CPT

## 2021-07-28 PROCEDURE — C1729 CATH, DRAINAGE: HCPCS

## 2021-07-28 PROCEDURE — 80069 RENAL FUNCTION PANEL: CPT

## 2021-07-28 PROCEDURE — 6360000002 HC RX W HCPCS: Performed by: RADIOLOGY

## 2021-07-28 PROCEDURE — 75984 XRAY CONTROL CATHETER CHANGE: CPT

## 2021-07-28 PROCEDURE — 74176 CT ABD & PELVIS W/O CONTRAST: CPT

## 2021-07-28 PROCEDURE — 36415 COLL VENOUS BLD VENIPUNCTURE: CPT

## 2021-07-28 PROCEDURE — 49423 EXCHANGE DRAINAGE CATHETER: CPT

## 2021-07-28 RX ORDER — FENTANYL CITRATE 50 UG/ML
INJECTION, SOLUTION INTRAMUSCULAR; INTRAVENOUS
Status: COMPLETED | OUTPATIENT
Start: 2021-07-28 | End: 2021-07-28

## 2021-07-28 RX ADMIN — FENTANYL CITRATE 50 MCG: 50 INJECTION INTRAMUSCULAR; INTRAVENOUS at 10:54

## 2021-07-28 NOTE — PROGRESS NOTES
Pt arrived for image guided abscess drain insertion LLQ . Procedure explained including the risk and benefits of the procedure. All questions answered. Pt verbalizes understanding of the procedure and states no more questions. Consent confirmed. Vital signs stable. Labs, allergies, medications, and code status reviewed. No contraindications noted. Vital Signs  Vitals:    07/28/21 1045   BP: (!) 163/78   Pulse: 52   Resp: 15   SpO2: 100%      Pre Denise Score  2 - Able to move 4 extremities voluntarily on command  2 - BP+/- 20mmHg of normal  2 - Fully awake  2 - Able to maintain oxygen saturation >92% on room air  2 - Able to breathe deeply and cough freely    Allergies  Patient has no known allergies.  (allergies)    Labs  Lab Results   Component Value Date    INR 1.17 (H) 07/07/2021    PROTIME 13.3 (H) 07/07/2021     Lab Results   Component Value Date    CREATININE 1.7 (H) 07/28/2021    BUN 33 (H) 07/28/2021     (L) 07/28/2021    K 4.1 07/28/2021    CL 99 07/28/2021    CO2 21 07/28/2021     Lab Results   Component Value Date    WBC 4.8 07/15/2021    HGB 9.2 (L) 07/15/2021    HCT 28.4 (L) 07/15/2021    MCV 82.9 07/15/2021     07/15/2021

## 2021-07-28 NOTE — PROGRESS NOTES
Image guided abscess drain insertion LLQ completed. Dr. Curtis Antoine placed 14 Wolof 25 cm Argon Skater drain LOT # [id number] EXP 04/28/2025 in the LLQ. Drain/tube secured at the 25 cm line with sutures. 10 milliliters of thick pus colored withdrawn immediately. Drain/tube dressing clean, dry, and intact. Pt tolerated procedure without any signs or symptoms of distress. Vital signs stable. Pt taken to recovery bay for post operative monitoring.    Vital Signs  Vitals:    07/28/21 1102   BP: (!) 149/73   Pulse: 58   Resp: 15   SpO2: 100%     Post Denise  2 - Able to move 4 extremities voluntarily on command  2 - BP+/- 20mmHg of normal  2 - Fully awake  2 - Able to maintain oxygen saturation >92% on room air  2 - Able to breathe deeply and cough freely

## 2021-08-11 ENCOUNTER — HOSPITAL ENCOUNTER (OUTPATIENT)
Dept: CT IMAGING | Age: 56
Discharge: HOME | End: 2021-08-11
Payer: COMMERCIAL

## 2021-08-11 ENCOUNTER — HOSPITAL ENCOUNTER (OUTPATIENT)
Dept: INTERVENTIONAL RADIOLOGY/VASCULAR | Age: 56
Discharge: HOME | End: 2021-08-11
Payer: COMMERCIAL

## 2021-08-11 DIAGNOSIS — N23 UNSPECIFIED RENAL COLIC: ICD-10-CM

## 2021-08-11 DIAGNOSIS — N20.0 CALCULUS OF KIDNEY: ICD-10-CM

## 2021-08-11 DIAGNOSIS — I10 ESSENTIAL (PRIMARY) HYPERTENSION: ICD-10-CM

## 2021-08-11 PROCEDURE — 6360000004 HC RX CONTRAST MEDICATION: Performed by: UROLOGY

## 2021-08-11 PROCEDURE — 74177 CT ABD & PELVIS W/CONTRAST: CPT

## 2021-08-11 PROCEDURE — 50389 REMOVE RENAL TUBE W/FLUORO: CPT

## 2021-08-11 RX ADMIN — IOPAMIDOL 50 ML: 755 INJECTION, SOLUTION INTRAVENOUS at 12:23

## 2021-08-11 NOTE — BRIEF OP NOTE
Brief Postoperative Note    Susanna Contreras  YOB: 1965  [id number]    Pre-operative Diagnosis: left perinephric abscess    Post-operative Diagnosis: Same    Procedure: removal of abscess drain    Anesthesia: None    Surgeons: Cale Starks MD    Estimated Blood Loss: Less than 5 mL    Complications: None    Specimens: Was Not Obtained    Findings: The patient has not had a lot of drainage from the tube daily and on CT scan there is no significant abscess remaining. Therefore the drainage catheter was removed.     Electronically signed by Cale Starks MD on 8/11/2021 at 12:35 PM

## 2021-08-16 ENCOUNTER — HOSPITAL ENCOUNTER (OUTPATIENT)
Dept: CT IMAGING | Age: 56
Discharge: HOME | End: 2021-08-16
Payer: COMMERCIAL

## 2021-08-16 PROCEDURE — 74177 CT ABD & PELVIS W/CONTRAST: CPT

## 2021-08-18 NOTE — PROGRESS NOTES
Pt coming to surgery by cab with a friend accompanying her. Instructed to make sure friend comes with her since she will not be able to be sent home alone in a cab.

## 2021-08-18 NOTE — PROGRESS NOTES
PRE OP INSTRUCTION SHEET   1. Do not eat or drink anything after 12 midnight  prior to surgery. This includes no water, chewing gum or mints. 2. Take the following pills will a small sip of water (see MAR)                                        3. Aspirin, Ibuprofen, Advil, Naproxen, Vitamin E, fish oil and other Anti-inflammatory products should be stopped for 5 days before surgery or as directed by your physician. 4. Check with your Doctor regarding stopping Plavix, Coumadin, Lovenox, Fragmin or other blood thinners   5. Do not smoke, and do not drink any alcoholic beverages 24 hours prior to surgery. This includes NA Beer. 6. You may brush your teeth and gargle the morning of surgery. DO NOT SWALLOW WATER   7. You MUST make arrangements for a responsible adult to take you home after your surgery. You will not be allowed to leave alone or drive yourself home. It is strongly suggested someone stay with you the first 24 hrs. Your surgery will be cancelled if you do not have a ride home. 8. A parent/legal guardian must accompany a child scheduled for surgery and plan to stay at the hospital until the child is discharged. Please do not bring other children with you. 9. Please wear simple, loose fitting clothing to the hospital.  [place] not bring valuables (money, credit cards, checkbooks, etc.) Do not wear any makeup (including no eye makeup) or nail polish on your fingers or toes. 10. DO NOT wear any jewelry or piercings on day of surgery. All body piercing jewelry must be removed. 11. If you have dentures,glasses, or contacts they will be removed before going to the OR; we will provide you a container. 12. Please see your family doctor/and cardiologist for a history & physical and/or concerning medications. Bring any test results/reports from your physician's office. Have history and labs faxed to [phone].  Remember to bring Blood Bank bracelet on the day of surgery. 14. If you have a Living Will and Durable Power of  for Healthcare, please bring in a copy. 13. Notify your Surgeon if you develop any illness between now and surgery  time, cough, cold, fever, sore throat, nausea, vomiting, etc.  Please notify your surgeon if you experience dizziness, shortness of breath or blurred vision between now & the time of your surgery   16. DO NOT shave your operative site 96 hours prior to surgery. For face & neck surgery, men may use an electric razor 48 hours prior to surgery. 17. Shower with _x__Antibacterial soap (x_chlorhexidine for total joint  Pt's) shower two times before surgery.(the morning of and the night before. 18. To provide excellent care visitors will be limited to one in the room at any given time.   Please call pre admission testing if you any further questions [phone] or [phone]

## 2021-08-19 ENCOUNTER — ANESTHESIA EVENT (OUTPATIENT)
Dept: OPERATING ROOM | Age: 56
End: 2021-08-19
Payer: COMMERCIAL

## 2021-08-19 ASSESSMENT — LIFESTYLE VARIABLES: SMOKING_STATUS: 1

## 2021-08-19 NOTE — ANESTHESIA PRE PROCEDURE
Department of Anesthesiology  Preprocedure Note       Name:  Stephanie Rodgers   Age:  55 y.o.  :  1965                                          MRN:  [id number]         Date:  2021      Surgeon: Leida Bañuelos):  Arianna Zhang MD    Procedure: Procedure(s):  CYSTOSCOPY, LEFT RETROGRADE, POSSIBLE LEFT STENT EXCHANGE    Medications prior to admission:   Prior to Admission medications    Medication Sig Start Date End Date Taking? Authorizing Provider   lisinopril (PRINIVIL;ZESTRIL) 10 MG tablet Take 10 mg by mouth daily   Yes Historical Provider, MD   hydroCHLOROthiazide (HYDRODIURIL) 25 MG tablet Take 25 mg by mouth daily   Yes Historical Provider, MD   hydrOXYzine (ATARAX) 25 MG tablet Take 25 mg by mouth 3 times daily as needed for Itching   Yes Historical Provider, MD   metoprolol succinate (TOPROL XL) 25 MG extended release tablet Take 1 tablet by mouth daily 21  Yes Aleisha Whitney MD   aspirin 81 MG chewable tablet Take 81 mg by mouth daily   Yes Historical Provider, MD   vitamin D (CHOLECALCIFEROL) 25 MCG (1000 UT) TABS tablet Take 1,000 Units by mouth once a week   Yes Historical Provider, MD       Current medications:    No current facility-administered medications for this encounter.      Current Outpatient Medications   Medication Sig Dispense Refill    lisinopril (PRINIVIL;ZESTRIL) 10 MG tablet Take 10 mg by mouth daily      hydroCHLOROthiazide (HYDRODIURIL) 25 MG tablet Take 25 mg by mouth daily      hydrOXYzine (ATARAX) 25 MG tablet Take 25 mg by mouth 3 times daily as needed for Itching      metoprolol succinate (TOPROL XL) 25 MG extended release tablet Take 1 tablet by mouth daily 30 tablet 1    aspirin 81 MG chewable tablet Take 81 mg by mouth daily      vitamin D (CHOLECALCIFEROL) 25 MCG (1000 UT) TABS tablet Take 1,000 Units by mouth once a week         Allergies:  No Known Allergies    Problem List:    Patient Active Problem List   Diagnosis Code    Septicemia ([facility] A41.9    Acute UTI N39.0    MARY (acute kidney injury) ([address] N17.9    Bacteremia R78.81    Kidney stone N20.0    Left flank pain R10.9    Generalized abdominal pain R10.84    Ureterolithiasis N20.1    Hydronephrosis N13.30    Obstructive uropathy N13.9    Hemorrhagic shock (HCC) R57.8    Acute blood loss anemia D62    NSTEMI (non-ST elevated myocardial infarction) ([facility]) I21.4    Atrial fibrillation (HCC) I48.91    Cardiomyopathy (HCC) I42.9    Acute pyelonephritis V82    Complication of nephrostomy ([address] N99.528    Essential hypertension I10    Renal calculi N20.0    Severe protein-calorie malnutrition ([address] E43    Perinephric abscess N15.1       Past Medical History:        Diagnosis Date    Depression     Headache(784.0)     Hepatitis C 08/21/2020    Hypertension        Past Surgical History:        Procedure Laterality Date    CYSTOSCOPY Left 1/11/2021    CYSTOSCOPY, DIAGNOSTIC LEFT URETEROSCOPY WITH HOLMIUM LASER LITHOTRIPSY, LEFT STENT EXCHANGE performed by Grover El MD at [address] [place] [address] / REMOVAL [address] / STONE Left 8/20/2020    CYSTOSCOPY URETERAL STENT INSERTION performed by Grover El MD at [address] [place] [address] / Roman Dasilva / Abdulaziz Lazcano Left 1/27/2021    CYSTOSCOPY, LEFT STENT REMOVAL performed by Grover El MD at [address] [place] [address] IR EMBOLIZATION HEMORRHAGE  4/24/2021    IR EMBOLIZATION HEMORRHAGE 4/24/2021 [facility] PROC[facility]    IR NEPHROSTOMY PERCUTANEOUS LEFT  4/23/2021    IR NEPHROSTOMY PERCUTANEOUS LEFT 4/23/2021 [facility] PROCED[facility]    KIDNEY STONE REMOVAL Left 7/6/2021    LEFT PERCUTANEOUS NEPHROLITHOTOMY,  LEFT STENT PLACEMENT performed by Camille Goodell, MD at [place] [address] (Left Bladder)    OTHER SURGICAL HISTORY  11/23/2020    CYSTOSCOPY, LEFT RIGID AND FLEXIBLE URETEROSCOPY, HOLMIUM LASER LITHOTRIPSY, STONE EXTRACTION, STENT EXCHANGE (Left )     OTHER SURGICAL HISTORY  01/11/2021    CYSTOSCOPY, DIAGNOSTIC LEFT URETEROSCOPY WITH HOLMIUM LASER LITHOTRIPSY, LEFT STENT EXCHANGE (Left Bladder)    OTHER SURGICAL HISTORY  01/27/2021    cystoscopy, left stent removal    URETEROSCOPY Left 11/23/2020    CYSTOSCOPY, LEFT RIGID AND FLEXIBLE URETEROSCOPY, HOLMIUM LASER LITHOTRIPSY, STONE EXTRACTION, STENT EXCHANGE, REMOVAL MIGRATED STENT performed by Pavel Mccabe MD at [address] [place] [address] History:    Social History     Tobacco Use    Smoking status: Current Every Day Smoker     Packs/day: 0.50     Types: Cigarettes    Smokeless tobacco: Never Used    Tobacco comment: smoked 2 cigarettes today   Substance Use Topics    Alcohol use: No                                Ready to quit: Not Answered  Counseling given: Not Answered  Comment: smoked 2 cigarettes today      Vital Signs (Current):   Vitals:    08/18/21 1331   Weight: 141 lb (64 kg)   Height: 5' 7\" (1.702 m)                                              BP Readings from Last 3 Encounters:   07/28/21 (!) 149/73   07/16/21 106/65   07/06/21 105/63       NPO Status:                                                                                 BMI:   Wt Readings from Last 3 Encounters:   08/18/21 141 lb (64 kg)   07/16/21 141 lb 5 oz (64.1 kg)   07/02/21 157 lb 2 oz (71.3 kg)     Body mass index is 22.08 kg/m².     CBC:   Lab Results   Component Value Date    WBC 4.8 07/15/2021    RBC 3.42 07/15/2021    HGB 9.2 07/15/2021    HCT 28.4 07/15/2021    MCV 82.9 07/15/2021    RDW 18.0 07/15/2021     07/15/2021       CMP:   Lab Results   Component Value Date     07/28/2021    K 4.1 07/28/2021    K 4.1 07/15/2021    CL 99 07/28/2021    CO2 21 07/28/2021    BUN 33 07/28/2021    CREATININE 1.7 07/28/2021    GFRAA 38 07/28/2021    AGRATIO 0.4 06/28/2021    LABGLOM 31 07/28/2021    GLUCOSE 108 07/28/2021    PROT 6.6 06/28/2021    CALCIUM 9.8 07/28/2021    BILITOT 0.6 06/28/2021 ALKPHOS 93 06/28/2021    AST 16 06/28/2021    ALT <5 06/28/2021       POC Tests: No results for input(s): POCGLU, POCNA, POCK, POCCL, POCBUN, POCHEMO, POCHCT in the last 72 hours. Coags:   Lab Results   Component Value Date    PROTIME 13.3 07/07/2021    INR 1.17 07/07/2021    APTT 47.6 04/28/2021       HCG (If Applicable): No results found for: PREGTESTUR, PREGSERUM, HCG, HCGQUANT     ABGs: No results found for: PHART, PO2ART, HZJ7CXW, YEY8XGP, BEART, H2MMFSQX     Type & Screen (If Applicable):  No results found for: LABABO, LABRH    Drug/Infectious Status (If Applicable):  No results found for: HIV, HEPCAB    COVID-19 Screening (If Applicable):   Lab Results   Component Value Date    COVID19 Not Detected 06/28/2021    COVID19 Not Detected 01/22/2021           Anesthesia Evaluation  Patient summary reviewed and Nursing notes reviewed no history of anesthetic complications:   Airway: Mallampati: II  TM distance: >3 FB   Neck ROM: full  Mouth opening: > = 3 FB Dental: normal exam         Pulmonary:   (+) current smoker                           Cardiovascular:    (+) hypertension:, past MI: no interval change, dysrhythmias: atrial fibrillation,                   Neuro/Psych:   (+) headaches:, psychiatric history:            GI/Hepatic/Renal:   (+) hepatitis: C, liver disease:,      (-) GERD and no renal disease       Endo/Other: Negative Endo/Other ROS       (-) diabetes mellitus               Abdominal:             Vascular: negative vascular ROS. Other Findings:           Anesthesia Plan      general     ASA 3     (I discussed with the patient the risks and benefits of PIV, general anesthesia, IV Narcotics, PACU. All questions were answered the patient agrees with the plan)  Induction: intravenous. MIPS: Prophylactic antiemetics administered. Anesthetic plan and risks discussed with patient. Plan discussed with CRNA.                   Carolyn Soni MD   8/19/2021

## 2021-08-20 ENCOUNTER — HOSPITAL ENCOUNTER (OUTPATIENT)
Age: 56
Discharge: HOME | End: 2021-08-20
Attending: UROLOGY
Payer: COMMERCIAL

## 2021-08-20 ENCOUNTER — HOSPITAL ENCOUNTER (OUTPATIENT)
Age: 56
Setting detail: OUTPATIENT SURGERY
Discharge: HOME | End: 2021-08-20
Attending: UROLOGY | Admitting: UROLOGY
Payer: COMMERCIAL

## 2021-08-20 ENCOUNTER — APPOINTMENT (OUTPATIENT)
Dept: GENERAL RADIOLOGY | Age: 56
End: 2021-08-20
Attending: UROLOGY
Payer: COMMERCIAL

## 2021-08-20 ENCOUNTER — ANESTHESIA (OUTPATIENT)
Dept: OPERATING ROOM | Age: 56
End: 2021-08-20
Payer: COMMERCIAL

## 2021-08-20 VITALS
RESPIRATION RATE: 12 BRPM | HEIGHT: 67 IN | HEART RATE: 53 BPM | WEIGHT: 141 LBS | BODY MASS INDEX: 22.13 KG/M2 | OXYGEN SATURATION: 100 % | SYSTOLIC BLOOD PRESSURE: 149 MMHG | TEMPERATURE: 97.2 F | DIASTOLIC BLOOD PRESSURE: 79 MMHG

## 2021-08-20 VITALS
SYSTOLIC BLOOD PRESSURE: 152 MMHG | OXYGEN SATURATION: 100 % | DIASTOLIC BLOOD PRESSURE: 78 MMHG | RESPIRATION RATE: 13 BRPM

## 2021-08-20 LAB
EKG ATRIAL RATE: 59 BPM
EKG DIAGNOSIS: NORMAL
EKG P AXIS: 8 DEGREES
EKG P-R INTERVAL: 110 MS
EKG Q-T INTERVAL: 442 MS
EKG QRS DURATION: 82 MS
EKG QTC CALCULATION (BAZETT): 437 MS
EKG R AXIS: 3 DEGREES
EKG T AXIS: 258 DEGREES
EKG VENTRICULAR RATE: 59 BPM
SARS-COV-2, NAAT: NOT DETECTED

## 2021-08-20 PROCEDURE — 6360000002 HC RX W HCPCS: Performed by: UROLOGY

## 2021-08-20 PROCEDURE — 6360000002 HC RX W HCPCS: Performed by: NURSE ANESTHETIST, CERTIFIED REGISTERED

## 2021-08-20 PROCEDURE — 87635 SARS-COV-2 COVID-19 AMP PRB: CPT

## 2021-08-20 PROCEDURE — 2709999900 HC NON-CHARGEABLE SUPPLY: Performed by: UROLOGY

## 2021-08-20 PROCEDURE — 3700000001 HC ADD 15 MINUTES (ANESTHESIA): Performed by: UROLOGY

## 2021-08-20 PROCEDURE — 3600000014 HC SURGERY LEVEL 4 ADDTL 15MIN: Performed by: UROLOGY

## 2021-08-20 PROCEDURE — 7100000011 HC PHASE II RECOVERY - ADDTL 15 MIN: Performed by: UROLOGY

## 2021-08-20 PROCEDURE — 2580000003 HC RX 258: Performed by: ANESTHESIOLOGY

## 2021-08-20 PROCEDURE — 6360000004 HC RX CONTRAST MEDICATION: Performed by: UROLOGY

## 2021-08-20 PROCEDURE — 7100000001 HC PACU RECOVERY - ADDTL 15 MIN: Performed by: UROLOGY

## 2021-08-20 PROCEDURE — C1769 GUIDE WIRE: HCPCS | Performed by: UROLOGY

## 2021-08-20 PROCEDURE — 3700000000 HC ANESTHESIA ATTENDED CARE: Performed by: UROLOGY

## 2021-08-20 PROCEDURE — 2500000003 HC RX 250 WO HCPCS: Performed by: NURSE ANESTHETIST, CERTIFIED REGISTERED

## 2021-08-20 PROCEDURE — 7100000000 HC PACU RECOVERY - FIRST 15 MIN: Performed by: UROLOGY

## 2021-08-20 PROCEDURE — 2580000003 HC RX 258: Performed by: NURSE ANESTHETIST, CERTIFIED REGISTERED

## 2021-08-20 PROCEDURE — 7100000010 HC PHASE II RECOVERY - FIRST 15 MIN: Performed by: UROLOGY

## 2021-08-20 PROCEDURE — 2580000003 HC RX 258: Performed by: UROLOGY

## 2021-08-20 PROCEDURE — 3600000004 HC SURGERY LEVEL 4 BASE: Performed by: UROLOGY

## 2021-08-20 PROCEDURE — 74420 UROGRAPHY RTRGR +-KUB: CPT

## 2021-08-20 PROCEDURE — 93005 ELECTROCARDIOGRAM TRACING: CPT | Performed by: ANESTHESIOLOGY

## 2021-08-20 PROCEDURE — 93010 ELECTROCARDIOGRAM REPORT: CPT | Performed by: INTERNAL MEDICINE

## 2021-08-20 RX ORDER — LIDOCAINE HYDROCHLORIDE 10 MG/ML
1 INJECTION, SOLUTION EPIDURAL; INFILTRATION; INTRACAUDAL; PERINEURAL
Status: DISCONTINUED | OUTPATIENT
Start: 2021-08-20 | End: 2021-08-20 | Stop reason: HOSPADM

## 2021-08-20 RX ORDER — HYDRALAZINE HYDROCHLORIDE 20 MG/ML
5 INJECTION INTRAMUSCULAR; INTRAVENOUS EVERY 10 MIN PRN
Status: DISCONTINUED | OUTPATIENT
Start: 2021-08-20 | End: 2021-08-20 | Stop reason: HOSPADM

## 2021-08-20 RX ORDER — CEFUROXIME AXETIL 250 MG/1
250 TABLET ORAL 2 TIMES DAILY
Qty: 6 TABLET | Refills: 0 | Status: SHIPPED | OUTPATIENT
Start: 2021-08-20 | End: 2021-08-23

## 2021-08-20 RX ORDER — SODIUM CHLORIDE, SODIUM LACTATE, POTASSIUM CHLORIDE, CALCIUM CHLORIDE 600; 310; 30; 20 MG/100ML; MG/100ML; MG/100ML; MG/100ML
INJECTION, SOLUTION INTRAVENOUS CONTINUOUS
Status: DISCONTINUED | OUTPATIENT
Start: 2021-08-20 | End: 2021-08-20 | Stop reason: HOSPADM

## 2021-08-20 RX ORDER — SODIUM CHLORIDE 9 MG/ML
25 INJECTION, SOLUTION INTRAVENOUS PRN
Status: DISCONTINUED | OUTPATIENT
Start: 2021-08-20 | End: 2021-08-20 | Stop reason: HOSPADM

## 2021-08-20 RX ORDER — ONDANSETRON 2 MG/ML
4 INJECTION INTRAMUSCULAR; INTRAVENOUS EVERY 10 MIN PRN
Status: DISCONTINUED | OUTPATIENT
Start: 2021-08-20 | End: 2021-08-20 | Stop reason: HOSPADM

## 2021-08-20 RX ORDER — SODIUM CHLORIDE, SODIUM LACTATE, POTASSIUM CHLORIDE, CALCIUM CHLORIDE 600; 310; 30; 20 MG/100ML; MG/100ML; MG/100ML; MG/100ML
INJECTION, SOLUTION INTRAVENOUS CONTINUOUS PRN
Status: DISCONTINUED | OUTPATIENT
Start: 2021-08-20 | End: 2021-08-20 | Stop reason: SDUPTHER

## 2021-08-20 RX ORDER — MEPERIDINE HYDROCHLORIDE 25 MG/ML
12.5 INJECTION INTRAMUSCULAR; INTRAVENOUS; SUBCUTANEOUS EVERY 5 MIN PRN
Status: DISCONTINUED | OUTPATIENT
Start: 2021-08-20 | End: 2021-08-20 | Stop reason: HOSPADM

## 2021-08-20 RX ORDER — OXYCODONE HYDROCHLORIDE AND ACETAMINOPHEN 5; 325 MG/1; MG/1
2 TABLET ORAL PRN
Status: DISCONTINUED | OUTPATIENT
Start: 2021-08-20 | End: 2021-08-20 | Stop reason: HOSPADM

## 2021-08-20 RX ORDER — ONDANSETRON 2 MG/ML
INJECTION INTRAMUSCULAR; INTRAVENOUS PRN
Status: DISCONTINUED | OUTPATIENT
Start: 2021-08-20 | End: 2021-08-20 | Stop reason: SDUPTHER

## 2021-08-20 RX ORDER — PROPOFOL 10 MG/ML
INJECTION, EMULSION INTRAVENOUS PRN
Status: DISCONTINUED | OUTPATIENT
Start: 2021-08-20 | End: 2021-08-20 | Stop reason: SDUPTHER

## 2021-08-20 RX ORDER — SODIUM CHLORIDE 0.9 % (FLUSH) 0.9 %
10 SYRINGE (ML) INJECTION PRN
Status: DISCONTINUED | OUTPATIENT
Start: 2021-08-20 | End: 2021-08-20 | Stop reason: HOSPADM

## 2021-08-20 RX ORDER — SODIUM CHLORIDE 0.9 % (FLUSH) 0.9 %
10 SYRINGE (ML) INJECTION EVERY 12 HOURS SCHEDULED
Status: DISCONTINUED | OUTPATIENT
Start: 2021-08-20 | End: 2021-08-20 | Stop reason: HOSPADM

## 2021-08-20 RX ORDER — OXYCODONE HYDROCHLORIDE AND ACETAMINOPHEN 5; 325 MG/1; MG/1
1 TABLET ORAL PRN
Status: DISCONTINUED | OUTPATIENT
Start: 2021-08-20 | End: 2021-08-20 | Stop reason: HOSPADM

## 2021-08-20 RX ORDER — PROPRANOLOL HYDROCHLORIDE 40 MG/1
40 TABLET ORAL 2 TIMES DAILY
Status: ON HOLD | COMMUNITY
End: 2021-09-28 | Stop reason: HOSPADM

## 2021-08-20 RX ORDER — MAGNESIUM HYDROXIDE 1200 MG/15ML
LIQUID ORAL PRN
Status: DISCONTINUED | OUTPATIENT
Start: 2021-08-20 | End: 2021-08-20 | Stop reason: ALTCHOICE

## 2021-08-20 RX ORDER — LABETALOL HYDROCHLORIDE 5 MG/ML
5 INJECTION, SOLUTION INTRAVENOUS EVERY 10 MIN PRN
Status: DISCONTINUED | OUTPATIENT
Start: 2021-08-20 | End: 2021-08-20 | Stop reason: HOSPADM

## 2021-08-20 RX ORDER — FENTANYL CITRATE 50 UG/ML
INJECTION, SOLUTION INTRAMUSCULAR; INTRAVENOUS PRN
Status: DISCONTINUED | OUTPATIENT
Start: 2021-08-20 | End: 2021-08-20 | Stop reason: SDUPTHER

## 2021-08-20 RX ORDER — LIDOCAINE HYDROCHLORIDE 20 MG/ML
INJECTION, SOLUTION INFILTRATION; PERINEURAL PRN
Status: DISCONTINUED | OUTPATIENT
Start: 2021-08-20 | End: 2021-08-20 | Stop reason: SDUPTHER

## 2021-08-20 RX ADMIN — LIDOCAINE HYDROCHLORIDE 100 MG: 20 INJECTION, SOLUTION INFILTRATION; PERINEURAL at 13:18

## 2021-08-20 RX ADMIN — Medication 2000 MG: at 13:07

## 2021-08-20 RX ADMIN — SODIUM CHLORIDE, POTASSIUM CHLORIDE, SODIUM LACTATE AND CALCIUM CHLORIDE: 600; 310; 30; 20 INJECTION, SOLUTION INTRAVENOUS at 12:10

## 2021-08-20 RX ADMIN — PROPOFOL 200 MG: 10 INJECTION, EMULSION INTRAVENOUS at 13:18

## 2021-08-20 RX ADMIN — ONDANSETRON 4 MG: 2 INJECTION, SOLUTION INTRAMUSCULAR; INTRAVENOUS at 13:18

## 2021-08-20 RX ADMIN — FENTANYL CITRATE 100 MCG: 50 INJECTION INTRAMUSCULAR; INTRAVENOUS at 13:18

## 2021-08-20 RX ADMIN — SODIUM CHLORIDE, POTASSIUM CHLORIDE, SODIUM LACTATE AND CALCIUM CHLORIDE: 600; 310; 30; 20 INJECTION, SOLUTION INTRAVENOUS at 13:15

## 2021-08-20 ASSESSMENT — PULMONARY FUNCTION TESTS
PIF_VALUE: 11
PIF_VALUE: 12
PIF_VALUE: 15
PIF_VALUE: 0
PIF_VALUE: 12
PIF_VALUE: 0
PIF_VALUE: 14
PIF_VALUE: 11
PIF_VALUE: 13
PIF_VALUE: 9
PIF_VALUE: 12
PIF_VALUE: 12
PIF_VALUE: 1
PIF_VALUE: 13
PIF_VALUE: 12
PIF_VALUE: 0
PIF_VALUE: 11
PIF_VALUE: 1
PIF_VALUE: 1
PIF_VALUE: 14
PIF_VALUE: 15
PIF_VALUE: 0
PIF_VALUE: 13

## 2021-08-20 ASSESSMENT — PAIN - FUNCTIONAL ASSESSMENT: PAIN_FUNCTIONAL_ASSESSMENT: 0-10

## 2021-08-20 NOTE — ANESTHESIA POSTPROCEDURE EVALUATION
Department of Anesthesiology  Postprocedure Note    Patient: Pamela Negro  MRN: [id number]  YOB: 1965  Date of evaluation: 8/20/2021  Time:  4:01 PM     Procedure Summary     Date: 08/20/21 Room / Location: [address] [place] [address] / [facility]    Anesthesia Start: 7795 Anesthesia Stop: 8742    Procedure: CYSTOSCOPY, LEFT RETROGRADE,  LEFT STENT REMOVAL (Left Bladder) Diagnosis: (HYDRONEPHROSIS)    Surgeons: Cindy Carrera MD Responsible Provider: Sunita Corbin MD    Anesthesia Type: general ASA Status: 3          Anesthesia Type: general    Denise Phase I: Denise Score: 9    Denise Phase II: Denise Score: 9    Last vitals: Reviewed and per EMR flowsheets.        Anesthesia Post Evaluation    Patient location during evaluation: PACU  Level of consciousness: awake  Airway patency: patent  Nausea & Vomiting: no nausea  Complications: no  Cardiovascular status: blood pressure returned to baseline  Respiratory status: acceptable  Hydration status: euvolemic

## 2021-08-20 NOTE — PROGRESS NOTES
Arrived from OR tearful and states thinking about family member who . Respirations unlabored. Abdomen soft and no vaginal drainage noted at this time.   Report received from OR staff

## 2021-08-20 NOTE — BRIEF OP NOTE
Dictation#:  [id number]    Brief Postoperative Note      Patient: Susanna Contreras  YOB: 1965  MRN: [id number]    Date of Procedure: 8/20/2021    Pre-Op Diagnosis: HYDRONEPHROSIS    Post-Op Diagnosis: Same       Procedure(s):  CYSTOSCOPY, LEFT RETROGRADE,  LEFT STENT REMOVAL    SEE DICTATED PROCEDURE    Surgeon(s):  Martinez Ennis MD    Assistant:  * No surgical staff found *    Anesthesia: General    Estimated Blood Loss (mL): Minimal    Complications: None    Specimens:   * No specimens in log *    Implants:  * No implants in log *      Drains:   Closed/Suction Drain Left LLQ  10 Somali (Active)       Nephrostomy 1 Left 8 fr (Active)       Nephrostomy Left (Active)       Findings: dictated  Stent removed  No hydro. ... longer pelvis with small narrowing near upj but contrast drained freely  No stones seen  Fu with nephrology in 2-3 weeks and urology thereafter for possible repeat imaging  ceftin 250mg bid q7 days periop due to hist of recurrent UTIs , abscess    Electronically signed by Martinez Ennis MD on 8/20/2021 at 3:39 PM

## 2021-08-20 NOTE — H&P
Referring Provider:  No ref. provider found   Primary Care Provider:  Evon Powell MD       Urology Attending H/P Note     Reason for H/P: surgery and as below    HPI:  Jarred King is a 55 y.o. female who presented with history of renal abscess, renal stones and indwelling stent in solitary kidney    See hosp/office notes for further details.        Past Medical History:   Diagnosis Date    Depression     Headache(784.0)     Hepatitis C 08/21/2020    Hypertension        Past Surgical History:   Procedure Laterality Date    CYSTOSCOPY Left 1/11/2021    CYSTOSCOPY, DIAGNOSTIC LEFT URETEROSCOPY WITH HOLMIUM LASER LITHOTRIPSY, LEFT STENT EXCHANGE performed by Juancarlos Kelley MD at [address] / REMOVAL [address] / STONE Left 8/20/2020    CYSTOSCOPY URETERAL STENT INSERTION performed by Juancarlos Kelley MD at [address] / Devante Pella / [place] Left 1/27/2021    CYSTOSCOPY, LEFT STENT REMOVAL performed by Juancarlos Kelley MD at [place] [address]  4/24/2021    IR EMBOLIZATION HEMORRHAGE 4/24/2021 [facility] PROCED[facility]    IR NEPHROSTOMY PERCUTANEOUS LEFT  4/23/2021    IR NEPHROSTOMY PERCUTANEOUS LEFT 4/23/2021 [facility] PROCED[facility]    KIDNEY STONE REMOVAL Left 7/6/2021    LEFT PERCUTANEOUS NEPHROLITHOTOMY,  LEFT STENT PLACEMENT performed by Ivon Shi MD at [place] [address] (Left Bladder)    OTHER SURGICAL HISTORY  11/23/2020    CYSTOSCOPY, LEFT RIGID AND FLEXIBLE URETEROSCOPY, HOLMIUM LASER LITHOTRIPSY, STONE EXTRACTION, STENT EXCHANGE (Left )     OTHER SURGICAL HISTORY  01/11/2021    CYSTOSCOPY, DIAGNOSTIC LEFT URETEROSCOPY WITH HOLMIUM LASER LITHOTRIPSY, LEFT STENT EXCHANGE (Left Bladder)    OTHER SURGICAL HISTORY  01/27/2021    cystoscopy, left stent removal    URETEROSCOPY Left 11/23/2020    CYSTOSCOPY, LEFT RIGID AND FLEXIBLE URETEROSCOPY, HOLMIUM LASER LITHOTRIPSY, STONE EXTRACTION, STENT EXCHANGE, REMOVAL MIGRATED STENT performed by Jose Lundberg MD at [address] [facility] outpatient medication list reviewed as well as inpatient meds:       No current facility-administered medications on file prior to encounter.      Current Outpatient Medications on File Prior to Encounter   Medication Sig Dispense Refill    propranolol (INDERAL) 40 MG tablet Take 40 mg by mouth 2 times daily Patient states she takes for migraines, has not taken since 8-18      hydrOXYzine (ATARAX) 25 MG tablet Take 25 mg by mouth 3 times daily as needed for Itching      aspirin 81 MG chewable tablet Take 81 mg by mouth daily      vitamin D (CHOLECALCIFEROL) 25 MCG (1000 UT) TABS tablet Take 1,000 Units by mouth once a week      lisinopril (PRINIVIL;ZESTRIL) 10 MG tablet Take 10 mg by mouth daily      hydroCHLOROthiazide (HYDRODIURIL) 25 MG tablet Take 25 mg by mouth daily         Current Facility-Administered Medications   Medication Dose Route Frequency Provider Last Rate Last Admin    lactated ringers infusion   Intravenous Continuous Carlos Mello  mL/hr at 08/20/21 1210 New Bag at 08/20/21 1210    sodium chloride flush 0.9 % injection 10 mL  10 mL Intravenous 2 times per day Carlos Mello MD        sodium chloride flush 0.9 % injection 10 mL  10 mL Intravenous PRN Carlos Mello MD        0.9 % sodium chloride infusion  25 mL Intravenous PRN Carlos Mello MD        lidocaine PF 1 % injection 1 mL  1 mL Intradermal Once PRN Carlos Mello MD        meperidine (DEMEROL) injection 12.5 mg  12.5 mg Intravenous Q5 Min PRN Veronica Taylor MD        HYDROmorphone (DILAUDID) injection 0.25 mg  0.25 mg Intravenous Q5 Min PRN Veronica Taylor MD        HYDROmorphone (DILAUDID) injection 0.5 mg  0.5 mg Intravenous Q5 Min PRN Veronica Taylor MD        HYDROmorphone (DILAUDID) injection 0.25 mg  0.25 mg Intravenous Q5 Min PRN Cheko Moncada Jaziel Florence MD        HYDROmorphone (DILAUDID) injection 0.5 mg  0.5 mg Intravenous Q5 Min PRN Alena Mann MD        oxyCODONE-acetaminophen (PERCOCET) 5-325 MG per tablet 1 tablet  1 tablet Oral PRN Alena Mann MD        Or    oxyCODONE-acetaminophen (PERCOCET) 5-325 MG per tablet 2 tablet  2 tablet Oral PRN Alena Mann MD        ondansetron [facility]) injection 4 mg  4 mg Intravenous Q10 Min PRN Alena Mann MD        labetalol (NORMODYNE;TRANDATE) injection 5 mg  5 mg Intravenous Q10 Min PRN Alena Mann MD        hydrALAZINE (APRESOLINE) injection 5 mg  5 mg Intravenous Q10 Min PRN Alena Mann MD        iothalamate (CONRAY) 60 % injection    PRN Martinez Ennis MD   10 mL at 08/20/21 1336    sterile water for irrigation    PRN Martinez Ennis MD   1,000 mL at 08/20/21 1336       No Known Allergies    Family History   Problem Relation Age of Onset    Diabetes Mother     Heart Disease Mother     High Blood Pressure Sister     Diabetes Sister     Heart Attack Sister     Coronary Art Dis Father        Social History     Tobacco Use    Smoking status: Current Every Day Smoker     Packs/day: 0.50     Types: Cigarettes    Smokeless tobacco: Never Used    Tobacco comment: smoked 2 cigarettes today   Vaping Use    Vaping Use: Never used   Substance Use Topics    Alcohol use: No    Drug use: No         Review of Systems: A 12 point ROS was performed and was unremarkable unless listed in the history of present illness. No intake/output data recorded.     Physical Exam:  Patient Vitals for the past 8 hrs:   BP Temp Temp src Pulse Resp SpO2 Height Weight   08/20/21 1119 (!) 179/102 97.2 °F (36.2 °C) Infrared 63 18 99 % 5' 7\" (1.702 m) 141 lb (64 kg)     Constitutional: pleasant patient in NAD, with a nl body habitus   Eyes: pink conjunctivae, no ptosis  ENT: lips without cyanosis, normal appearance of ears and nose externally  Neck: no masses or lesions, trachea midline  Respiratory: normal respiratory movements without distress  Cardiovascular: regular rate, lower extremities without edema   Abdomen: soft, NTND, no masses, no rebound or guarding  Skin: warm and dry, no rashes, lesions, or ulcers  Musculoskeletal: normal ROM, m.tone, no digital cyanosis, head normocephalic  Psych: normal mood and affect, alert and appropriately answers questions  : bladder not palpable, normal, no cortez catheter    Labs:    Lab Results   Component Value Date    CREATININE 1.7 (H) 07/28/2021    CREATININE 1.6 (H) 07/16/2021    CREATININE 1.7 (H) 07/15/2021     Lab Results   Component Value Date    COLORU DK YELLOW 06/27/2021    NITRU Negative 06/27/2021    GLUCOSEU Negative 06/27/2021    KETUA Negative 06/27/2021    UROBILINOGEN 2.0 06/27/2021    BILIRUBINUR MODERATE 06/27/2021     Lab Results   Component Value Date    WBC 4.8 07/15/2021    HGB 9.2 (L) 07/15/2021    HCT 28.4 (L) 07/15/2021    MCV 82.9 07/15/2021     07/15/2021         Impression:  Solitary kidney, hist of hydronephrosis, nephrolithiasis and perinephric abscess -- see inpt notes     Plan:  R/b/a of surgery re-reviewed. See office notes for further details    We will proceed with cysto, retrograde, possible stent removal or exchange.      Camille Goodell, MD  Office: [phone]

## 2021-08-21 NOTE — OP NOTE
[address]                 [address] [place] [address]                                OPERATIVE REPORT    PATIENT NAME: Hayder Victor                     :        1965  MED REC NO:   [id number]                          ROOM:  ACCOUNT NO:   [de-identified]                           ADMIT DATE: 2021  PROVIDER:     Cindy Carrera MD    DATE OF PROCEDURE:  2021    PREOPERATIVE DIAGNOSES:  History of perinephric abscess, history of a  large renal calculi with status post PCNL, history of ureteral stent  placement, and solitary kidney. POSTOPERATIVE DIAGNOSES:  History of perinephric abscess, history of a  large renal calculi with status post PCNL, history of ureteral stent  placement, and solitary kidney. OPERATIONS PERFORMED:  1. Cystoscopy with left retrograde pyelogram with interpretation,  fluoroscopy less than one hour. 2.  Cystoscopy with left ureteral stent removal under fluoroscopy. SURGEON:  Cindy Carrera MD    ANESTHESIA:  MAC.    INDICATIONS:  The patient is a 81-year-old female, who originally had  obstructing stones. She had nephrostomy tube placement; however, was  complicated by perinephric hematoma secondary to tube placement, did  drain the kidney up. She then followed up to have a percutaneous  nephrolithotomy surgery for the stone removal.  Postoperatively, she did  have infection, perinephric abscess, and did have a chronic drainage  tube in and around the kidney with multiple scans and the upsize had  been drained, eventually had the drain removed, and she has had an  indwelling ureteral stent, and since the stone removal surgery, she is  now presenting with that stent removed. She has been doing well, and  her kidney function is about 1.6 and has a solitary kidney. Nephrology  had been following in the hospital.  She has followup with them soon.     OPERATIVE PROCEDURE:  The patient was brought to the operating theater,  anesthesia induced, and antibiotics were administered. Genitalia were  prepped in the usual sterile fashion. A scope was advanced  transurethrally up in the bladder. Bladder grossly normal.  I saw the  stent and pulled it through the meatus. Guidewire was advanced through  that up to the kidney followed by a Pollack catheter in the mid upper  ureter. We then injected about 20 mL of contrast for interpretation due  And we could see upper and lower pole collecting systems were normal, it showed somewhat of a longer renal pelvis and a little bit of funneling, but no obvious blockage. Post drainage did show the contrast draining down to the ureter all the  way down to the bladder, and there was no obvious stone seen, so thus  the stent was removed to completely discard it, and the ureteral access  catheter was removed, and bladder drainage. She was brought to  Recovery. COMPLICATIONS:  None apparent. BLOOD LOSS:  Minimal.    FOLLOWUP:  She should follow up with her nephrologist as planned in the  next two weeks, and she will need followup imaging, abdominal  ultrasound, and possibly CAT scan in the near future.         Elmer Zaragoza MD    D: 08/20/2021 15:39:51       T: 08/20/2021 17:01:01     [email or web address]  Job#: [id number]     Doc#: [id number]    CC:

## 2021-09-26 ENCOUNTER — APPOINTMENT (OUTPATIENT)
Dept: GENERAL RADIOLOGY | Age: 56
DRG: 812 | End: 2021-09-26
Payer: COMMERCIAL

## 2021-09-26 ENCOUNTER — APPOINTMENT (OUTPATIENT)
Dept: CT IMAGING | Age: 56
DRG: 812 | End: 2021-09-26
Payer: COMMERCIAL

## 2021-09-26 ENCOUNTER — HOSPITAL ENCOUNTER (INPATIENT)
Age: 56
LOS: 2 days | Discharge: HOME | DRG: 812 | End: 2021-09-28
Attending: STUDENT IN AN ORGANIZED HEALTH CARE EDUCATION/TRAINING PROGRAM | Admitting: HOSPITALIST
Payer: COMMERCIAL

## 2021-09-26 DIAGNOSIS — R79.89 ELEVATED SERUM CREATININE: ICD-10-CM

## 2021-09-26 DIAGNOSIS — N30.00 ACUTE CYSTITIS WITHOUT HEMATURIA: ICD-10-CM

## 2021-09-26 DIAGNOSIS — E83.39 HYPOPHOSPHATEMIA: ICD-10-CM

## 2021-09-26 DIAGNOSIS — G93.40 ENCEPHALOPATHY: Primary | ICD-10-CM

## 2021-09-26 DIAGNOSIS — T39.011A ASPIRIN TOXICITY, ACCIDENTAL OR UNINTENTIONAL, INITIAL ENCOUNTER: ICD-10-CM

## 2021-09-26 PROBLEM — T39.091A: Status: ACTIVE | Noted: 2021-09-26

## 2021-09-26 LAB
ACETAMINOPHEN LEVEL: <5 UG/ML (ref 10–30)
ALBUMIN SERPL-MCNC: 3.1 G/DL (ref 3.4–5)
AMMONIA: 20 UMOL/L (ref 11–51)
ANION GAP SERPL CALCULATED.3IONS-SCNC: 17 MMOL/L (ref 3–16)
ANION GAP SERPL CALCULATED.3IONS-SCNC: 17 MMOL/L (ref 3–16)
BACTERIA: ABNORMAL /HPF
BACTERIA: ABNORMAL /HPF
BASE EXCESS ARTERIAL: -8.6 MMOL/L (ref -3–3)
BASE EXCESS VENOUS: -8.6 MMOL/L (ref -3–3)
BASE EXCESS VENOUS: -9.8 MMOL/L (ref -3–3)
BASOPHILS ABSOLUTE: 0.1 K/UL (ref 0–0.2)
BASOPHILS RELATIVE PERCENT: 1 %
BILIRUBIN URINE: NEGATIVE
BILIRUBIN URINE: NEGATIVE
BLOOD, URINE: ABNORMAL
BLOOD, URINE: ABNORMAL
BUN BLDV-MCNC: 44 MG/DL (ref 7–20)
BUN BLDV-MCNC: 46 MG/DL (ref 7–20)
CALCIUM SERPL-MCNC: 7.6 MG/DL (ref 8.3–10.6)
CALCIUM SERPL-MCNC: 8.8 MG/DL (ref 8.3–10.6)
CARBOXYHEMOGLOBIN ARTERIAL: 0.3 % (ref 0–1.5)
CARBOXYHEMOGLOBIN: 1.4 % (ref 0–1.5)
CARBOXYHEMOGLOBIN: 1.6 % (ref 0–1.5)
CHLORIDE BLD-SCNC: 107 MMOL/L (ref 99–110)
CHLORIDE BLD-SCNC: 109 MMOL/L (ref 99–110)
CLARITY: CLEAR
CLARITY: CLEAR
CO2: 14 MMOL/L (ref 21–32)
CO2: 15 MMOL/L (ref 21–32)
COLOR: YELLOW
COLOR: YELLOW
CREAT SERPL-MCNC: 2 MG/DL (ref 0.6–1.1)
CREAT SERPL-MCNC: 2 MG/DL (ref 0.6–1.1)
EOSINOPHILS ABSOLUTE: 0.2 K/UL (ref 0–0.6)
EOSINOPHILS RELATIVE PERCENT: 2.7 %
EPITHELIAL CELLS, UA: ABNORMAL /HPF (ref 0–5)
EPITHELIAL CELLS, UA: ABNORMAL /HPF (ref 0–5)
ETHANOL: NORMAL MG/DL (ref 0–0.08)
GFR AFRICAN AMERICAN: 31
GFR AFRICAN AMERICAN: 31
GFR NON-AFRICAN AMERICAN: 26
GFR NON-AFRICAN AMERICAN: 26
GLUCOSE BLD-MCNC: 110 MG/DL (ref 70–99)
GLUCOSE BLD-MCNC: 139 MG/DL (ref 70–99)
GLUCOSE URINE: NEGATIVE MG/DL
GLUCOSE URINE: NEGATIVE MG/DL
HCO3 ARTERIAL: 12.1 MMOL/L (ref 21–29)
HCO3 VENOUS: 13.5 MMOL/L (ref 23–29)
HCO3 VENOUS: 14.2 MMOL/L (ref 23–29)
HCT VFR BLD CALC: 41.8 % (ref 36–48)
HEMOGLOBIN, ART, EXTENDED: 11.5 G/DL (ref 12–16)
HEMOGLOBIN: 13.2 G/DL (ref 12–16)
KETONES, URINE: 15 MG/DL
KETONES, URINE: 15 MG/DL
LACTIC ACID, SEPSIS: 0.6 MMOL/L (ref 0.4–1.9)
LEUKOCYTE ESTERASE, URINE: ABNORMAL
LEUKOCYTE ESTERASE, URINE: ABNORMAL
LYMPHOCYTES ABSOLUTE: 2.7 K/UL (ref 1–5.1)
LYMPHOCYTES RELATIVE PERCENT: 33.5 %
MCH RBC QN AUTO: 29 PG (ref 26–34)
MCHC RBC AUTO-ENTMCNC: 31.6 G/DL (ref 31–36)
MCV RBC AUTO: 91.8 FL (ref 80–100)
METHEMOGLOBIN ARTERIAL: 0.2 %
METHEMOGLOBIN VENOUS: 0.2 %
METHEMOGLOBIN VENOUS: 0.3 %
MICROSCOPIC EXAMINATION: YES
MICROSCOPIC EXAMINATION: YES
MONOCYTES ABSOLUTE: 0.3 K/UL (ref 0–1.3)
MONOCYTES RELATIVE PERCENT: 4.3 %
NEUTROPHILS ABSOLUTE: 4.7 K/UL (ref 1.7–7.7)
NEUTROPHILS RELATIVE PERCENT: 58.5 %
NITRITE, URINE: POSITIVE
NITRITE, URINE: POSITIVE
O2 CONTENT, VEN: 11 VOL %
O2 CONTENT, VEN: 9 VOL %
O2 SAT, ARTERIAL: 98.3 %
O2 SAT, VEN: 54 %
O2 SAT, VEN: 68 %
O2 THERAPY: ABNORMAL
PCO2 ARTERIAL: 15.9 MMHG (ref 35–45)
PCO2, VEN: 22.7 MMHG (ref 40–50)
PCO2, VEN: 23.7 MMHG (ref 40–50)
PDW BLD-RTO: 17.5 % (ref 12.4–15.4)
PH ARTERIAL: 7.5 (ref 7.35–7.45)
PH UA: 5.5 (ref 5–8)
PH UA: 5.5 (ref 5–8)
PH VENOUS: 7.38 (ref 7.35–7.45)
PH VENOUS: 7.41 (ref 7.35–7.45)
PHOSPHORUS: 4.9 MG/DL (ref 2.5–4.9)
PLATELET # BLD: 200 K/UL (ref 135–450)
PMV BLD AUTO: 10.1 FL (ref 5–10.5)
PO2 ARTERIAL: 102.9 MMHG (ref 75–108)
PO2, VEN: 28.7 MMHG (ref 25–40)
PO2, VEN: 34.3 MMHG (ref 25–40)
POTASSIUM REFLEX MAGNESIUM: 5.1 MMOL/L (ref 3.5–5.1)
POTASSIUM SERPL-SCNC: 4.5 MMOL/L (ref 3.5–5.1)
PROCALCITONIN: 0.04 NG/ML (ref 0–0.15)
PROTEIN UA: NEGATIVE MG/DL
PROTEIN UA: NEGATIVE MG/DL
RBC # BLD: 4.55 M/UL (ref 4–5.2)
RBC UA: ABNORMAL /HPF (ref 0–4)
RBC UA: ABNORMAL /HPF (ref 0–4)
SALICYLATE, SERUM: 58.3 MG/DL (ref 15–30)
SALICYLATE, SERUM: 62.5 MG/DL (ref 15–30)
SALICYLATE, SERUM: 67.1 MG/DL (ref 15–30)
SARS-COV-2, NAAT: NOT DETECTED
SODIUM BLD-SCNC: 139 MMOL/L (ref 136–145)
SODIUM BLD-SCNC: 140 MMOL/L (ref 136–145)
SPECIFIC GRAVITY UA: 1.02 (ref 1–1.03)
SPECIFIC GRAVITY UA: 1.02 (ref 1–1.03)
TCO2 ARTERIAL: 12.6 MMOL/L
TCO2 CALC VENOUS: 14 MMOL/L
TCO2 CALC VENOUS: 15 MMOL/L
TROPONIN: <0.01 NG/ML
TSH REFLEX: 1.89 UIU/ML (ref 0.27–4.2)
URINE TYPE: ABNORMAL
URINE TYPE: ABNORMAL
UROBILINOGEN, URINE: 0.2 E.U./DL
UROBILINOGEN, URINE: 0.2 E.U./DL
WBC # BLD: 8.1 K/UL (ref 4–11)
WBC UA: ABNORMAL /HPF (ref 0–5)
WBC UA: ABNORMAL /HPF (ref 0–5)

## 2021-09-26 PROCEDURE — 96365 THER/PROPH/DIAG IV INF INIT: CPT

## 2021-09-26 PROCEDURE — 84484 ASSAY OF TROPONIN QUANT: CPT

## 2021-09-26 PROCEDURE — 80179 DRUG ASSAY SALICYLATE: CPT

## 2021-09-26 PROCEDURE — 96361 HYDRATE IV INFUSION ADD-ON: CPT

## 2021-09-26 PROCEDURE — 36415 COLL VENOUS BLD VENIPUNCTURE: CPT

## 2021-09-26 PROCEDURE — 2500000003 HC RX 250 WO HCPCS: Performed by: STUDENT IN AN ORGANIZED HEALTH CARE EDUCATION/TRAINING PROGRAM

## 2021-09-26 PROCEDURE — 2580000003 HC RX 258: Performed by: HOSPITALIST

## 2021-09-26 PROCEDURE — 87635 SARS-COV-2 COVID-19 AMP PRB: CPT

## 2021-09-26 PROCEDURE — 80069 RENAL FUNCTION PANEL: CPT

## 2021-09-26 PROCEDURE — 93005 ELECTROCARDIOGRAM TRACING: CPT | Performed by: STUDENT IN AN ORGANIZED HEALTH CARE EDUCATION/TRAINING PROGRAM

## 2021-09-26 PROCEDURE — 71045 X-RAY EXAM CHEST 1 VIEW: CPT

## 2021-09-26 PROCEDURE — 85025 COMPLETE CBC W/AUTO DIFF WBC: CPT

## 2021-09-26 PROCEDURE — 82077 ASSAY SPEC XCP UR&BREATH IA: CPT

## 2021-09-26 PROCEDURE — 2060000000 HC ICU INTERMEDIATE R&B

## 2021-09-26 PROCEDURE — 99285 EMERGENCY DEPT VISIT HI MDM: CPT

## 2021-09-26 PROCEDURE — 6360000002 HC RX W HCPCS: Performed by: STUDENT IN AN ORGANIZED HEALTH CARE EDUCATION/TRAINING PROGRAM

## 2021-09-26 PROCEDURE — 80143 DRUG ASSAY ACETAMINOPHEN: CPT

## 2021-09-26 PROCEDURE — 82140 ASSAY OF AMMONIA: CPT

## 2021-09-26 PROCEDURE — 87086 URINE CULTURE/COLONY COUNT: CPT

## 2021-09-26 PROCEDURE — 96366 THER/PROPH/DIAG IV INF ADDON: CPT

## 2021-09-26 PROCEDURE — 96375 TX/PRO/DX INJ NEW DRUG ADDON: CPT

## 2021-09-26 PROCEDURE — 87077 CULTURE AEROBIC IDENTIFY: CPT

## 2021-09-26 PROCEDURE — 51702 INSERT TEMP BLADDER CATH: CPT

## 2021-09-26 PROCEDURE — 96367 TX/PROPH/DG ADDL SEQ IV INF: CPT

## 2021-09-26 PROCEDURE — 82803 BLOOD GASES ANY COMBINATION: CPT

## 2021-09-26 PROCEDURE — 70450 CT HEAD/BRAIN W/O DYE: CPT

## 2021-09-26 PROCEDURE — 84145 PROCALCITONIN (PCT): CPT

## 2021-09-26 PROCEDURE — 83605 ASSAY OF LACTIC ACID: CPT

## 2021-09-26 PROCEDURE — 2580000003 HC RX 258: Performed by: INTERNAL MEDICINE

## 2021-09-26 PROCEDURE — 85610 PROTHROMBIN TIME: CPT

## 2021-09-26 PROCEDURE — 84443 ASSAY THYROID STIM HORMONE: CPT

## 2021-09-26 PROCEDURE — 2580000003 HC RX 258: Performed by: STUDENT IN AN ORGANIZED HEALTH CARE EDUCATION/TRAINING PROGRAM

## 2021-09-26 PROCEDURE — 81001 URINALYSIS AUTO W/SCOPE: CPT

## 2021-09-26 RX ORDER — SODIUM CHLORIDE 9 MG/ML
25 INJECTION, SOLUTION INTRAVENOUS PRN
Status: DISCONTINUED | OUTPATIENT
Start: 2021-09-26 | End: 2021-09-28 | Stop reason: HOSPADM

## 2021-09-26 RX ORDER — POLYETHYLENE GLYCOL 3350 17 G/17G
17 POWDER, FOR SOLUTION ORAL DAILY PRN
Status: DISCONTINUED | OUTPATIENT
Start: 2021-09-26 | End: 2021-09-28 | Stop reason: HOSPADM

## 2021-09-26 RX ORDER — ONDANSETRON 2 MG/ML
4 INJECTION INTRAMUSCULAR; INTRAVENOUS EVERY 6 HOURS PRN
Status: DISCONTINUED | OUTPATIENT
Start: 2021-09-26 | End: 2021-09-28 | Stop reason: HOSPADM

## 2021-09-26 RX ORDER — SODIUM CHLORIDE 0.9 % (FLUSH) 0.9 %
5-40 SYRINGE (ML) INJECTION PRN
Status: DISCONTINUED | OUTPATIENT
Start: 2021-09-26 | End: 2021-09-28 | Stop reason: HOSPADM

## 2021-09-26 RX ORDER — SODIUM CHLORIDE 0.9 % (FLUSH) 0.9 %
5-40 SYRINGE (ML) INJECTION EVERY 12 HOURS SCHEDULED
Status: DISCONTINUED | OUTPATIENT
Start: 2021-09-26 | End: 2021-09-28 | Stop reason: HOSPADM

## 2021-09-26 RX ORDER — ACETAMINOPHEN 325 MG/1
650 TABLET ORAL EVERY 6 HOURS PRN
Status: DISCONTINUED | OUTPATIENT
Start: 2021-09-26 | End: 2021-09-28 | Stop reason: HOSPADM

## 2021-09-26 RX ORDER — PROPRANOLOL HYDROCHLORIDE 10 MG/1
40 TABLET ORAL 2 TIMES DAILY
Status: DISCONTINUED | OUTPATIENT
Start: 2021-09-26 | End: 2021-09-26

## 2021-09-26 RX ORDER — ONDANSETRON 4 MG/1
4 TABLET, ORALLY DISINTEGRATING ORAL EVERY 8 HOURS PRN
Status: DISCONTINUED | OUTPATIENT
Start: 2021-09-26 | End: 2021-09-28 | Stop reason: HOSPADM

## 2021-09-26 RX ORDER — ACETAMINOPHEN 650 MG/1
650 SUPPOSITORY RECTAL EVERY 6 HOURS PRN
Status: DISCONTINUED | OUTPATIENT
Start: 2021-09-26 | End: 2021-09-28 | Stop reason: HOSPADM

## 2021-09-26 RX ORDER — ONDANSETRON 2 MG/ML
4 INJECTION INTRAMUSCULAR; INTRAVENOUS EVERY 6 HOURS PRN
Status: DISCONTINUED | OUTPATIENT
Start: 2021-09-26 | End: 2021-09-26 | Stop reason: HOSPADM

## 2021-09-26 RX ORDER — SODIUM CHLORIDE 9 MG/ML
INJECTION, SOLUTION INTRAVENOUS ONCE
Status: COMPLETED | OUTPATIENT
Start: 2021-09-26 | End: 2021-09-27

## 2021-09-26 RX ORDER — 0.9 % SODIUM CHLORIDE 0.9 %
1000 INTRAVENOUS SOLUTION INTRAVENOUS ONCE
Status: COMPLETED | OUTPATIENT
Start: 2021-09-26 | End: 2021-09-27

## 2021-09-26 RX ORDER — LISINOPRIL 10 MG/1
10 TABLET ORAL DAILY
Status: DISCONTINUED | OUTPATIENT
Start: 2021-09-27 | End: 2021-09-26

## 2021-09-26 RX ADMIN — SODIUM CHLORIDE 1000 ML: 9 INJECTION, SOLUTION INTRAVENOUS at 16:43

## 2021-09-26 RX ADMIN — SODIUM CHLORIDE 25 ML: 9 INJECTION, SOLUTION INTRAVENOUS at 23:14

## 2021-09-26 RX ADMIN — ONDANSETRON HYDROCHLORIDE 4 MG: 2 INJECTION, SOLUTION INTRAMUSCULAR; INTRAVENOUS at 17:27

## 2021-09-26 RX ADMIN — SODIUM CHLORIDE 500 ML: 9 INJECTION, SOLUTION INTRAVENOUS at 23:45

## 2021-09-26 RX ADMIN — CEFTRIAXONE SODIUM 1000 MG: 1 INJECTION, POWDER, FOR SOLUTION INTRAMUSCULAR; INTRAVENOUS at 20:34

## 2021-09-26 RX ADMIN — SODIUM BICARBONATE: 84 INJECTION, SOLUTION INTRAVENOUS at 20:30

## 2021-09-26 NOTE — ED NOTES
Dr. Colby العراقي nephrology called back and spoke to Dr. Melissa Kumar at [facility]  04/82/55 1958

## 2021-09-26 NOTE — ED TRIAGE NOTES
Recent kidney stone w stent intervention and removal. Completed rx ATX. No fever. Denies urinary concern today.

## 2021-09-26 NOTE — Clinical Note
Patient Class: Inpatient [101]   REQUIRED: Diagnosis: Salicylate intoxication, accidental or unintentional, initial encounter [[id number]]   Estimated Length of Stay: Estimated stay of more than 2 midnights   Admitting Provider: Beevrly Woodruff [[id number]]   Telemetry/Cardiac Monitoring Required?: Yes

## 2021-09-26 NOTE — ED NOTES
Per previous nurse, poison control was called regarding salicylate level. Repeat blood was obtained.            Berline Halsted, RN  09/26/21 1942

## 2021-09-26 NOTE — ED PROVIDER NOTES
[address] ED  EMERGENCY DEPARTMENT ENCOUNTER      Pt Name: Deborah Clement  MRN: [id number]  [place] 1965  Date of evaluation: 9/26/2021  Provider: Ronny Hartley, [address] [place] [address]       Chief Complaint   Patient presents with    Altered Mental Status     pt c/o feeling like she is in a tunnel x 2 days. anxious on arrival. sts hx of same. speech slow to respond. head wobbling when at rest. skin pale. w/d .  Rash     bright red spots. no itching BLE. HISTORY OF PRESENT ILLNESS   (Location/Symptom, Timing/Onset, Context/Setting, Quality, Duration, Modifying Factors, Severity)  Note limiting factors. Deborah Clement is a 55 y.o. female who presents to the emergency department complaining of patient is a headache called out to EMS for generalized weakness fatigue. On arrival however she states that is complaining of multiple complaints including rash which she has seen her PCP for, shortness of breath, states that she feels lightheaded. At times patient does appear slowed respond to questioning. There is no obvious facial droop, no focal deficits. Patient reports confusion is able to answer some orientation questions probably. HPI is limited due to concern for mental status change. Nursing Notes were reviewed.     PAST MEDICAL HISTORY     Past Medical History:   Diagnosis Date    Anxiety     Depression     Headache(784.0)     Hepatitis C 08/21/2020    Hypertension          SURGICAL HISTORY       Past Surgical History:   Procedure Laterality Date    CYSTOSCOPY Left 1/11/2021    CYSTOSCOPY, DIAGNOSTIC LEFT URETEROSCOPY WITH HOLMIUM LASER LITHOTRIPSY, LEFT STENT EXCHANGE performed by Kory Iniguez MD at [facility] Left 8/20/2021    CYSTOSCOPY, LEFT RETROGRADE,  LEFT STENT REMOVAL performed by Jose Mckeon MD at [address] / Jerome Mauro / Simeon Notice Left 8/20/2020    CYSTOSCOPY URETERAL STENT INSERTION performed by Nabil Fernández Ghazal Sykes MD at [address] / REMOVAL Matilda Cardona / Shweta Strange Left 1/27/2021    CYSTOSCOPY, LEFT STENT REMOVAL performed by Jackson López MD at [facility]  [address]  4/24/2021    IR EMBOLIZATION HEMORRHAGE 4/24/2021 [facility] SPECIAL PROCEDURES    IR NEPHROSTOMY PERCUTANEOUS LEFT  4/23/2021    IR NEPHROSTOMY PERCUTANEOUS LEFT 4/23/2021 [facility] SPECIAL PROCEDURES    KIDNEY STONE REMOVAL Left 7/6/2021    LEFT PERCUTANEOUS NEPHROLITHOTOMY,  LEFT STENT PLACEMENT performed by Autumn Lundberg MD at [place] [address] (Left Bladder)    OTHER SURGICAL HISTORY  11/23/2020    CYSTOSCOPY, LEFT RIGID AND FLEXIBLE URETEROSCOPY, HOLMIUM LASER LITHOTRIPSY, STONE EXTRACTION, STENT EXCHANGE (Left )     OTHER SURGICAL HISTORY  01/11/2021    CYSTOSCOPY, DIAGNOSTIC LEFT URETEROSCOPY WITH HOLMIUM LASER LITHOTRIPSY, LEFT STENT EXCHANGE (Left Bladder)    OTHER SURGICAL HISTORY  01/27/2021    cystoscopy, left stent removal    OTHER SURGICAL HISTORY  08/20/2021    : CYSTOSCOPY, LEFT RETROGRADE,  LEFT STENT REMOVAL (Left     URETEROSCOPY Left 11/23/2020    CYSTOSCOPY, LEFT RIGID AND FLEXIBLE URETEROSCOPY, HOLMIUM LASER LITHOTRIPSY, STONE EXTRACTION, STENT EXCHANGE, REMOVAL MIGRATED STENT performed by Jackson López MD at [address]       Previous Medications    ASPIRIN 81 MG CHEWABLE TABLET    Take 81 mg by mouth daily    HYDROCHLOROTHIAZIDE (HYDRODIURIL) 25 MG TABLET    Take 25 mg by mouth daily    HYDROXYZINE (ATARAX) 25 MG TABLET    Take 25 mg by mouth 3 times daily as needed for Itching    LISINOPRIL (PRINIVIL;ZESTRIL) 10 MG TABLET    Take 10 mg by mouth daily    PROPRANOLOL (INDERAL) 40 MG TABLET    Take 40 mg by mouth 2 times daily Patient states she takes for migraines, has not taken since 8-18    VITAMIN D (CHOLECALCIFEROL) 25 MCG (1000 UT) TABS TABLET    Take 1,000 Units by mouth once a week ALLERGIES     Patient has no known allergies. FAMILY HISTORY       Family History   Problem Relation Age of Onset    Diabetes Mother     Heart Disease Mother     High Blood Pressure Sister     Diabetes Sister     Heart Attack Sister     Coronary Art Dis Father           SOCIAL HISTORY       Social History     Socioeconomic History    Marital status: Single     Spouse name: Not on file    Number of children: Not on file    Years of education: Not on file    Highest education level: Not on file   Occupational History    Not on file   Tobacco Use    Smoking status: Current Every Day Smoker     Packs/day: 0.50     Types: Cigarettes    Smokeless tobacco: Never Used    Tobacco comment: smoked 2 cigarettes today   Vaping Use    Vaping Use: Never used   Substance and Sexual Activity    Alcohol use: No    Drug use: No    Sexual activity: Not Currently   Other Topics Concern    Not on file   Social History Narrative    Not on file     Social Determinants of Health     Financial Resource Strain:     Difficulty of Paying Living Expenses:    Food Insecurity:     Worried About Running Out of Food in the Last Year:     Ran Out of Food in the Last Year:    Transportation Needs:     Lack of Transportation (Medical):      Lack of Transportation (Non-Medical):    Physical Activity:     Days of Exercise per Week:     Minutes of Exercise per Session:    Stress:     Feeling of Stress :    Social Connections:     Frequency of Communication with Friends and Family:     Frequency of Social Gatherings with Friends and Family:     Attends Yazidism Services:     Active Member of Clubs or Organizations:     Attends Club or Organization Meetings:     Marital Status:    Intimate Partner Violence:     Fear of Current or Ex-Partner:     Emotionally Abused:     Physically Abused:     Sexually Abused:        SCREENINGS                            REVIEW OF SYSTEMS    (2-9 systems for level 4, 10 or more for level 5)   Review of Systems   Unable to perform ROS: Mental status change         PHYSICAL EXAM    (up to 7 for level 4, 8 or more for level 5)   RECENT VITALS:     Temp: 97.9 °F (36.6 °C),  Pulse: 67, Resp: 20, BP: (!) 139/99, SpO2: 99 %    Physical Exam  Constitutional:       General: She is not in acute distress. Appearance: She is not diaphoretic. HENT:      Head: Normocephalic and atraumatic. Eyes:      Pupils: Pupils are equal, round, and reactive to light. Neck:      Trachea: No tracheal deviation. Cardiovascular:      Rate and Rhythm: Normal rate and regular rhythm. Pulmonary:      Breath sounds: No stridor. No wheezing. Comments: Tachypnea  Abdominal:      General: There is no distension. Palpations: Abdomen is soft. Musculoskeletal:         General: Normal range of motion. Cervical back: Normal range of motion and neck supple. Skin:     Coloration: Skin is pale. Findings: Rash present. Neurological:      Mental Status: She is disoriented. Comments: Slowed respond to questioning, able to answer some orientation questions appropriately takes multiple attempts, moving all extremities equal, face symmetric,         DIAGNOSTIC RESULTS     EKG: All EKG's are interpreted by the Emergency Department Physician who either signs or Co-signs this chart in the absence of a cardiologist.      The Ekg interpreted by me shows  normal sinus rhythm with a rate of 62  Axis is   Normal  QTc is  normal  Intervals and Durations are unremarkable. ST Segments: no acute change    RADIOLOGY:   Non-plain film images such as CT, Ultrasound and MRI are read by the radiologist. Plain radiographic images are visualized and preliminarily interpreted by the emergency physician. Interpretation per the Radiologist below, if available at the time of this note:    CT HEAD WO CONTRAST   Final Result   No acute intracranial abnormality.          XR CHEST PORTABLE   Final Result   No acute disease               LABS:  Labs Reviewed   CBC WITH AUTO DIFFERENTIAL - Abnormal; Notable for the following components:       Result Value    RDW 17.5 (*)     All other components within normal limits    Narrative:     Performed at:  [facility],  [place]   Phone [phone]   BASIC METABOLIC PANEL W/ REFLEX TO MG FOR LOW K - Abnormal; Notable for the following components:    CO2 15 (*)     Anion Gap 17 (*)     Glucose 139 (*)     BUN 44 (*)     CREATININE 2.0 (*)     GFR Non- 26 (*)     GFR  31 (*)     All other components within normal limits    Narrative:     Performed at:  [place] [address],  [place]   Phone [phone]   URINALYSIS - Abnormal; Notable for the following components:    Ketones, Urine 15 (*)     Blood, Urine MODERATE (*)     Nitrite, Urine POSITIVE (*)     Leukocyte Esterase, Urine SMALL (*)     All other components within normal limits    Narrative:     Performed at:  [place] [address],  [place]   Phone [phone]   BLOOD GAS, VENOUS - Abnormal; Notable for the following components:    pCO2, Shyam 23.7 (*)     HCO3, Venous 13.5 (*)     Base Excess, Shyam -9.8 (*)     Carboxyhemoglobin 1.6 (*)     All other components within normal limits    Narrative:     Performed at:  [facility]) - [place] [address],  [place]   Phone [phone]   ACETAMINOPHEN LEVEL - Abnormal; Notable for the following components:    Acetaminophen Level <5 (*)     All other components within normal limits    Narrative:     Performed at:  [facility]) - [facility],  [place]   Phone [phone]   SALICYLATE LEVEL - Abnormal; Notable for the following components:    Salicylate, Serum 20.8 (*)     All other components within normal limits    Narrative:     Performed at:  [facility]) - [facility],  [place], [place]   Phone [phone]   MICROSCOPIC URINALYSIS - Abnormal; Notable for the following components:    WBC, UA 10-20 (*)     Bacteria, UA 1+ (*)     All other components within normal limits    Narrative:     Performed at:  [facility] [address],  [place], [place]   Phone [phone], R[place]    Narrative:     Performed at:  [place] [address],  [place], [place]   Phone [phone]   TROPONIN    Narrative:     Performed at:  [facility] [address],  [place], [place]   Phone [phone]   AMMONIA    Narrative:     Performed at:  [place] [address],  [place], [place]   Phone [phone]   ETHANOL    Narrative:     Performed at:  [place] [address],  [place], [place]   Phone [phone]   TSH WITH REFLEX    Narrative:     Performed at:  [place] [address],  [place], [place]   Phone [phone]   PROCALCITONIN    Narrative:     Performed at:  [facility] [address],  [place], [place]   Phone [phone]   LACTATE, SEPSIS   LACTATE, SEPSIS   URINE DRUG SCREEN   SALICYLATE LEVEL   BLOOD GAS, ARTERIAL   BASIC METABOLIC PANEL W/ REFLEX TO MG FOR LOW K       All other labs were within normal range or not returned as of this dictation.     EMERGENCY DEPARTMENT COURSE and DIFFERENTIAL DIAGNOSIS/MDM:   Lisa Vazquez is a 55 y.o. female who presents to the emergency department with the complaint of patient arrives initially called out due to generalized weakness fatigue has been ongoing for several days, seems somewhat disoriented, slow respond to questioning. History of UTI quiring stenting. Denies urinary complaints now. Will check urine studies. Vitals are stable on arrival she is slightly tachypneic however but lung sounds are clear. Abdomen soft nontender heart regular rhythm. Patient does appear slightly pale. Nonspecific rash. Hep C history will check ammonia basic labs, obtain CT head due to confusion. CT head negative. Mild increase in patient's serum creatinine, receiving IV fluids, patient's bicarb is 15, aspirin level did come back elevated at 67.1, see nurse documentation for conversation with poison control, upon receiving labs from aspirin, bicarb, bicarb infusion was ordered at 150 mEq at 150 an hour and D5, nephrology was then paged agreed with starting bicarb infusion, hold off on dialysis, repeat ABG in 2 hours, BMP in 4 hours. We will plan admit to ICU for continued monitoring and treatment. Murguia catheter was placed for urine output monitoring, nephrology recommending Lasix as needed if urine output drop. CRITICAL CARE TIME   Total Critical Care time was 55 minutes, excluding separately reportable procedures. There was a high probability of clinically significant/life threatening deterioration in the patient's condition which required my urgent intervention. Clinical concern aspirin toxicity  Intervention history, physical exam, chart review, lab interpretation, medication management, discussion with specialist (nephrology), discussion with admission services, multiple reevaluations of patient, charting    CONSULTS:  IP CONSULT TO NEPHROLOGY  IP CONSULT TO HOSPITALIST    PROCEDURES:  Unless otherwise noted below, none     Procedures        FINAL IMPRESSION      1. Encephalopathy    2. Aspirin toxicity, accidental or unintentional, initial encounter    3. Acute cystitis without hematuria    4.  Elevated serum creatinine          DISPOSITION/PLAN   DISPOSITION  admit      PATIENT REFERRED TO:  No follow-up provider specified. DISCHARGE MEDICATIONS:  New Prescriptions    No medications on file     Controlled Substances Monitoring:     RX Monitoring 7/28/2015   Periodic Controlled Substance Monitoring No signs of potential drug abuse or diversion identified.        (Please note that portions of this note were completed with a voice recognition program.  Efforts were made to edit the dictations but occasionally words are mis-transcribed.)    Dimitris Rehman DO (electronically signed)  Attending Emergency Physician            Dimitris Rehman DO  09/26/21 2021

## 2021-09-27 ENCOUNTER — APPOINTMENT (OUTPATIENT)
Dept: INTERVENTIONAL RADIOLOGY/VASCULAR | Age: 56
DRG: 812 | End: 2021-09-27
Payer: COMMERCIAL

## 2021-09-27 LAB
A/G RATIO: 1.5 (ref 1.1–2.2)
ALBUMIN SERPL-MCNC: 2.7 G/DL (ref 3.4–5)
ALBUMIN SERPL-MCNC: 2.9 G/DL (ref 3.4–5)
ALBUMIN SERPL-MCNC: 3 G/DL (ref 3.4–5)
ALBUMIN SERPL-MCNC: 3.1 G/DL (ref 3.4–5)
ALBUMIN SERPL-MCNC: 3.1 G/DL (ref 3.4–5)
ALP BLD-CCNC: 86 U/L (ref 40–129)
ALT SERPL-CCNC: 27 U/L (ref 10–40)
ANION GAP SERPL CALCULATED.3IONS-SCNC: 14 MMOL/L (ref 3–16)
ANION GAP SERPL CALCULATED.3IONS-SCNC: 16 MMOL/L (ref 3–16)
ANION GAP SERPL CALCULATED.3IONS-SCNC: 16 MMOL/L (ref 3–16)
ANION GAP SERPL CALCULATED.3IONS-SCNC: 18 MMOL/L (ref 3–16)
ANION GAP SERPL CALCULATED.3IONS-SCNC: 8 MMOL/L (ref 3–16)
AST SERPL-CCNC: 31 U/L (ref 15–37)
BASE EXCESS ARTERIAL: -2.1 MMOL/L (ref -3–3)
BASE EXCESS ARTERIAL: -3.6 MMOL/L (ref -3–3)
BASE EXCESS ARTERIAL: -5.1 MMOL/L (ref -3–3)
BASE EXCESS ARTERIAL: -8.8 MMOL/L (ref -3–3)
BILIRUB SERPL-MCNC: <0.2 MG/DL (ref 0–1)
BUN BLDV-MCNC: 18 MG/DL (ref 7–20)
BUN BLDV-MCNC: 40 MG/DL (ref 7–20)
BUN BLDV-MCNC: 42 MG/DL (ref 7–20)
BUN BLDV-MCNC: 46 MG/DL (ref 7–20)
BUN BLDV-MCNC: 46 MG/DL (ref 7–20)
CALCIUM SERPL-MCNC: 6.7 MG/DL (ref 8.3–10.6)
CALCIUM SERPL-MCNC: 7.1 MG/DL (ref 8.3–10.6)
CALCIUM SERPL-MCNC: 7.1 MG/DL (ref 8.3–10.6)
CALCIUM SERPL-MCNC: 7.3 MG/DL (ref 8.3–10.6)
CALCIUM SERPL-MCNC: 7.9 MG/DL (ref 8.3–10.6)
CARBOXYHEMOGLOBIN ARTERIAL: 0.2 % (ref 0–1.5)
CARBOXYHEMOGLOBIN ARTERIAL: 0.3 % (ref 0–1.5)
CHLORIDE BLD-SCNC: 104 MMOL/L (ref 99–110)
CHLORIDE BLD-SCNC: 104 MMOL/L (ref 99–110)
CHLORIDE BLD-SCNC: 105 MMOL/L (ref 99–110)
CHLORIDE BLD-SCNC: 108 MMOL/L (ref 99–110)
CHLORIDE BLD-SCNC: 108 MMOL/L (ref 99–110)
CO2: 14 MMOL/L (ref 21–32)
CO2: 19 MMOL/L (ref 21–32)
CO2: 21 MMOL/L (ref 21–32)
CO2: 22 MMOL/L (ref 21–32)
CO2: 27 MMOL/L (ref 21–32)
CREAT SERPL-MCNC: 1.1 MG/DL (ref 0.6–1.1)
CREAT SERPL-MCNC: 1.8 MG/DL (ref 0.6–1.1)
CREAT SERPL-MCNC: 2 MG/DL (ref 0.6–1.1)
CREAT SERPL-MCNC: 2 MG/DL (ref 0.6–1.1)
CREAT SERPL-MCNC: 2.1 MG/DL (ref 0.6–1.1)
EKG ATRIAL RATE: 62 BPM
EKG DIAGNOSIS: NORMAL
EKG P AXIS: 68 DEGREES
EKG P-R INTERVAL: 124 MS
EKG Q-T INTERVAL: 396 MS
EKG QRS DURATION: 84 MS
EKG QTC CALCULATION (BAZETT): 401 MS
EKG R AXIS: 83 DEGREES
EKG T AXIS: 77 DEGREES
EKG VENTRICULAR RATE: 62 BPM
GFR AFRICAN AMERICAN: 30
GFR AFRICAN AMERICAN: 31
GFR AFRICAN AMERICAN: 31
GFR AFRICAN AMERICAN: 35
GFR AFRICAN AMERICAN: >60
GFR NON-AFRICAN AMERICAN: 24
GFR NON-AFRICAN AMERICAN: 26
GFR NON-AFRICAN AMERICAN: 26
GFR NON-AFRICAN AMERICAN: 29
GFR NON-AFRICAN AMERICAN: 52
GLOBULIN: 2.1 G/DL
GLUCOSE BLD-MCNC: 102 MG/DL (ref 70–99)
GLUCOSE BLD-MCNC: 106 MG/DL (ref 70–99)
GLUCOSE BLD-MCNC: 112 MG/DL (ref 70–99)
GLUCOSE BLD-MCNC: 144 MG/DL (ref 70–99)
GLUCOSE BLD-MCNC: 81 MG/DL (ref 70–99)
HBV SURFACE AB TITR SER: <3.5 MIU/ML
HCO3 ARTERIAL: 11.8 MMOL/L (ref 21–29)
HCO3 ARTERIAL: 15.3 MMOL/L (ref 21–29)
HCO3 ARTERIAL: 16 MMOL/L (ref 21–29)
HCO3 ARTERIAL: 18.1 MMOL/L (ref 21–29)
HEMOGLOBIN, ART, EXTENDED: 10.3 G/DL (ref 12–16)
HEMOGLOBIN, ART, EXTENDED: 10.5 G/DL (ref 12–16)
HEMOGLOBIN, ART, EXTENDED: 10.9 G/DL (ref 12–16)
HEMOGLOBIN, ART, EXTENDED: 12.8 G/DL (ref 12–16)
HEPATITIS B SURFACE ANTIGEN INTERPRETATION: NORMAL
INFLUENZA A: NOT DETECTED
INFLUENZA B: NOT DETECTED
INR BLD: 1.17 (ref 0.88–1.12)
LACTIC ACID: 1.4 MMOL/L (ref 0.4–2)
LACTIC ACID: 1.5 MMOL/L (ref 0.4–2)
MAGNESIUM: 1.8 MG/DL (ref 1.8–2.4)
MAGNESIUM: 1.8 MG/DL (ref 1.8–2.4)
METHEMOGLOBIN ARTERIAL: 0.2 %
O2 SAT, ARTERIAL: 98.4 %
O2 SAT, ARTERIAL: 98.5 %
O2 SAT, ARTERIAL: 98.5 %
O2 SAT, ARTERIAL: 98.6 %
O2 THERAPY: ABNORMAL
PCO2 ARTERIAL: 15.2 MMHG (ref 35–45)
PCO2 ARTERIAL: 17.7 MMHG (ref 35–45)
PCO2 ARTERIAL: 17.8 MMHG (ref 35–45)
PCO2 ARTERIAL: 19.3 MMHG (ref 35–45)
PH ARTERIAL: 7.51 (ref 7.35–7.45)
PH ARTERIAL: 7.55 (ref 7.35–7.45)
PH ARTERIAL: 7.57 (ref 7.35–7.45)
PH ARTERIAL: 7.59 (ref 7.35–7.45)
PH UA: 5.5 (ref 5–8)
PH UA: 6 (ref 5–8)
PHOSPHORUS: 1.5 MG/DL (ref 2.5–4.9)
PHOSPHORUS: 3.1 MG/DL (ref 2.5–4.9)
PHOSPHORUS: 3.8 MG/DL (ref 2.5–4.9)
PHOSPHORUS: 5 MG/DL (ref 2.5–4.9)
PO2 ARTERIAL: 100.8 MMHG (ref 75–108)
PO2 ARTERIAL: 104.3 MMHG (ref 75–108)
PO2 ARTERIAL: 104.9 MMHG (ref 75–108)
PO2 ARTERIAL: 109.4 MMHG (ref 75–108)
POTASSIUM REFLEX MAGNESIUM: 3.4 MMOL/L (ref 3.5–5.1)
POTASSIUM SERPL-SCNC: 3.2 MMOL/L (ref 3.5–5.1)
POTASSIUM SERPL-SCNC: 3.4 MMOL/L (ref 3.5–5.1)
POTASSIUM SERPL-SCNC: 3.6 MMOL/L (ref 3.5–5.1)
POTASSIUM SERPL-SCNC: 4.3 MMOL/L (ref 3.5–5.1)
PROTHROMBIN TIME: 13.3 SEC (ref 9.9–12.7)
SALICYLATE, SERUM: 14.2 MG/DL (ref 15–30)
SALICYLATE, SERUM: 40.2 MG/DL (ref 15–30)
SALICYLATE, SERUM: 45 MG/DL (ref 15–30)
SALICYLATE, SERUM: 48.2 MG/DL (ref 15–30)
SALICYLATE, SERUM: 55.1 MG/DL (ref 15–30)
SARS-COV-2 RNA, RT PCR: NOT DETECTED
SODIUM BLD-SCNC: 139 MMOL/L (ref 136–145)
SODIUM BLD-SCNC: 140 MMOL/L (ref 136–145)
SODIUM BLD-SCNC: 140 MMOL/L (ref 136–145)
SODIUM BLD-SCNC: 142 MMOL/L (ref 136–145)
SODIUM BLD-SCNC: 143 MMOL/L (ref 136–145)
TCO2 ARTERIAL: 12.3 MMOL/L
TCO2 ARTERIAL: 15.9 MMOL/L
TCO2 ARTERIAL: 16.5 MMOL/L
TCO2 ARTERIAL: 18.7 MMOL/L
TOTAL PROTEIN: 5.2 G/DL (ref 6.4–8.2)

## 2021-09-27 PROCEDURE — 90935 HEMODIALYSIS ONE EVALUATION: CPT

## 2021-09-27 PROCEDURE — 2580000003 HC RX 258: Performed by: INTERNAL MEDICINE

## 2021-09-27 PROCEDURE — 2580000003 HC RX 258: Performed by: HOSPITALIST

## 2021-09-27 PROCEDURE — 83986 ASSAY PH BODY FLUID NOS: CPT

## 2021-09-27 PROCEDURE — 6370000000 HC RX 637 (ALT 250 FOR IP): Performed by: HOSPITALIST

## 2021-09-27 PROCEDURE — 6370000000 HC RX 637 (ALT 250 FOR IP): Performed by: INTERNAL MEDICINE

## 2021-09-27 PROCEDURE — 6360000002 HC RX W HCPCS: Performed by: HOSPITALIST

## 2021-09-27 PROCEDURE — 83605 ASSAY OF LACTIC ACID: CPT

## 2021-09-27 PROCEDURE — 6360000002 HC RX W HCPCS: Performed by: INTERNAL MEDICINE

## 2021-09-27 PROCEDURE — 5A1D70Z PERFORMANCE OF URINARY FILTRATION, INTERMITTENT, LESS THAN 6 HOURS PER DAY: ICD-10-PCS | Performed by: INTERNAL MEDICINE

## 2021-09-27 PROCEDURE — 86706 HEP B SURFACE ANTIBODY: CPT

## 2021-09-27 PROCEDURE — 96366 THER/PROPH/DIAG IV INF ADDON: CPT

## 2021-09-27 PROCEDURE — 87340 HEPATITIS B SURFACE AG IA: CPT

## 2021-09-27 PROCEDURE — 99255 IP/OBS CONSLTJ NEW/EST HI 80: CPT | Performed by: INTERNAL MEDICINE

## 2021-09-27 PROCEDURE — 83735 ASSAY OF MAGNESIUM: CPT

## 2021-09-27 PROCEDURE — 2060000000 HC ICU INTERMEDIATE R&B

## 2021-09-27 PROCEDURE — 93010 ELECTROCARDIOGRAM REPORT: CPT | Performed by: INTERNAL MEDICINE

## 2021-09-27 PROCEDURE — 87636 SARSCOV2 & INF A&B AMP PRB: CPT

## 2021-09-27 PROCEDURE — 2500000003 HC RX 250 WO HCPCS: Performed by: HOSPITALIST

## 2021-09-27 PROCEDURE — 96365 THER/PROPH/DIAG IV INF INIT: CPT

## 2021-09-27 PROCEDURE — 80179 DRUG ASSAY SALICYLATE: CPT

## 2021-09-27 PROCEDURE — 76937 US GUIDE VASCULAR ACCESS: CPT

## 2021-09-27 PROCEDURE — 77001 FLUOROGUIDE FOR VEIN DEVICE: CPT

## 2021-09-27 PROCEDURE — 2500000003 HC RX 250 WO HCPCS: Performed by: INTERNAL MEDICINE

## 2021-09-27 PROCEDURE — 2709999900 IR NONTUNNELED VASCULAR CATHETER > 5 YEARS

## 2021-09-27 PROCEDURE — 36415 COLL VENOUS BLD VENIPUNCTURE: CPT

## 2021-09-27 PROCEDURE — 02H633Z INSERTION OF INFUSION DEVICE INTO RIGHT ATRIUM, PERCUTANEOUS APPROACH: ICD-10-PCS | Performed by: INTERNAL MEDICINE

## 2021-09-27 PROCEDURE — 86704 HEP B CORE ANTIBODY TOTAL: CPT

## 2021-09-27 PROCEDURE — 36556 INSERT NON-TUNNEL CV CATH: CPT

## 2021-09-27 PROCEDURE — 80053 COMPREHEN METABOLIC PANEL: CPT

## 2021-09-27 PROCEDURE — 82803 BLOOD GASES ANY COMBINATION: CPT

## 2021-09-27 PROCEDURE — 96367 TX/PROPH/DG ADDL SEQ IV INF: CPT

## 2021-09-27 RX ORDER — POTASSIUM CHLORIDE 7.45 MG/ML
10 INJECTION INTRAVENOUS
Status: DISCONTINUED | OUTPATIENT
Start: 2021-09-27 | End: 2021-09-27

## 2021-09-27 RX ORDER — POTASSIUM CHLORIDE 7.45 MG/ML
40 INJECTION INTRAVENOUS ONCE
Status: DISCONTINUED | OUTPATIENT
Start: 2021-09-27 | End: 2021-09-27 | Stop reason: DRUGHIGH

## 2021-09-27 RX ORDER — 0.9 % SODIUM CHLORIDE 0.9 %
250 INTRAVENOUS SOLUTION INTRAVENOUS ONCE
Status: COMPLETED | OUTPATIENT
Start: 2021-09-27 | End: 2021-09-27

## 2021-09-27 RX ORDER — HEPARIN SODIUM 1000 [USP'U]/ML
2600 INJECTION, SOLUTION INTRAVENOUS; SUBCUTANEOUS PRN
Status: DISCONTINUED | OUTPATIENT
Start: 2021-09-27 | End: 2021-09-28 | Stop reason: HOSPADM

## 2021-09-27 RX ORDER — SODIUM CHLORIDE 9 MG/ML
INJECTION, SOLUTION INTRAVENOUS CONTINUOUS
Status: DISCONTINUED | OUTPATIENT
Start: 2021-09-27 | End: 2021-09-28

## 2021-09-27 RX ORDER — 0.9 % SODIUM CHLORIDE 0.9 %
750 INTRAVENOUS SOLUTION INTRAVENOUS ONCE
Status: COMPLETED | OUTPATIENT
Start: 2021-09-27 | End: 2021-09-27

## 2021-09-27 RX ORDER — FERROUS SULFATE 325(65) MG
325 TABLET ORAL
COMMUNITY

## 2021-09-27 RX ORDER — SODIUM CHLORIDE 9 MG/ML
1000 INJECTION, SOLUTION INTRAVENOUS ONCE
Status: COMPLETED | OUTPATIENT
Start: 2021-09-27 | End: 2021-09-27

## 2021-09-27 RX ORDER — ALBUMIN (HUMAN) 12.5 G/50ML
12.5 SOLUTION INTRAVENOUS PRN
Status: DISCONTINUED | OUTPATIENT
Start: 2021-09-27 | End: 2021-09-28 | Stop reason: HOSPADM

## 2021-09-27 RX ADMIN — SODIUM CHLORIDE: 9 INJECTION, SOLUTION INTRAVENOUS at 15:15

## 2021-09-27 RX ADMIN — ACETAMINOPHEN 650 MG: 325 TABLET ORAL at 19:37

## 2021-09-27 RX ADMIN — SODIUM CHLORIDE 750 ML: 9 INJECTION, SOLUTION INTRAVENOUS at 09:05

## 2021-09-27 RX ADMIN — SODIUM BICARBONATE: 84 INJECTION, SOLUTION INTRAVENOUS at 04:27

## 2021-09-27 RX ADMIN — SODIUM BICARBONATE: 84 INJECTION, SOLUTION INTRAVENOUS at 06:00

## 2021-09-27 RX ADMIN — SODIUM CHLORIDE 250 ML: 9 INJECTION, SOLUTION INTRAVENOUS at 06:23

## 2021-09-27 RX ADMIN — ENOXAPARIN SODIUM 40 MG: 40 INJECTION SUBCUTANEOUS at 21:57

## 2021-09-27 RX ADMIN — CEFTRIAXONE SODIUM 1000 MG: 1 INJECTION, POWDER, FOR SOLUTION INTRAMUSCULAR; INTRAVENOUS at 22:01

## 2021-09-27 RX ADMIN — CALCIUM GLUCONATE 2000 MG: 98 INJECTION, SOLUTION INTRAVENOUS at 08:18

## 2021-09-27 RX ADMIN — SODIUM CHLORIDE 500 ML: 9 INJECTION, SOLUTION INTRAVENOUS at 03:05

## 2021-09-27 RX ADMIN — POTASSIUM BICARBONATE 40 MEQ: 391 TABLET, EFFERVESCENT ORAL at 09:05

## 2021-09-27 RX ADMIN — SODIUM BICARBONATE 50 MEQ: 84 INJECTION INTRAVENOUS at 03:07

## 2021-09-27 RX ADMIN — SODIUM CHLORIDE 250 ML: 9 INJECTION, SOLUTION INTRAVENOUS at 07:49

## 2021-09-27 RX ADMIN — Medication 10 ML: at 22:01

## 2021-09-27 ASSESSMENT — PAIN DESCRIPTION - PAIN TYPE: TYPE: ACUTE PAIN

## 2021-09-27 ASSESSMENT — PAIN SCALES - GENERAL: PAINLEVEL_OUTOF10: 3

## 2021-09-27 ASSESSMENT — PAIN - FUNCTIONAL ASSESSMENT: PAIN_FUNCTIONAL_ASSESSMENT: ACTIVITIES ARE NOT PREVENTED

## 2021-09-27 ASSESSMENT — PAIN DESCRIPTION - FREQUENCY: FREQUENCY: CONTINUOUS

## 2021-09-27 ASSESSMENT — PAIN DESCRIPTION - DESCRIPTORS: DESCRIPTORS: ACHING

## 2021-09-27 ASSESSMENT — PAIN DESCRIPTION - LOCATION: LOCATION: HEAD

## 2021-09-27 ASSESSMENT — PAIN DESCRIPTION - PROGRESSION: CLINICAL_PROGRESSION: NOT CHANGED

## 2021-09-27 ASSESSMENT — PAIN DESCRIPTION - ONSET: ONSET: ON-GOING

## 2021-09-27 NOTE — H&P
Hospital Medicine History & Physical      PCP: Albert Alexandre MD    Date of Admission: 9/26/2021    Date of Service: Pt seen/examined on 9/27/2021 and Admitted to Inpatient with expected LOS greater than two midnights due to medical therapy. Chief Complaint:  AMS      History Of Present Illness:       55 y.o. female presents initially with increased confusion with ER evaluation revealing toxic salicylate levels. Patient recalls having increased dental pain over the past several days which caused her to take an indeterminate amount of asa. She denies suicidal intention. She denies black/bloody stools, vomiting, but has had some nausea. She denies ringing in her ears, but has had some lightheadedness / dizziness. She denies fever, chills cough, sob.     Past Medical History:          Diagnosis Date    Anxiety     Depression     Headache(784.0)     Hepatitis C 08/21/2020    Hypertension        Past Surgical History:          Procedure Laterality Date    CYSTOSCOPY Left 1/11/2021    CYSTOSCOPY, DIAGNOSTIC LEFT URETEROSCOPY WITH HOLMIUM LASER LITHOTRIPSY, LEFT STENT EXCHANGE performed by Pernell Garay MD at [facility] 8/20/2021    CYSTOSCOPY, LEFT RETROGRADE,  LEFT STENT REMOVAL performed by Frankie Henderson MD at [address] [place] [address] / [place] / Clark Landeros Left 8/20/2020    CYSTOSCOPY URETERAL STENT INSERTION performed by Pernell Garay MD at [address] / [place] / Clark Landeros Left 1/27/2021    CYSTOSCOPY, LEFT STENT REMOVAL performed by Pernell Garay MD at [place] [address]  4/24/2021    IR EMBOLIZATION HEMORRHAGE 4/24/2021 [address] SPECIAL PROCEDURES    IR NEPHROSTOMY PERCUTANEOUS LEFT  4/23/2021    IR NEPHROSTOMY PERCUTANEOUS LEFT 4/23/2021 [address] SPECIAL PROCEDURES    KIDNEY STONE REMOVAL Left 7/6/2021    LEFT PERCUTANEOUS NEPHROLITHOTOMY,  LEFT STENT PLACEMENT performed by Frankie Henderson MD at [facility] [address] [facility]    OTHER SURGICAL HISTORY      CYSTOSCOPY URETERAL STENT INSERTION (Left Bladder)    OTHER SURGICAL HISTORY  11/23/2020    CYSTOSCOPY, LEFT RIGID AND FLEXIBLE URETEROSCOPY, HOLMIUM LASER LITHOTRIPSY, STONE EXTRACTION, STENT EXCHANGE (Left )     OTHER SURGICAL HISTORY  01/11/2021    CYSTOSCOPY, DIAGNOSTIC LEFT URETEROSCOPY WITH HOLMIUM LASER LITHOTRIPSY, LEFT STENT EXCHANGE (Left Bladder)    OTHER SURGICAL HISTORY  01/27/2021    cystoscopy, left stent removal    OTHER SURGICAL HISTORY  08/20/2021    : CYSTOSCOPY, LEFT RETROGRADE,  LEFT STENT REMOVAL (Left     URETEROSCOPY Left 11/23/2020    CYSTOSCOPY, LEFT RIGID AND FLEXIBLE URETEROSCOPY, HOLMIUM LASER LITHOTRIPSY, STONE EXTRACTION, STENT EXCHANGE, REMOVAL MIGRATED STENT performed by Corry Truong MD at [facility] OR       Medications Prior to Admission:      Prior to Admission medications    Medication Sig Start Date End Date Taking? Authorizing Provider   propranolol (INDERAL) 40 MG tablet Take 40 mg by mouth 2 times daily Patient states she takes for migraines, has not taken since 8-18    Historical Provider, MD   lisinopril (PRINIVIL;ZESTRIL) 10 MG tablet Take 10 mg by mouth daily    Historical Provider, MD   hydroCHLOROthiazide (HYDRODIURIL) 25 MG tablet Take 25 mg by mouth daily    Historical Provider, MD   hydrOXYzine (ATARAX) 25 MG tablet Take 25 mg by mouth 3 times daily as needed for Itching    Historical Provider, MD   aspirin 81 MG chewable tablet Take 81 mg by mouth daily    Historical Provider, MD   vitamin D (CHOLECALCIFEROL) 25 MCG (1000 UT) TABS tablet Take 1,000 Units by mouth once a week    Historical Provider, MD       Allergies:  Patient has no known allergies. Social History:           TOBACCO:   reports that she has been smoking cigarettes. She has been smoking about 0.50 packs per day. She has never used smokeless tobacco.  ETOH:   reports no history of alcohol use.       Family History:             Problem Relation Age of Onset    Diabetes Mother     Heart Disease Mother     High Blood Pressure Sister     Diabetes Sister     Heart Attack Sister     Coronary Art Dis Father        REVIEW OF SYSTEMS:   Pertinent positives as noted in the HPI. All other systems reviewed and negative. PHYSICAL EXAM PERFORMED:    BP (!) 129/116   Pulse 58   Temp 97.9 °F (36.6 °C)   Resp 16   Ht 5' 7\" (1.702 m)   LMP 12/01/2014   SpO2 100%   BMI 22.08 kg/m²     General appearance:  No apparent distress, appears stated age and cooperative. HEENT:  Normal cephalic, atraumatic without obvious deformity. Pupils equal, round,  Extra ocular muscles intact. Conjunctivae/corneas clear. Neck: Supple, with full range of motion. No jugular venous distention. Trachea midline. Respiratory:  Normal respiratory effort. No tachypnea, use of accessory muscles, intercostal retractions  Cardiovascular:  Regular rate and rhythm   Abdomen: Soft, non-tender, non-distended with normal bowel sounds. Musculoskeletal:  No clubbing, cyanosis or edema bilaterally. No calf tenderness  Skin: Skin color, texture, turgor normal.     Neurologic:  , grossly non-focal.  Psychiatric:  Alert        Labs:     Recent Labs     09/26/21  1620   WBC 8.1   HGB 13.2   HCT 41.8        Recent Labs     09/26/21  1620 09/26/21  2220    140   K 5.1 4.5    109   CO2 15* 14*   BUN 44* 46*   CREATININE 2.0* 2.0*   CALCIUM 8.8 7.6*   PHOS  --  4.9     No results for input(s): AST, ALT, BILIDIR, BILITOT, ALKPHOS in the last 72 hours. No results for input(s): INR in the last 72 hours.   Recent Labs     09/26/21  1620   TROPONINI <0.01       Urinalysis:      Lab Results   Component Value Date    NITRU POSITIVE 09/26/2021    WBCUA 10-20 09/26/2021    BACTERIA 1+ 09/26/2021    RBCUA 3-4 09/26/2021    BLOODU MODERATE 09/26/2021    SPECGRAV 1.020 09/26/2021    GLUCOSEU Negative 09/26/2021       Radiology:          CT HEAD WO CONTRAST   Final Result   No acute intracranial abnormality. XR CHEST PORTABLE   Final Result   No acute disease             ASSESSMENT:    Active Hospital Problems    Diagnosis Date Noted    Salicylate intoxication, accidental or unintentional, initial encounter [T39.091A] 09/26/2021         PLAN:    Salicylate toxicity  - unintentional  - IV bicarb, continue q 2 renal, salicylate levels until <40  - Nephro consulted    UTI  - rocephin      DVT Prophylaxis: lovenox  Diet: ADULT DIET; Regular  Code Status: Full Litzy Anderson MD    Thank you Amanda Malik MD for the opportunity to be involved in this patient's care. If you have any questions or concerns please feel free to contact me at [phone].

## 2021-09-27 NOTE — PLAN OF CARE
Pt was seen after midnight by nocturnist provider. Please see complete H&P for full report. Will round on patient tomorrow during daily rounds. Chart Reviewed:    CC: AMS, Rash    HPI: 55 y.o. female presents initially with increased confusion with ER evaluation revealing toxic salicylate levels. Patient recalls having increased dental pain over the past several days which caused her to take an indeterminate amount of asa. She denies suicidal intention. She denies black/bloody stools, vomiting, but has had some nausea. She denies ringing in her ears, but has had some lightheadedness / dizziness. She denies fever, chills cough, sob. Vitals  /63   Pulse 62   Temp 97.9 °F (36.6 °C)   Resp 16   Ht 5' 7\" (1.702 m)   LMP 12/01/2014   SpO2 100%   BMI 22.08 kg/m²    Physical Exam:   Gen: No distress. Alert. Middle aged  female, seen in HD, appears older than stated age  Eyes: No sclera icterus. No conjunctival injection. Neck: Trachea midline. Temporary vas cath in place  Resp: No accessory muscle use. No crackles. No wheezes. No rhonchi. On RA - limited to anterior auscultatation while in HD  CV: Regular rate. Regular rhythm. No murmur. No rub. No edema. Peripheral Pulses: +2 palpable, equal bilaterally   GI: Soft, Non-tender. Non-distended. Normal bowel sounds. Skin: Warm and dry. No rash on exposed extremities. Petechial rash to BLE   Neuro: Awake. Grossly nonfocal    Psych: Oriented x 4. No anxiety or agitation. Microbiology:    SARS-COV-2 - Rapid: Not detected    Rapid Influenza A/B: negative     Radiology:    CT HEAD WO CONTRAST   Final Result   No acute intracranial abnormality.          XR CHEST PORTABLE   Final Result   No acute disease         IR NONTUNNELED VASCULAR CATHETER > 5 YEARS    (Results Pending)      EKG:    Normal sinus rhythm rate of 62  ST & T wave abnormality, consider anterior ischemia - noted on prior EKG  Abnormal ECG  This is improved compared to prior EKG from 8/2021  Confirmed by Shaun Wang MD, [address] [place] [address] (1982) on 9/27/2021 7:28:18 AM    Assessment/Plan:    Acute Toxic and Metabolic Encephalopathy - Improved  - likely 2/2 salicylate toxicity  - CT head was non-acute  - mgmt as below  - monitor mentation - A&O x 4    Hypokalemia  - replete and monitor BMP    Salicylate Toxicity with Respiratory Alkalosis  Accidental Aspirin Overdose  - initial salicylate level 99.7  - serial salicylate levels, monitor ABGs  - on IVF with Na Bicarb  - Vas Cath placement per nephro - plan for HD - had HD today    Hypovolemic Shock - improved  Hypotension - improved  Hx of HTN  - SBP in upper 70s, since improved  - responsive to IVF  - on continuous IVF, given 1 additional L NS per nephro this AM  - d/c Lisinopril, hold HCTZ, Propranolol    MARY on CKD stage III  AGMA - Resolved  - Cr 2 on arrival, CO2 15, AG 17  - baseline Cr 1.7  - on IVF, given Na Bicarb  - Nephrology following, d/c Lisinopril  - in HD today 2/2 above, Cr 1.8    UTI  - UA with positive nitrite and small LE  - check urine cx  - Rocephin D#2    Anxiety  - PRN Vistaril, on hold    DVT Prophylaxis: Lovenox  Diet: Regular  Code Status: Full    Gilford Bonnet, PA PA-C 1:06 PM 9/27/2021

## 2021-09-27 NOTE — ED NOTES
Spoke with Dr. Navya Holt.  Hold blood pressure meds, increase bicarb to 200 ml, repeat ABG at [address] Edward Gonzalez RN  09/26/21 [id number]

## 2021-09-27 NOTE — ACP (ADVANCE CARE PLANNING)
Attempted the advance care planning conversation to determine a decision maker if she was unable to make her medical decisions with pt and she stated she could not get into this conversation at this time. She stated she has lost several family members recently.

## 2021-09-27 NOTE — CONSULTS
Patient is being seen at the request of JAGRUTI Dutton for a consultation for salicylate overdose    HISTORY OF PRESENT ILLNESS:   55years old with history of depression and anxiety presented with worsening confusion. Moderate. Found to have a toxic level of salicylate, 67. Associated with hypotension. Found to be in acute kidney injury, creatinine of 2 and anion gap metabolic acidosis. Worsening dental pain for the past several days for which has been taking unknown amount of aspirin, 6 tablets at a time. Denied any suicidal intention or ideation. Denies any hematochezia, nausea, vomiting. Feeling dizzy and lightheadedness. Patient poor historian limited to obtain further HPI.     PAST MEDICAL HISTORY:  Past Medical History:   Diagnosis Date    Anxiety     Depression     Headache(784.0)     Hepatitis C 08/21/2020    Hypertension      PAST SURGICAL HISTORY:  Past Surgical History:   Procedure Laterality Date    CYSTOSCOPY Left 1/11/2021    CYSTOSCOPY, DIAGNOSTIC LEFT URETEROSCOPY WITH HOLMIUM LASER LITHOTRIPSY, LEFT STENT EXCHANGE performed by Corry Truong MD at [address] [place] [address] 8/20/2021    CYSTOSCOPY, LEFT RETROGRADE,  LEFT STENT [facility]AL [facility] [address] / [place] Kraus Left 8/20/2020    CYSTOSCOPY URETERAL STENT [facility]ION [facility] [address] / [place]us Left 1/27/2021    CYSTOSCOPY, LEFT STENT REMOVAL performed by Corry Truong MD at [place] [address]  4/24/2021    IR EMBOLIZATION HEMORRHAGE [facility]2021 [facility]    IR NEPHROSTOMY PERCUTANEOUS LEFT  4/23/2021    IR NEPHROSTOMY PERCUTANEOUS LEFT 4/23/2021 [facility]    IR NONTUNNELED VASCULAR CATHETER  9/27/2021    IR NONTUNNELED VASCULAR CATHETER 9/27/2021 [facility]    KIDNEY STONE REMOVAL Left 7/6/2021    LEFT PERCUTANEOUS NEPHROLITHOTOMY,  LEFT STENT PLACEMENT performed by Bryce Patel MD at [facility] (Left Bladder)    OTHER SURGICAL HISTORY  11/23/2020    CYSTOSCOPY, LEFT RIGID AND FLEXIBLE URETEROSCOPY, HOLMIUM LASER LITHOTRIPSY, STONE EXTRACTION, STENT EXCHANGE (Left )     OTHER SURGICAL HISTORY  01/11/2021    CYSTOSCOPY, DIAGNOSTIC LEFT URETEROSCOPY WITH HOLMIUM LASER LITHOTRIPSY, LEFT STENT EXCHANGE (Left Bladder)    OTHER SURGICAL HISTORY  01/27/2021    cystoscopy, left stent removal    OTHER SURGICAL HISTORY  08/20/2021    : CYSTOSCOPY, LEFT RETROGRADE,  LEFT STENT REMOVAL (Left     URETEROSCOPY Left 11/23/2020    CYSTOSCOPY, LEFT RIGID AND FLEXIBLE URETEROSCOPY, HOLMIUM LASER LITHOTRIPSY, STONE EXTRACTION, STENT EXCHANGE, REMOVAL MIGRATED STENT performed by Merissa Mitchell MD at [address]:  family history includes Coronary Art Dis in her father; Diabetes in her mother and sister; Heart Attack in her sister; Heart Disease in her mother; High Blood Pressure in her sister. SOCIAL HISTORY:   reports that she has been smoking cigarettes. She has been smoking about 0.50 packs per day. She has never used smokeless tobacco.    Scheduled Meds:   sodium chloride flush  5-40 mL IntraVENous 2 times per day    enoxaparin  40 mg SubCUTAneous Nightly    cefTRIAXone (ROCEPHIN) IV  1,000 mg IntraVENous Q24H     Continuous Infusions:   sodium chloride      sodium chloride 25 mL (09/26/21 4024)     PRN Meds:  albumin human, heparin (porcine), sodium chloride flush, sodium chloride, ondansetron **OR** ondansetron, polyethylene glycol, acetaminophen **OR** acetaminophen    ALLERGIES:  Patient has No Known Allergies.     REVIEW OF SYSTEMS:  Constitutional: Negative for fever  HENT: Negative for sore throat  Eyes: Negative for redness   Respiratory: Negative for dyspnea, cough  Cardiovascular: Negative for chest pain  Gastrointestinal: Negative for vomiting, diarrhea   Genitourinary: Negative for hematuria   Musculoskeletal: Negative for arthralgias   Skin: Negative for rash  Neurological: + Dizziness  Hematological: Negative for adenopathy  Psychiatric/Behavorial: + Anxiety    PHYSICAL EXAM:  Blood pressure (!) 116/58, pulse 72, temperature 97.3 °F (36.3 °C), resp. rate 18, height 5' 7\" (1.702 m), weight 156 lb 12 oz (71.1 kg), last menstrual period 12/01/2014, SpO2 100 %, not currently breastfeeding.' on RA  Gen: No distress. Eyes: PERRL. No sclera icterus. No conjunctival injection. ENT: No discharge. Pharynx clear. Neck: Trachea midline. No obvious mass. Resp: No accessory muscle use. No crackles. No wheezes. No rhonchi. No dullness on percussion. CV: Regular rate. Regular rhythm. No murmur or rub. No edema. GI: Non-tender. Non-distended. No hernia. Skin: Warm and dry. No nodule on exposed extremities. Lymph: No cervical LAD. No supraclavicular LAD. M/S: No cyanosis. No joint deformity. No clubbing. Neuro: Awake. Alert. Moves all four extremities. Poor historian  Psych: Oriented x 3.  + Anxiety. LABS:  CBC:   Recent Labs     09/26/21  1620   WBC 8.1   HGB 13.2   HCT 41.8   MCV 91.8        BMP:   Recent Labs     09/27/21  0247 09/27/21  0247 09/27/21  0615 09/27/21  1025 09/27/21  1220      < > 143 142 140   K 4.3   < > 3.2* 3.6 3.4*      < > 108 104 105   CO2 14*   < > 19* 22 21   PHOS 5.0*  --  3.8 3.1  --    BUN 46*   < > 46* 42* 40*   CREATININE 2.1*   < > 2.0* 2.0* 1.8*    < > = values in this interval not displayed. LIVER PROFILE:   Recent Labs     09/27/21  1220   AST 31   ALT 27   BILITOT <0.2   ALKPHOS 86     PT/INR:   Recent Labs     09/26/21  2220   PROTIME 13.3*   INR 1.17*     APTT: No results for input(s): APTT in the last 72 hours.   UA:  Recent Labs     09/26/21 2230 09/26/21 2230 09/27/21  0615   COLORU Yellow  --   --    PHUR 5.5   < > 5.5   WBCUA 10-20*  --   --    RBCUA 3-4 --   --    BACTERIA 1+*  --   --    CLARITYU Clear  --   --    SPECGRAV 1.020  --   --    LEUKOCYTESUR SMALL*  --   --    UROBILINOGEN 0.2  --   --    BILIRUBINUR Negative  --   --    BLOODU MODERATE*  --   --    GLUCOSEU Negative  --   --     < > = values in this interval not displayed. Recent Labs     09/27/21  0612 09/27/21  0852   PHART 7.573* 7.589*   SBI7CTN 17.7* 19.3*   PO2ART 104.3 104. 9       Chest x-ray 9/26 imaging was reviewed by me and showed   No acute cardiopulmonary disease      ASSESSMENT:  · Drug overdose-salicylate  · Acute encephalopathy/Metabolic encephalopathy   · Hypotension-likely intravascular depletion  · Metabolic acidosis with respiratory Kalosis  · Acute on chronic kidney injury- baseline creatinine 1.7  · Possible UTI  · Depression/Anxiety   · Hepatitis C    PLAN:  · Supplemental oxygen to maintain SaO2 >92%; wean as tolerated  · Closely monitory airways, clinical status, cardiac rhythm, vital signs, and urine output   · Poison control consult   · IVF  cc bolus PRN target SBP>90 up to 4 liters  · Serum and urine alkalinization with bicarb drip   · Monitor salicylate and electrolytes  · Monitor urine pH every 2 hours, target urine pH 7.5-8  · Stop bicarb once aspirin level less than 30  · Rocephin  · Holding lisinopril  · DVT prophylaxis: Lovenox  · MRSA prophylaxis: Bactroban

## 2021-09-27 NOTE — CONSULTS
[employer] [facility]  Nephrology Consult Note           Reason for Consult: Aspirin toxicity, MARY on CKD  Requesting Physician:  Dr. Fidencio Dixon    Chief Complaint:    Chief Complaint   Patient presents with    Altered Mental Status     pt c/o feeling like she is in a tunnel x 2 days. anxious on arrival. sts hx of same. speech slow to respond. head wobbling when at rest. skin pale. w/d .  Rash     bright red spots. no itching BLE. History of Present Illness on 9/27/2021:    55 y.o. yo female with PMH of anxiety, depression, hepatitis C, hypertension who is admitted for MARY on CKD with salicylate overdose  Patient complains that she had had to take for several days and has been taking aspirin, unknown quantities at different intervals. She denies that this was intentional or in suicidal in nature  She presented to the emergency room with complaints of feeling confused anxious and weak  She has been treated with IV bicarbonate, poison control has been contacted. Clinically she has made some improvement but she still remains mildly confused, due to her bicarbonate, she has developed hypocalcemia and hypokalemia. Urine output has been low. She has already received about 3 L of normal saline boluses.   Blood pressures improved    Past Medical History:        Diagnosis Date    Anxiety     Depression     Headache(784.0)     Hepatitis C 08/21/2020    Hypertension        Past Surgical History:        Procedure Laterality Date    CYSTOSCOPY Left 1/11/2021    CYSTOSCOPY, DIAGNOSTIC LEFT URETEROSCOPY WITH HOLMIUM LASER LITHOTRIPSY, LEFT STENT EXCHANGE performed by Ian Hugo MD at [facility] Left 8/20/2021    CYSTOSCOPY, LEFT RETROGRADE,  LEFT STENT REMOVAL performed by Braulio Francois MD at [address] / REMOVAL Deidra Fernandez / Danny Antonio Left 8/20/2020    CYSTOSCOPY URETERAL STENT INSERTION performed by Ian Hugo MD at [address] / REMOVAL Pertinent positives stated above in HPI. All other 10 systems were reviewed and were negative. Physical exam:   Constitutional:  VITALS:  BP 86/71   Pulse 63   Temp 97.9 °F (36.6 °C)   Resp 16   Ht 5' 7\" (1.702 m)   LMP 12/01/2014   SpO2 100%   BMI 22.08 kg/m²   Gen: alert, awake, nad  HEENT: pupils reactive  Neck: no bruits or jvd noted  Cardiovascular:  S1, S2 without m/r/g; no lower extremity edema  Respiratory: CTA B without w/r/r; respiratory effort normal  Abdomen:  +bs, soft, nt, nd, no hepatosplenomegaly  Neuro/Psy: AAoriented times 2. Patient was not able to tell date today, she could not name the month. She was able to tell the year. She was able to tell current president's name and her date of birth. She thinks that she is in the hospital for last 3 days. Data/  Recent Labs     09/26/21  1620   WBC 8.1   HGB 13.2   HCT 41.8   MCV 91.8        Recent Labs     09/26/21  2220 09/27/21  0247 09/27/21  0615    140 143   K 4.5 4.3 3.2*    108 108   CO2 14* 14* 19*   GLUCOSE 110* 81 112*   PHOS 4.9 5.0* 3.8   MG  --   --  1.80   BUN 46* 46* 46*   CREATININE 2.0* 2.1* 2.0*   LABGLOM 26* 24* 26*   GFRAA 31* 30* 31*     Urinalysis shows positive nitrite and small leukocyte esterase with 50-18 WBCs  Salicylate level on admission was 67 mg/dl    Assessment  -Acute on chronic kidney disease in setting of hypotension and possible UTI. Baseline creatinine of around 1.7.    -Salicylate overdose with mental status change. She has developed respiratory alkalosis along with metabolic acidosis.   Some improvement with volume challenge and bicarbonate supplementation but she has started to develop hypokalemia and hypocalcemia    -Shock, hypovolemic fluid responsive    -Possible UTI    -Acute metabolic encephalopathy likely related to salicylate overdose   Patient denies intentional/suicide behavior    Plan  -Keep systolic blood pressure over 100, further 1 L normal saline bolus this morning  -Continue IV fluid with bicarb as ordered, decrease rate to 125 mL/h  -Supplement potassium 40 M EQ p.o. times two 1 hour apart  -IV calcium gluconate as ordered 2 g x 1  -IR consult for Vas-Cath placement  -As patient continues to have some mental status changes, medical management with bicarb fluid leading to more electrolyte changes and persistent MARY, dialysis will be performed to remove the salicylate   -Renal dose medications  -Continue IV antibiotics for possible UTI  -Stop lisinopril at present  -Serial salicylate levels as ordered  -Until dialysis done, continue with bicarb fluid and frequent ABGs to avoid blood gas pH increasing over 7.6      35 min of critical care time used reviewing the chart, and managing/coordinating the care of this patient. Thank you for the consultation. Please do not hesitate to call with questions. Zane Hollingsworth MD  Office: [phone]  Fax:    [phone]  [facility]

## 2021-09-27 NOTE — ED NOTES
Spoke with poison control- if this is a chronic ingestion \"should proceed to dialysis. If this is acute she still meets the level for dialysis. \"     Urine Ph suggested- want is at 7.5 or better. Emy Dimas pharmacist at [address] [place] [address],[address] control.         Rodri Tam RN  09/26/21 4447

## 2021-09-27 NOTE — ED NOTES
Spoke with Dr. Alexia Pearson, new orders noted   Change Bicarb to 150 ml   Repeat  ABG 0130    Call if blood Ph less 7.5 or greater 7.6    If the Blood PH is Less 7.5 increase 200 ml     If Blood PH is more 7.6 change to  ML hr     Give 500 NS bolus to mange pressure now.             Faustina Eaton RN  09/26/21 0142

## 2021-09-27 NOTE — ED NOTES
Pt alert and oriented at time of first interaction with this nurse. Medications and gtts per [facility]. Pt with 3 patent pivs at this time. Will continue to monitor. Jenna Del Rio RN       Jenna Del Rio RN  09/27/21 1061

## 2021-09-27 NOTE — CARE COORDINATION
Attempted calling pt's bedside phone but there was no answer. Attempted calling pt's number of [phone] but there was no answer.      CM will attempt again when able    Addendum at 2:38pm: Attempted calling pt's phone again and it rang with no answer    Addendum at 3:50pm:     Case Management Assessment  Initial Evaluation      Patient Name: Kallie Perez  YOB: 1965  Diagnosis: Encephalopathy [G93.40]  Elevated serum creatinine [R79.89]  Acute cystitis without hematuria [R61.56]  Salicylate intoxication, accidental or unintentional, initial encounter [T39.091A]  Aspirin toxicity, accidental or unintentional, initial encounter [T39.011A]  Date / Time: 9/26/2021  4:08 PM    Admission status/Date: 09/26/2021 Inpatient   Chart Reviewed: Yes      Patient Interviewed: Yes   Family Interviewed:  No      Hospitalization in the last 30 days:  No      Health Care Decision Maker :  Pt declined    Met with: pt   Interview conducted  (bedside/phone): bedside    Current PCP: Shantell Casey MD    [facility] required for SNF : Y          3 night stay required -  N    ADLS  Support Systems/Care Needs:    Transportation: family    Meal Preparation: self    Housing  Living Arrangements: pt lives at home alone  Steps: 6-7  Intent for return to present living arrangements: Yes  Identified Issues: [facility] with [address] : No Agency:(Services)     Passport/Waiver : No  :                      Phone Number:    Passport/Waiver Services: n/a          Durable Medical Equiptment   DME Provider: n/a  Equipment:   Walker___Cane___RTS___ BSC___Shower Chair___Hospital Bed___W/C____Other________  02 at ____Liter(s)---wears(frequency)_______ HHN ___ CPAP___ BiPap___   N/A_x___      Home O2 Use :  No    If No for home O2---if presently on O2 during hospitalization:  No  if yes CM to follow for potential DC O2 need  Informed of need for care provider to bring portable home O2 tank on day of discharge for nursing to connect prior to leaving:   Not Indicated  Verbalized agreement/Understanding:   Not Indicated    Community Service Affiliation  Dialysis:  No    · Agency:  · Location:  · Dialysis Schedule:  · Phone:   · Fax: Other Community Services: n/a    DISCHARGE PLAN: Explained Case Management role/services. Chart review completed. Met with pt at bedside. Pt stated she is normally independent at home and plans on returning home. She stated she took to many Asprin for a toothache. She denied needs or questions for CM. Gae Libman RN aware pt is requesting a bedside commode and stated writer did not need to notify pt's RN     CM will follow. Please notify CM if needs or questions arise.

## 2021-09-27 NOTE — ED NOTES
5963 - called [facility] Lizbet Denney) to open case for transfer to another [facility] with ICU Beds. So will be Anna [place] (Clinical ) or myself calling on case. Sander Celestin  09/27/21 0954    1024 - [facility] called said there are no ICU Beds in [facility], Barbara (Clinical Admin) she will take issue to [facility]. [facility] called said that she called [facility] to start the outside of [facility].         Sander Celestin  09/27/21 1040    1126 - [facility] called with [facility] on pnone, transferred call to Geni Hunt (Clinical )     Sander Celestin  09/27/21 1121    Dr. Rivera  in ER said pt is going to PCU here       Sander Celestin  09/27/21 3142

## 2021-09-27 NOTE — FLOWSHEET NOTE
09/27/21 1225 09/27/21 1506   Vital Signs   BP (!) 116/58 (!) 117/55   Temp 97.3 °F (36.3 °C) 97.1 °F (36.2 °C)   Pulse 72 76   Resp 18 18   Weight 156 lb 12 oz (71.1 kg) 158 lb 11.7 oz (72 kg)   Weight Method Bed scale Bed scale   Treatment time: 3 hours  Net UF: +1000 ml    Pre weight: 71.1 kg   Post weight: 72 kg  EDW: TBD kg    Access used: R temp catheter  Access function: positional with -250 ml/min    Medications or blood products given: none    Regular outpatient schedule: MARY    Summary of response to treatment: good, positional catheter resulting in decreased BFR and clotted system causing HD tx to end 20 minutes early. Bicarb drip ended 1hr into tx and IV fluids started end of tx. Primary RN aware of labs pending to collect    Copy of dialysis treatment record placed in chart, to be scanned into EMR.

## 2021-09-27 NOTE — PROGRESS NOTES
Temp Vas cath placed to Rt IJ, patient tolerated well. Line ok to use per Dr. Ally Vinson. Alvin Elite 16 cm lot # [id number].   Adolfo De Oliveira RN

## 2021-09-27 NOTE — ED NOTES
Pt report received. Pt acutely ill with multi drips. Increased bicarb drip to 150. Consent form for IR dialysis cath completed and signed per pt.      Perry Kennedy  09/27/21 1038

## 2021-09-27 NOTE — PROGRESS NOTES
Patient admitted to room 315 from ER. Patient oriented to room, call light, bed rails, phone, lights and bathroom. Patient instructed about the schedule of the day including: vital sign frequency, lab draws, possible tests, frequency of MD and staff rounds, daily weights, I &O's and prescribed diet. bed alarm in place, patient aware of placement and reason. Telemetry box in place, patient aware of placement and reason. Bed locked, in lowest position, side rails up 2/4, call light within reach. Patient was brought up from ER. NO new orders were released. Recliner Assessment  Patient is able to demonstrate the ability to move from a reclining position to an upright position within the recliner.     Emeka Prevention initiated:  No   Wound Care Orders initiated:  No      [facility] nurse consulted for Pressure Injury (Stage 3,4, Unstageable, DTI, NWPT, Complex wounds)and New or Established Ostomies:  No      Primary Nurse eSignature: Electronically signed by Hever Lr RN on 9/27/21 at 7:52 PM EDT

## 2021-09-27 NOTE — PROGRESS NOTES
Seen and examined on dialysis. Orders confirmed.   HD for 3hrs w 300 BFR w +1l net UF  Dc bicarb gtt 1h after HD  Saline infusion as ordered at the end of HD   Recheck renal panel, salicylate, lactate at 1800 and in am.

## 2021-09-27 NOTE — FLOWSHEET NOTE
09/27/21 1608   Vital Signs   Temp 96.3 °F (35.7 °C)   Temp Source Oral   Pulse 92   Heart Rate Source Monitor   Resp 20   BP 89/64   BP Location Left upper arm   Patient Position Semi fowlers   Level of Consciousness Alert (0)   MEWS Score 2   Patient Currently in Pain Denies   Oxygen Therapy   SpO2 97 %   Pulse Oximeter Device Mode Intermittent   Pulse Oximeter Device Location Finger   O2 Device None (Room air)   Patient brought over from dialysis. NO orders to be released. Patient denies any needs. Will continue to monitor.

## 2021-09-28 VITALS
BODY MASS INDEX: 25.83 KG/M2 | DIASTOLIC BLOOD PRESSURE: 62 MMHG | TEMPERATURE: 98.4 F | HEIGHT: 67 IN | HEART RATE: 68 BPM | RESPIRATION RATE: 20 BRPM | OXYGEN SATURATION: 97 % | SYSTOLIC BLOOD PRESSURE: 108 MMHG | WEIGHT: 164.6 LBS

## 2021-09-28 PROBLEM — T39.091A: Status: RESOLVED | Noted: 2021-09-26 | Resolved: 2021-09-28

## 2021-09-28 LAB
ALBUMIN SERPL-MCNC: 2.3 G/DL (ref 3.4–5)
ALBUMIN SERPL-MCNC: 2.7 G/DL (ref 3.4–5)
ALBUMIN SERPL-MCNC: 2.8 G/DL (ref 3.4–5)
ANION GAP SERPL CALCULATED.3IONS-SCNC: 7 MMOL/L (ref 3–16)
ANION GAP SERPL CALCULATED.3IONS-SCNC: 7 MMOL/L (ref 3–16)
ANION GAP SERPL CALCULATED.3IONS-SCNC: 9 MMOL/L (ref 3–16)
BUN BLDV-MCNC: 16 MG/DL (ref 7–20)
BUN BLDV-MCNC: 17 MG/DL (ref 7–20)
BUN BLDV-MCNC: 20 MG/DL (ref 7–20)
CALCIUM SERPL-MCNC: 7.3 MG/DL (ref 8.3–10.6)
CALCIUM SERPL-MCNC: 7.4 MG/DL (ref 8.3–10.6)
CALCIUM SERPL-MCNC: 7.7 MG/DL (ref 8.3–10.6)
CHLORIDE BLD-SCNC: 105 MMOL/L (ref 99–110)
CHLORIDE BLD-SCNC: 105 MMOL/L (ref 99–110)
CHLORIDE BLD-SCNC: 107 MMOL/L (ref 99–110)
CO2: 24 MMOL/L (ref 21–32)
CO2: 26 MMOL/L (ref 21–32)
CO2: 27 MMOL/L (ref 21–32)
CREAT SERPL-MCNC: 1.1 MG/DL (ref 0.6–1.1)
CREAT SERPL-MCNC: 1.1 MG/DL (ref 0.6–1.1)
CREAT SERPL-MCNC: 1.2 MG/DL (ref 0.6–1.1)
GFR AFRICAN AMERICAN: 56
GFR AFRICAN AMERICAN: >60
GFR AFRICAN AMERICAN: >60
GFR NON-AFRICAN AMERICAN: 47
GFR NON-AFRICAN AMERICAN: 52
GFR NON-AFRICAN AMERICAN: 52
GLUCOSE BLD-MCNC: 128 MG/DL (ref 70–99)
GLUCOSE BLD-MCNC: 83 MG/DL (ref 70–99)
GLUCOSE BLD-MCNC: 87 MG/DL (ref 70–99)
LACTIC ACID: 1 MMOL/L (ref 0.4–2)
LACTIC ACID: 1.2 MMOL/L (ref 0.4–2)
LACTIC ACID: 1.7 MMOL/L (ref 0.4–2)
MAGNESIUM: 1.9 MG/DL (ref 1.8–2.4)
ORGANISM: ABNORMAL
PH UA: 7 (ref 5–8)
PHOSPHORUS: 1.9 MG/DL (ref 2.5–4.9)
PHOSPHORUS: 2 MG/DL (ref 2.5–4.9)
PHOSPHORUS: 2 MG/DL (ref 2.5–4.9)
POTASSIUM SERPL-SCNC: 3.5 MMOL/L (ref 3.5–5.1)
POTASSIUM SERPL-SCNC: 3.6 MMOL/L (ref 3.5–5.1)
POTASSIUM SERPL-SCNC: 3.6 MMOL/L (ref 3.5–5.1)
SALICYLATE, SERUM: 10.6 MG/DL (ref 15–30)
SALICYLATE, SERUM: 3.8 MG/DL (ref 15–30)
SALICYLATE, SERUM: 5.5 MG/DL (ref 15–30)
SODIUM BLD-SCNC: 138 MMOL/L (ref 136–145)
SODIUM BLD-SCNC: 139 MMOL/L (ref 136–145)
SODIUM BLD-SCNC: 140 MMOL/L (ref 136–145)
URINE CULTURE, ROUTINE: ABNORMAL

## 2021-09-28 PROCEDURE — 83605 ASSAY OF LACTIC ACID: CPT

## 2021-09-28 PROCEDURE — 6370000000 HC RX 637 (ALT 250 FOR IP): Performed by: HOSPITALIST

## 2021-09-28 PROCEDURE — 99238 HOSP IP/OBS DSCHRG MGMT 30/<: CPT | Performed by: PHYSICIAN ASSISTANT

## 2021-09-28 PROCEDURE — 83735 ASSAY OF MAGNESIUM: CPT

## 2021-09-28 PROCEDURE — 80179 DRUG ASSAY SALICYLATE: CPT

## 2021-09-28 PROCEDURE — 99233 SBSQ HOSP IP/OBS HIGH 50: CPT | Performed by: INTERNAL MEDICINE

## 2021-09-28 PROCEDURE — 80069 RENAL FUNCTION PANEL: CPT

## 2021-09-28 PROCEDURE — 2580000003 HC RX 258: Performed by: INTERNAL MEDICINE

## 2021-09-28 PROCEDURE — 36415 COLL VENOUS BLD VENIPUNCTURE: CPT

## 2021-09-28 PROCEDURE — 6370000000 HC RX 637 (ALT 250 FOR IP): Performed by: PHYSICIAN ASSISTANT

## 2021-09-28 PROCEDURE — 6360000002 HC RX W HCPCS: Performed by: HOSPITALIST

## 2021-09-28 PROCEDURE — 2580000003 HC RX 258: Performed by: HOSPITALIST

## 2021-09-28 PROCEDURE — 2580000003 HC RX 258: Performed by: PHYSICIAN ASSISTANT

## 2021-09-28 PROCEDURE — 6360000002 HC RX W HCPCS: Performed by: PHYSICIAN ASSISTANT

## 2021-09-28 PROCEDURE — 83986 ASSAY PH BODY FLUID NOS: CPT

## 2021-09-28 RX ADMIN — SODIUM CHLORIDE: 9 INJECTION, SOLUTION INTRAVENOUS at 07:21

## 2021-09-28 RX ADMIN — ONDANSETRON HYDROCHLORIDE 4 MG: 2 INJECTION, SOLUTION INTRAMUSCULAR; INTRAVENOUS at 11:58

## 2021-09-28 RX ADMIN — DIBASIC SODIUM PHOSPHATE, MONOBASIC POTASSIUM PHOSPHATE AND MONOBASIC SODIUM PHOSPHATE 2 TABLET: 852; 155; 130 TABLET ORAL at 11:58

## 2021-09-28 RX ADMIN — SODIUM CHLORIDE 25 ML: 9 INJECTION, SOLUTION INTRAVENOUS at 11:59

## 2021-09-28 RX ADMIN — CEFTRIAXONE SODIUM 1000 MG: 1 INJECTION, POWDER, FOR SOLUTION INTRAMUSCULAR; INTRAVENOUS at 11:59

## 2021-09-28 RX ADMIN — ACETAMINOPHEN 650 MG: 325 TABLET ORAL at 10:22

## 2021-09-28 ASSESSMENT — PAIN DESCRIPTION - PAIN TYPE
TYPE: ACUTE PAIN
TYPE: ACUTE PAIN

## 2021-09-28 ASSESSMENT — PAIN DESCRIPTION - FREQUENCY
FREQUENCY: CONTINUOUS
FREQUENCY: CONTINUOUS

## 2021-09-28 ASSESSMENT — PAIN SCALES - GENERAL
PAINLEVEL_OUTOF10: 2
PAINLEVEL_OUTOF10: 2
PAINLEVEL_OUTOF10: 3

## 2021-09-28 ASSESSMENT — PAIN DESCRIPTION - PROGRESSION: CLINICAL_PROGRESSION: NOT CHANGED

## 2021-09-28 ASSESSMENT — PAIN DESCRIPTION - ONSET: ONSET: ON-GOING

## 2021-09-28 ASSESSMENT — PAIN DESCRIPTION - LOCATION
LOCATION: HEAD
LOCATION: OTHER (COMMENT)

## 2021-09-28 ASSESSMENT — PAIN - FUNCTIONAL ASSESSMENT: PAIN_FUNCTIONAL_ASSESSMENT: ACTIVITIES ARE NOT PREVENTED

## 2021-09-28 ASSESSMENT — PAIN DESCRIPTION - DESCRIPTORS
DESCRIPTORS: ACHING
DESCRIPTORS: ACHING

## 2021-09-28 NOTE — FLOWSHEET NOTE
09/28/21 0730   Vital Signs   Temp 97.2 °F (36.2 °C)   Temp Source Oral   Pulse 55   Heart Rate Source Monitor   Resp 16   BP (!) 101/48   BP Location Right upper arm   Patient Position Lying left side   Level of Consciousness Alert (0)   MEWS Score 1   Patient Currently in Pain Denies   Pain Assessment   Pain Assessment 0-10   Pain Level 2   Pain Type Acute pain   Pain Location Other (Comment)  (lower body)   Pain Descriptors Aching   Pain Frequency Continuous   Oxygen Therapy   SpO2 96 %   O2 Device Nasal cannula   O2 Flow Rate (L/min) 2 L/min   Patient is resting showing no s/s of distress. Patient is alert and oriented. Meds were given, see MAR. Patient is denying any needs. Bed is in lowest position and call light is within reach. Will continue to monitor. Shift assessment complete, see flowsheets.

## 2021-09-28 NOTE — FLOWSHEET NOTE
09/28/21 1437   Vital Signs   Temp 98.4 °F (36.9 °C)   Temp Source Oral   Pulse 68   Heart Rate Source Monitor   Resp 20   /62   BP Location Right upper arm   Patient Position Semi fowlers   Level of Consciousness Alert (0)   MEWS Score 1   Patient Currently in Pain Denies   Oxygen Therapy   SpO2 97 %   Pulse Oximeter Device Mode Intermittent   Pulse Oximeter Device Location Finger   O2 Device None (Room air)   Patient resting. O2 Sat at rest on room air is 99 %. O2 Sat with activity on room air is 95 %.

## 2021-09-28 NOTE — CARE COORDINATION
DISCHARGE ORDER  Date/Time 2021 1:41 PM  Completed by: Suman Baxter RN, Case Management    Patient Name: Fito Guzman      : 1965  Admitting Diagnosis: Encephalopathy [G93.40]  Elevated serum creatinine [R79.89]  Acute cystitis without hematuria [O18.61]  Salicylate intoxication, accidental or unintentional, initial encounter [T39.091A]  Aspirin toxicity, accidental or unintentional, initial encounter [C27.818F]      Admit order Date and Status:2021  (verify MD's last order for status of admission)      Noted discharge order. If applicable PT/OT recommendation at Discharge: n/a  DME recommendation by PT/OT:n/a  Confirmed discharge plan  (pt): Yes  with whom____________pt___  If pt confirmed DC plan does family need to be contacted by CM No if yes who____n/a__  Discharge Plan: Reviewed chart. Role of discharge planner explained and patient verbalized understanding. Discharge order is noted. Pt is being d/c'd to home today. Pt's O2 sats are 97% on RA. Per Isi RN walking test, \"O2 Sat at rest on room air is 99 %. O2 Sat with activity on room air is 95 %. \"  No home o2 is needed. Pt declines HC. Per nephrology, pt will not need HD and Vas cath d/c'd. Pt is from home alone. No further discharge needs needed or noted. Reviewed chart. Role of discharge planner explained and patient verbalized understanding. Discharge order is noted. Has Home O2 in place on admit:  No  Informed of need to bring portable home O2 tank on day of discharge for nursing to connect prior to leaving:   Not Indicated  Verbalized agreement/Understanding:   Not Indicated    Discharge timeout done with RICO Snowden. All discharge needs and concerns addressed.

## 2021-09-28 NOTE — PROGRESS NOTES
Pulmonary Progress Note    CC: Salicylate overdose    Subjective:   Appears comfortable  Room air  Fully oriented        Intake/Output Summary (Last 24 hours) at 9/28/2021 0751  Last data filed at 9/28/2021 0230  Gross per 24 hour   Intake 4150 ml   Output 3050 ml   Net 1100 ml       Exam:   BP (!) 101/48   Pulse 55   Temp 97.2 °F (36.2 °C) (Oral)   Resp 18   Ht 5' 7\" (1.702 m)   Wt 164 lb 9.6 oz (74.7 kg)   LMP 12/01/2014   SpO2 96%   BMI 25.78 kg/m²  on room air  Gen: No distress. Eyes: PERRL. No sclera icterus. No conjunctival injection. ENT: No discharge. Pharynx clear. Neck: Trachea midline. No obvious mass. Resp: No accessory muscle use. No crackles. No wheezes. No rhonchi. No dullness on percussion. CV: Regular rate. Regular rhythm. No murmur or rub. No edema. GI: Non-tender. Non-distended. No hernia. Skin: Warm and dry. No nodule on exposed extremities. Lymph: No cervical LAD. No supraclavicular LAD. M/S: No cyanosis. No joint deformity. No clubbing. Neuro: Awake. Alert. Moves all four extremities. Psych: Oriented x 3.     Mild anxiety    Scheduled Meds:   covid-19 vaccine  1 Dose IntraMUSCular Prior to discharge    sodium chloride flush  5-40 mL IntraVENous 2 times per day    enoxaparin  40 mg SubCUTAneous Nightly    cefTRIAXone (ROCEPHIN) IV  1,000 mg IntraVENous Q24H     Continuous Infusions:   sodium chloride 100 mL/hr at 09/28/21 0721    sodium chloride 25 mL (09/26/21 2314)     PRN Meds:  albumin human, heparin (porcine), sodium chloride flush, sodium chloride, ondansetron **OR** ondansetron, polyethylene glycol, acetaminophen **OR** acetaminophen    Labs:  CBC:   Recent Labs     09/26/21  1620   WBC 8.1   HGB 13.2   HCT 41.8   MCV 91.8        BMP:   Recent Labs     09/26/21  1620 09/27/21  1025 09/27/21  1220 09/27/21  1853 09/28/21  0103   NA   < > 142 140 139 139   K   < > 3.6 3.4* 3.4* 3.5   CL   < > 104 105 104 105   CO2   < > 22 21 27 27   PHOS  --  3.1 --  1.5* 2.0*   BUN   < > 42* 40* 18 20   CREATININE   < > 2.0* 1.8* 1.1 1.2*    < > = values in this interval not displayed. LIVER PROFILE:   Recent Labs     09/27/21  1220   AST 31   ALT 27   BILITOT <0.2   ALKPHOS 86     PT/INR:   Recent Labs     09/26/21  2220   PROTIME 13.3*   INR 1.17*     APTT: No results for input(s): APTT in the last 72 hours. UA:  Recent Labs     09/26/21  2230 09/27/21  0615 09/27/21  1853   COLORU Yellow  --   --    PHUR 5.5   < > 6.0   WBCUA 10-20*  --   --    RBCUA 3-4  --   --    BACTERIA 1+*  --   --    CLARITYU Clear  --   --    SPECGRAV 1.020  --   --    LEUKOCYTESUR SMALL*  --   --    UROBILINOGEN 0.2  --   --    BILIRUBINUR Negative  --   --    BLOODU MODERATE*  --   --    GLUCOSEU Negative  --   --     < > = values in this interval not displayed.      Recent Labs     09/27/21  0612 09/27/21  0852   PHART 7.573* 7.589*   MAQ2TVE 17.7* 19.3*   PO2ART 104.3 104.9           Films:  Chest x-ray 9/26 imaging was reviewed by me and showed   No acute cardiopulmonary disease        ASSESSMENT:  · Drug overdose-salicylate  · Acute encephalopathy/Metabolic encephalopathy   · Hypotension-likely intravascular depletion  · Metabolic acidosis with respiratory Kalosis  · Acute on chronic kidney injury- baseline creatinine 1.7  · Enterobacter UTI  · Depression/Anxiety   · Hepatitis C     PLAN:  · Supplemental oxygen to maintain SaO2 >92%; wean as tolerated  · Electrolytes replacement  · Rocephin day #2  · DVT prophylaxis: Lovenox  · MRSA prophylaxis: Bactroban  · We will follow on the periphery and call with questions

## 2021-09-28 NOTE — DISCHARGE SUMMARY
Name:  Tad Rodriguez  Room:  /8268-60  MRN:    [id number]    Discharge Summary      This discharge summary is in conjunction with a complete physical exam done on the day of discharge. Discharging Provider: Maxx Hensley PA-C  Attending Physician: Jose E Jones MD       Admit: 9/26/2021  Discharge: 9/28/2021     HPI taken from admission H&P:      55 y.o. female presents initially with increased confusion with ER evaluation revealing toxic salicylate levels. Patient recalls having increased dental pain over the past several days which caused her to take an indeterminate amount of asa. She denies suicidal intention. She denies black/bloody stools, vomiting, but has had some nausea. She denies ringing in her ears, but has had some lightheadedness / dizziness. She denies fever, chills cough, sob.     Diagnoses this Admission and Hospital Course     Acute Toxic and Metabolic Encephalopathy - Resolved  - likely 2/2 salicylate toxicity  - CT head was non-acute  - mgmt as below, s/p HD  - monitored mentation, this has improved - A&O x 4     Salicylate Toxicity with Respiratory Alkalosis - Resolved  Accidental Aspirin Overdose - Resolved  - initial salicylate level 18.8  - serial salicylate levels, monitored ABGs  - s/p IVF with Na Bicarb  - Vas Cath placed per nephro - s/p HD on 9/27  - mentation improved, labs improved, salicylate levels trended down  - removed cortez & temporary vas cath    Hypophosphatemia  - repleted prior to d/c  - will need repeat phosphorus level in 1 week following discharge     Hypokalemia - Resolved  - repleted and repeats were normal     Hypovolemic Shock - improved  Hypotension - improved  Hx of HTN  - SBP in upper 70s, since improved  - responsive to IVF  - on continuous IVF, given 1 additional L NS per nephro on 9/27  - d/c Lisinopril, held HCTZ, Propranolol > these were discontinued at d/c d/t low BPs, f/u with PCP prior to restarting these medications     MARY on CKD stage III - Resolved  AGMA - Resolved  - Cr 2 on arrival, CO2 15, AG 17  - baseline Cr 1.7  - s/p IVF, given Na Bicarb  - Nephrology following, d/c Lisinopril  - had HD on 9/27 2/2 above, Cr 1.8 > trended down to 1.1  - stopped Lisinopril and HCTZ with low BPs     UTI  - UA with positive nitrite and small LE  - checked urine cx - Enterococcus, <10,000 CFU/mL  - Rocephin D#3, no further abx at d/c     Anxiety  - PRN Vistaril - resume at d/c    Procedures (Please Review Full Report for Details)  Temporary Vas Cath placement    Consults    Nephrology  Critical Care    Physical Exam at Discharge:    BP (!) 101/48   Pulse 55   Temp 97.2 °F (36.2 °C) (Oral)   Resp 16   Ht 5' 7\" (1.702 m)   Wt 164 lb 9.6 oz (74.7 kg)   LMP 12/01/2014   SpO2 96%   BMI 25.78 kg/m²   Gen: No distress. Alert. Middle aged [de-identified] female, appears older than stated age  Eyes: No sclera icterus. No conjunctival injection. Neck: Trachea midline. Temporary vas cath in place - RN at bedside to remove  Resp: No accessory muscle use. No crackles. No wheezes. No rhonchi. On RA   CV: Regular rate. Regular rhythm. No murmur. No rub. No edema. Peripheral Pulses: +2 palpable, equal bilaterally   GI: Soft, Non-tender. Non-distended. Normal bowel sounds. Skin: Warm and dry. No rash on exposed extremities  Neuro: Awake. Grossly nonfocal    Psych: Oriented x 4. No anxiety or agitation.       CBC:   Recent Labs     09/26/21  1620   WBC 8.1   HGB 13.2   HCT 41.8   MCV 91.8        BMP:   Recent Labs     09/27/21  1853 09/28/21  0103 09/28/21  0732    139 140   K 3.4* 3.5 3.6    105 107   CO2 27 27 26   PHOS 1.5* 2.0* 2.0*   BUN 18 20 17   CREATININE 1.1 1.2* 1.1     LIVER PROFILE:   Recent Labs     09/27/21  1220   AST 31   ALT 27   BILITOT <0.2   ALKPHOS 86     PT/INR:   Recent Labs     09/26/21  2220   PROTIME 13.3*   INR 1.17*     UA:  Recent Labs     09/26/21  2230 09/27/21  0615 09/28/21  0938   COLORU Yellow  --   --    PHUR 5.5   < > 7. 0   WBCUA 10-20*  --   --    RBCUA 3-4  --   --    BACTERIA 1+*  --   --    CLARITYU Clear  --   --    SPECGRAV 1.020  --   --    LEUKOCYTESUR SMALL*  --   --    UROBILINOGEN 0.2  --   --    BILIRUBINUR Negative  --   --    BLOODU MODERATE*  --   --    GLUCOSEU Negative  --   --     < > = values in this interval not displayed. CULTURES    Urine cx: <10,000 CFU/mL Enterococcus species    SARS-COV-2 - Rapid PCR: Not detected     Rapid Influenza A/B: negative      RADIOLOGY    IR NONTUNNELED VASCULAR CATHETER > 5 YEARS 9/27/2021   Final Result   Successful ultrasound and fluoroscopy guided non-tunneled right IJ catheter   placement. CT HEAD WO CONTRAST 9/26/2021   Final Result   No acute intracranial abnormality. XR CHEST PORTABLE 9/26/2021   Final Result   No acute disease           Discharge Medications     Medication List      CONTINUE taking these medications    ferrous sulfate 325 (65 Fe) MG tablet  Commonly known as: IRON 325     hydrOXYzine 25 MG tablet  Commonly known as: ATARAX     vitamin D 25 MCG (1000 UT) Tabs tablet  Commonly known as: CHOLECALCIFEROL        STOP taking these medications    aspirin 81 MG chewable tablet     hydroCHLOROthiazide 25 MG tablet  Commonly known as: HYDRODIURIL     lisinopril 10 MG tablet  Commonly known as: PRINIVIL;ZESTRIL     propranolol 40 MG tablet  Commonly known as: INDERAL              Discharged in stable condition to home. Follow Up: Follow up with PCP in 1 week.     Leilani Maza PA-C 11:52 AM 9/28/2021

## 2021-09-28 NOTE — PROGRESS NOTES
[facility] [place]  Nephrology Follow up Note           Reason for Consult: Aspirin toxicity, MARY on CKD  Requesting Physician:  Dr. Radha Hamm history  Patient feels much better, she is well oriented to time place and person today  Salicylate level has normalized    HD on 9/27 for 3 hours, +1 L    Last 24 h uop 2.1 L    ROS: No chest pain/shortness of breath/fever/nausea/vomiting  PSFH: No visitor    Scheduled Meds:   covid-19 vaccine  1 Dose IntraMUSCular Prior to discharge    sodium chloride flush  5-40 mL IntraVENous 2 times per day    enoxaparin  40 mg SubCUTAneous Nightly    cefTRIAXone (ROCEPHIN) IV  1,000 mg IntraVENous Q24H     Continuous Infusions:   sodium chloride 100 mL/hr at 09/28/21 5254    sodium chloride 25 mL (09/26/21 2314)     PRN Meds:.albumin human, heparin (porcine), sodium chloride flush, sodium chloride, ondansetron **OR** ondansetron, polyethylene glycol, acetaminophen **OR** acetaminophen  History of Present Illness on 9/27/2021:    55 y.o. yo female with PMH of anxiety, depression, hepatitis C, hypertension who is admitted for MARY on CKD with salicylate overdose  Patient complains that she had had to take for several days and has been taking aspirin, unknown quantities at different intervals. She denies that this was intentional or in suicidal in nature  She presented to the emergency room with complaints of feeling confused anxious and weak  She has been treated with IV bicarbonate, poison control has been contacted. Clinically she has made some improvement but she still remains mildly confused, due to her bicarbonate, she has developed hypocalcemia and hypokalemia. Urine output has been low. She has already received about 3 L of normal saline boluses.   Blood pressures improved    Physical exam:   Constitutional:  VITALS:  BP (!) 101/48   Pulse 55   Temp 97.2 °F (36.2 °C) (Oral)   Resp 16   Ht 5' 7\" (1.702 m)   Wt 164 lb 9.6 oz (74.7 kg)   LMP 12/01/2014 behavior    Plan  -Status post dialysis on 9/27 for aspirin overdose with mental status changes, this is resolved  -Okay to DC Murguia catheter and Vas-Cath  -Okay to discharge from renal standpoint  -Discussed with patient at length to avoid all NSAIDs        Thank you for the consultation. Please do not hesitate to call with questions. Mickey Correia MD  Office: [phone]  Fax:    [phone] [address] [facility]

## 2021-09-28 NOTE — PROGRESS NOTES
Labs are not able to be seen by lab when released in the ER. RN ordered duplicate labs due to this error and will send down appropriate labs following protocol.

## 2021-09-28 NOTE — PROGRESS NOTES
Patient educated on discharge instructions as well as new medications use, dosage, administration and possible side effects. Patient verified knowledge. IV removed without difficulty and dry dressing in place. Telemetry monitor removed and returned to [facility]. Pt left facility in stable condition to Home with all of their personal belongings.

## 2021-09-29 LAB — HEPATITIS B CORE TOTAL ANTIBODY: NEGATIVE

## 2021-12-27 ENCOUNTER — HOSPITAL ENCOUNTER (OUTPATIENT)
Dept: MAMMOGRAPHY | Age: 56
Discharge: HOME | End: 2021-12-27
Payer: COMMERCIAL

## 2021-12-27 DIAGNOSIS — Z12.31 ENCOUNTER FOR SCREENING MAMMOGRAM FOR BREAST CANCER: ICD-10-CM

## 2021-12-27 PROCEDURE — 77067 SCR MAMMO BI INCL CAD: CPT

## 2022-01-28 NOTE — PROGRESS NOTES
Lt renal angiogram and coil embolization completed. Dr. Shane Cox placed three coils in the left anterior branch of the kidney. Pressure held for 15 minutes and pressure dressing applied. Pressure dressing clean, dry, and intact. Vital signs stable. Pt transported back to ICU by this writer and ICU nurse Gricel Yañez.      Vital Signs  Vitals:    04/24/21 0252   BP: 109/83   Pulse: 66   Resp: 17   Temp:    SpO2: 96%      Post Denise Score  2 - Able to move 4 extremities voluntarily on command  2 - BP+/- 20mmHg of normal  2 - Able to maintain oxygen saturation >92% on room air  2 - Able to breathe deeply and cough freely  2 - Fully awake    Total  IV Sedation  1 mg Versed  50 mcg Fentanyl Yes

## 2022-07-26 NOTE — PROGRESS NOTES
PRN percocet given for pain rated 7/10 Post-Care Instructions: After the procedure, take precautions agains sun exposure. Avoid alcohol based toners for 10-14 days. Post op includes: Gentle cleanser, Lift HG and Calming Complex.\\nAfter 2-3 days patients can return to daily regimen.

## 2022-10-11 NOTE — PROGRESS NOTES
Shift assessment complete, scheduled meds given. Call light and bedside table in reach. Updated poison control via phone. Metronidazole Pregnancy And Lactation Text: This medication is Pregnancy Category B and considered safe during pregnancy.  It is also excreted in breast milk.

## 2023-06-23 ENCOUNTER — HOSPITAL ENCOUNTER (INPATIENT)
Age: 58
LOS: 3 days | Discharge: HOME | DRG: 720 | End: 2023-06-27
Attending: STUDENT IN AN ORGANIZED HEALTH CARE EDUCATION/TRAINING PROGRAM | Admitting: STUDENT IN AN ORGANIZED HEALTH CARE EDUCATION/TRAINING PROGRAM
Payer: COMMERCIAL

## 2023-06-23 ENCOUNTER — APPOINTMENT (OUTPATIENT)
Dept: CT IMAGING | Age: 58
DRG: 720 | End: 2023-06-23
Payer: COMMERCIAL

## 2023-06-23 ENCOUNTER — HOSPITAL ENCOUNTER (OUTPATIENT)
Dept: MAMMOGRAPHY | Age: 58
Discharge: HOME | End: 2023-06-23
Payer: COMMERCIAL

## 2023-06-23 DIAGNOSIS — R33.9 INABILITY TO URINATE: ICD-10-CM

## 2023-06-23 DIAGNOSIS — N13.30 HYDRONEPHROSIS OF LEFT KIDNEY: ICD-10-CM

## 2023-06-23 DIAGNOSIS — Z12.31 SCREENING MAMMOGRAM, ENCOUNTER FOR: ICD-10-CM

## 2023-06-23 DIAGNOSIS — N20.1 OBSTRUCTION OF LEFT URETEROPELVIC JUNCTION (UPJ) DUE TO STONE: Primary | ICD-10-CM

## 2023-06-23 DIAGNOSIS — N17.9 AKI (ACUTE KIDNEY INJURY) (HCC): ICD-10-CM

## 2023-06-23 LAB
ALBUMIN SERPL-MCNC: 4 G/DL (ref 3.4–5)
ALBUMIN/GLOB SERPL: 1.1 {RATIO} (ref 1.1–2.2)
ALP SERPL-CCNC: 80 U/L (ref 40–129)
ALT SERPL-CCNC: 22 U/L (ref 10–40)
ANION GAP SERPL CALCULATED.3IONS-SCNC: 13 MMOL/L (ref 3–16)
AST SERPL-CCNC: 28 U/L (ref 15–37)
BASOPHILS # BLD: 0.1 K/UL (ref 0–0.2)
BASOPHILS NFR BLD: 0.6 %
BILIRUB SERPL-MCNC: 0.6 MG/DL (ref 0–1)
BUN SERPL-MCNC: 32 MG/DL (ref 7–20)
CALCIUM SERPL-MCNC: 10.2 MG/DL (ref 8.3–10.6)
CHLORIDE SERPL-SCNC: 99 MMOL/L (ref 99–110)
CO2 SERPL-SCNC: 23 MMOL/L (ref 21–32)
CREAT SERPL-MCNC: 2.2 MG/DL (ref 0.6–1.1)
DEPRECATED RDW RBC AUTO: 14.9 % (ref 12.4–15.4)
EOSINOPHIL # BLD: 0.2 K/UL (ref 0–0.6)
EOSINOPHIL NFR BLD: 1.4 %
GFR SERPLBLD CREATININE-BSD FMLA CKD-EPI: 26 ML/MIN/{1.73_M2}
GLUCOSE SERPL-MCNC: 127 MG/DL (ref 70–99)
HCT VFR BLD AUTO: 40.5 % (ref 36–48)
HGB BLD-MCNC: 13.4 G/DL (ref 12–16)
LYMPHOCYTES # BLD: 1.4 K/UL (ref 1–5.1)
LYMPHOCYTES NFR BLD: 11.6 %
MCH RBC QN AUTO: 29.5 PG (ref 26–34)
MCHC RBC AUTO-ENTMCNC: 33 G/DL (ref 31–36)
MCV RBC AUTO: 89.6 FL (ref 80–100)
MONOCYTES # BLD: 1 K/UL (ref 0–1.3)
MONOCYTES NFR BLD: 8.1 %
NEUTROPHILS # BLD: 9.6 K/UL (ref 1.7–7.7)
NEUTROPHILS NFR BLD: 78.3 %
PLATELET # BLD AUTO: 162 K/UL (ref 135–450)
PMV BLD AUTO: 10.6 FL (ref 5–10.5)
POTASSIUM SERPL-SCNC: 3.9 MMOL/L (ref 3.5–5.1)
PROT SERPL-MCNC: 7.6 G/DL (ref 6.4–8.2)
RBC # BLD AUTO: 4.52 M/UL (ref 4–5.2)
SODIUM SERPL-SCNC: 135 MMOL/L (ref 136–145)
WBC # BLD AUTO: 12.3 K/UL (ref 4–11)

## 2023-06-23 PROCEDURE — 74176 CT ABD & PELVIS W/O CONTRAST: CPT

## 2023-06-23 PROCEDURE — 36415 COLL VENOUS BLD VENIPUNCTURE: CPT

## 2023-06-23 PROCEDURE — 2580000003 HC RX 258: Performed by: PHYSICIAN ASSISTANT

## 2023-06-23 PROCEDURE — 80053 COMPREHEN METABOLIC PANEL: CPT

## 2023-06-23 PROCEDURE — 96376 TX/PRO/DX INJ SAME DRUG ADON: CPT

## 2023-06-23 PROCEDURE — 77063 BREAST TOMOSYNTHESIS BI: CPT

## 2023-06-23 PROCEDURE — 96375 TX/PRO/DX INJ NEW DRUG ADDON: CPT

## 2023-06-23 PROCEDURE — 99285 EMERGENCY DEPT VISIT HI MDM: CPT

## 2023-06-23 PROCEDURE — 96374 THER/PROPH/DIAG INJ IV PUSH: CPT

## 2023-06-23 PROCEDURE — 6360000002 HC RX W HCPCS

## 2023-06-23 PROCEDURE — 85025 COMPLETE CBC W/AUTO DIFF WBC: CPT

## 2023-06-23 PROCEDURE — 6360000002 HC RX W HCPCS: Performed by: PHYSICIAN ASSISTANT

## 2023-06-23 RX ORDER — ONDANSETRON 2 MG/ML
INJECTION INTRAMUSCULAR; INTRAVENOUS
Status: COMPLETED
Start: 2023-06-23 | End: 2023-06-23

## 2023-06-23 RX ORDER — MORPHINE SULFATE 2 MG/ML
2 INJECTION, SOLUTION INTRAMUSCULAR; INTRAVENOUS EVERY 4 HOURS PRN
Status: DISCONTINUED | OUTPATIENT
Start: 2023-06-23 | End: 2023-06-24

## 2023-06-23 RX ORDER — MORPHINE SULFATE 2 MG/ML
INJECTION, SOLUTION INTRAMUSCULAR; INTRAVENOUS
Status: COMPLETED
Start: 2023-06-23 | End: 2023-06-23

## 2023-06-23 RX ORDER — ONDANSETRON 2 MG/ML
4 INJECTION INTRAMUSCULAR; INTRAVENOUS EVERY 6 HOURS PRN
Status: DISCONTINUED | OUTPATIENT
Start: 2023-06-23 | End: 2023-06-27 | Stop reason: HOSPADM

## 2023-06-23 RX ORDER — LORAZEPAM 2 MG/ML
0.5 INJECTION INTRAMUSCULAR ONCE
Status: COMPLETED | OUTPATIENT
Start: 2023-06-23 | End: 2023-06-23

## 2023-06-23 RX ORDER — 0.9 % SODIUM CHLORIDE 0.9 %
1000 INTRAVENOUS SOLUTION INTRAVENOUS ONCE
Status: COMPLETED | OUTPATIENT
Start: 2023-06-23 | End: 2023-06-24

## 2023-06-23 RX ADMIN — SODIUM CHLORIDE 1000 ML: 9 INJECTION, SOLUTION INTRAVENOUS at 22:36

## 2023-06-23 RX ADMIN — MORPHINE SULFATE 2 MG: 2 INJECTION, SOLUTION INTRAMUSCULAR; INTRAVENOUS at 21:08

## 2023-06-23 RX ADMIN — LORAZEPAM 0.5 MG: 2 INJECTION INTRAMUSCULAR; INTRAVENOUS at 23:45

## 2023-06-23 RX ADMIN — HYDROMORPHONE HYDROCHLORIDE 0.5 MG: 1 INJECTION, SOLUTION INTRAMUSCULAR; INTRAVENOUS; SUBCUTANEOUS at 22:47

## 2023-06-23 RX ADMIN — ONDANSETRON 4 MG: 2 INJECTION INTRAMUSCULAR; INTRAVENOUS at 21:06

## 2023-06-23 RX ADMIN — HYDROMORPHONE HYDROCHLORIDE 0.5 MG: 1 INJECTION, SOLUTION INTRAMUSCULAR; INTRAVENOUS; SUBCUTANEOUS at 22:08

## 2023-06-23 ASSESSMENT — PAIN DESCRIPTION - LOCATION
LOCATION: FLANK
LOCATION: ABDOMEN;FLANK

## 2023-06-23 ASSESSMENT — PAIN SCALES - GENERAL
PAINLEVEL_OUTOF10: 8
PAINLEVEL_OUTOF10: 7

## 2023-06-23 ASSESSMENT — PAIN - FUNCTIONAL ASSESSMENT: PAIN_FUNCTIONAL_ASSESSMENT: 0-10

## 2023-06-23 ASSESSMENT — PAIN DESCRIPTION - ORIENTATION: ORIENTATION: LEFT

## 2023-06-24 ENCOUNTER — ANESTHESIA EVENT (OUTPATIENT)
Dept: OPERATING ROOM | Age: 58
End: 2023-06-24
Payer: COMMERCIAL

## 2023-06-24 ENCOUNTER — APPOINTMENT (OUTPATIENT)
Dept: GENERAL RADIOLOGY | Age: 58
DRG: 720 | End: 2023-06-24
Payer: COMMERCIAL

## 2023-06-24 ENCOUNTER — ANESTHESIA (OUTPATIENT)
Dept: OPERATING ROOM | Age: 58
End: 2023-06-24
Payer: COMMERCIAL

## 2023-06-24 PROBLEM — R57.9 SHOCK (HCC): Status: ACTIVE | Noted: 2023-06-24

## 2023-06-24 LAB
ANION GAP SERPL CALCULATED.3IONS-SCNC: 13 MMOL/L (ref 3–16)
BUN SERPL-MCNC: 38 MG/DL (ref 7–20)
CALCIUM SERPL-MCNC: 8.9 MG/DL (ref 8.3–10.6)
CHLORIDE SERPL-SCNC: 105 MMOL/L (ref 99–110)
CO2 SERPL-SCNC: 19 MMOL/L (ref 21–32)
CREAT SERPL-MCNC: 3.3 MG/DL (ref 0.6–1.1)
DEPRECATED RDW RBC AUTO: 14.7 % (ref 12.4–15.4)
EKG ATRIAL RATE: 76 BPM
EKG DIAGNOSIS: NORMAL
EKG P AXIS: 68 DEGREES
EKG P-R INTERVAL: 118 MS
EKG Q-T INTERVAL: 416 MS
EKG QRS DURATION: 74 MS
EKG QTC CALCULATION (BAZETT): 468 MS
EKG R AXIS: 41 DEGREES
EKG T AXIS: 33 DEGREES
EKG VENTRICULAR RATE: 76 BPM
GFR SERPLBLD CREATININE-BSD FMLA CKD-EPI: 16 ML/MIN/{1.73_M2}
GLUCOSE SERPL-MCNC: 95 MG/DL (ref 70–99)
HCT VFR BLD AUTO: 36.8 % (ref 36–48)
HGB BLD-MCNC: 12.3 G/DL (ref 12–16)
MCH RBC QN AUTO: 30.5 PG (ref 26–34)
MCHC RBC AUTO-ENTMCNC: 33.5 G/DL (ref 31–36)
MCV RBC AUTO: 91.1 FL (ref 80–100)
PLATELET # BLD AUTO: 80 K/UL (ref 135–450)
PMV BLD AUTO: 9.9 FL (ref 5–10.5)
POTASSIUM SERPL-SCNC: 3.8 MMOL/L (ref 3.5–5.1)
RBC # BLD AUTO: 4.04 M/UL (ref 4–5.2)
SODIUM SERPL-SCNC: 137 MMOL/L (ref 136–145)
WBC # BLD AUTO: 5.3 K/UL (ref 4–11)

## 2023-06-24 PROCEDURE — 36415 COLL VENOUS BLD VENIPUNCTURE: CPT

## 2023-06-24 PROCEDURE — 6360000002 HC RX W HCPCS: Performed by: STUDENT IN AN ORGANIZED HEALTH CARE EDUCATION/TRAINING PROGRAM

## 2023-06-24 PROCEDURE — 3600000004 HC SURGERY LEVEL 4 BASE: Performed by: UROLOGY

## 2023-06-24 PROCEDURE — 99255 IP/OBS CONSLTJ NEW/EST HI 80: CPT | Performed by: INTERNAL MEDICINE

## 2023-06-24 PROCEDURE — 2580000003 HC RX 258: Performed by: PHYSICIAN ASSISTANT

## 2023-06-24 PROCEDURE — 2580000003 HC RX 258: Performed by: INTERNAL MEDICINE

## 2023-06-24 PROCEDURE — C2617 STENT, NON-COR, TEM W/O DEL: HCPCS | Performed by: UROLOGY

## 2023-06-24 PROCEDURE — 3600000014 HC SURGERY LEVEL 4 ADDTL 15MIN: Performed by: UROLOGY

## 2023-06-24 PROCEDURE — 3700000001 HC ADD 15 MINUTES (ANESTHESIA): Performed by: UROLOGY

## 2023-06-24 PROCEDURE — 6370000000 HC RX 637 (ALT 250 FOR IP): Performed by: UROLOGY

## 2023-06-24 PROCEDURE — 99223 1ST HOSP IP/OBS HIGH 75: CPT | Performed by: INTERNAL MEDICINE

## 2023-06-24 PROCEDURE — 2500000003 HC RX 250 WO HCPCS: Performed by: INTERNAL MEDICINE

## 2023-06-24 PROCEDURE — 6360000002 HC RX W HCPCS: Performed by: UROLOGY

## 2023-06-24 PROCEDURE — 6360000002 HC RX W HCPCS: Performed by: PHYSICIAN ASSISTANT

## 2023-06-24 PROCEDURE — 36620 INSERTION CATHETER ARTERY: CPT

## 2023-06-24 PROCEDURE — 2580000003 HC RX 258: Performed by: STUDENT IN AN ORGANIZED HEALTH CARE EDUCATION/TRAINING PROGRAM

## 2023-06-24 PROCEDURE — 2000000000 HC ICU R&B

## 2023-06-24 PROCEDURE — 85027 COMPLETE CBC AUTOMATED: CPT

## 2023-06-24 PROCEDURE — 6370000000 HC RX 637 (ALT 250 FOR IP): Performed by: STUDENT IN AN ORGANIZED HEALTH CARE EDUCATION/TRAINING PROGRAM

## 2023-06-24 PROCEDURE — 6360000004 HC RX CONTRAST MEDICATION: Performed by: UROLOGY

## 2023-06-24 PROCEDURE — 36556 INSERT NON-TUNNEL CV CATH: CPT

## 2023-06-24 PROCEDURE — 74420 UROGRAPHY RTRGR +-KUB: CPT

## 2023-06-24 PROCEDURE — 71045 X-RAY EXAM CHEST 1 VIEW: CPT

## 2023-06-24 PROCEDURE — 7100000000 HC PACU RECOVERY - FIRST 15 MIN: Performed by: UROLOGY

## 2023-06-24 PROCEDURE — 6360000002 HC RX W HCPCS: Performed by: INTERNAL MEDICINE

## 2023-06-24 PROCEDURE — 36556 INSERT NON-TUNNEL CV CATH: CPT | Performed by: INTERNAL MEDICINE

## 2023-06-24 PROCEDURE — 87040 BLOOD CULTURE FOR BACTERIA: CPT

## 2023-06-24 PROCEDURE — 80048 BASIC METABOLIC PNL TOTAL CA: CPT

## 2023-06-24 PROCEDURE — 2709999900 HC NON-CHARGEABLE SUPPLY: Performed by: UROLOGY

## 2023-06-24 PROCEDURE — 87086 URINE CULTURE/COLONY COUNT: CPT

## 2023-06-24 PROCEDURE — 93010 ELECTROCARDIOGRAM REPORT: CPT | Performed by: INTERNAL MEDICINE

## 2023-06-24 PROCEDURE — 02HV33Z INSERTION OF INFUSION DEVICE INTO SUPERIOR VENA CAVA, PERCUTANEOUS APPROACH: ICD-10-PCS | Performed by: INTERNAL MEDICINE

## 2023-06-24 PROCEDURE — 2500000003 HC RX 250 WO HCPCS: Performed by: ANESTHESIOLOGY

## 2023-06-24 PROCEDURE — 93005 ELECTROCARDIOGRAM TRACING: CPT | Performed by: STUDENT IN AN ORGANIZED HEALTH CARE EDUCATION/TRAINING PROGRAM

## 2023-06-24 PROCEDURE — 3700000000 HC ANESTHESIA ATTENDED CARE: Performed by: UROLOGY

## 2023-06-24 PROCEDURE — C1769 GUIDE WIRE: HCPCS | Performed by: UROLOGY

## 2023-06-24 PROCEDURE — 2580000003 HC RX 258: Performed by: UROLOGY

## 2023-06-24 PROCEDURE — 6360000002 HC RX W HCPCS: Performed by: ANESTHESIOLOGY

## 2023-06-24 PROCEDURE — 51702 INSERT TEMP BLADDER CATH: CPT

## 2023-06-24 DEVICE — URETERAL STENT
Type: IMPLANTABLE DEVICE | Site: URETER | Status: FUNCTIONAL
Brand: CONTOUR™

## 2023-06-24 RX ORDER — MEPERIDINE HYDROCHLORIDE 25 MG/ML
12.5 INJECTION INTRAMUSCULAR; INTRAVENOUS; SUBCUTANEOUS EVERY 5 MIN PRN
Status: CANCELLED | OUTPATIENT
Start: 2023-06-24

## 2023-06-24 RX ORDER — ACETAMINOPHEN 650 MG/1
650 SUPPOSITORY RECTAL EVERY 6 HOURS PRN
Status: DISCONTINUED | OUTPATIENT
Start: 2023-06-24 | End: 2023-06-27 | Stop reason: HOSPADM

## 2023-06-24 RX ORDER — ENOXAPARIN SODIUM 100 MG/ML
40 INJECTION SUBCUTANEOUS DAILY
Status: DISCONTINUED | OUTPATIENT
Start: 2023-06-24 | End: 2023-06-25

## 2023-06-24 RX ORDER — LIDOCAINE HYDROCHLORIDE 20 MG/ML
JELLY TOPICAL PRN
Status: DISCONTINUED | OUTPATIENT
Start: 2023-06-24 | End: 2023-06-24 | Stop reason: ALTCHOICE

## 2023-06-24 RX ORDER — SODIUM CHLORIDE 0.9 % (FLUSH) 0.9 %
5-40 SYRINGE (ML) INJECTION EVERY 12 HOURS SCHEDULED
Status: CANCELLED | OUTPATIENT
Start: 2023-06-24

## 2023-06-24 RX ORDER — SODIUM CHLORIDE, SODIUM LACTATE, POTASSIUM CHLORIDE, AND CALCIUM CHLORIDE .6; .31; .03; .02 G/100ML; G/100ML; G/100ML; G/100ML
500 INJECTION, SOLUTION INTRAVENOUS ONCE
Status: DISCONTINUED | OUTPATIENT
Start: 2023-06-24 | End: 2023-06-24

## 2023-06-24 RX ORDER — DIPHENHYDRAMINE HYDROCHLORIDE 50 MG/ML
12.5 INJECTION INTRAMUSCULAR; INTRAVENOUS
Status: CANCELLED | OUTPATIENT
Start: 2023-06-24 | End: 2023-06-25

## 2023-06-24 RX ORDER — MORPHINE SULFATE 4 MG/ML
4 INJECTION, SOLUTION INTRAMUSCULAR; INTRAVENOUS
Status: DISCONTINUED | OUTPATIENT
Start: 2023-06-24 | End: 2023-06-26

## 2023-06-24 RX ORDER — SUCCINYLCHOLINE CHLORIDE 20 MG/ML
INJECTION INTRAMUSCULAR; INTRAVENOUS PRN
Status: DISCONTINUED | OUTPATIENT
Start: 2023-06-24 | End: 2023-06-24 | Stop reason: SDUPTHER

## 2023-06-24 RX ORDER — LIDOCAINE HYDROCHLORIDE 20 MG/ML
INJECTION, SOLUTION EPIDURAL; INFILTRATION; INTRACAUDAL; PERINEURAL PRN
Status: DISCONTINUED | OUTPATIENT
Start: 2023-06-24 | End: 2023-06-24 | Stop reason: SDUPTHER

## 2023-06-24 RX ORDER — 0.9 % SODIUM CHLORIDE 0.9 %
1000 INTRAVENOUS SOLUTION INTRAVENOUS ONCE
Status: COMPLETED | OUTPATIENT
Start: 2023-06-24 | End: 2023-06-24

## 2023-06-24 RX ORDER — ACETAMINOPHEN 325 MG/1
650 TABLET ORAL EVERY 6 HOURS PRN
Status: DISCONTINUED | OUTPATIENT
Start: 2023-06-24 | End: 2023-06-24

## 2023-06-24 RX ORDER — MORPHINE SULFATE 2 MG/ML
2 INJECTION, SOLUTION INTRAMUSCULAR; INTRAVENOUS
Status: DISCONTINUED | OUTPATIENT
Start: 2023-06-24 | End: 2023-06-26

## 2023-06-24 RX ORDER — SODIUM CHLORIDE 0.9 % (FLUSH) 0.9 %
5-40 SYRINGE (ML) INJECTION PRN
Status: CANCELLED | OUTPATIENT
Start: 2023-06-24

## 2023-06-24 RX ORDER — OXYCODONE HYDROCHLORIDE 5 MG/1
5 TABLET ORAL
Status: CANCELLED | OUTPATIENT
Start: 2023-06-24 | End: 2023-06-25

## 2023-06-24 RX ORDER — SERTRALINE HYDROCHLORIDE 25 MG/1
TABLET, FILM COATED ORAL
COMMUNITY
Start: 2023-06-02

## 2023-06-24 RX ORDER — ONDANSETRON 2 MG/ML
4 INJECTION INTRAMUSCULAR; INTRAVENOUS EVERY 10 MIN PRN
Status: CANCELLED | OUTPATIENT
Start: 2023-06-24

## 2023-06-24 RX ORDER — SODIUM CHLORIDE, SODIUM LACTATE, POTASSIUM CHLORIDE, AND CALCIUM CHLORIDE .6; .31; .03; .02 G/100ML; G/100ML; G/100ML; G/100ML
1000 INJECTION, SOLUTION INTRAVENOUS ONCE
Status: COMPLETED | OUTPATIENT
Start: 2023-06-24 | End: 2023-06-24

## 2023-06-24 RX ORDER — SODIUM CHLORIDE 0.9 % (FLUSH) 0.9 %
5-40 SYRINGE (ML) INJECTION EVERY 12 HOURS SCHEDULED
Status: DISCONTINUED | OUTPATIENT
Start: 2023-06-24 | End: 2023-06-27 | Stop reason: HOSPADM

## 2023-06-24 RX ORDER — MAGNESIUM HYDROXIDE 1200 MG/15ML
LIQUID ORAL PRN
Status: DISCONTINUED | OUTPATIENT
Start: 2023-06-24 | End: 2023-06-24 | Stop reason: ALTCHOICE

## 2023-06-24 RX ORDER — SODIUM CHLORIDE 0.9 % (FLUSH) 0.9 %
5-40 SYRINGE (ML) INJECTION PRN
Status: DISCONTINUED | OUTPATIENT
Start: 2023-06-24 | End: 2023-06-27 | Stop reason: HOSPADM

## 2023-06-24 RX ORDER — MIDAZOLAM HYDROCHLORIDE 1 MG/ML
1 INJECTION INTRAMUSCULAR; INTRAVENOUS EVERY 5 MIN PRN
Status: CANCELLED | OUTPATIENT
Start: 2023-06-24

## 2023-06-24 RX ORDER — EPINEPHRINE 1 MG/ML
INJECTION, SOLUTION, CONCENTRATE INTRAVENOUS PRN
Status: DISCONTINUED | OUTPATIENT
Start: 2023-06-24 | End: 2023-06-24 | Stop reason: SDUPTHER

## 2023-06-24 RX ORDER — PROPRANOLOL HYDROCHLORIDE 40 MG/1
TABLET ORAL
Status: ON HOLD | COMMUNITY
Start: 2023-06-02 | End: 2023-06-27 | Stop reason: HOSPADM

## 2023-06-24 RX ORDER — HYDRALAZINE HYDROCHLORIDE 20 MG/ML
5 INJECTION INTRAMUSCULAR; INTRAVENOUS
Status: CANCELLED | OUTPATIENT
Start: 2023-06-24

## 2023-06-24 RX ORDER — SODIUM CHLORIDE 9 MG/ML
INJECTION, SOLUTION INTRAVENOUS CONTINUOUS
Status: DISCONTINUED | OUTPATIENT
Start: 2023-06-24 | End: 2023-06-25

## 2023-06-24 RX ORDER — FERROUS SULFATE 325(65) MG
325 TABLET ORAL
Status: DISCONTINUED | OUTPATIENT
Start: 2023-06-24 | End: 2023-06-27 | Stop reason: HOSPADM

## 2023-06-24 RX ORDER — SODIUM CHLORIDE 9 MG/ML
INJECTION, SOLUTION INTRAVENOUS PRN
Status: DISCONTINUED | OUTPATIENT
Start: 2023-06-24 | End: 2023-06-27 | Stop reason: HOSPADM

## 2023-06-24 RX ORDER — ASPIRIN 81 MG/1
TABLET, COATED ORAL
COMMUNITY
Start: 2023-06-02

## 2023-06-24 RX ORDER — HYDROXYZINE HYDROCHLORIDE 25 MG/1
25 TABLET, FILM COATED ORAL 3 TIMES DAILY PRN
Status: DISCONTINUED | OUTPATIENT
Start: 2023-06-24 | End: 2023-06-27 | Stop reason: HOSPADM

## 2023-06-24 RX ORDER — HYDROCHLOROTHIAZIDE 25 MG/1
TABLET ORAL
Status: ON HOLD | COMMUNITY
Start: 2023-06-02 | End: 2023-06-27 | Stop reason: HOSPADM

## 2023-06-24 RX ORDER — LISINOPRIL 10 MG/1
TABLET ORAL
Status: ON HOLD | COMMUNITY
Start: 2023-06-02 | End: 2023-06-27 | Stop reason: HOSPADM

## 2023-06-24 RX ORDER — TAMSULOSIN HYDROCHLORIDE 0.4 MG/1
0.4 CAPSULE ORAL NIGHTLY
Status: DISCONTINUED | OUTPATIENT
Start: 2023-06-24 | End: 2023-06-27 | Stop reason: HOSPADM

## 2023-06-24 RX ORDER — BISACODYL 5 MG/1
5 TABLET, DELAYED RELEASE ORAL DAILY PRN
Status: DISCONTINUED | OUTPATIENT
Start: 2023-06-24 | End: 2023-06-27 | Stop reason: HOSPADM

## 2023-06-24 RX ORDER — SODIUM CHLORIDE 9 MG/ML
INJECTION, SOLUTION INTRAVENOUS PRN
Status: CANCELLED | OUTPATIENT
Start: 2023-06-24

## 2023-06-24 RX ORDER — PROPOFOL 10 MG/ML
INJECTION, EMULSION INTRAVENOUS PRN
Status: DISCONTINUED | OUTPATIENT
Start: 2023-06-24 | End: 2023-06-24 | Stop reason: SDUPTHER

## 2023-06-24 RX ORDER — MIDAZOLAM HYDROCHLORIDE 1 MG/ML
INJECTION INTRAMUSCULAR; INTRAVENOUS PRN
Status: DISCONTINUED | OUTPATIENT
Start: 2023-06-24 | End: 2023-06-24 | Stop reason: SDUPTHER

## 2023-06-24 RX ORDER — ONDANSETRON 2 MG/ML
INJECTION INTRAMUSCULAR; INTRAVENOUS PRN
Status: DISCONTINUED | OUTPATIENT
Start: 2023-06-24 | End: 2023-06-24 | Stop reason: SDUPTHER

## 2023-06-24 RX ADMIN — ONDANSETRON 4 MG: 2 INJECTION INTRAMUSCULAR; INTRAVENOUS at 04:04

## 2023-06-24 RX ADMIN — TAMSULOSIN HYDROCHLORIDE 0.4 MG: 0.4 CAPSULE ORAL at 20:30

## 2023-06-24 RX ADMIN — EPINEPHRINE 20 MCG: 1 INJECTION, SOLUTION, CONCENTRATE INTRAVENOUS at 08:15

## 2023-06-24 RX ADMIN — MORPHINE SULFATE 2 MG: 2 INJECTION, SOLUTION INTRAMUSCULAR; INTRAVENOUS at 18:10

## 2023-06-24 RX ADMIN — PROPOFOL 150 MG: 10 INJECTION, EMULSION INTRAVENOUS at 08:01

## 2023-06-24 RX ADMIN — PHENYLEPHRINE HYDROCHLORIDE 100 MCG: 10 INJECTION INTRAVENOUS at 08:11

## 2023-06-24 RX ADMIN — SODIUM CHLORIDE: 9 INJECTION, SOLUTION INTRAVENOUS at 12:21

## 2023-06-24 RX ADMIN — CEFTRIAXONE SODIUM 1000 MG: 1 INJECTION, POWDER, FOR SOLUTION INTRAMUSCULAR; INTRAVENOUS at 03:17

## 2023-06-24 RX ADMIN — SUCCINYLCHOLINE CHLORIDE 140 MG: 20 INJECTION, SOLUTION INTRAMUSCULAR; INTRAVENOUS at 08:01

## 2023-06-24 RX ADMIN — PHENYLEPHRINE HYDROCHLORIDE 100 MCG: 10 INJECTION INTRAVENOUS at 08:03

## 2023-06-24 RX ADMIN — MORPHINE SULFATE 4 MG: 4 INJECTION, SOLUTION INTRAMUSCULAR; INTRAVENOUS at 06:56

## 2023-06-24 RX ADMIN — LIDOCAINE HYDROCHLORIDE 60 MG: 20 INJECTION, SOLUTION EPIDURAL; INFILTRATION; INTRACAUDAL; PERINEURAL at 08:01

## 2023-06-24 RX ADMIN — PHENYLEPHRINE HYDROCHLORIDE 100 MCG: 10 INJECTION INTRAVENOUS at 08:08

## 2023-06-24 RX ADMIN — ONDANSETRON HYDROCHLORIDE 4 MG: 2 INJECTION, SOLUTION INTRAMUSCULAR; INTRAVENOUS at 08:01

## 2023-06-24 RX ADMIN — MIDAZOLAM 2 MG: 1 INJECTION INTRAMUSCULAR; INTRAVENOUS at 07:59

## 2023-06-24 RX ADMIN — SODIUM CHLORIDE, PRESERVATIVE FREE 10 ML: 5 INJECTION INTRAVENOUS at 20:29

## 2023-06-24 RX ADMIN — SODIUM CHLORIDE, POTASSIUM CHLORIDE, SODIUM LACTATE AND CALCIUM CHLORIDE 1000 ML: 600; 310; 30; 20 INJECTION, SOLUTION INTRAVENOUS at 11:16

## 2023-06-24 RX ADMIN — EPINEPHRINE 30 MCG: 1 INJECTION, SOLUTION, CONCENTRATE INTRAVENOUS at 08:25

## 2023-06-24 RX ADMIN — CEFEPIME 1000 MG: 1 INJECTION, POWDER, FOR SOLUTION INTRAMUSCULAR; INTRAVENOUS at 12:24

## 2023-06-24 RX ADMIN — PHENYLEPHRINE HYDROCHLORIDE 100 MCG: 10 INJECTION INTRAVENOUS at 08:16

## 2023-06-24 RX ADMIN — SODIUM CHLORIDE 1000 ML: 9 INJECTION, SOLUTION INTRAVENOUS at 02:49

## 2023-06-24 RX ADMIN — SODIUM CHLORIDE, PRESERVATIVE FREE 10 ML: 5 INJECTION INTRAVENOUS at 11:19

## 2023-06-24 RX ADMIN — NOREPINEPHRINE BITARTRATE 2 MCG/MIN: 1 SOLUTION INTRAVENOUS at 16:20

## 2023-06-24 RX ADMIN — MORPHINE SULFATE 2 MG: 2 INJECTION, SOLUTION INTRAMUSCULAR; INTRAVENOUS at 04:04

## 2023-06-24 RX ADMIN — TAMSULOSIN HYDROCHLORIDE 0.4 MG: 0.4 CAPSULE ORAL at 02:52

## 2023-06-24 ASSESSMENT — PAIN DESCRIPTION - LOCATION
LOCATION: FLANK
LOCATION: FLANK
LOCATION: BACK

## 2023-06-24 ASSESSMENT — PAIN DESCRIPTION - ORIENTATION
ORIENTATION: RIGHT;LEFT
ORIENTATION: RIGHT;LEFT
ORIENTATION: LEFT

## 2023-06-24 ASSESSMENT — PAIN SCALES - GENERAL
PAINLEVEL_OUTOF10: 10
PAINLEVEL_OUTOF10: 9
PAINLEVEL_OUTOF10: 6
PAINLEVEL_OUTOF10: 0
PAINLEVEL_OUTOF10: 0

## 2023-06-24 ASSESSMENT — PAIN DESCRIPTION - ONSET: ONSET: ON-GOING

## 2023-06-24 ASSESSMENT — PAIN DESCRIPTION - FREQUENCY: FREQUENCY: INTERMITTENT

## 2023-06-24 ASSESSMENT — PAIN DESCRIPTION - DESCRIPTORS
DESCRIPTORS: DISCOMFORT;THROBBING
DESCRIPTORS: ACHING
DESCRIPTORS: ACHING;THROBBING

## 2023-06-24 ASSESSMENT — LIFESTYLE VARIABLES: SMOKING_STATUS: 1

## 2023-06-24 ASSESSMENT — PAIN DESCRIPTION - PAIN TYPE: TYPE: ACUTE PAIN

## 2023-06-24 NOTE — ANESTHESIA PRE PROCEDURE
(I discussed with the patient the risks and benefits of PIV, general anesthesia, IV Narcotics, PACU. All questions were answered the patient agrees with the plan)  Induction: intravenous. MIPS: Prophylactic antiemetics administered. Anesthetic plan and risks discussed with patient.                         Olegario Harris MD   6/24/2023

## 2023-06-24 NOTE — ANESTHESIA POSTPROCEDURE EVALUATION
Department of Anesthesiology  Postprocedure Note    Patient: Leslie Abrams  MRN: [id number]  YOB: 1965  Date of evaluation: 6/24/2023      Procedure Summary     Date: 06/24/23 Room / Location: [facility]    Anesthesia Start: 9742 Anesthesia Stop: 0491    Procedure: [address] (Left: Bladder) Diagnosis:       Ureterolithiasis      (Ureterolithiasis [N20.1])    Surgeons: Bree Barry MD Responsible Provider: Alexei Vizcaino MD    Anesthesia Type: general ASA Status: 3 - Emergent          Anesthesia Type: No value filed.     Denise Phase I:      Denise Phase II:        Anesthesia Post Evaluation    Patient location during evaluation: ICU  Level of consciousness: awake  Airway patency: patent  Nausea & Vomiting: no nausea  Complications: no  Cardiovascular status: hypotensive and hemodynamically stable  Respiratory status: acceptable  Hydration status: euvolemic

## 2023-06-25 LAB
ALBUMIN SERPL-MCNC: 2.3 G/DL (ref 3.4–5)
ANION GAP SERPL CALCULATED.3IONS-SCNC: 10 MMOL/L (ref 3–16)
ANION GAP SERPL CALCULATED.3IONS-SCNC: 12 MMOL/L (ref 3–16)
BACTERIA UR CULT: NORMAL
BACTERIA URNS QL MICRO: ABNORMAL /HPF
BASE EXCESS BLDV CALC-SCNC: -7.2 MMOL/L (ref -3–3)
BILIRUB UR QL STRIP.AUTO: NEGATIVE
BUN SERPL-MCNC: 42 MG/DL (ref 7–20)
BUN SERPL-MCNC: 42 MG/DL (ref 7–20)
CALCIUM SERPL-MCNC: 7.5 MG/DL (ref 8.3–10.6)
CALCIUM SERPL-MCNC: 7.7 MG/DL (ref 8.3–10.6)
CHLORIDE SERPL-SCNC: 109 MMOL/L (ref 99–110)
CHLORIDE SERPL-SCNC: 116 MMOL/L (ref 99–110)
CLARITY UR: ABNORMAL
CO2 BLDV-SCNC: 17 MMOL/L
CO2 SERPL-SCNC: 16 MMOL/L (ref 21–32)
CO2 SERPL-SCNC: 17 MMOL/L (ref 21–32)
COHGB MFR BLDV: 5.9 % (ref 0–1.5)
COLOR UR: YELLOW
CREAT SERPL-MCNC: 2.9 MG/DL (ref 0.6–1.1)
CREAT SERPL-MCNC: 3 MG/DL (ref 0.6–1.1)
DEPRECATED RDW RBC AUTO: 15.3 % (ref 12.4–15.4)
EPI CELLS #/AREA URNS HPF: ABNORMAL /HPF (ref 0–5)
GFR SERPLBLD CREATININE-BSD FMLA CKD-EPI: 18 ML/MIN/{1.73_M2}
GFR SERPLBLD CREATININE-BSD FMLA CKD-EPI: 18 ML/MIN/{1.73_M2}
GLUCOSE SERPL-MCNC: 126 MG/DL (ref 70–99)
GLUCOSE SERPL-MCNC: 91 MG/DL (ref 70–99)
GLUCOSE UR STRIP.AUTO-MCNC: NEGATIVE MG/DL
HCO3 BLDV-SCNC: 16 MMOL/L (ref 23–29)
HCT VFR BLD AUTO: 30.8 % (ref 36–48)
HGB BLD-MCNC: 10.1 G/DL (ref 12–16)
HGB UR QL STRIP.AUTO: ABNORMAL
KETONES UR STRIP.AUTO-MCNC: NEGATIVE MG/DL
LACTATE BLDV-SCNC: 2 MMOL/L (ref 0.4–2)
LEUKOCYTE ESTERASE UR QL STRIP.AUTO: ABNORMAL
MCH RBC QN AUTO: 30 PG (ref 26–34)
MCHC RBC AUTO-ENTMCNC: 32.6 G/DL (ref 31–36)
MCV RBC AUTO: 91.9 FL (ref 80–100)
METHGB MFR BLDV: 0.3 %
NITRITE UR QL STRIP.AUTO: NEGATIVE
O2 CT VFR BLDV CALC: 14 VOL %
O2 THERAPY: ABNORMAL
PCO2 BLDV: 25.4 MMHG (ref 40–50)
PH BLDV: 7.42 [PH] (ref 7.35–7.45)
PH UR STRIP.AUTO: 6 [PH] (ref 5–8)
PHOSPHATE SERPL-MCNC: 3.4 MG/DL (ref 2.5–4.9)
PLATELET # BLD AUTO: 67 K/UL (ref 135–450)
PMV BLD AUTO: 9.9 FL (ref 5–10.5)
PO2 BLDV: 161.1 MMHG (ref 25–40)
POTASSIUM SERPL-SCNC: 4.4 MMOL/L (ref 3.5–5.1)
POTASSIUM SERPL-SCNC: 4.5 MMOL/L (ref 3.5–5.1)
PROT UR STRIP.AUTO-MCNC: 30 MG/DL
RBC # BLD AUTO: 3.36 M/UL (ref 4–5.2)
RBC #/AREA URNS HPF: ABNORMAL /HPF (ref 0–4)
SAO2 % BLDV: 99 %
SODIUM SERPL-SCNC: 138 MMOL/L (ref 136–145)
SODIUM SERPL-SCNC: 142 MMOL/L (ref 136–145)
SP GR UR STRIP.AUTO: <=1.005 (ref 1–1.03)
UA DIPSTICK W REFLEX MICRO PNL UR: YES
URN SPEC COLLECT METH UR: ABNORMAL
UROBILINOGEN UR STRIP-ACNC: 0.2 E.U./DL
WBC # BLD AUTO: 8.3 K/UL (ref 4–11)
WBC #/AREA URNS HPF: ABNORMAL /HPF (ref 0–5)

## 2023-06-25 PROCEDURE — 2700000000 HC OXYGEN THERAPY PER DAY

## 2023-06-25 PROCEDURE — 2580000003 HC RX 258: Performed by: INTERNAL MEDICINE

## 2023-06-25 PROCEDURE — 81001 URINALYSIS AUTO W/SCOPE: CPT

## 2023-06-25 PROCEDURE — 99233 SBSQ HOSP IP/OBS HIGH 50: CPT | Performed by: INTERNAL MEDICINE

## 2023-06-25 PROCEDURE — 51702 INSERT TEMP BLADDER CATH: CPT

## 2023-06-25 PROCEDURE — 6360000002 HC RX W HCPCS: Performed by: INTERNAL MEDICINE

## 2023-06-25 PROCEDURE — 6360000002 HC RX W HCPCS: Performed by: UROLOGY

## 2023-06-25 PROCEDURE — 85027 COMPLETE CBC AUTOMATED: CPT

## 2023-06-25 PROCEDURE — 2500000003 HC RX 250 WO HCPCS: Performed by: INTERNAL MEDICINE

## 2023-06-25 PROCEDURE — 6370000000 HC RX 637 (ALT 250 FOR IP): Performed by: INTERNAL MEDICINE

## 2023-06-25 PROCEDURE — 80069 RENAL FUNCTION PANEL: CPT

## 2023-06-25 PROCEDURE — 94761 N-INVAS EAR/PLS OXIMETRY MLT: CPT

## 2023-06-25 PROCEDURE — 36415 COLL VENOUS BLD VENIPUNCTURE: CPT

## 2023-06-25 PROCEDURE — 82803 BLOOD GASES ANY COMBINATION: CPT

## 2023-06-25 PROCEDURE — 6370000000 HC RX 637 (ALT 250 FOR IP): Performed by: UROLOGY

## 2023-06-25 PROCEDURE — 2580000003 HC RX 258: Performed by: UROLOGY

## 2023-06-25 PROCEDURE — 2000000000 HC ICU R&B

## 2023-06-25 PROCEDURE — 0T778DZ DILATION OF LEFT URETER WITH INTRALUMINAL DEVICE, VIA NATURAL OR ARTIFICIAL OPENING ENDOSCOPIC: ICD-10-PCS | Performed by: UROLOGY

## 2023-06-25 PROCEDURE — 83605 ASSAY OF LACTIC ACID: CPT

## 2023-06-25 RX ORDER — ENOXAPARIN SODIUM 100 MG/ML
30 INJECTION SUBCUTANEOUS DAILY
Status: DISCONTINUED | OUTPATIENT
Start: 2023-06-25 | End: 2023-06-27

## 2023-06-25 RX ORDER — MIDODRINE HYDROCHLORIDE 5 MG/1
5 TABLET ORAL
Status: DISCONTINUED | OUTPATIENT
Start: 2023-06-25 | End: 2023-06-26

## 2023-06-25 RX ADMIN — SODIUM CHLORIDE: 9 INJECTION, SOLUTION INTRAVENOUS at 00:17

## 2023-06-25 RX ADMIN — MORPHINE SULFATE 4 MG: 4 INJECTION, SOLUTION INTRAMUSCULAR; INTRAVENOUS at 17:40

## 2023-06-25 RX ADMIN — CEFEPIME 1000 MG: 1 INJECTION, POWDER, FOR SOLUTION INTRAMUSCULAR; INTRAVENOUS at 11:56

## 2023-06-25 RX ADMIN — SODIUM CHLORIDE: 9 INJECTION, SOLUTION INTRAVENOUS at 08:36

## 2023-06-25 RX ADMIN — SODIUM CHLORIDE, PRESERVATIVE FREE 10 ML: 5 INJECTION INTRAVENOUS at 21:16

## 2023-06-25 RX ADMIN — ONDANSETRON 4 MG: 2 INJECTION INTRAMUSCULAR; INTRAVENOUS at 22:56

## 2023-06-25 RX ADMIN — MORPHINE SULFATE 4 MG: 4 INJECTION, SOLUTION INTRAMUSCULAR; INTRAVENOUS at 22:56

## 2023-06-25 RX ADMIN — SODIUM BICARBONATE: 84 INJECTION, SOLUTION INTRAVENOUS at 13:04

## 2023-06-25 RX ADMIN — MORPHINE SULFATE 4 MG: 4 INJECTION, SOLUTION INTRAMUSCULAR; INTRAVENOUS at 08:43

## 2023-06-25 RX ADMIN — TAMSULOSIN HYDROCHLORIDE 0.4 MG: 0.4 CAPSULE ORAL at 21:16

## 2023-06-25 RX ADMIN — MIDODRINE HYDROCHLORIDE 5 MG: 5 TABLET ORAL at 17:40

## 2023-06-25 RX ADMIN — SODIUM CHLORIDE, PRESERVATIVE FREE 10 ML: 5 INJECTION INTRAVENOUS at 08:44

## 2023-06-25 RX ADMIN — Medication 10 ML: at 17:41

## 2023-06-25 RX ADMIN — MORPHINE SULFATE 4 MG: 4 INJECTION, SOLUTION INTRAMUSCULAR; INTRAVENOUS at 01:48

## 2023-06-25 RX ADMIN — ENOXAPARIN SODIUM 30 MG: 100 INJECTION SUBCUTANEOUS at 11:59

## 2023-06-25 RX ADMIN — MIDODRINE HYDROCHLORIDE 5 MG: 5 TABLET ORAL at 11:59

## 2023-06-25 RX ADMIN — FERROUS SULFATE TAB 325 MG (65 MG ELEMENTAL FE) 325 MG: 325 (65 FE) TAB at 11:59

## 2023-06-25 ASSESSMENT — PAIN SCALES - GENERAL
PAINLEVEL_OUTOF10: 2
PAINLEVEL_OUTOF10: 0
PAINLEVEL_OUTOF10: 2
PAINLEVEL_OUTOF10: 7
PAINLEVEL_OUTOF10: 7
PAINLEVEL_OUTOF10: 2
PAINLEVEL_OUTOF10: 8
PAINLEVEL_OUTOF10: 7

## 2023-06-25 ASSESSMENT — PAIN DESCRIPTION - ORIENTATION
ORIENTATION: LEFT

## 2023-06-25 ASSESSMENT — PAIN DESCRIPTION - ONSET
ONSET: ON-GOING
ONSET: ON-GOING

## 2023-06-25 ASSESSMENT — PAIN DESCRIPTION - LOCATION
LOCATION: BACK
LOCATION: FLANK
LOCATION: FLANK;BACK;CHEST
LOCATION: FLANK
LOCATION: BACK

## 2023-06-25 ASSESSMENT — PAIN DESCRIPTION - PAIN TYPE
TYPE: ACUTE PAIN
TYPE: ACUTE PAIN

## 2023-06-25 ASSESSMENT — PAIN DESCRIPTION - FREQUENCY
FREQUENCY: INTERMITTENT
FREQUENCY: INTERMITTENT

## 2023-06-25 ASSESSMENT — PAIN DESCRIPTION - DESCRIPTORS
DESCRIPTORS: ACHING

## 2023-06-26 ENCOUNTER — APPOINTMENT (OUTPATIENT)
Dept: GENERAL RADIOLOGY | Age: 58
DRG: 720 | End: 2023-06-26
Payer: COMMERCIAL

## 2023-06-26 PROBLEM — R65.21 SEPTIC SHOCK (HCC): Status: ACTIVE | Noted: 2023-06-26

## 2023-06-26 PROBLEM — A41.9 SEPTIC SHOCK (HCC): Status: ACTIVE | Noted: 2023-06-26

## 2023-06-26 LAB
ANION GAP SERPL CALCULATED.3IONS-SCNC: 9 MMOL/L (ref 3–16)
BASOPHILS # BLD: 0 K/UL (ref 0–0.2)
BASOPHILS NFR BLD: 0.2 %
BUN SERPL-MCNC: 42 MG/DL (ref 7–20)
CALCIUM SERPL-MCNC: 8.3 MG/DL (ref 8.3–10.6)
CHLORIDE SERPL-SCNC: 111 MMOL/L (ref 99–110)
CO2 SERPL-SCNC: 21 MMOL/L (ref 21–32)
CREAT SERPL-MCNC: 2.3 MG/DL (ref 0.6–1.1)
DEPRECATED RDW RBC AUTO: 15.2 % (ref 12.4–15.4)
EOSINOPHIL # BLD: 0.2 K/UL (ref 0–0.6)
EOSINOPHIL NFR BLD: 2.4 %
GFR SERPLBLD CREATININE-BSD FMLA CKD-EPI: 24 ML/MIN/{1.73_M2}
GLUCOSE SERPL-MCNC: 92 MG/DL (ref 70–99)
HCT VFR BLD AUTO: 30.8 % (ref 36–48)
HGB BLD-MCNC: 10 G/DL (ref 12–16)
LV EF: 65 %
LVEF MODALITY: NORMAL
LYMPHOCYTES # BLD: 1.1 K/UL (ref 1–5.1)
LYMPHOCYTES NFR BLD: 12.7 %
MCH RBC QN AUTO: 29.5 PG (ref 26–34)
MCHC RBC AUTO-ENTMCNC: 32.4 G/DL (ref 31–36)
MCV RBC AUTO: 91.2 FL (ref 80–100)
MONOCYTES # BLD: 0.6 K/UL (ref 0–1.3)
MONOCYTES NFR BLD: 6.8 %
NEUTROPHILS # BLD: 6.7 K/UL (ref 1.7–7.7)
NEUTROPHILS NFR BLD: 77.9 %
PLATELET # BLD AUTO: 72 K/UL (ref 135–450)
PMV BLD AUTO: 11.2 FL (ref 5–10.5)
POTASSIUM SERPL-SCNC: 3.9 MMOL/L (ref 3.5–5.1)
RBC # BLD AUTO: 3.38 M/UL (ref 4–5.2)
SODIUM SERPL-SCNC: 141 MMOL/L (ref 136–145)
T4 FREE SERPL-MCNC: 0.8 NG/DL (ref 0.9–1.8)
TSH SERPL DL<=0.005 MIU/L-ACNC: 6.44 UIU/ML (ref 0.27–4.2)
WBC # BLD AUTO: 8.6 K/UL (ref 4–11)

## 2023-06-26 PROCEDURE — 99255 IP/OBS CONSLTJ NEW/EST HI 80: CPT | Performed by: INTERNAL MEDICINE

## 2023-06-26 PROCEDURE — 99233 SBSQ HOSP IP/OBS HIGH 50: CPT | Performed by: INTERNAL MEDICINE

## 2023-06-26 PROCEDURE — 6370000000 HC RX 637 (ALT 250 FOR IP): Performed by: INTERNAL MEDICINE

## 2023-06-26 PROCEDURE — 6360000002 HC RX W HCPCS: Performed by: UROLOGY

## 2023-06-26 PROCEDURE — 2060000000 HC ICU INTERMEDIATE R&B

## 2023-06-26 PROCEDURE — 84443 ASSAY THYROID STIM HORMONE: CPT

## 2023-06-26 PROCEDURE — 80048 BASIC METABOLIC PNL TOTAL CA: CPT

## 2023-06-26 PROCEDURE — 2580000003 HC RX 258: Performed by: INTERNAL MEDICINE

## 2023-06-26 PROCEDURE — 71045 X-RAY EXAM CHEST 1 VIEW: CPT

## 2023-06-26 PROCEDURE — 2500000003 HC RX 250 WO HCPCS: Performed by: INTERNAL MEDICINE

## 2023-06-26 PROCEDURE — 6360000002 HC RX W HCPCS: Performed by: INTERNAL MEDICINE

## 2023-06-26 PROCEDURE — 94761 N-INVAS EAR/PLS OXIMETRY MLT: CPT

## 2023-06-26 PROCEDURE — 2700000000 HC OXYGEN THERAPY PER DAY

## 2023-06-26 PROCEDURE — 6370000000 HC RX 637 (ALT 250 FOR IP): Performed by: UROLOGY

## 2023-06-26 PROCEDURE — 93306 TTE W/DOPPLER COMPLETE: CPT

## 2023-06-26 PROCEDURE — 2580000003 HC RX 258: Performed by: UROLOGY

## 2023-06-26 PROCEDURE — 84439 ASSAY OF FREE THYROXINE: CPT

## 2023-06-26 PROCEDURE — 85025 COMPLETE CBC W/AUTO DIFF WBC: CPT

## 2023-06-26 PROCEDURE — 36415 COLL VENOUS BLD VENIPUNCTURE: CPT

## 2023-06-26 RX ORDER — METOPROLOL SUCCINATE 25 MG/1
25 TABLET, EXTENDED RELEASE ORAL DAILY
Status: DISCONTINUED | OUTPATIENT
Start: 2023-06-26 | End: 2023-06-27 | Stop reason: HOSPADM

## 2023-06-26 RX ORDER — OXYCODONE HYDROCHLORIDE AND ACETAMINOPHEN 5; 325 MG/1; MG/1
1 TABLET ORAL EVERY 6 HOURS PRN
Status: DISCONTINUED | OUTPATIENT
Start: 2023-06-26 | End: 2023-06-27 | Stop reason: HOSPADM

## 2023-06-26 RX ORDER — PROMETHAZINE HYDROCHLORIDE 25 MG/ML
6.25 INJECTION, SOLUTION INTRAMUSCULAR; INTRAVENOUS EVERY 6 HOURS PRN
Status: DISCONTINUED | OUTPATIENT
Start: 2023-06-26 | End: 2023-06-27 | Stop reason: HOSPADM

## 2023-06-26 RX ADMIN — ENOXAPARIN SODIUM 30 MG: 100 INJECTION SUBCUTANEOUS at 07:11

## 2023-06-26 RX ADMIN — PROMETHAZINE HYDROCHLORIDE 6.25 MG: 25 INJECTION INTRAMUSCULAR; INTRAVENOUS at 15:16

## 2023-06-26 RX ADMIN — OXYCODONE HYDROCHLORIDE AND ACETAMINOPHEN 1 TABLET: 5; 325 TABLET ORAL at 20:07

## 2023-06-26 RX ADMIN — CEFEPIME 1000 MG: 1 INJECTION, POWDER, FOR SOLUTION INTRAMUSCULAR; INTRAVENOUS at 11:01

## 2023-06-26 RX ADMIN — METHOCARBAMOL 500 MG: 100 INJECTION INTRAMUSCULAR; INTRAVENOUS at 15:13

## 2023-06-26 RX ADMIN — FERROUS SULFATE TAB 325 MG (65 MG ELEMENTAL FE) 325 MG: 325 (65 FE) TAB at 07:11

## 2023-06-26 RX ADMIN — SODIUM CHLORIDE, PRESERVATIVE FREE 10 ML: 5 INJECTION INTRAVENOUS at 20:08

## 2023-06-26 RX ADMIN — SODIUM BICARBONATE: 84 INJECTION, SOLUTION INTRAVENOUS at 01:58

## 2023-06-26 RX ADMIN — ONDANSETRON 4 MG: 2 INJECTION INTRAMUSCULAR; INTRAVENOUS at 10:44

## 2023-06-26 RX ADMIN — SODIUM CHLORIDE, PRESERVATIVE FREE 10 ML: 5 INJECTION INTRAVENOUS at 07:11

## 2023-06-26 RX ADMIN — MUPIROCIN: 20 OINTMENT TOPICAL at 10:59

## 2023-06-26 RX ADMIN — MORPHINE SULFATE 4 MG: 4 INJECTION, SOLUTION INTRAMUSCULAR; INTRAVENOUS at 07:21

## 2023-06-26 RX ADMIN — HYDROXYZINE HYDROCHLORIDE 25 MG: 25 TABLET ORAL at 20:07

## 2023-06-26 RX ADMIN — METOPROLOL SUCCINATE 25 MG: 25 TABLET, EXTENDED RELEASE ORAL at 12:57

## 2023-06-26 RX ADMIN — TAMSULOSIN HYDROCHLORIDE 0.4 MG: 0.4 CAPSULE ORAL at 20:07

## 2023-06-26 ASSESSMENT — PAIN SCALES - GENERAL
PAINLEVEL_OUTOF10: 7
PAINLEVEL_OUTOF10: 10
PAINLEVEL_OUTOF10: 3

## 2023-06-26 ASSESSMENT — PAIN DESCRIPTION - DESCRIPTORS: DESCRIPTORS: ACHING

## 2023-06-26 ASSESSMENT — PAIN - FUNCTIONAL ASSESSMENT: PAIN_FUNCTIONAL_ASSESSMENT: PREVENTS OR INTERFERES WITH MANY ACTIVE NOT PASSIVE ACTIVITIES

## 2023-06-26 ASSESSMENT — PAIN DESCRIPTION - LOCATION
LOCATION: FLANK;HIP
LOCATION: FLANK;HIP

## 2023-06-26 ASSESSMENT — PAIN DESCRIPTION - ORIENTATION
ORIENTATION: LEFT
ORIENTATION: LEFT

## 2023-06-26 ASSESSMENT — PAIN DESCRIPTION - FREQUENCY: FREQUENCY: INTERMITTENT

## 2023-06-26 ASSESSMENT — PAIN DESCRIPTION - ONSET: ONSET: ON-GOING

## 2023-06-26 ASSESSMENT — PAIN DESCRIPTION - PAIN TYPE: TYPE: ACUTE PAIN

## 2023-06-27 ENCOUNTER — TELEPHONE (OUTPATIENT)
Dept: CARDIOLOGY | Age: 58
End: 2023-06-27

## 2023-06-27 ENCOUNTER — APPOINTMENT (OUTPATIENT)
Dept: GENERAL RADIOLOGY | Age: 58
DRG: 720 | End: 2023-06-27
Payer: COMMERCIAL

## 2023-06-27 VITALS
TEMPERATURE: 97.9 F | HEART RATE: 77 BPM | HEIGHT: 67 IN | SYSTOLIC BLOOD PRESSURE: 158 MMHG | DIASTOLIC BLOOD PRESSURE: 86 MMHG | WEIGHT: 186.73 LBS | BODY MASS INDEX: 29.31 KG/M2 | RESPIRATION RATE: 18 BRPM | OXYGEN SATURATION: 91 %

## 2023-06-27 LAB
ANION GAP SERPL CALCULATED.3IONS-SCNC: 10 MMOL/L (ref 3–16)
BASOPHILS # BLD: 0 K/UL (ref 0–0.2)
BASOPHILS NFR BLD: 0.3 %
BUN SERPL-MCNC: 35 MG/DL (ref 7–20)
CALCIUM SERPL-MCNC: 8.2 MG/DL (ref 8.3–10.6)
CHLORIDE SERPL-SCNC: 108 MMOL/L (ref 99–110)
CO2 SERPL-SCNC: 24 MMOL/L (ref 21–32)
CREAT SERPL-MCNC: 1.9 MG/DL (ref 0.6–1.1)
DEPRECATED RDW RBC AUTO: 14.5 % (ref 12.4–15.4)
EOSINOPHIL # BLD: 0.2 K/UL (ref 0–0.6)
EOSINOPHIL NFR BLD: 2.1 %
GFR SERPLBLD CREATININE-BSD FMLA CKD-EPI: 30 ML/MIN/{1.73_M2}
GLUCOSE SERPL-MCNC: 84 MG/DL (ref 70–99)
HCT VFR BLD AUTO: 29.5 % (ref 36–48)
HGB BLD-MCNC: 10.2 G/DL (ref 12–16)
LYMPHOCYTES # BLD: 1.2 K/UL (ref 1–5.1)
LYMPHOCYTES NFR BLD: 14 %
MCH RBC QN AUTO: 30.8 PG (ref 26–34)
MCHC RBC AUTO-ENTMCNC: 34.5 G/DL (ref 31–36)
MCV RBC AUTO: 89.2 FL (ref 80–100)
MONOCYTES # BLD: 0.6 K/UL (ref 0–1.3)
MONOCYTES NFR BLD: 7.2 %
NEUTROPHILS # BLD: 6.3 K/UL (ref 1.7–7.7)
NEUTROPHILS NFR BLD: 76.4 %
PLATELET # BLD AUTO: 75 K/UL (ref 135–450)
PMV BLD AUTO: 10.9 FL (ref 5–10.5)
POTASSIUM SERPL-SCNC: 4 MMOL/L (ref 3.5–5.1)
RBC # BLD AUTO: 3.31 M/UL (ref 4–5.2)
SODIUM SERPL-SCNC: 142 MMOL/L (ref 136–145)
WBC # BLD AUTO: 8.3 K/UL (ref 4–11)

## 2023-06-27 PROCEDURE — 6370000000 HC RX 637 (ALT 250 FOR IP): Performed by: INTERNAL MEDICINE

## 2023-06-27 PROCEDURE — 2500000003 HC RX 250 WO HCPCS: Performed by: INTERNAL MEDICINE

## 2023-06-27 PROCEDURE — 97530 THERAPEUTIC ACTIVITIES: CPT

## 2023-06-27 PROCEDURE — 6360000002 HC RX W HCPCS: Performed by: INTERNAL MEDICINE

## 2023-06-27 PROCEDURE — 2580000003 HC RX 258: Performed by: INTERNAL MEDICINE

## 2023-06-27 PROCEDURE — 97535 SELF CARE MNGMENT TRAINING: CPT

## 2023-06-27 PROCEDURE — 97116 GAIT TRAINING THERAPY: CPT

## 2023-06-27 PROCEDURE — 80048 BASIC METABOLIC PNL TOTAL CA: CPT

## 2023-06-27 PROCEDURE — 97166 OT EVAL MOD COMPLEX 45 MIN: CPT

## 2023-06-27 PROCEDURE — 99232 SBSQ HOSP IP/OBS MODERATE 35: CPT | Performed by: INTERNAL MEDICINE

## 2023-06-27 PROCEDURE — 74018 RADEX ABDOMEN 1 VIEW: CPT

## 2023-06-27 PROCEDURE — 2500000003 HC RX 250 WO HCPCS: Performed by: STUDENT IN AN ORGANIZED HEALTH CARE EDUCATION/TRAINING PROGRAM

## 2023-06-27 PROCEDURE — 99239 HOSP IP/OBS DSCHRG MGMT >30: CPT | Performed by: INTERNAL MEDICINE

## 2023-06-27 PROCEDURE — 85025 COMPLETE CBC W/AUTO DIFF WBC: CPT

## 2023-06-27 PROCEDURE — 97162 PT EVAL MOD COMPLEX 30 MIN: CPT

## 2023-06-27 RX ORDER — TAMSULOSIN HYDROCHLORIDE 0.4 MG/1
0.4 CAPSULE ORAL NIGHTLY
Qty: 15 CAPSULE | Refills: 0 | Status: SHIPPED | OUTPATIENT
Start: 2023-06-27

## 2023-06-27 RX ORDER — ONDANSETRON 4 MG/1
4 TABLET, FILM COATED ORAL EVERY 8 HOURS PRN
Qty: 20 TABLET | Refills: 0 | Status: SHIPPED | OUTPATIENT
Start: 2023-06-27

## 2023-06-27 RX ORDER — OXYCODONE HYDROCHLORIDE AND ACETAMINOPHEN 5; 325 MG/1; MG/1
1 TABLET ORAL EVERY 6 HOURS PRN
Qty: 10 TABLET | Refills: 0 | Status: SHIPPED | OUTPATIENT
Start: 2023-06-27 | End: 2023-06-30

## 2023-06-27 RX ORDER — ENOXAPARIN SODIUM 100 MG/ML
40 INJECTION SUBCUTANEOUS DAILY
Status: DISCONTINUED | OUTPATIENT
Start: 2023-06-27 | End: 2023-06-27 | Stop reason: HOSPADM

## 2023-06-27 RX ORDER — METOPROLOL SUCCINATE 25 MG/1
25 TABLET, EXTENDED RELEASE ORAL DAILY
Qty: 30 TABLET | Refills: 3 | Status: SHIPPED | OUTPATIENT
Start: 2023-06-27

## 2023-06-27 RX ORDER — CIPROFLOXACIN 500 MG/1
500 TABLET, FILM COATED ORAL 2 TIMES DAILY
Qty: 14 TABLET | Refills: 0 | Status: SHIPPED | OUTPATIENT
Start: 2023-06-27 | End: 2023-07-04

## 2023-06-27 RX ADMIN — SERTRALINE HYDROCHLORIDE 25 MG: 50 TABLET ORAL at 11:02

## 2023-06-27 RX ADMIN — SODIUM BICARBONATE: 84 INJECTION, SOLUTION INTRAVENOUS at 03:43

## 2023-06-27 RX ADMIN — ONDANSETRON 4 MG: 2 INJECTION INTRAMUSCULAR; INTRAVENOUS at 09:06

## 2023-06-27 RX ADMIN — OXYCODONE HYDROCHLORIDE AND ACETAMINOPHEN 1 TABLET: 5; 325 TABLET ORAL at 09:06

## 2023-06-27 RX ADMIN — FERROUS SULFATE TAB 325 MG (65 MG ELEMENTAL FE) 325 MG: 325 (65 FE) TAB at 09:06

## 2023-06-27 RX ADMIN — HYDROMORPHONE HYDROCHLORIDE 0.5 MG: 1 INJECTION, SOLUTION INTRAMUSCULAR; INTRAVENOUS; SUBCUTANEOUS at 01:36

## 2023-06-27 RX ADMIN — CEFTRIAXONE SODIUM 1000 MG: 1 INJECTION, POWDER, FOR SOLUTION INTRAMUSCULAR; INTRAVENOUS at 11:02

## 2023-06-27 RX ADMIN — METOPROLOL SUCCINATE 25 MG: 25 TABLET, EXTENDED RELEASE ORAL at 09:06

## 2023-06-27 ASSESSMENT — PAIN DESCRIPTION - LOCATION
LOCATION: FLANK;HIP
LOCATION: ABDOMEN
LOCATION: FLANK

## 2023-06-27 ASSESSMENT — PAIN SCALES - GENERAL
PAINLEVEL_OUTOF10: 3
PAINLEVEL_OUTOF10: 7
PAINLEVEL_OUTOF10: 8
PAINLEVEL_OUTOF10: 5

## 2023-06-27 ASSESSMENT — PAIN DESCRIPTION - PAIN TYPE: TYPE: ACUTE PAIN

## 2023-06-27 ASSESSMENT — ENCOUNTER SYMPTOMS
RESPIRATORY NEGATIVE: 1
GASTROINTESTINAL NEGATIVE: 1

## 2023-06-27 ASSESSMENT — PAIN DESCRIPTION - ORIENTATION
ORIENTATION: LEFT
ORIENTATION: LEFT

## 2023-06-27 ASSESSMENT — PAIN DESCRIPTION - DESCRIPTORS: DESCRIPTORS: ACHING

## 2023-06-28 DIAGNOSIS — I48.0 PAF (PAROXYSMAL ATRIAL FIBRILLATION) (HCC): Primary | ICD-10-CM

## 2023-06-28 LAB
BACTERIA BLD CULT ORG #2: NORMAL
BACTERIA BLD CULT: NORMAL

## 2023-08-29 ENCOUNTER — ANESTHESIA EVENT (OUTPATIENT)
Dept: OPERATING ROOM | Age: 58
End: 2023-08-29
Payer: COMMERCIAL

## 2023-08-30 ENCOUNTER — APPOINTMENT (OUTPATIENT)
Dept: GENERAL RADIOLOGY | Age: 58
End: 2023-08-30
Payer: COMMERCIAL

## 2023-08-30 ENCOUNTER — HOSPITAL ENCOUNTER (INPATIENT)
Age: 58
LOS: 2 days | Discharge: HOME | End: 2023-09-01
Attending: STUDENT IN AN ORGANIZED HEALTH CARE EDUCATION/TRAINING PROGRAM | Admitting: INTERNAL MEDICINE
Payer: COMMERCIAL

## 2023-08-30 ENCOUNTER — ANESTHESIA (OUTPATIENT)
Dept: OPERATING ROOM | Age: 58
End: 2023-08-30
Payer: COMMERCIAL

## 2023-08-30 ENCOUNTER — APPOINTMENT (OUTPATIENT)
Dept: CT IMAGING | Age: 58
End: 2023-08-30
Payer: COMMERCIAL

## 2023-08-30 DIAGNOSIS — N20.1 LEFT URETERAL CALCULUS: ICD-10-CM

## 2023-08-30 DIAGNOSIS — N20.0 KIDNEY STONE: ICD-10-CM

## 2023-08-30 DIAGNOSIS — N30.01 ACUTE CYSTITIS WITH HEMATURIA: Primary | ICD-10-CM

## 2023-08-30 PROBLEM — T83.84XA PAIN DUE TO URETERAL STENT (HCC): Status: ACTIVE | Noted: 2023-08-30

## 2023-08-30 PROBLEM — F41.9 ANXIETY: Status: ACTIVE | Noted: 2023-08-30

## 2023-08-30 PROBLEM — F32.A DEPRESSION: Status: ACTIVE | Noted: 2023-08-30

## 2023-08-30 PROBLEM — Z90.5 H/O RIGHT NEPHRECTOMY: Status: ACTIVE | Noted: 2023-08-30

## 2023-08-30 PROBLEM — N39.0 UTI (URINARY TRACT INFECTION): Status: ACTIVE | Noted: 2023-08-30

## 2023-08-30 LAB
ALBUMIN SERPL-MCNC: 3.9 G/DL (ref 3.4–5)
ALBUMIN/GLOB SERPL: 1 {RATIO} (ref 1.1–2.2)
ALP SERPL-CCNC: 108 U/L (ref 40–129)
ALT SERPL-CCNC: 26 U/L (ref 10–40)
ANION GAP SERPL CALCULATED.3IONS-SCNC: 12 MMOL/L (ref 3–16)
AST SERPL-CCNC: 28 U/L (ref 15–37)
BASOPHILS # BLD: 0.1 K/UL (ref 0–0.2)
BASOPHILS NFR BLD: 0.9 %
BILIRUB SERPL-MCNC: 0.4 MG/DL (ref 0–1)
BILIRUB UR QL STRIP.AUTO: NEGATIVE
BUN SERPL-MCNC: 27 MG/DL (ref 7–20)
CALCIUM SERPL-MCNC: 9.8 MG/DL (ref 8.3–10.6)
CHARACTER UR: ABNORMAL
CHLORIDE SERPL-SCNC: 100 MMOL/L (ref 99–110)
CLARITY UR: ABNORMAL
CO2 SERPL-SCNC: 25 MMOL/L (ref 21–32)
COLOR UR: YELLOW
CREAT SERPL-MCNC: 1.8 MG/DL (ref 0.6–1.1)
DEPRECATED RDW RBC AUTO: 14.8 % (ref 12.4–15.4)
EOSINOPHIL # BLD: 0.2 K/UL (ref 0–0.6)
EOSINOPHIL NFR BLD: 2.8 %
GFR SERPLBLD CREATININE-BSD FMLA CKD-EPI: 32 ML/MIN/{1.73_M2}
GLUCOSE SERPL-MCNC: 120 MG/DL (ref 70–99)
GLUCOSE UR STRIP.AUTO-MCNC: NEGATIVE MG/DL
HCT VFR BLD AUTO: 40.9 % (ref 36–48)
HGB BLD-MCNC: 13.8 G/DL (ref 12–16)
HGB UR QL STRIP.AUTO: ABNORMAL
KETONES UR STRIP.AUTO-MCNC: NEGATIVE MG/DL
LEUKOCYTE ESTERASE UR QL STRIP.AUTO: ABNORMAL
LIPASE SERPL-CCNC: 45 U/L (ref 13–60)
LYMPHOCYTES # BLD: 2.4 K/UL (ref 1–5.1)
LYMPHOCYTES NFR BLD: 29.6 %
MCH RBC QN AUTO: 29.9 PG (ref 26–34)
MCHC RBC AUTO-ENTMCNC: 33.6 G/DL (ref 31–36)
MCV RBC AUTO: 89 FL (ref 80–100)
MONOCYTES # BLD: 1 K/UL (ref 0–1.3)
MONOCYTES NFR BLD: 12.2 %
MUCOUS THREADS #/AREA URNS LPF: ABNORMAL /LPF
NEUTROPHILS # BLD: 4.5 K/UL (ref 1.7–7.7)
NEUTROPHILS NFR BLD: 54.5 %
NITRITE UR QL STRIP.AUTO: POSITIVE
PH UR STRIP.AUTO: 7 [PH] (ref 5–8)
PLATELET # BLD AUTO: 185 K/UL (ref 135–450)
PMV BLD AUTO: 9.8 FL (ref 5–10.5)
POTASSIUM SERPL-SCNC: 4.8 MMOL/L (ref 3.5–5.1)
PROT SERPL-MCNC: 7.9 G/DL (ref 6.4–8.2)
PROT UR STRIP.AUTO-MCNC: 100 MG/DL
RBC # BLD AUTO: 4.6 M/UL (ref 4–5.2)
RBC #/AREA URNS HPF: >100 /HPF (ref 0–4)
SODIUM SERPL-SCNC: 137 MMOL/L (ref 136–145)
SP GR UR STRIP.AUTO: 1.02 (ref 1–1.03)
UA COMPLETE W REFLEX CULTURE PNL UR: YES
UA DIPSTICK W REFLEX MICRO PNL UR: YES
URN SPEC COLLECT METH UR: ABNORMAL
UROBILINOGEN UR STRIP-ACNC: 0.2 E.U./DL
WBC # BLD AUTO: 8.2 K/UL (ref 4–11)
WBC #/AREA URNS HPF: >100 /HPF (ref 0–5)

## 2023-08-30 PROCEDURE — BT1F1ZZ FLUOROSCOPY OF LEFT KIDNEY, URETER AND BLADDER USING LOW OSMOLAR CONTRAST: ICD-10-PCS | Performed by: UROLOGY

## 2023-08-30 PROCEDURE — 80053 COMPREHEN METABOLIC PANEL: CPT

## 2023-08-30 PROCEDURE — 74176 CT ABD & PELVIS W/O CONTRAST: CPT

## 2023-08-30 PROCEDURE — 6370000000 HC RX 637 (ALT 250 FOR IP): Performed by: FAMILY MEDICINE

## 2023-08-30 PROCEDURE — 2709999900 HC NON-CHARGEABLE SUPPLY: Performed by: UROLOGY

## 2023-08-30 PROCEDURE — C1769 GUIDE WIRE: HCPCS | Performed by: UROLOGY

## 2023-08-30 PROCEDURE — 6360000002 HC RX W HCPCS: Performed by: NURSE ANESTHETIST, CERTIFIED REGISTERED

## 2023-08-30 PROCEDURE — 2580000003 HC RX 258: Performed by: NURSE ANESTHETIST, CERTIFIED REGISTERED

## 2023-08-30 PROCEDURE — 87086 URINE CULTURE/COLONY COUNT: CPT

## 2023-08-30 PROCEDURE — 6370000000 HC RX 637 (ALT 250 FOR IP): Performed by: ANESTHESIOLOGY

## 2023-08-30 PROCEDURE — 2580000003 HC RX 258: Performed by: UROLOGY

## 2023-08-30 PROCEDURE — 3600000004 HC SURGERY LEVEL 4 BASE: Performed by: UROLOGY

## 2023-08-30 PROCEDURE — 83690 ASSAY OF LIPASE: CPT

## 2023-08-30 PROCEDURE — 1200000000 HC SEMI PRIVATE

## 2023-08-30 PROCEDURE — 7100000000 HC PACU RECOVERY - FIRST 15 MIN: Performed by: UROLOGY

## 2023-08-30 PROCEDURE — 87077 CULTURE AEROBIC IDENTIFY: CPT

## 2023-08-30 PROCEDURE — 96365 THER/PROPH/DIAG IV INF INIT: CPT

## 2023-08-30 PROCEDURE — 36415 COLL VENOUS BLD VENIPUNCTURE: CPT

## 2023-08-30 PROCEDURE — 99222 1ST HOSP IP/OBS MODERATE 55: CPT | Performed by: INTERNAL MEDICINE

## 2023-08-30 PROCEDURE — 6360000002 HC RX W HCPCS: Performed by: STUDENT IN AN ORGANIZED HEALTH CARE EDUCATION/TRAINING PROGRAM

## 2023-08-30 PROCEDURE — 0TC78ZZ EXTIRPATION OF MATTER FROM LEFT URETER, VIA NATURAL OR ARTIFICIAL OPENING ENDOSCOPIC: ICD-10-PCS | Performed by: UROLOGY

## 2023-08-30 PROCEDURE — 2720000010 HC SURG SUPPLY STERILE: Performed by: UROLOGY

## 2023-08-30 PROCEDURE — 0T778DZ DILATION OF LEFT URETER WITH INTRALUMINAL DEVICE, VIA NATURAL OR ARTIFICIAL OPENING ENDOSCOPIC: ICD-10-PCS | Performed by: UROLOGY

## 2023-08-30 PROCEDURE — C2617 STENT, NON-COR, TEM W/O DEL: HCPCS | Performed by: UROLOGY

## 2023-08-30 PROCEDURE — 87186 SC STD MICRODIL/AGAR DIL: CPT

## 2023-08-30 PROCEDURE — 2580000003 HC RX 258: Performed by: INTERNAL MEDICINE

## 2023-08-30 PROCEDURE — 85025 COMPLETE CBC W/AUTO DIFF WBC: CPT

## 2023-08-30 PROCEDURE — 96375 TX/PRO/DX INJ NEW DRUG ADDON: CPT

## 2023-08-30 PROCEDURE — 3600000014 HC SURGERY LEVEL 4 ADDTL 15MIN: Performed by: UROLOGY

## 2023-08-30 PROCEDURE — 87070 CULTURE OTHR SPECIMN AEROBIC: CPT

## 2023-08-30 PROCEDURE — C1758 CATHETER, URETERAL: HCPCS | Performed by: UROLOGY

## 2023-08-30 PROCEDURE — 87205 SMEAR GRAM STAIN: CPT

## 2023-08-30 PROCEDURE — 87075 CULTR BACTERIA EXCEPT BLOOD: CPT

## 2023-08-30 PROCEDURE — 6360000002 HC RX W HCPCS: Performed by: ANESTHESIOLOGY

## 2023-08-30 PROCEDURE — C1894 INTRO/SHEATH, NON-LASER: HCPCS | Performed by: UROLOGY

## 2023-08-30 PROCEDURE — 3700000000 HC ANESTHESIA ATTENDED CARE: Performed by: UROLOGY

## 2023-08-30 PROCEDURE — 6370000000 HC RX 637 (ALT 250 FOR IP): Performed by: INTERNAL MEDICINE

## 2023-08-30 PROCEDURE — 74420 UROGRAPHY RTRGR +-KUB: CPT

## 2023-08-30 PROCEDURE — 7100000001 HC PACU RECOVERY - ADDTL 15 MIN: Performed by: UROLOGY

## 2023-08-30 PROCEDURE — 2500000003 HC RX 250 WO HCPCS: Performed by: NURSE ANESTHETIST, CERTIFIED REGISTERED

## 2023-08-30 PROCEDURE — 2580000003 HC RX 258: Performed by: STUDENT IN AN ORGANIZED HEALTH CARE EDUCATION/TRAINING PROGRAM

## 2023-08-30 PROCEDURE — 99285 EMERGENCY DEPT VISIT HI MDM: CPT

## 2023-08-30 PROCEDURE — 6360000004 HC RX CONTRAST MEDICATION: Performed by: UROLOGY

## 2023-08-30 PROCEDURE — 6360000002 HC RX W HCPCS: Performed by: UROLOGY

## 2023-08-30 PROCEDURE — 3700000001 HC ADD 15 MINUTES (ANESTHESIA): Performed by: UROLOGY

## 2023-08-30 PROCEDURE — 81001 URINALYSIS AUTO W/SCOPE: CPT

## 2023-08-30 DEVICE — URETERAL STENT
Type: IMPLANTABLE DEVICE | Site: URETER | Status: FUNCTIONAL
Brand: CONTOUR™

## 2023-08-30 RX ORDER — OXYCODONE HYDROCHLORIDE AND ACETAMINOPHEN 5; 325 MG/1; MG/1
1 TABLET ORAL ONCE
Status: COMPLETED | OUTPATIENT
Start: 2023-08-30 | End: 2023-08-30

## 2023-08-30 RX ORDER — MAGNESIUM HYDROXIDE 1200 MG/15ML
LIQUID ORAL PRN
Status: DISCONTINUED | OUTPATIENT
Start: 2023-08-30 | End: 2023-08-30 | Stop reason: ALTCHOICE

## 2023-08-30 RX ORDER — SODIUM CHLORIDE 0.9 % (FLUSH) 0.9 %
5-40 SYRINGE (ML) INJECTION PRN
Status: DISCONTINUED | OUTPATIENT
Start: 2023-08-30 | End: 2023-08-30 | Stop reason: HOSPADM

## 2023-08-30 RX ORDER — PROPRANOLOL HYDROCHLORIDE 10 MG/1
40 TABLET ORAL 2 TIMES DAILY
Status: DISCONTINUED | OUTPATIENT
Start: 2023-08-30 | End: 2023-09-01 | Stop reason: HOSPADM

## 2023-08-30 RX ORDER — SODIUM CHLORIDE 0.9 % (FLUSH) 0.9 %
5-40 SYRINGE (ML) INJECTION EVERY 12 HOURS SCHEDULED
Status: DISCONTINUED | OUTPATIENT
Start: 2023-08-30 | End: 2023-08-30 | Stop reason: HOSPADM

## 2023-08-30 RX ORDER — CEPHALEXIN 500 MG/1
500 CAPSULE ORAL 4 TIMES DAILY
Qty: 20 CAPSULE | Refills: 0 | Status: SHIPPED | OUTPATIENT
Start: 2023-08-30 | End: 2023-08-30 | Stop reason: SDUPTHER

## 2023-08-30 RX ORDER — SODIUM CHLORIDE, SODIUM LACTATE, POTASSIUM CHLORIDE, CALCIUM CHLORIDE 600; 310; 30; 20 MG/100ML; MG/100ML; MG/100ML; MG/100ML
INJECTION, SOLUTION INTRAVENOUS CONTINUOUS
Status: DISCONTINUED | OUTPATIENT
Start: 2023-08-30 | End: 2023-08-30 | Stop reason: HOSPADM

## 2023-08-30 RX ORDER — LIDOCAINE HYDROCHLORIDE 10 MG/ML
1 INJECTION, SOLUTION EPIDURAL; INFILTRATION; INTRACAUDAL; PERINEURAL
Status: DISCONTINUED | OUTPATIENT
Start: 2023-08-30 | End: 2023-08-30 | Stop reason: HOSPADM

## 2023-08-30 RX ORDER — CEPHALEXIN 500 MG/1
500 CAPSULE ORAL 4 TIMES DAILY
Qty: 20 CAPSULE | Refills: 0 | Status: SHIPPED | OUTPATIENT
Start: 2023-08-30 | End: 2023-09-01 | Stop reason: HOSPADM

## 2023-08-30 RX ORDER — SODIUM CHLORIDE 9 MG/ML
INJECTION, SOLUTION INTRAVENOUS PRN
Status: DISCONTINUED | OUTPATIENT
Start: 2023-08-30 | End: 2023-08-30 | Stop reason: HOSPADM

## 2023-08-30 RX ORDER — POTASSIUM CHLORIDE 7.45 MG/ML
10 INJECTION INTRAVENOUS PRN
Status: DISCONTINUED | OUTPATIENT
Start: 2023-08-30 | End: 2023-09-01 | Stop reason: HOSPADM

## 2023-08-30 RX ORDER — DIPHENHYDRAMINE HYDROCHLORIDE 50 MG/ML
12.5 INJECTION INTRAMUSCULAR; INTRAVENOUS
Status: DISCONTINUED | OUTPATIENT
Start: 2023-08-30 | End: 2023-08-30 | Stop reason: HOSPADM

## 2023-08-30 RX ORDER — ACETAMINOPHEN 325 MG/1
650 TABLET ORAL EVERY 6 HOURS PRN
Status: DISCONTINUED | OUTPATIENT
Start: 2023-08-30 | End: 2023-09-01 | Stop reason: HOSPADM

## 2023-08-30 RX ORDER — SODIUM CHLORIDE 0.9 % (FLUSH) 0.9 %
5-40 SYRINGE (ML) INJECTION PRN
Status: DISCONTINUED | OUTPATIENT
Start: 2023-08-30 | End: 2023-09-01 | Stop reason: HOSPADM

## 2023-08-30 RX ORDER — OXYCODONE HYDROCHLORIDE AND ACETAMINOPHEN 5; 325 MG/1; MG/1
1 TABLET ORAL EVERY 6 HOURS PRN
Qty: 12 TABLET | Refills: 0 | Status: SHIPPED | OUTPATIENT
Start: 2023-08-30 | End: 2023-09-02

## 2023-08-30 RX ORDER — GLYCOPYRROLATE 0.2 MG/ML
INJECTION INTRAMUSCULAR; INTRAVENOUS PRN
Status: DISCONTINUED | OUTPATIENT
Start: 2023-08-30 | End: 2023-08-30 | Stop reason: SDUPTHER

## 2023-08-30 RX ORDER — SODIUM CHLORIDE 9 MG/ML
INJECTION, SOLUTION INTRAVENOUS PRN
Status: DISCONTINUED | OUTPATIENT
Start: 2023-08-30 | End: 2023-09-01 | Stop reason: HOSPADM

## 2023-08-30 RX ORDER — ONDANSETRON 2 MG/ML
4 INJECTION INTRAMUSCULAR; INTRAVENOUS ONCE
Status: COMPLETED | OUTPATIENT
Start: 2023-08-30 | End: 2023-08-30

## 2023-08-30 RX ORDER — METOPROLOL SUCCINATE 25 MG/1
25 TABLET, EXTENDED RELEASE ORAL DAILY
Status: DISCONTINUED | OUTPATIENT
Start: 2023-08-30 | End: 2023-08-30 | Stop reason: SDUPTHER

## 2023-08-30 RX ORDER — TAMSULOSIN HYDROCHLORIDE 0.4 MG/1
0.4 CAPSULE ORAL NIGHTLY
Status: DISCONTINUED | OUTPATIENT
Start: 2023-08-30 | End: 2023-09-01 | Stop reason: HOSPADM

## 2023-08-30 RX ORDER — SODIUM CHLORIDE, SODIUM LACTATE, POTASSIUM CHLORIDE, CALCIUM CHLORIDE 600; 310; 30; 20 MG/100ML; MG/100ML; MG/100ML; MG/100ML
INJECTION, SOLUTION INTRAVENOUS CONTINUOUS PRN
Status: DISCONTINUED | OUTPATIENT
Start: 2023-08-30 | End: 2023-08-30 | Stop reason: SDUPTHER

## 2023-08-30 RX ORDER — POLYETHYLENE GLYCOL 3350 17 G/17G
17 POWDER, FOR SOLUTION ORAL DAILY PRN
Status: DISCONTINUED | OUTPATIENT
Start: 2023-08-30 | End: 2023-09-01 | Stop reason: HOSPADM

## 2023-08-30 RX ORDER — METOPROLOL SUCCINATE 25 MG/1
25 TABLET, EXTENDED RELEASE ORAL DAILY
Status: DISCONTINUED | OUTPATIENT
Start: 2023-08-30 | End: 2023-09-01 | Stop reason: HOSPADM

## 2023-08-30 RX ORDER — ONDANSETRON 2 MG/ML
4 INJECTION INTRAMUSCULAR; INTRAVENOUS EVERY 6 HOURS PRN
Status: DISCONTINUED | OUTPATIENT
Start: 2023-08-30 | End: 2023-09-01 | Stop reason: HOSPADM

## 2023-08-30 RX ORDER — MAGNESIUM SULFATE IN WATER 40 MG/ML
2000 INJECTION, SOLUTION INTRAVENOUS PRN
Status: DISCONTINUED | OUTPATIENT
Start: 2023-08-30 | End: 2023-09-01 | Stop reason: HOSPADM

## 2023-08-30 RX ORDER — OXYCODONE HYDROCHLORIDE AND ACETAMINOPHEN 5; 325 MG/1; MG/1
1 TABLET ORAL EVERY 6 HOURS PRN
Qty: 12 TABLET | Refills: 0 | Status: SHIPPED | OUTPATIENT
Start: 2023-08-30 | End: 2023-08-30 | Stop reason: SDUPTHER

## 2023-08-30 RX ORDER — SODIUM CHLORIDE 0.9 % (FLUSH) 0.9 %
5-40 SYRINGE (ML) INJECTION EVERY 12 HOURS SCHEDULED
Status: DISCONTINUED | OUTPATIENT
Start: 2023-08-30 | End: 2023-09-01 | Stop reason: HOSPADM

## 2023-08-30 RX ORDER — ACETAMINOPHEN 650 MG/1
650 SUPPOSITORY RECTAL EVERY 6 HOURS PRN
Status: DISCONTINUED | OUTPATIENT
Start: 2023-08-30 | End: 2023-09-01 | Stop reason: HOSPADM

## 2023-08-30 RX ORDER — PROPOFOL 10 MG/ML
INJECTION, EMULSION INTRAVENOUS PRN
Status: DISCONTINUED | OUTPATIENT
Start: 2023-08-30 | End: 2023-08-30 | Stop reason: SDUPTHER

## 2023-08-30 RX ORDER — FLUCONAZOLE 2 MG/ML
200 INJECTION, SOLUTION INTRAVENOUS EVERY 24 HOURS
Status: DISCONTINUED | OUTPATIENT
Start: 2023-08-30 | End: 2023-09-01 | Stop reason: HOSPADM

## 2023-08-30 RX ORDER — ONDANSETRON 4 MG/1
4 TABLET, ORALLY DISINTEGRATING ORAL EVERY 8 HOURS PRN
Status: DISCONTINUED | OUTPATIENT
Start: 2023-08-30 | End: 2023-09-01 | Stop reason: HOSPADM

## 2023-08-30 RX ORDER — FENTANYL CITRATE 50 UG/ML
INJECTION, SOLUTION INTRAMUSCULAR; INTRAVENOUS PRN
Status: DISCONTINUED | OUTPATIENT
Start: 2023-08-30 | End: 2023-08-30 | Stop reason: SDUPTHER

## 2023-08-30 RX ORDER — ONDANSETRON 2 MG/ML
4 INJECTION INTRAMUSCULAR; INTRAVENOUS
Status: COMPLETED | OUTPATIENT
Start: 2023-08-30 | End: 2023-08-30

## 2023-08-30 RX ORDER — OXYCODONE HYDROCHLORIDE 5 MG/1
10 TABLET ORAL PRN
Status: COMPLETED | OUTPATIENT
Start: 2023-08-30 | End: 2023-08-30

## 2023-08-30 RX ORDER — SODIUM CHLORIDE, SODIUM LACTATE, POTASSIUM CHLORIDE, CALCIUM CHLORIDE 600; 310; 30; 20 MG/100ML; MG/100ML; MG/100ML; MG/100ML
INJECTION, SOLUTION INTRAVENOUS CONTINUOUS
Status: DISCONTINUED | OUTPATIENT
Start: 2023-08-30 | End: 2023-09-01 | Stop reason: HOSPADM

## 2023-08-30 RX ORDER — KETOROLAC TROMETHAMINE 30 MG/ML
15 INJECTION, SOLUTION INTRAMUSCULAR; INTRAVENOUS ONCE
Status: COMPLETED | OUTPATIENT
Start: 2023-08-30 | End: 2023-08-30

## 2023-08-30 RX ORDER — ONDANSETRON 2 MG/ML
INJECTION INTRAMUSCULAR; INTRAVENOUS PRN
Status: DISCONTINUED | OUTPATIENT
Start: 2023-08-30 | End: 2023-08-30 | Stop reason: SDUPTHER

## 2023-08-30 RX ORDER — POTASSIUM CHLORIDE 20 MEQ/1
40 TABLET, EXTENDED RELEASE ORAL PRN
Status: DISCONTINUED | OUTPATIENT
Start: 2023-08-30 | End: 2023-09-01 | Stop reason: HOSPADM

## 2023-08-30 RX ORDER — LIDOCAINE HYDROCHLORIDE 20 MG/ML
INJECTION, SOLUTION INFILTRATION; PERINEURAL PRN
Status: DISCONTINUED | OUTPATIENT
Start: 2023-08-30 | End: 2023-08-30 | Stop reason: SDUPTHER

## 2023-08-30 RX ORDER — LABETALOL HYDROCHLORIDE 5 MG/ML
10 INJECTION, SOLUTION INTRAVENOUS
Status: DISCONTINUED | OUTPATIENT
Start: 2023-08-30 | End: 2023-08-30 | Stop reason: HOSPADM

## 2023-08-30 RX ORDER — OXYCODONE HYDROCHLORIDE 5 MG/1
5 TABLET ORAL PRN
Status: COMPLETED | OUTPATIENT
Start: 2023-08-30 | End: 2023-08-30

## 2023-08-30 RX ADMIN — PHENYLEPHRINE HYDROCHLORIDE 200 MCG: 10 INJECTION INTRAVENOUS at 14:26

## 2023-08-30 RX ADMIN — GLYCOPYRROLATE 0.4 MG: 0.2 INJECTION INTRAMUSCULAR; INTRAVENOUS at 13:56

## 2023-08-30 RX ADMIN — ONDANSETRON 4 MG: 2 INJECTION INTRAMUSCULAR; INTRAVENOUS at 15:56

## 2023-08-30 RX ADMIN — FENTANYL CITRATE 100 MCG: 50 INJECTION INTRAMUSCULAR; INTRAVENOUS at 13:42

## 2023-08-30 RX ADMIN — PHENYLEPHRINE HYDROCHLORIDE 200 MCG: 10 INJECTION INTRAVENOUS at 13:55

## 2023-08-30 RX ADMIN — PROPOFOL 200 MG: 10 INJECTION, EMULSION INTRAVENOUS at 13:42

## 2023-08-30 RX ADMIN — PHENYLEPHRINE HYDROCHLORIDE 200 MCG: 10 INJECTION INTRAVENOUS at 14:08

## 2023-08-30 RX ADMIN — ACETAMINOPHEN 650 MG: 325 TABLET ORAL at 10:39

## 2023-08-30 RX ADMIN — VANCOMYCIN HYDROCHLORIDE 1000 MG: 1 INJECTION, POWDER, LYOPHILIZED, FOR SOLUTION INTRAVENOUS at 17:20

## 2023-08-30 RX ADMIN — METOPROLOL SUCCINATE 25 MG: 25 TABLET, EXTENDED RELEASE ORAL at 22:14

## 2023-08-30 RX ADMIN — LIDOCAINE HYDROCHLORIDE 100 MG: 20 INJECTION, SOLUTION INFILTRATION; PERINEURAL at 13:42

## 2023-08-30 RX ADMIN — FLUCONAZOLE 200 MG: 2 INJECTION, SOLUTION INTRAVENOUS at 22:27

## 2023-08-30 RX ADMIN — PHENYLEPHRINE HYDROCHLORIDE 200 MCG: 10 INJECTION INTRAVENOUS at 14:34

## 2023-08-30 RX ADMIN — CEFTRIAXONE SODIUM 1000 MG: 1 INJECTION, POWDER, FOR SOLUTION INTRAMUSCULAR; INTRAVENOUS at 06:10

## 2023-08-30 RX ADMIN — ONDANSETRON HYDROCHLORIDE 4 MG: 2 INJECTION, SOLUTION INTRAMUSCULAR; INTRAVENOUS at 03:14

## 2023-08-30 RX ADMIN — OXYCODONE HYDROCHLORIDE 10 MG: 5 TABLET ORAL at 15:52

## 2023-08-30 RX ADMIN — SODIUM CHLORIDE, POTASSIUM CHLORIDE, SODIUM LACTATE AND CALCIUM CHLORIDE: 600; 310; 30; 20 INJECTION, SOLUTION INTRAVENOUS at 22:29

## 2023-08-30 RX ADMIN — TAMSULOSIN HYDROCHLORIDE 0.4 MG: 0.4 CAPSULE ORAL at 22:15

## 2023-08-30 RX ADMIN — KETOROLAC TROMETHAMINE 15 MG: 30 INJECTION, SOLUTION INTRAMUSCULAR at 03:09

## 2023-08-30 RX ADMIN — PROPRANOLOL HYDROCHLORIDE 40 MG: 10 TABLET ORAL at 22:14

## 2023-08-30 RX ADMIN — PHENYLEPHRINE HYDROCHLORIDE 200 MCG: 10 INJECTION INTRAVENOUS at 14:14

## 2023-08-30 RX ADMIN — FENTANYL CITRATE 50 MCG: 50 INJECTION INTRAMUSCULAR; INTRAVENOUS at 14:54

## 2023-08-30 RX ADMIN — OXYCODONE HYDROCHLORIDE AND ACETAMINOPHEN 1 TABLET: 5; 325 TABLET ORAL at 22:15

## 2023-08-30 RX ADMIN — SODIUM CHLORIDE, POTASSIUM CHLORIDE, SODIUM LACTATE AND CALCIUM CHLORIDE: 600; 310; 30; 20 INJECTION, SOLUTION INTRAVENOUS at 17:12

## 2023-08-30 RX ADMIN — PHENYLEPHRINE HYDROCHLORIDE 200 MCG: 10 INJECTION INTRAVENOUS at 14:03

## 2023-08-30 RX ADMIN — FENTANYL CITRATE 50 MCG: 50 INJECTION INTRAMUSCULAR; INTRAVENOUS at 14:03

## 2023-08-30 RX ADMIN — SODIUM CHLORIDE, POTASSIUM CHLORIDE, SODIUM LACTATE AND CALCIUM CHLORIDE: 600; 310; 30; 20 INJECTION, SOLUTION INTRAVENOUS at 13:38

## 2023-08-30 RX ADMIN — PHENYLEPHRINE HYDROCHLORIDE 200 MCG: 10 INJECTION INTRAVENOUS at 14:19

## 2023-08-30 RX ADMIN — ONDANSETRON HYDROCHLORIDE 4 MG: 2 INJECTION, SOLUTION INTRAMUSCULAR; INTRAVENOUS at 13:42

## 2023-08-30 RX ADMIN — PHENYLEPHRINE HYDROCHLORIDE 200 MCG: 10 INJECTION INTRAVENOUS at 14:41

## 2023-08-30 ASSESSMENT — LIFESTYLE VARIABLES: SMOKING_STATUS: 1

## 2023-08-30 ASSESSMENT — PAIN DESCRIPTION - LOCATION
LOCATION: ABDOMEN
LOCATION: OTHER (COMMENT)
LOCATION: ABDOMEN
LOCATION: ABDOMEN;FLANK
LOCATION: OTHER (COMMENT)

## 2023-08-30 ASSESSMENT — PAIN SCALES - GENERAL
PAINLEVEL_OUTOF10: 2
PAINLEVEL_OUTOF10: 7
PAINLEVEL_OUTOF10: 4
PAINLEVEL_OUTOF10: 6

## 2023-08-30 ASSESSMENT — PAIN DESCRIPTION - ORIENTATION
ORIENTATION: LEFT
ORIENTATION: LOWER
ORIENTATION: LEFT

## 2023-08-30 ASSESSMENT — PAIN DESCRIPTION - DESCRIPTORS
DESCRIPTORS: DISCOMFORT
DESCRIPTORS: ACHING;DISCOMFORT;PRESSURE
DESCRIPTORS: ACHING
DESCRIPTORS: ACHING;DISCOMFORT

## 2023-08-30 ASSESSMENT — PAIN - FUNCTIONAL ASSESSMENT
PAIN_FUNCTIONAL_ASSESSMENT: 0-10
PAIN_FUNCTIONAL_ASSESSMENT: ACTIVITIES ARE NOT PREVENTED

## 2023-08-30 NOTE — PROGRESS NOTES
Called over to surgery and spoke with [facility]. No further questions or concerns. Pt is physically in [facility]. Also called and spoke with Juli on med/surg to give report. Again no further questions or concerns. Ly Oseguera

## 2023-08-30 NOTE — PROGRESS NOTES
Pt arrived to PACU from OR in stable condition. Report received from [facility] and [facility]. Phase I. Care of pt transferred at this time. Pt immediately placed on bedside cardiac monitor with cont pulse ox and BP. Pt arrived to unit without any oxygen requirements. SPO2 98% on RA.

## 2023-08-30 NOTE — PROGRESS NOTES
[facility]  1. Patient/Caregiver identifies - states name and date of birth. 2.  The patient is free from signs and symptoms of injury. 3.  The patient receives appropriate medication(s), safely administered during the Perioperative period. 4.  The patient is free from signs and symptoms of infection. 5.  The patient has wound / tissue perfusion. 6.  The patients's fluid, electrolyte, and acid-base balances are established preoperatively. 7.  The patient's pulmonary function is established preoperatively. 8.  The patient's cardiovascular status is established preoperatively. 9.  The patient / caregiver demonstrates knowledge of nutritional management related to the operative or other invasive procedure. 10. The patient/caregiver demonstrates knowledge of medication management. 11. The patient/caregiver demonstrates knowledge of pain management. 12.  The patient participates in the rehabilitation process as applicable. 13.  The patient/caregiver participates in decisions affection his or her Perioperative plan of care. 14.  The patient's care is consistent with the individualized Perioperative plan of care. 15.  The patient's right to privacy is maintained. 16.  The patient is the recipient of competent and ethical care within legal standards of practice. 17.  The patient's value system, lifestyle, ethnicity, and culture are considered, respected, and incorporated in the Perioperative plan of care and understands special services available. 18.  The patient demonstrates and/or reports adequate pain control throughout the the Perioperative period. 19. The patient's neurological status is established preoperatively. 20. The patient/caregiver demonstrates knowledge of the expected responses to the operative or invasive procedure. 21.  Patient/Caregiver has reduced anxiety. Interventions- Familiarize with environment and equipment.   22. Patient/Caregiver verbalizes No/soft snoring s/p adenoidectomy No

## 2023-08-30 NOTE — PROGRESS NOTES
PEDIATRIC NEUROLOGY     Name: Donna Abdi  PCP:  No Pcp  ADMISSION DATE:  2021  ATTENDING PHYSICIAN:  Adela Ulloa MD  DATE:  2021      Interval History  • 21    Multiple spells reported with increased FiO2  • Stable from a neurological perspective.       Past Medical History  Patient Active Problem List   Diagnosis   •   infant of 28 completed weeks of gestation   • Twin delivery by    • Respiratory distress of    • At risk for apnea   • Anemia   • Hypernatremia   • Thrombocytopenia (CMS/HCC)   • RDS of    • Hypotension due to hypovolemia   • Elevated serum creatinine   • Metabolic acidosis   • Inguinal hernia       Family History  See EMR    Social History  See EMR    Medications  Current Facility-Administered Medications   Medication Dose Route Frequency Provider Last Rate Last Admin   • chlorothiazide (DIURIL) 50 mg/mL oral suspension 21 mg  10 mg/kg (Dosing Weight) Oral Q12H Denisse Villalta CNP       • sodium chloride 4 mEq/mL oral solution 3.8 mEq  2 mEq/kg (Dosing Weight) Oral 4 times per day Lisa Wilburn MD   3.8 mEq at 21 0746   • potassium CHLORIDE lola/ped oral liquid 1.8667 mEq  1 mEq/kg (Dosing Weight) Per NG tube 3 times per day Lisa Wilburn MD   1.8667 mEq at 21 0746   • cyclopentolate (CYCLOGYL) 1 % ophthalmic solution 1 drop  1 drop Both Eyes On Call to Procedure Denisse Villalta CNP   1 drop at 10/21/21 0904   • pediatric multivitamin with iron (POLY-VI-SOL WITH IRON) oral solution 0.5 mL  0.5 mL Oral Q24H Kei Dunia, DO   0.5 mL at 21 0806   • cholecalciferol (VITAMIN D) 5 mcg (200 units)/0.5 mL oral liquid 5 mcg  5 mcg Oral Daily Kei Dunia, DO   5 mcg at 21 0806   • budesonide (PULMICORT) nebulizer suspension 0.25 mg  0.25 mg Nebulization BID Resp Kei Dunia, DO   0.25 mg at 21 0813       Allergies  ALLERGIES:  No Known Allergies      Review of Systems     I have reviewed the  Bps 189/101 and 163/106, notified Dr. Santiago Jackson, no new orders. information as listed in the medical record, medications, allergies, past medical, surgical, social and family histories were reviewed and updated as appropriate.    Vital signs reviewed  Visit Vitals  BP (!) 92/47 (BP Location: RLE - Right lower extremity)   Pulse 159   Temp 98.1 °F (36.7 °C) (Axillary)   Resp 49   Ht 16.73\" (42.5 cm)   Wt (!) 2130 g   HC 30.5 cm (12.01\")   SpO2 100%   BMI 11.79 kg/m²       Physical Exam  HC Readings from Last 3 Encounters:   10/31/21 30.5 cm (12.01\") (29 %, Z= -0.57)*     * Growth percentiles are based on Nitesh (Boys, 22-50 Weeks) data.      Neurological Exam  Mental Status: Awake responds to painful stim  Cranial Nerve: no nystagmus, no obvious facial asymmetr  Motor/Sensory: Withdrawal of both extremities upper and lower extremities spontaneously and in response to touch and tickle  Reflexes: Intact upper and lower extremities        Impression  Donna is a pleasant 6 week old male 28-week premature infant twin a with a history of multiple medical issues.  Neurology consulted for paroxysmal movements concerning for possible seizure-like activity in the setting apneas, bradycardias and desaturations.     Recommendations  · EEG (10/2) :disorganization of the background activity consistent with extreme prematurity.  However convincing epileptiform activity or ongoing electroencephalographic seizure activity was not evident  · Suggest monitoring clinically for seizures   · If clinical seizure activity is noted, we will use phenobarbital loading dose 20 mg/KG/dose intravenously and then consider maintenance phenobarbital.   · In the interim however, since convincing clinical seizures or electroencephalographic evidence of seizures is not evident, it may be prudent to monitor for evolution of symptoms.  · MRI of brain when stable from a  standpoint - in light of the extreme prematurity, and the high risk for neurological issues.  As previously discussed by neurology with the  family on 2021  · Continue to monitor clinically for seizures.      I have discussed the nature of the difficulties, course, prognosis, plan, recommendations and outcome extensively with the clinical team.    We will follow the child clinically.    Thanks for the consultation.

## 2023-08-30 NOTE — PROGRESS NOTES
Assisted pt to get up and ambulate to the restroom. Pt with complaints of pain in her LLQ, left flank pain. Pt states pain feels like pressure. Rates pain 7/10. Upon returning to bed pt also with complaints of feeling nauseous.

## 2023-08-30 NOTE — PLAN OF CARE
D/W ER MD   D/W urology Dr. Ralf Li      Pt non compliant, single kidney , due for stent exchange . Here with UTI     NPO, IVF, IV abx.   Plan stent exchange

## 2023-08-30 NOTE — CONSULTS
Patient:  Sade Harding  YOB: 1965   CSN:  [id number]    Referring Provider:  No ref. provider found   Primary Care Provider:  Shine Armstrong MD       Urology Attending Consult Note     Reason for Consultation:  nephrolithiasis, flank pain    Chief Complaint: \"my side hurt, I have a stent\"  HPI:  Bradley Kirk is a 63 y.o. female who presented with history of severe renal colic which prompted CT - it showed a renal stones in solitary kidney. Complex  hist of PCNL, perinephric bleed. Now with stent in the kidney for obstruction and request stone /stent pull.          Past Medical History:   Diagnosis Date    Anxiety     Depression     Headache(784.0)     Hepatitis C 08/21/2020    Hypertension        Past Surgical History:   Procedure Laterality Date    CYSTOSCOPY Left 1/11/2021    CYSTOSCOPY, DIAGNOSTIC LEFT URETEROSCOPY WITH HOLMIUM LASER LITHOTRIPSY, LEFT STENT EXCHANGE performed by Kalpesh Seymour MD at [address] [place] [address] Left 8/20/2021    CYSTOSCOPY, LEFT RETROGRADE,  LEFT STENT REMOVAL performed by Gricelda Phipps MD at [address] [place] [address] Left 6/24/2023    CYSTOSCOPY URETERAL STENT INSERTION performed by Kalpesh Seymour MD at [address] [place] [address] / [facility] / Kobi Mora Left 8/20/2020    CYSTOSCOPY URETERAL STENT INSERTION performed by Kalpesh Seymour MD at [address] [place] [address] / [facility] / [place]a Left 1/27/2021    CYSTOSCOPY, LEFT STENT REMOVAL performed by Kalpesh Seymour MD at [address] [place] [address]  4/24/2021    IR EMBOLIZATION HEMORRHAGE 4/24/2021 [address]    IR NEPHROSTOMY PERCUTANEOUS LEFT  4/23/2021    IR NEPHROSTOMY PERCUTANEOUS LEFT 4/23/2021 [facility] SPECIAL PROCEDURES    IR NONTUNNELED VASCULAR CATHETER  9/27/2021    IR NONTUNNELED VASCULAR CATHETER 9/27/2021 [facility] SPECIAL PROCEDURES    KIDNEY STONE REMOVAL Left 7/6/2021    LEFT PERCUTANEOUS NEPHROLITHOTOMY,  LEFT STENT PLACEMENT performed by Jamil Heredia

## 2023-08-30 NOTE — ED PROVIDER NOTES
Emergency Department Encounter    Patient: Micheline Luna  MRN: [id number]  : 1965  Date of Evaluation: 2023  ED Provider:  Kurtis Garcia MD    Triage Chief Complaint:   Other (Pt had a urethral stent placed on the Left on . Has not followed up with urology. Has an appt at 12pm today for stent removal. States that the pain has increased and she cannot tolerate it any more. Also states that she would not have had a ride to this appt. )    [place]:  Micheline Luna is a 63 y.o. female presenting with abdominal pain and flank pain. Patient states she had a stent placed on . Patient states since that time she has had increased pain. States she is supposed to have stent removal today at 12 PM.  Patient denies fevers or chills. Denies significant urinary symptoms bowel or bladder changes nausea vomiting denies any chest pain, shortness of breath cough or sputum production. Denies recent falls or trauma.     ROS - see HPI, below listed is current ROS at time of my eval:  At least 14 systems reviewed, negative other than HPI    Past Medical History:   Diagnosis Date    Anxiety     Depression     Headache(784.0)     Hepatitis C 2020    Hypertension      Past Surgical History:   Procedure Laterality Date    CYSTOSCOPY Left 2021    CYSTOSCOPY, DIAGNOSTIC LEFT URETEROSCOPY WITH HOLMIUM LASER LITHOTRIPSY, LEFT STENT EXCHANGE performed by Vashti Sandoval MD at [facility] [address] Left 2021    CYSTOSCOPY, LEFT RETROGRADE,  LEFT STENT REMOVAL performed by Gunnar Orozco MD at [address] [place] [address] Dr Left 2023    CYSTOSCOPY URETERAL STENT INSERTION performed by Vashti Sandoval MD at [address] [place] [address] / Maximiano Faizanes / [place] Left 2020    CYSTOSCOPY URETERAL STENT INSERTION performed by Vashti Sandoval MD at [address] [place] [address] / [facility] [place] Left 2021    CYSTOSCOPY, LEFT STENT REMOVAL performed by Jefferson Lala Mookie Roberts MD at [facility]  4/24/2021    IR EMBOLIZATION HEMORRHAGE 4/24/2021 [facility] SPECIAL PROCEDURES    IR NEPHROSTOMY PERCUTANEOUS LEFT  4/23/2021    IR NEPHROSTOMY PERCUTANEOUS LEFT 4/23/2021 [facility] SPECIAL PROCEDURES    IR NONTUNNELED VASCULAR CATHETER  9/27/2021    IR NONTUNNELED VASCULAR CATHETER 9/27/2021 [facility] SPECIAL PROCEDURES    KIDNEY STONE REMOVAL Left 7/6/2021    LEFT PERCUTANEOUS NEPHROLITHOTOMY,  LEFT STENT PLACEMENT performed by Chalo Bradshaw MD at [facility] (Left Bladder)    OTHER SURGICAL HISTORY  11/23/2020    CYSTOSCOPY, LEFT RIGID AND FLEXIBLE URETEROSCOPY, HOLMIUM LASER LITHOTRIPSY, STONE EXTRACTION, STENT EXCHANGE (Left )     OTHER SURGICAL HISTORY  01/11/2021    CYSTOSCOPY, DIAGNOSTIC LEFT URETEROSCOPY WITH HOLMIUM LASER LITHOTRIPSY, LEFT STENT EXCHANGE (Left Bladder)    OTHER SURGICAL HISTORY  01/27/2021    cystoscopy, left stent removal    OTHER SURGICAL HISTORY  08/20/2021    : CYSTOSCOPY, LEFT RETROGRADE,  LEFT STENT REMOVAL (Left     URETEROSCOPY Left 11/23/2020    CYSTOSCOPY, LEFT RIGID AND FLEXIBLE URETEROSCOPY, HOLMIUM LASER LITHOTRIPSY, STONE EXTRACTION, STENT EXCHANGE, REMOVAL MIGRATED STENT performed by Greg Callahan MD at [facility] OR     Family History   Problem Relation Age of Onset    Diabetes Mother     Heart Disease Mother     High Blood Pressure Sister     Diabetes Sister     Heart Attack Sister     Coronary Art Dis Father      Social History     Socioeconomic History    Marital status: Single     Spouse name: Not on file    Number of children: Not on file    Years of education: Not on file    Highest education level: Not on file   Occupational History    Not on file   Tobacco Use    Smoking status: Every Day     Packs/day: 0.50     Types: Cigarettes    Smokeless tobacco: Never    Tobacco comments:     smoked 2 cigarettes today   Vaping Use    Vaping Use: Never used

## 2023-08-30 NOTE — ED NOTES
0308 - Perfect Serve sent to Dr. Michelle Quispe  08/30/23 [phone] - Dr. Cory Nails returned the page and spoke directly to Dr. Be Mtz at this time     Cedric Allison  08/30/23 1741

## 2023-08-30 NOTE — ED NOTES
Pt B/P elevated, per pt B/P meds due, Dr Dorene Smith aware ok to discharge pt.      16[address] Blake Walker, RN  08/30/23 [id number]

## 2023-08-30 NOTE — ANESTHESIA PRE PROCEDURE
Department of Anesthesiology  Preprocedure Note       Name:  Tod Andrews   Age:  63 y.o.  :  1965                                          MRN:  [id number]         Date:  2023      Surgeon: Saniya Villalta):  Nohelia Blackwell MD    Procedure: Procedure(s):  CYSTOSCOPY, LEFT URETEROSCOPY WITH POSSIBLE HOLMIUM LASER LITHOTRIPSY, POSSIBLE STENT EXCHANGE **23 HR HOLD**  X  X  X    Medications prior to admission:   Prior to Admission medications    Medication Sig Start Date End Date Taking? Authorizing Provider   cephALEXin (KEFLEX) 500 MG capsule Take 1 capsule by mouth 4 times daily for 5 days 23 Yes Pat Alaniz MD   oxyCODONE-acetaminophen (PERCOCET) 5-325 MG per tablet Take 1 tablet by mouth every 6 hours as needed for Pain for up to 3 days. Intended supply: 3 days.  Take lowest dose possible to manage pain Max Daily Amount: 4 tablets 23 Yes Pat Alaniz MD   propranolol (INDERAL) 40 MG tablet Take 1 tablet by mouth 2 times daily    Historical Provider, MD   tamsulosin (FLOMAX) 0.4 MG capsule Take 1 capsule by mouth nightly 23   Monica Bueno MD   metoprolol succinate (TOPROL XL) 25 MG extended release tablet Take 1 tablet by mouth daily 23   Monica Bueno MD   ondansetron (ZOFRAN) 4 MG tablet Take 1 tablet by mouth every 8 hours as needed for Nausea or Vomiting 23   Monica Bueno MD   ASPIRIN LOW DOSE 81 MG EC tablet Take 1 tablet by mouth daily 23   Historical Provider, MD   sertraline (ZOLOFT) 25 MG tablet  23   Historical Provider, MD   ferrous sulfate (IRON 325) 325 (65 Fe) MG tablet Take 1 tablet by mouth daily (with breakfast)    Historical Provider, MD   hydrOXYzine (ATARAX) 25 MG tablet Take 1 tablet by mouth 3 times daily as needed for Itching    Historical Provider, MD   vitamin D (CHOLECALCIFEROL) 25 MCG (1000 UT) TABS tablet Take 1 tablet by mouth once a week    Historical Provider, MD       Current medications:

## 2023-08-30 NOTE — OP NOTE
Operative Note      Patient: Neville Ruiz  YOB: 1965  MRN: [id number]    Date of Procedure: 8/30/2023    Pre-Op Diagnosis Codes:     * Left ureteral calculus [N20.1]; LEFT renal stone solitary kidney;    Post-Operative Diagnosis: same      Procedure Performed:   1. Left flexible ureteroscopy  2. holmium laser lithotripsy  3. fluoro <1hr,  retrograde pyelogram with interpretation  4. ureteral stent placement, string attached  5. basket extraction of stone    Surgeon:  Matthew Gaines MD  Assistant:  scrub  Anesthesia: General     Drains/Tubes: 9 Frech JJ ureteral stent   Specimens: Stone fragment and  Stone CULTURE  Estimated Blood Loss: 3 mL   Complications: None     Findings:     -multiple soft stones and some hard stone (1.5cm or so of stone) identified in the kidney; It was pulverized to frag 1-4mm or less and all other large pieces removed. -Contrast injected for interpretation of Retrograde pyelogram and it confirmed no extravasation and wire in place followed by appropriate stent curl in the kidney. Indications: See consult/office , previous op notes. Procedure:  After obtaining informed consent, the patient was brought to the operating room and placed supine on the operating room table. After adequate anesthesia, she was then repositioned in the dorsal lithotomy position and prepped & draped in the standard surgical fashion. I began the case by doing rigid cystoscopy with a 21-Mozambican sheath and a 30-degree lens. The urethra was within normal limits. Once in the bladder, I saw no evidence of tumors, stones, or diverticula. Alongside the stent to get a good retrograde pyelogram, 20 cc injected, then stent was pulled down to meatus and a guidewire was then inserted up into the kidney under fluoroscopic guidance and the old stent was discarded. Then a under flouroscopy the wire was used to guide up a 12/14-Mozambican ureteral access sheath to level of the renal pelvis.   I then Heron Nageotte, MD on 8/30/2023 at 6:26 PM

## 2023-08-30 NOTE — ED NOTES
Report given to [facility] for transfer of care.      [address], RN  08/30/23 4176

## 2023-08-30 NOTE — ANESTHESIA POSTPROCEDURE EVALUATION
Department of Anesthesiology  Postprocedure Note    Patient: Rosina Haley  MRN: [id number]  YOB: 1965  Date of evaluation: 8/30/2023      Procedure Summary     Date: 08/30/23 Room / Location: [address] [place] [address] / [facility]    Anesthesia Start: 8711 Anesthesia Stop: 2115    Procedure: CYSTOSCOPY, LEFT URETEROSCOPY WITH POSSIBLE HOLMIUM LASER LITHOTRIPSY AND LEFT URETERAL STENT EXCHANGE (Left: Bladder) Diagnosis:       Left ureteral calculus      (Left ureteral calculus [N20.1])    Surgeons: Tim Muhammad MD Responsible Provider: Moy Bates MD    Anesthesia Type: general ASA Status: 3          Anesthesia Type: No value filed. Denise Phase I: Denise Score: 10    Denise Phase II:        Anesthesia Post Evaluation    Patient location during evaluation: PACU  Patient participation: complete - patient participated  Level of consciousness: awake and alert  Airway patency: patent  Nausea & Vomiting: no nausea and no vomiting  Complications: no  Cardiovascular status: blood pressure returned to baseline  Respiratory status: acceptable  Hydration status: euvolemic  Comments: VSS on transfer to phase 2 recovery. No anesthetic complications.   Pain management: adequate

## 2023-08-31 LAB
ANION GAP SERPL CALCULATED.3IONS-SCNC: 8 MMOL/L (ref 3–16)
BASOPHILS # BLD: 0 K/UL (ref 0–0.2)
BASOPHILS NFR BLD: 0.6 %
BUN SERPL-MCNC: 23 MG/DL (ref 7–20)
CALCIUM SERPL-MCNC: 8.6 MG/DL (ref 8.3–10.6)
CHLORIDE SERPL-SCNC: 105 MMOL/L (ref 99–110)
CO2 SERPL-SCNC: 26 MMOL/L (ref 21–32)
CREAT SERPL-MCNC: 2.1 MG/DL (ref 0.6–1.1)
DEPRECATED RDW RBC AUTO: 14.5 % (ref 12.4–15.4)
EOSINOPHIL # BLD: 0.1 K/UL (ref 0–0.6)
EOSINOPHIL NFR BLD: 1.3 %
GFR SERPLBLD CREATININE-BSD FMLA CKD-EPI: 27 ML/MIN/{1.73_M2}
GLUCOSE SERPL-MCNC: 97 MG/DL (ref 70–99)
HCT VFR BLD AUTO: 33 % (ref 36–48)
HGB BLD-MCNC: 11.1 G/DL (ref 12–16)
LYMPHOCYTES # BLD: 1 K/UL (ref 1–5.1)
LYMPHOCYTES NFR BLD: 18.9 %
MCH RBC QN AUTO: 30 PG (ref 26–34)
MCHC RBC AUTO-ENTMCNC: 33.6 G/DL (ref 31–36)
MCV RBC AUTO: 89.3 FL (ref 80–100)
MONOCYTES # BLD: 0.5 K/UL (ref 0–1.3)
MONOCYTES NFR BLD: 10.2 %
NEUTROPHILS # BLD: 3.6 K/UL (ref 1.7–7.7)
NEUTROPHILS NFR BLD: 69 %
PLATELET # BLD AUTO: 107 K/UL (ref 135–450)
PMV BLD AUTO: 10.1 FL (ref 5–10.5)
POTASSIUM SERPL-SCNC: 4.5 MMOL/L (ref 3.5–5.1)
RBC # BLD AUTO: 3.69 M/UL (ref 4–5.2)
SODIUM SERPL-SCNC: 139 MMOL/L (ref 136–145)
WBC # BLD AUTO: 5.2 K/UL (ref 4–11)

## 2023-08-31 PROCEDURE — 2580000003 HC RX 258: Performed by: UROLOGY

## 2023-08-31 PROCEDURE — 6370000000 HC RX 637 (ALT 250 FOR IP): Performed by: INTERNAL MEDICINE

## 2023-08-31 PROCEDURE — 80048 BASIC METABOLIC PNL TOTAL CA: CPT

## 2023-08-31 PROCEDURE — 36415 COLL VENOUS BLD VENIPUNCTURE: CPT

## 2023-08-31 PROCEDURE — 85025 COMPLETE CBC W/AUTO DIFF WBC: CPT

## 2023-08-31 PROCEDURE — 2580000003 HC RX 258: Performed by: INTERNAL MEDICINE

## 2023-08-31 PROCEDURE — 99232 SBSQ HOSP IP/OBS MODERATE 35: CPT | Performed by: INTERNAL MEDICINE

## 2023-08-31 PROCEDURE — 6360000002 HC RX W HCPCS: Performed by: UROLOGY

## 2023-08-31 PROCEDURE — 1200000000 HC SEMI PRIVATE

## 2023-08-31 PROCEDURE — 6360000002 HC RX W HCPCS: Performed by: INTERNAL MEDICINE

## 2023-08-31 RX ORDER — FLUTICASONE PROPIONATE 50 MCG
1 SPRAY, SUSPENSION (ML) NASAL DAILY
Status: DISCONTINUED | OUTPATIENT
Start: 2023-08-31 | End: 2023-09-01 | Stop reason: HOSPADM

## 2023-08-31 RX ORDER — OXYCODONE HYDROCHLORIDE AND ACETAMINOPHEN 5; 325 MG/1; MG/1
1 TABLET ORAL EVERY 6 HOURS PRN
Status: DISCONTINUED | OUTPATIENT
Start: 2023-08-31 | End: 2023-09-01 | Stop reason: HOSPADM

## 2023-08-31 RX ADMIN — TAMSULOSIN HYDROCHLORIDE 0.4 MG: 0.4 CAPSULE ORAL at 20:03

## 2023-08-31 RX ADMIN — FLUCONAZOLE 200 MG: 2 INJECTION, SOLUTION INTRAVENOUS at 20:14

## 2023-08-31 RX ADMIN — PROPRANOLOL HYDROCHLORIDE 40 MG: 10 TABLET ORAL at 20:03

## 2023-08-31 RX ADMIN — ONDANSETRON 4 MG: 4 TABLET, ORALLY DISINTEGRATING ORAL at 19:22

## 2023-08-31 RX ADMIN — OXYCODONE HYDROCHLORIDE AND ACETAMINOPHEN 1 TABLET: 5; 325 TABLET ORAL at 19:22

## 2023-08-31 RX ADMIN — FLUTICASONE PROPIONATE 1 SPRAY: 50 SPRAY, METERED NASAL at 10:42

## 2023-08-31 RX ADMIN — SODIUM CHLORIDE, POTASSIUM CHLORIDE, SODIUM LACTATE AND CALCIUM CHLORIDE: 600; 310; 30; 20 INJECTION, SOLUTION INTRAVENOUS at 20:04

## 2023-08-31 RX ADMIN — CEFTRIAXONE SODIUM 1000 MG: 1 INJECTION, POWDER, FOR SOLUTION INTRAMUSCULAR; INTRAVENOUS at 05:37

## 2023-08-31 RX ADMIN — SERTRALINE HYDROCHLORIDE 25 MG: 50 TABLET ORAL at 08:48

## 2023-08-31 RX ADMIN — SODIUM CHLORIDE, POTASSIUM CHLORIDE, SODIUM LACTATE AND CALCIUM CHLORIDE: 600; 310; 30; 20 INJECTION, SOLUTION INTRAVENOUS at 05:35

## 2023-08-31 RX ADMIN — OXYCODONE HYDROCHLORIDE AND ACETAMINOPHEN 1 TABLET: 5; 325 TABLET ORAL at 12:15

## 2023-08-31 RX ADMIN — OXYCODONE HYDROCHLORIDE AND ACETAMINOPHEN 1 TABLET: 5; 325 TABLET ORAL at 05:21

## 2023-08-31 ASSESSMENT — PAIN DESCRIPTION - PAIN TYPE
TYPE: ACUTE PAIN
TYPE: ACUTE PAIN

## 2023-08-31 ASSESSMENT — PAIN SCALES - GENERAL
PAINLEVEL_OUTOF10: 4
PAINLEVEL_OUTOF10: 7
PAINLEVEL_OUTOF10: 6
PAINLEVEL_OUTOF10: 6
PAINLEVEL_OUTOF10: 3
PAINLEVEL_OUTOF10: 8

## 2023-08-31 ASSESSMENT — PAIN DESCRIPTION - LOCATION
LOCATION: ABDOMEN
LOCATION: OTHER (COMMENT)
LOCATION: OTHER (COMMENT)
LOCATION: ABDOMEN

## 2023-08-31 ASSESSMENT — PAIN SCALES - WONG BAKER: WONGBAKER_NUMERICALRESPONSE: 2

## 2023-08-31 ASSESSMENT — PAIN DESCRIPTION - DESCRIPTORS
DESCRIPTORS: ACHING;TENDER
DESCRIPTORS: ACHING;CRUSHING
DESCRIPTORS: ACHING;CRUSHING

## 2023-08-31 NOTE — PROGRESS NOTES
8/31/23 MEETS for Indicators: Indwelling Cath     Date of Procedure: 8/30/2023     Pre-Op Diagnosis Codes:     * Left ureteral calculus [N20.1]; LEFT renal stone solitary kidney;    Post-Operative Diagnosis: same      Procedure Performed:   1. Left flexible ureteroscopy  2. holmium laser lithotripsy  3.  fluoro <1hr,  retrograde pyelogram with interpretation  4. ureteral stent placement, string attached  5. basket extraction of stone    Surgeon:  Carmen Walker MD

## 2023-08-31 NOTE — PROGRESS NOTES
PRN given for abdominal pain rated 7/10, see MAR.  Patient declined non pharm methods of pain relief

## 2023-08-31 NOTE — PLAN OF CARE
Problem: Discharge Planning  Goal: Discharge to home or other facility with appropriate resources  8/31/2023 0104 by Idalia Guidry RN  Outcome: Progressing  8/30/2023 1813 by Wade Giles RN  Outcome: Progressing     Problem: Pain  Goal: Verbalizes/displays adequate comfort level or baseline comfort level  8/31/2023 0104 by Idalia Guidry RN  Outcome: Progressing  8/30/2023 1813 by Wade Giles RN  Outcome: Progressing     Problem: ABCDS Injury Assessment  Goal: Absence of physical injury  8/31/2023 0104 by Idalia Guidry RN  Outcome: Progressing  8/30/2023 1813 by Wade Giles RN  Outcome: Progressing     Problem: Safety - Adult  Goal: Free from fall injury  Outcome: Progressing

## 2023-08-31 NOTE — PLAN OF CARE
Problem: Discharge Planning  Goal: Discharge to home or other facility with appropriate resources  8/31/2023 1120 by Wade Giles RN  Outcome: Progressing  8/31/2023 0104 by Idalia Guidry RN  Outcome: Progressing     Problem: Pain  Goal: Verbalizes/displays adequate comfort level or baseline comfort level  8/31/2023 1120 by Wade Giles RN  Outcome: Progressing  8/31/2023 0104 by Idalia Guidry RN  Outcome: Progressing     Problem: ABCDS Injury Assessment  Goal: Absence of physical injury  8/31/2023 1120 by Wade Giles RN  Outcome: Progressing  8/31/2023 0104 by Idalia Guidry RN  Outcome: Progressing     Problem: Safety - Adult  Goal: Free from fall injury  8/31/2023 1120 by Wade Giles RN  Outcome: Progressing  8/31/2023 0104 by Idalia Guidry RN  Outcome: Progressing

## 2023-09-01 VITALS
TEMPERATURE: 98.7 F | BODY MASS INDEX: 28.71 KG/M2 | RESPIRATION RATE: 16 BRPM | SYSTOLIC BLOOD PRESSURE: 177 MMHG | WEIGHT: 183.3 LBS | DIASTOLIC BLOOD PRESSURE: 87 MMHG | HEART RATE: 61 BPM | OXYGEN SATURATION: 99 %

## 2023-09-01 LAB
ANION GAP SERPL CALCULATED.3IONS-SCNC: 6 MMOL/L (ref 3–16)
BACTERIA UR CULT: ABNORMAL
BUN SERPL-MCNC: 17 MG/DL (ref 7–20)
CALCIUM SERPL-MCNC: 8.8 MG/DL (ref 8.3–10.6)
CHLORIDE SERPL-SCNC: 105 MMOL/L (ref 99–110)
CO2 SERPL-SCNC: 25 MMOL/L (ref 21–32)
CREAT SERPL-MCNC: 2 MG/DL (ref 0.6–1.1)
GFR SERPLBLD CREATININE-BSD FMLA CKD-EPI: 29 ML/MIN/{1.73_M2}
GLUCOSE SERPL-MCNC: 100 MG/DL (ref 70–99)
ORGANISM: ABNORMAL
ORGANISM: ABNORMAL
POTASSIUM SERPL-SCNC: 4.4 MMOL/L (ref 3.5–5.1)
SODIUM SERPL-SCNC: 136 MMOL/L (ref 136–145)

## 2023-09-01 PROCEDURE — 80048 BASIC METABOLIC PNL TOTAL CA: CPT

## 2023-09-01 PROCEDURE — 6360000002 HC RX W HCPCS: Performed by: INTERNAL MEDICINE

## 2023-09-01 PROCEDURE — 6370000000 HC RX 637 (ALT 250 FOR IP): Performed by: INTERNAL MEDICINE

## 2023-09-01 PROCEDURE — 99239 HOSP IP/OBS DSCHRG MGMT >30: CPT | Performed by: INTERNAL MEDICINE

## 2023-09-01 PROCEDURE — 2580000003 HC RX 258: Performed by: INTERNAL MEDICINE

## 2023-09-01 PROCEDURE — 2580000003 HC RX 258: Performed by: UROLOGY

## 2023-09-01 PROCEDURE — 36415 COLL VENOUS BLD VENIPUNCTURE: CPT

## 2023-09-01 RX ORDER — CIPROFLOXACIN 500 MG/1
500 TABLET, FILM COATED ORAL 2 TIMES DAILY
Qty: 14 TABLET | Refills: 0 | Status: ON HOLD | OUTPATIENT
Start: 2023-09-02 | End: 2023-09-06 | Stop reason: HOSPADM

## 2023-09-01 RX ADMIN — CEFTRIAXONE SODIUM 1000 MG: 1 INJECTION, POWDER, FOR SOLUTION INTRAMUSCULAR; INTRAVENOUS at 05:33

## 2023-09-01 RX ADMIN — SERTRALINE HYDROCHLORIDE 25 MG: 50 TABLET ORAL at 08:59

## 2023-09-01 RX ADMIN — ACETAMINOPHEN 650 MG: 325 TABLET ORAL at 13:44

## 2023-09-01 RX ADMIN — OXYCODONE HYDROCHLORIDE AND ACETAMINOPHEN 1 TABLET: 5; 325 TABLET ORAL at 16:13

## 2023-09-01 RX ADMIN — METOPROLOL SUCCINATE 25 MG: 25 TABLET, EXTENDED RELEASE ORAL at 08:59

## 2023-09-01 RX ADMIN — SODIUM CHLORIDE, POTASSIUM CHLORIDE, SODIUM LACTATE AND CALCIUM CHLORIDE: 600; 310; 30; 20 INJECTION, SOLUTION INTRAVENOUS at 02:11

## 2023-09-01 RX ADMIN — OXYCODONE HYDROCHLORIDE AND ACETAMINOPHEN 1 TABLET: 5; 325 TABLET ORAL at 08:59

## 2023-09-01 RX ADMIN — PROPRANOLOL HYDROCHLORIDE 40 MG: 10 TABLET ORAL at 08:59

## 2023-09-01 RX ADMIN — FLUTICASONE PROPIONATE 1 SPRAY: 50 SPRAY, METERED NASAL at 08:59

## 2023-09-01 RX ADMIN — OXYCODONE HYDROCHLORIDE AND ACETAMINOPHEN 1 TABLET: 5; 325 TABLET ORAL at 02:08

## 2023-09-01 ASSESSMENT — PAIN SCALES - GENERAL
PAINLEVEL_OUTOF10: 6
PAINLEVEL_OUTOF10: 7
PAINLEVEL_OUTOF10: 2
PAINLEVEL_OUTOF10: 2
PAINLEVEL_OUTOF10: 7
PAINLEVEL_OUTOF10: 7

## 2023-09-01 ASSESSMENT — PAIN SCALES - WONG BAKER
WONGBAKER_NUMERICALRESPONSE: 2

## 2023-09-01 ASSESSMENT — PAIN DESCRIPTION - LOCATION: LOCATION: ABDOMEN

## 2023-09-01 ASSESSMENT — PAIN DESCRIPTION - ORIENTATION: ORIENTATION: RIGHT;LEFT

## 2023-09-01 ASSESSMENT — PAIN - FUNCTIONAL ASSESSMENT: PAIN_FUNCTIONAL_ASSESSMENT: ACTIVITIES ARE NOT PREVENTED

## 2023-09-01 ASSESSMENT — PAIN DESCRIPTION - PAIN TYPE: TYPE: ACUTE PAIN

## 2023-09-01 ASSESSMENT — PAIN DESCRIPTION - FREQUENCY: FREQUENCY: INTERMITTENT

## 2023-09-01 ASSESSMENT — PAIN DESCRIPTION - DESCRIPTORS: DESCRIPTORS: ACHING;SHOOTING

## 2023-09-01 NOTE — PLAN OF CARE
Problem: Discharge Planning  Goal: Discharge to home or other facility with appropriate resources  9/1/2023 0012 by Josy Moses RN  Outcome: Progressing  Flowsheets (Taken 8/31/2023 1952)  Discharge to home or other facility with appropriate resources: Identify barriers to discharge with patient and caregiver  Problem: Pain  Goal: Verbalizes/displays adequate comfort level or baseline comfort level  9/1/2023 0012 by Josy Moses RN  Outcome: Progressing     Problem: ABCDS Injury Assessment  Goal: Absence of physical injury  9/1/2023 0012 by Josy Moses RN  Outcome: Progressing       Problem: Safety - Adult  Goal: Free from fall injury  9/1/2023 0012 by Josy Moses RN  Outcome: Progressing

## 2023-09-01 NOTE — PROGRESS NOTES
Pain 7/10, percocet given at this time, see MAR. Pt given written and verbal discharge instructions. Pt indicated understanding of home medication and care instructions. Prescriptions provided to pt via Meds to Beds. Pt's belongings packed. IV removed, pt tolerated. Telemetry removed, CMU notified. Called a cab for the pt with [employer], eta is 20-25 minutes. No needs voiced.

## 2023-09-01 NOTE — PROGRESS NOTES
Pt urinated approximately 70mL pink tinged urine at this time. C/o pain 6/10 in her bladder. Bladder scan completed; 93mL. Percocet unavailable at this time, tylenol given. Pt expresses content with this pain coverage at this time.

## 2023-09-01 NOTE — PLAN OF CARE
Problem: Discharge Planning  Goal: Discharge to home or other facility with appropriate resources  9/1/2023 1035 by Jorge Gamino RN  Outcome: Progressing  9/1/2023 0012 by Tenzin Madera RN  Outcome: Progressing  Flowsheets (Taken 8/31/2023 1952)  Discharge to home or other facility with appropriate resources: Identify barriers to discharge with patient and caregiver     Problem: Pain  Goal: Verbalizes/displays adequate comfort level or baseline comfort level  9/1/2023 1035 by Jorge Gamino RN  Outcome: Progressing  9/1/2023 0012 by Tenzin Madera RN  Outcome: Progressing     Problem: ABCDS Injury Assessment  Goal: Absence of physical injury  9/1/2023 1035 by Jorge Gamino RN  Outcome: Progressing  9/1/2023 0012 by Tenzin Madera RN  Outcome: Progressing     Problem: Safety - Adult  Goal: Free from fall injury  9/1/2023 1035 by Jorge Gamino RN  Outcome: Progressing  9/1/2023 0012 by Tenzin Madera RN  Outcome: Progressing

## 2023-09-01 NOTE — PLAN OF CARE
Problem: Discharge Planning  Goal: Discharge to home or other facility with appropriate resources  9/1/2023 1553 by Archie Laughlin RN  Outcome: Adequate for Discharge  9/1/2023 1035 by Archie Laughlin RN  Outcome: Progressing     Problem: Pain  Goal: Verbalizes/displays adequate comfort level or baseline comfort level  9/1/2023 1553 by Archie Laughlin RN  Outcome: Adequate for Discharge  9/1/2023 1035 by Archie Laughlin RN  Outcome: Progressing     Problem: ABCDS Injury Assessment  Goal: Absence of physical injury  9/1/2023 1553 by Archie Laughlin RN  Outcome: Adequate for Discharge  9/1/2023 1035 by Archie Laughlin RN  Outcome: Progressing     Problem: Safety - Adult  Goal: Free from fall injury  9/1/2023 1553 by Archie Laughlin RN  Outcome: Adequate for Discharge  9/1/2023 1035 by Archie Laughlin RN  Outcome: Progressing

## 2023-09-01 NOTE — PROGRESS NOTES
AM assessment completed. c/o abdominal pain 7/10, pain med given, see MAR. No signs of symptoms of distress noted. Patient tolerated morning medications well. Respirations easy and even. Bed in lowest position, bed alarm in place and functioning properly, SR up x 2 and bed in low position. Call light within reach. Bedside Mobility Assessment Tool (BMAT):     Assessment Level 1- Sit and Shake    1. From a semi-reclined position, ask patient to sit up and rotate to a seated position at the side of the bed. Can use the bedrail. 2. Ask patient to reach out and grab your hand and shake making sure patient reaches across his/her midline. Pass- Patient is able to come to a seated position, maintain core strength. Maintains seated balance while reaching across midline. Move on to Assessment Level 2. Assessment Level 2- Stretch and Point   1. With patient in seated position at the side of the bed, have patient place both feet on the floor (or stool) with knees no higher than hips. 2. Ask patient to stretch one leg and straighten the knee, then bend the ankle/flex and point the toes. If appropriate, repeat with the other leg. Pass- Patient is able to demonstrate appropriate quad strength on intended weight bearing limb(s). Move onto Assessment Level 3. Assessment Level 3- Stand   1. Ask patient to elevate off the bed or chair (seated to standing) using an assistive device (cane, bedrail). 2. Patient should be able to raise buttocks off be and hold for a count of five. May repeat once. Pass- Patient maintains standing stability for at least 5 seconds, proceed to assessment level 4. Assessment Level 4- Walk   1. Ask patient to march in place at bedside. 2. Then ask patient to advance step and return each foot. Some medical conditions may render a patient from stepping backwards, use your best clinical judgement.    Pass- Patient demonstrates balance while shifting weight and ability to step, takes independent steps, does not use assistive device patient is MOBILITY LEVEL 4.       Mobility Level- 4

## 2023-09-01 NOTE — PROGRESS NOTES
Patient resting in bed with eyes closed, respiration easy, even, unlabored. No s/s of distress noted. No complains of pain or discomfort at this time. Murguia catheter in placed draining pink orange urine. Call light within reach.

## 2023-09-01 NOTE — DISCHARGE SUMMARY
INR 1.17 (H) 09/26/2021    INR 1.17 (H) 07/07/2021    INR 1.34 (H) 06/28/2021       Radiology:  CT ABDOMEN PELVIS WO CONTRAST Additional Contrast? None    Result Date: 8/31/2023  EXAMINATION: CT OF THE ABDOMEN AND PELVIS WITHOUT CONTRAST 8/30/2023 3:56 am TECHNIQUE: CT of the abdomen and pelvis was performed without the administration of intravenous contrast. Multiplanar reformatted images are provided for review. Automated exposure control, iterative reconstruction, and/or weight based adjustment of the mA/kV was utilized to reduce the radiation dose to as low as reasonably achievable. COMPARISON: June 23, 2023. HISTORY: ORDERING SYSTEM PROVIDED HISTORY: Abdominal pain, recent ureteral stent TECHNOLOGIST PROVIDED HISTORY: Reason for exam:->abdominal pain, recent ureteral stent Additional Contrast?->None Reason for Exam: Abdominal pain, recent ureteral stent FINDINGS: Lower Chest: No focal consolidation or pleural effusion. Organs: The liver, gallbladder, spleen, pancreas, and adrenal glands demonstrate no acute abnormality. The left kidney demonstrates a double-J ureteral stent with a large calcification in the renal pelvis. Mild hydronephrosis of the lower pole is seen. Adjacent stranding is noted. The right kidney is absent. GI/Bowel: The stomach, small bowel, and colon are normal in course and caliber without evidence of wall thickening or obstruction. Severe diverticulosis. No evidence of acute diverticulitis. Normal appendix. Pelvis: Double-J ureteral stent terminates in the bladder which is otherwise unremarkable. Questionable uterine anomaly is identified with a left-sided horn. Abnormal positioning of the uterus in the left hemipelvis remains a differential consideration. The left adnexa is unremarkable. No right ovary identified. Peritoneum/Retroperitoneum: Inflammatory stranding is seen in the left retroperitoneum. No loculated fluid collections to suggest abscess. No free air.  Abdominal aorta demonstrates severe atherosclerosis without aneurysmal dilatation. Bones/Soft Tissues: No acute or aggressive osseous lesion. 1. Left double-J ureteral stent in place. A large stone is seen in the left renal pelvis with resulting mild hydronephrosis of the lower pole. Adjacent stranding is also noted. Superimposed infection would be difficult to exclude. 2. Absent right kidney. FL RETROGRADE PYELOGRAM LEFT    Result Date: 8/30/2023  EXAMINATION: SPOT FLUOROSCOPIC IMAGES 8/30/2023 2:59 pm TECHNIQUE: Fluoroscopy was provided by the radiology department for procedure. Radiologist was not present during examination. FLUOROSCOPY DOSE AND TYPE: Radiation Exposure Index: Kerma mGy, 1.8 COMPARISON: 08/30/2023 HISTORY: ORDERING SYSTEM PROVIDED HISTORY: LT ureteral stone removal, stent placement TECHNOLOGIST PROVIDED HISTORY: Reason for exam:->LT ureteral stone removal, stent placement Reason for Exam: LT stone removal, stent placement Intraprocedural imaging. FINDINGS: Spot intraoperative images are obtained demonstrating abdomen. Intraprocedural fluoroscopic spot images as above. See separate procedure report for more information. The patient was seen and examined on day of discharge and this discharge summary is in conjunction with any daily progress note from day of discharge. Time Spent on discharge is more than 30 minutes in the examination, evaluation, counseling and review of medications and discharge plan. Mabel Umana DO   9/1/2023      Thank you Eleni Viera MD for the opportunity to be involved in this patient's care. If you have any questions or concerns please feel free to contact me at [facility].

## 2023-09-02 NOTE — PROGRESS NOTES
Physician Progress Note      Sue Santos  [facility] #:                  [id number]  :                       1965  ADMIT DATE:       2023 2:16 AM  DISCH DATE:        2023 5:00 PM  RESPONDING  PROVIDER #:        Jareth Sheldon DO        QUERY TEXT:    Stage of Chronic Kidney Disease: Please provide further specificity, if known. Clinical indicators include: ckd, creatinine, cr, bun  Options provided:  -- Chronic kidney disease stage 1  -- Chronic kidney disease stage 2  -- Chronic kidney disease stage 3  -- Chronic kidney disease stage 3a  -- Chronic kidney disease stage 3b  -- Chronic kidney disease stage 4  -- Chronic kidney disease stage 5  -- Chronic kidney disease stage 5, requiring dialysis  -- End stage renal disease  -- Other - I will add my own diagnosis  -- Disagree - Not applicable / Not valid  -- Disagree - Clinically Unable to determine / Unknown        PROVIDER RESPONSE TEXT:    The patient has chronic kidney disease stage 3. QUERY TEXT:    PPt admitted with UTI. Pt noted to have Ureteral stent in H&P, . If   possible, please document in the progress notes and discharge summary if you   are evaluating and/or treating any of the following: The medical record reflects the following:  Risk Factors:  UTI, MARY, Kidney stone    Clinical Indicators: H&P,  \"Ureteral stent pain; UTI; pt had urethral   stent placed on the left on  for left obstructing UPJ stone and has not   f/u with urology, had an appt at 12pm today for stent; UA with positive   nitrites, large leukocyte esterase, >100 WBCs; urine cx pending; urology   consulted, plan for stent exchange today\". Treatment: IV Rocephin, Stent Exchange, Urology consult,  Thank you,  Michael Leal RN, CCDS, [facility]  Certified Clinical Documentation   [phone]  you can also reach me by perfect serve.   Options provided:  -- UTI due to ureteral stent  -- UTI not due to ureteral unable to determine / Unknown  -- Refer to Clinical Documentation Reviewer    PROVIDER RESPONSE TEXT:    This patient has acute kidney injury as evidenced by Increase in Cr 0.3    Query created by: Gabe Magaña on 8/31/2023 7:05 AM      Electronically signed by:  Janell Card DO 9/2/2023 6:43 AM

## 2023-09-03 ENCOUNTER — ANESTHESIA (OUTPATIENT)
Dept: OPERATING ROOM | Age: 58
End: 2023-09-03
Payer: COMMERCIAL

## 2023-09-03 ENCOUNTER — ANESTHESIA EVENT (OUTPATIENT)
Dept: OPERATING ROOM | Age: 58
End: 2023-09-03
Payer: COMMERCIAL

## 2023-09-03 ENCOUNTER — APPOINTMENT (OUTPATIENT)
Dept: CT IMAGING | Age: 58
DRG: 465 | End: 2023-09-03
Payer: COMMERCIAL

## 2023-09-03 ENCOUNTER — HOSPITAL ENCOUNTER (INPATIENT)
Age: 58
LOS: 3 days | Discharge: HOME HEALTH | DRG: 465 | End: 2023-09-06
Attending: EMERGENCY MEDICINE | Admitting: INTERNAL MEDICINE
Payer: COMMERCIAL

## 2023-09-03 ENCOUNTER — APPOINTMENT (OUTPATIENT)
Dept: GENERAL RADIOLOGY | Age: 58
DRG: 465 | End: 2023-09-03
Payer: COMMERCIAL

## 2023-09-03 DIAGNOSIS — N17.9 ACUTE RENAL FAILURE, UNSPECIFIED ACUTE RENAL FAILURE TYPE (HCC): Primary | ICD-10-CM

## 2023-09-03 DIAGNOSIS — N13.2 HYDRONEPHROSIS WITH URINARY OBSTRUCTION DUE TO URETERAL CALCULUS: ICD-10-CM

## 2023-09-03 DIAGNOSIS — R33.9 URINARY RETENTION: ICD-10-CM

## 2023-09-03 PROBLEM — E87.1 HYPONATREMIA: Status: ACTIVE | Noted: 2023-09-03

## 2023-09-03 LAB
ALBUMIN SERPL-MCNC: 2.7 G/DL (ref 3.4–5)
ALBUMIN/GLOB SERPL: 0.8 {RATIO} (ref 1.1–2.2)
ALP SERPL-CCNC: 75 U/L (ref 40–129)
ALT SERPL-CCNC: 8 U/L (ref 10–40)
ANION GAP SERPL CALCULATED.3IONS-SCNC: 16 MMOL/L (ref 3–16)
AST SERPL-CCNC: 12 U/L (ref 15–37)
BACTERIA SPEC AEROBE CULT: ABNORMAL
BACTERIA SPEC AEROBE CULT: ABNORMAL
BACTERIA SPEC ANAEROBE CULT: ABNORMAL
BACTERIA URNS QL MICRO: ABNORMAL /HPF
BASOPHILS # BLD: 0 K/UL (ref 0–0.2)
BASOPHILS NFR BLD: 0.2 %
BILIRUB SERPL-MCNC: 0.5 MG/DL (ref 0–1)
BILIRUB UR QL STRIP.AUTO: NEGATIVE
BUN SERPL-MCNC: 29 MG/DL (ref 7–20)
CALCIUM SERPL-MCNC: 8.7 MG/DL (ref 8.3–10.6)
CHLORIDE SERPL-SCNC: 93 MMOL/L (ref 99–110)
CLARITY UR: ABNORMAL
CO2 SERPL-SCNC: 18 MMOL/L (ref 21–32)
COLOR UR: ABNORMAL
CREAT SERPL-MCNC: 5.6 MG/DL (ref 0.6–1.1)
DEPRECATED RDW RBC AUTO: 14.5 % (ref 12.4–15.4)
EOSINOPHIL # BLD: 0 K/UL (ref 0–0.6)
EOSINOPHIL NFR BLD: 0.4 %
EPI CELLS #/AREA URNS HPF: ABNORMAL /HPF (ref 0–5)
GFR SERPLBLD CREATININE-BSD FMLA CKD-EPI: 8 ML/MIN/{1.73_M2}
GLUCOSE SERPL-MCNC: 120 MG/DL (ref 70–99)
GLUCOSE UR STRIP.AUTO-MCNC: NEGATIVE MG/DL
GRAM STN SPEC: ABNORMAL
HCT VFR BLD AUTO: 30.9 % (ref 36–48)
HGB BLD-MCNC: 10.2 G/DL (ref 12–16)
HGB UR QL STRIP.AUTO: ABNORMAL
INR PPP: 1.21 (ref 0.84–1.16)
KETONES UR STRIP.AUTO-MCNC: NEGATIVE MG/DL
LEUKOCYTE ESTERASE UR QL STRIP.AUTO: ABNORMAL
LYMPHOCYTES # BLD: 1.2 K/UL (ref 1–5.1)
LYMPHOCYTES NFR BLD: 9.8 %
MCH RBC QN AUTO: 29.7 PG (ref 26–34)
MCHC RBC AUTO-ENTMCNC: 33.1 G/DL (ref 31–36)
MCV RBC AUTO: 89.5 FL (ref 80–100)
MONOCYTES # BLD: 1.3 K/UL (ref 0–1.3)
MONOCYTES NFR BLD: 11.1 %
NEUTROPHILS # BLD: 9.3 K/UL (ref 1.7–7.7)
NEUTROPHILS NFR BLD: 78.5 %
NITRITE UR QL STRIP.AUTO: NEGATIVE
ORGANISM: ABNORMAL
ORGANISM: ABNORMAL
PH UR STRIP.AUTO: 6 [PH] (ref 5–8)
PLATELET # BLD AUTO: 137 K/UL (ref 135–450)
PMV BLD AUTO: 10.2 FL (ref 5–10.5)
POTASSIUM SERPL-SCNC: 4.3 MMOL/L (ref 3.5–5.1)
PROT SERPL-MCNC: 6 G/DL (ref 6.4–8.2)
PROT UR STRIP.AUTO-MCNC: NEGATIVE MG/DL
PROTHROMBIN TIME: 15.3 SEC (ref 11.5–14.8)
RBC # BLD AUTO: 3.46 M/UL (ref 4–5.2)
RBC #/AREA URNS HPF: ABNORMAL /HPF (ref 0–4)
SODIUM SERPL-SCNC: 127 MMOL/L (ref 136–145)
SP GR UR STRIP.AUTO: <=1.005 (ref 1–1.03)
UA COMPLETE W REFLEX CULTURE PNL UR: YES
UA DIPSTICK W REFLEX MICRO PNL UR: YES
URN SPEC COLLECT METH UR: ABNORMAL
UROBILINOGEN UR STRIP-ACNC: 0.2 E.U./DL
WBC # BLD AUTO: 11.8 K/UL (ref 4–11)
WBC #/AREA URNS HPF: ABNORMAL /HPF (ref 0–5)

## 2023-09-03 PROCEDURE — 85025 COMPLETE CBC W/AUTO DIFF WBC: CPT

## 2023-09-03 PROCEDURE — 6370000000 HC RX 637 (ALT 250 FOR IP)

## 2023-09-03 PROCEDURE — 2709999900 HC NON-CHARGEABLE SUPPLY: Performed by: UROLOGY

## 2023-09-03 PROCEDURE — 99285 EMERGENCY DEPT VISIT HI MDM: CPT

## 2023-09-03 PROCEDURE — 80053 COMPREHEN METABOLIC PANEL: CPT

## 2023-09-03 PROCEDURE — 3600000014 HC SURGERY LEVEL 4 ADDTL 15MIN: Performed by: UROLOGY

## 2023-09-03 PROCEDURE — 3700000000 HC ANESTHESIA ATTENDED CARE: Performed by: UROLOGY

## 2023-09-03 PROCEDURE — 85610 PROTHROMBIN TIME: CPT

## 2023-09-03 PROCEDURE — 87086 URINE CULTURE/COLONY COUNT: CPT

## 2023-09-03 PROCEDURE — 74176 CT ABD & PELVIS W/O CONTRAST: CPT

## 2023-09-03 PROCEDURE — 7100000000 HC PACU RECOVERY - FIRST 15 MIN: Performed by: UROLOGY

## 2023-09-03 PROCEDURE — BT1F1ZZ FLUOROSCOPY OF LEFT KIDNEY, URETER AND BLADDER USING LOW OSMOLAR CONTRAST: ICD-10-PCS | Performed by: UROLOGY

## 2023-09-03 PROCEDURE — 6360000002 HC RX W HCPCS: Performed by: ANESTHESIOLOGY

## 2023-09-03 PROCEDURE — 1200000000 HC SEMI PRIVATE

## 2023-09-03 PROCEDURE — 0T778DZ DILATION OF LEFT URETER WITH INTRALUMINAL DEVICE, VIA NATURAL OR ARTIFICIAL OPENING ENDOSCOPIC: ICD-10-PCS | Performed by: UROLOGY

## 2023-09-03 PROCEDURE — C2617 STENT, NON-COR, TEM W/O DEL: HCPCS | Performed by: UROLOGY

## 2023-09-03 PROCEDURE — 96361 HYDRATE IV INFUSION ADD-ON: CPT

## 2023-09-03 PROCEDURE — 6360000002 HC RX W HCPCS

## 2023-09-03 PROCEDURE — 2580000003 HC RX 258

## 2023-09-03 PROCEDURE — C1769 GUIDE WIRE: HCPCS | Performed by: UROLOGY

## 2023-09-03 PROCEDURE — 36415 COLL VENOUS BLD VENIPUNCTURE: CPT

## 2023-09-03 PROCEDURE — 2580000003 HC RX 258: Performed by: UROLOGY

## 2023-09-03 PROCEDURE — 3700000001 HC ADD 15 MINUTES (ANESTHESIA): Performed by: UROLOGY

## 2023-09-03 PROCEDURE — 2580000003 HC RX 258: Performed by: ANESTHESIOLOGY

## 2023-09-03 PROCEDURE — 96375 TX/PRO/DX INJ NEW DRUG ADDON: CPT

## 2023-09-03 PROCEDURE — 51702 INSERT TEMP BLADDER CATH: CPT

## 2023-09-03 PROCEDURE — 2580000003 HC RX 258: Performed by: NURSE PRACTITIONER

## 2023-09-03 PROCEDURE — 7100000001 HC PACU RECOVERY - ADDTL 15 MIN: Performed by: UROLOGY

## 2023-09-03 PROCEDURE — 3600000004 HC SURGERY LEVEL 4 BASE: Performed by: UROLOGY

## 2023-09-03 PROCEDURE — 96365 THER/PROPH/DIAG IV INF INIT: CPT

## 2023-09-03 PROCEDURE — 99223 1ST HOSP IP/OBS HIGH 75: CPT

## 2023-09-03 PROCEDURE — 51798 US URINE CAPACITY MEASURE: CPT

## 2023-09-03 PROCEDURE — 6360000004 HC RX CONTRAST MEDICATION: Performed by: UROLOGY

## 2023-09-03 PROCEDURE — 81001 URINALYSIS AUTO W/SCOPE: CPT

## 2023-09-03 PROCEDURE — 74420 UROGRAPHY RTRGR +-KUB: CPT

## 2023-09-03 PROCEDURE — 2060000000 HC ICU INTERMEDIATE R&B

## 2023-09-03 PROCEDURE — 6360000002 HC RX W HCPCS: Performed by: NURSE PRACTITIONER

## 2023-09-03 DEVICE — URETERAL STENT
Type: IMPLANTABLE DEVICE | Site: URETER | Status: FUNCTIONAL
Brand: CONTOUR™

## 2023-09-03 RX ORDER — SODIUM CHLORIDE 9 MG/ML
INJECTION, SOLUTION INTRAVENOUS CONTINUOUS
Status: DISCONTINUED | OUTPATIENT
Start: 2023-09-03 | End: 2023-09-04

## 2023-09-03 RX ORDER — FERROUS SULFATE 325(65) MG
325 TABLET ORAL
Status: DISCONTINUED | OUTPATIENT
Start: 2023-09-04 | End: 2023-09-06 | Stop reason: HOSPADM

## 2023-09-03 RX ORDER — ONDANSETRON 4 MG/1
4 TABLET, ORALLY DISINTEGRATING ORAL EVERY 8 HOURS PRN
Status: DISCONTINUED | OUTPATIENT
Start: 2023-09-03 | End: 2023-09-06 | Stop reason: HOSPADM

## 2023-09-03 RX ORDER — SODIUM CHLORIDE 0.9 % (FLUSH) 0.9 %
5-40 SYRINGE (ML) INJECTION EVERY 12 HOURS SCHEDULED
Status: DISCONTINUED | OUTPATIENT
Start: 2023-09-03 | End: 2023-09-06 | Stop reason: HOSPADM

## 2023-09-03 RX ORDER — FENTANYL CITRATE 50 UG/ML
INJECTION, SOLUTION INTRAMUSCULAR; INTRAVENOUS PRN
Status: DISCONTINUED | OUTPATIENT
Start: 2023-09-03 | End: 2023-09-03 | Stop reason: SDUPTHER

## 2023-09-03 RX ORDER — ONDANSETRON 2 MG/ML
4 INJECTION INTRAMUSCULAR; INTRAVENOUS ONCE
Status: COMPLETED | OUTPATIENT
Start: 2023-09-03 | End: 2023-09-03

## 2023-09-03 RX ORDER — HYDROCHLOROTHIAZIDE 25 MG/1
25 TABLET ORAL DAILY
Status: ON HOLD | COMMUNITY
End: 2023-09-06 | Stop reason: HOSPADM

## 2023-09-03 RX ORDER — SODIUM CHLORIDE 9 MG/ML
INJECTION, SOLUTION INTRAVENOUS PRN
Status: DISCONTINUED | OUTPATIENT
Start: 2023-09-03 | End: 2023-09-06 | Stop reason: HOSPADM

## 2023-09-03 RX ORDER — ONDANSETRON 2 MG/ML
4 INJECTION INTRAMUSCULAR; INTRAVENOUS EVERY 6 HOURS PRN
Status: DISCONTINUED | OUTPATIENT
Start: 2023-09-03 | End: 2023-09-06 | Stop reason: HOSPADM

## 2023-09-03 RX ORDER — OXYCODONE HYDROCHLORIDE 5 MG/1
10 TABLET ORAL PRN
Status: DISCONTINUED | OUTPATIENT
Start: 2023-09-03 | End: 2023-09-03 | Stop reason: HOSPADM

## 2023-09-03 RX ORDER — SUCCINYLCHOLINE CHLORIDE 20 MG/ML
INJECTION INTRAMUSCULAR; INTRAVENOUS PRN
Status: DISCONTINUED | OUTPATIENT
Start: 2023-09-03 | End: 2023-09-03 | Stop reason: SDUPTHER

## 2023-09-03 RX ORDER — SODIUM CHLORIDE 0.9 % (FLUSH) 0.9 %
5-40 SYRINGE (ML) INJECTION EVERY 12 HOURS SCHEDULED
Status: DISCONTINUED | OUTPATIENT
Start: 2023-09-03 | End: 2023-09-03 | Stop reason: HOSPADM

## 2023-09-03 RX ORDER — PROPOFOL 10 MG/ML
INJECTION, EMULSION INTRAVENOUS PRN
Status: DISCONTINUED | OUTPATIENT
Start: 2023-09-03 | End: 2023-09-03 | Stop reason: SDUPTHER

## 2023-09-03 RX ORDER — DIPHENHYDRAMINE HYDROCHLORIDE 50 MG/ML
12.5 INJECTION INTRAMUSCULAR; INTRAVENOUS
Status: DISCONTINUED | OUTPATIENT
Start: 2023-09-03 | End: 2023-09-03 | Stop reason: HOSPADM

## 2023-09-03 RX ORDER — DEXAMETHASONE SODIUM PHOSPHATE 4 MG/ML
INJECTION, SOLUTION INTRA-ARTICULAR; INTRALESIONAL; INTRAMUSCULAR; INTRAVENOUS; SOFT TISSUE PRN
Status: DISCONTINUED | OUTPATIENT
Start: 2023-09-03 | End: 2023-09-03 | Stop reason: SDUPTHER

## 2023-09-03 RX ORDER — TAMSULOSIN HYDROCHLORIDE 0.4 MG/1
0.4 CAPSULE ORAL NIGHTLY
Status: DISCONTINUED | OUTPATIENT
Start: 2023-09-03 | End: 2023-09-06 | Stop reason: HOSPADM

## 2023-09-03 RX ORDER — MAGNESIUM HYDROXIDE 1200 MG/15ML
LIQUID ORAL PRN
Status: DISCONTINUED | OUTPATIENT
Start: 2023-09-03 | End: 2023-09-03 | Stop reason: ALTCHOICE

## 2023-09-03 RX ORDER — HEPARIN SODIUM 5000 [USP'U]/ML
5000 INJECTION, SOLUTION INTRAVENOUS; SUBCUTANEOUS EVERY 8 HOURS SCHEDULED
Status: DISCONTINUED | OUTPATIENT
Start: 2023-09-03 | End: 2023-09-06 | Stop reason: HOSPADM

## 2023-09-03 RX ORDER — LISINOPRIL 10 MG/1
10 TABLET ORAL DAILY
Status: ON HOLD | COMMUNITY
End: 2023-09-06 | Stop reason: HOSPADM

## 2023-09-03 RX ORDER — POTASSIUM CHLORIDE 7.45 MG/ML
10 INJECTION INTRAVENOUS PRN
Status: DISCONTINUED | OUTPATIENT
Start: 2023-09-03 | End: 2023-09-03

## 2023-09-03 RX ORDER — 0.9 % SODIUM CHLORIDE 0.9 %
1000 INTRAVENOUS SOLUTION INTRAVENOUS ONCE
Status: COMPLETED | OUTPATIENT
Start: 2023-09-03 | End: 2023-09-03

## 2023-09-03 RX ORDER — ACETAMINOPHEN 500 MG
500 TABLET ORAL EVERY 6 HOURS PRN
COMMUNITY

## 2023-09-03 RX ORDER — ACETAMINOPHEN 650 MG/1
650 SUPPOSITORY RECTAL EVERY 6 HOURS PRN
Status: DISCONTINUED | OUTPATIENT
Start: 2023-09-03 | End: 2023-09-06 | Stop reason: HOSPADM

## 2023-09-03 RX ORDER — PROPRANOLOL HYDROCHLORIDE 40 MG/1
40 TABLET ORAL 2 TIMES DAILY
Status: DISCONTINUED | OUTPATIENT
Start: 2023-09-03 | End: 2023-09-06 | Stop reason: HOSPADM

## 2023-09-03 RX ORDER — METOPROLOL SUCCINATE 25 MG/1
25 TABLET, EXTENDED RELEASE ORAL DAILY
Status: DISCONTINUED | OUTPATIENT
Start: 2023-09-04 | End: 2023-09-05

## 2023-09-03 RX ORDER — ONDANSETRON 2 MG/ML
4 INJECTION INTRAMUSCULAR; INTRAVENOUS
Status: DISCONTINUED | OUTPATIENT
Start: 2023-09-03 | End: 2023-09-03 | Stop reason: HOSPADM

## 2023-09-03 RX ORDER — ASPIRIN 81 MG/1
81 TABLET ORAL DAILY
Status: DISCONTINUED | OUTPATIENT
Start: 2023-09-04 | End: 2023-09-06 | Stop reason: HOSPADM

## 2023-09-03 RX ORDER — MAGNESIUM SULFATE 1 G/100ML
1000 INJECTION INTRAVENOUS PRN
Status: DISCONTINUED | OUTPATIENT
Start: 2023-09-03 | End: 2023-09-03

## 2023-09-03 RX ORDER — SODIUM CHLORIDE, SODIUM LACTATE, POTASSIUM CHLORIDE, CALCIUM CHLORIDE 600; 310; 30; 20 MG/100ML; MG/100ML; MG/100ML; MG/100ML
INJECTION, SOLUTION INTRAVENOUS CONTINUOUS PRN
Status: DISCONTINUED | OUTPATIENT
Start: 2023-09-03 | End: 2023-09-03 | Stop reason: SDUPTHER

## 2023-09-03 RX ORDER — VITAMIN B COMPLEX
1000 TABLET ORAL WEEKLY
Status: DISCONTINUED | OUTPATIENT
Start: 2023-09-03 | End: 2023-09-06 | Stop reason: HOSPADM

## 2023-09-03 RX ORDER — ACETAMINOPHEN 325 MG/1
650 TABLET ORAL EVERY 6 HOURS PRN
Status: DISCONTINUED | OUTPATIENT
Start: 2023-09-03 | End: 2023-09-06 | Stop reason: HOSPADM

## 2023-09-03 RX ORDER — SODIUM CHLORIDE 0.9 % (FLUSH) 0.9 %
5-40 SYRINGE (ML) INJECTION PRN
Status: DISCONTINUED | OUTPATIENT
Start: 2023-09-03 | End: 2023-09-03 | Stop reason: HOSPADM

## 2023-09-03 RX ORDER — POLYETHYLENE GLYCOL 3350 17 G/17G
17 POWDER, FOR SOLUTION ORAL DAILY PRN
Status: DISCONTINUED | OUTPATIENT
Start: 2023-09-03 | End: 2023-09-06 | Stop reason: HOSPADM

## 2023-09-03 RX ORDER — SODIUM CHLORIDE 0.9 % (FLUSH) 0.9 %
10 SYRINGE (ML) INJECTION PRN
Status: DISCONTINUED | OUTPATIENT
Start: 2023-09-03 | End: 2023-09-06 | Stop reason: HOSPADM

## 2023-09-03 RX ORDER — OXYCODONE HYDROCHLORIDE AND ACETAMINOPHEN 5; 325 MG/1; MG/1
1 TABLET ORAL EVERY 4 HOURS PRN
Status: DISCONTINUED | OUTPATIENT
Start: 2023-09-03 | End: 2023-09-06 | Stop reason: HOSPADM

## 2023-09-03 RX ORDER — ONDANSETRON 2 MG/ML
INJECTION INTRAMUSCULAR; INTRAVENOUS PRN
Status: DISCONTINUED | OUTPATIENT
Start: 2023-09-03 | End: 2023-09-03 | Stop reason: SDUPTHER

## 2023-09-03 RX ORDER — LABETALOL HYDROCHLORIDE 5 MG/ML
10 INJECTION, SOLUTION INTRAVENOUS
Status: DISCONTINUED | OUTPATIENT
Start: 2023-09-03 | End: 2023-09-03 | Stop reason: HOSPADM

## 2023-09-03 RX ORDER — OXYCODONE HYDROCHLORIDE 5 MG/1
5 TABLET ORAL PRN
Status: DISCONTINUED | OUTPATIENT
Start: 2023-09-03 | End: 2023-09-03 | Stop reason: HOSPADM

## 2023-09-03 RX ORDER — POTASSIUM CHLORIDE 20 MEQ/1
40 TABLET, EXTENDED RELEASE ORAL PRN
Status: DISCONTINUED | OUTPATIENT
Start: 2023-09-03 | End: 2023-09-03

## 2023-09-03 RX ORDER — SODIUM CHLORIDE 9 MG/ML
INJECTION, SOLUTION INTRAVENOUS PRN
Status: DISCONTINUED | OUTPATIENT
Start: 2023-09-03 | End: 2023-09-03 | Stop reason: HOSPADM

## 2023-09-03 RX ADMIN — TAMSULOSIN HYDROCHLORIDE 0.4 MG: 0.4 CAPSULE ORAL at 20:10

## 2023-09-03 RX ADMIN — CEFTRIAXONE SODIUM 1000 MG: 1 INJECTION, POWDER, FOR SOLUTION INTRAMUSCULAR; INTRAVENOUS at 21:00

## 2023-09-03 RX ADMIN — ONDANSETRON HYDROCHLORIDE 4 MG: 2 INJECTION, SOLUTION INTRAMUSCULAR; INTRAVENOUS at 16:54

## 2023-09-03 RX ADMIN — SODIUM CHLORIDE, POTASSIUM CHLORIDE, SODIUM LACTATE AND CALCIUM CHLORIDE: 600; 310; 30; 20 INJECTION, SOLUTION INTRAVENOUS at 16:47

## 2023-09-03 RX ADMIN — ONDANSETRON 4 MG: 2 INJECTION INTRAMUSCULAR; INTRAVENOUS at 15:58

## 2023-09-03 RX ADMIN — PROPOFOL 200 MG: 10 INJECTION, EMULSION INTRAVENOUS at 16:49

## 2023-09-03 RX ADMIN — SUCCINYLCHOLINE CHLORIDE 140 MG: 20 INJECTION, SOLUTION INTRAMUSCULAR; INTRAVENOUS at 16:49

## 2023-09-03 RX ADMIN — OXYCODONE HYDROCHLORIDE AND ACETAMINOPHEN 1 TABLET: 5; 325 TABLET ORAL at 20:10

## 2023-09-03 RX ADMIN — PROPRANOLOL HYDROCHLORIDE 40 MG: 40 TABLET ORAL at 21:03

## 2023-09-03 RX ADMIN — SERTRALINE HYDROCHLORIDE 25 MG: 50 TABLET ORAL at 20:10

## 2023-09-03 RX ADMIN — ONDANSETRON 4 MG: 2 INJECTION INTRAMUSCULAR; INTRAVENOUS at 10:19

## 2023-09-03 RX ADMIN — SODIUM CHLORIDE 1000 ML: 9 INJECTION, SOLUTION INTRAVENOUS at 11:39

## 2023-09-03 RX ADMIN — CEFAZOLIN 2000 MG: 2 INJECTION, POWDER, FOR SOLUTION INTRAMUSCULAR; INTRAVENOUS at 13:19

## 2023-09-03 RX ADMIN — HEPARIN SODIUM 5000 UNITS: 5000 INJECTION INTRAVENOUS; SUBCUTANEOUS at 23:43

## 2023-09-03 RX ADMIN — SODIUM CHLORIDE, PRESERVATIVE FREE 10 ML: 5 INJECTION INTRAVENOUS at 21:01

## 2023-09-03 RX ADMIN — DEXAMETHASONE SODIUM PHOSPHATE 8 MG: 4 INJECTION, SOLUTION INTRAMUSCULAR; INTRAVENOUS at 16:54

## 2023-09-03 RX ADMIN — FENTANYL CITRATE 50 MCG: 50 INJECTION INTRAMUSCULAR; INTRAVENOUS at 16:47

## 2023-09-03 RX ADMIN — SODIUM CHLORIDE: 9 INJECTION, SOLUTION INTRAVENOUS at 19:07

## 2023-09-03 ASSESSMENT — ENCOUNTER SYMPTOMS
SHORTNESS OF BREATH: 0
COUGH: 0
BACK PAIN: 0
NAUSEA: 1
ABDOMINAL PAIN: 0
EYE PAIN: 0
VOMITING: 0
SORE THROAT: 0
RHINORRHEA: 0

## 2023-09-03 ASSESSMENT — LIFESTYLE VARIABLES: SMOKING_STATUS: 1

## 2023-09-03 ASSESSMENT — PAIN DESCRIPTION - LOCATION
LOCATION: ABDOMEN
LOCATION: ABDOMEN

## 2023-09-03 ASSESSMENT — PAIN - FUNCTIONAL ASSESSMENT: PAIN_FUNCTIONAL_ASSESSMENT: 0-10

## 2023-09-03 ASSESSMENT — PAIN SCALES - GENERAL
PAINLEVEL_OUTOF10: 6
PAINLEVEL_OUTOF10: 7
PAINLEVEL_OUTOF10: 2

## 2023-09-03 NOTE — OP NOTE
Indications Perioperative use for selected surgical procedures 09/01/23 0852   Site Assessment Pink 09/01/23 0852   Urine Color Pink;Orange 09/01/23 0852   Urine Appearance Clear 09/01/23 0852   Urine Odor Malodorous 09/01/23 0852   Collection Container Standard 09/01/23 0852   Securement Method Securing device (Describe) 09/01/23 0852   Catheter Care  Perineal wipes 08/31/23 2030   Catheter Best Practices  Drainage tube clipped to bed;Catheter secured to thigh; Tamper seal intact; Bag below bladder;Bag not on floor; Lack of dependent loop in tubing;Drainage bag less than half full 09/01/23 0852   Status Draining;Patent 09/01/23 0852   Output (mL) 700 mL 09/01/23 0533       Findings: left obstructing proximal ureteral stone in solitary kidney -  stent placed - cortez placed     Detailed Description of Procedure:   After obtaining informed consent, the patient was brought to the operating room and placed supine on the operating room table. After adequate anesthesia, she was then repositioned in the dorsal lithotomy position and prepped & draped in the standard surgical fashion. I began the case by doing rigid cystoscopy with a 21-Luxembourgish sheath and a 30-degree lens. Urethra was normal. The bladder was free of masses or lesions. The LEFT ureteral orifice was identified. I was able to advance a Sensor wire to the expected location of the LEFT renal pelvis after manipulating past the stone. I then placed the open-ended catheter into the left ureter. A very gentle retrograde pyelogram delineated the renal anatomy - there was hydronephrosis, the anatomy was distorted. Wire was replaced and curled appropriately within the upper pole of kidney. Over this a 6 x 24 JJ stent was placed with good curls noted in the kidney and bladder. Urine was murky and was sent for culture. 18Fr cortez was placed for maximal  decompression. The patient was taken to the PACU in stable condition. There were no apparent complications.

## 2023-09-03 NOTE — H&P
[facility] Medicine History & Physical      PCP: Sarah Alexander MD    Date of Admission: 9/3/2023    Date of Service: Pt seen/examined on 09/03/23     Chief Complaint:    Chief Complaint   Patient presents with    Urinary Retention     Pt had stents placed and litho Friday, urinated before DC, less urination over the weekend and as of last night pt unable to urinate. Reports bloating but denies pain in abd, reports soreness from procedure. History Of Present Illness: The patient is a 63 y.o. female with PMH of anxiety, depression, hepC, and HTN who presented to [facility] ED with complaint of urinary retention. Pt states that she was recently discharged from the hospital after a stent replacement and has been able to urinate less and less since then. She states that last night she was unable to urinate at all and felt bloated. She states that she just wasn't feeling good overall and had a few episodes of vomiting as well. She denies SOB, CP, fever/chills, or dysuria.      Past Medical History:        Diagnosis Date    Anxiety     Depression     Headache(784.0)     Hepatitis C 08/21/2020    Hypertension        Past Surgical History:        Procedure Laterality Date    CYSTOSCOPY Left 01/11/2021    CYSTOSCOPY, DIAGNOSTIC LEFT URETEROSCOPY WITH HOLMIUM LASER LITHOTRIPSY, LEFT STENT EXCHANGE performed by Tony Lombardo MD at [address] [place] [address] Dr Left 08/20/2021    CYSTOSCOPY, LEFT RETROGRADE,  LEFT STENT REMOVAL performed by Shayna Vazquez MD at [address] [place] [address] Dr Lamb 06/24/2023    CYSTOSCOPY URETERAL STENT INSERTION performed by Tony Lombardo MD at [address] [place] [address] Dr Left 8/30/2023    CYSTOSCOPY, LEFT URETEROSCOPY WITH  HOLMIUM LASER LITHOTRIPSY AND LEFT URETERAL STENT EXCHANGE performed by Shayna Vazquez MD at [address] [place] [address] / [address] [place] [address] Ambrose / Belle Lamb 08/20/2020    CYSTOSCOPY URETERAL STENT INSERTION performed by Tony Lombardo MD at [address] [place] [address]

## 2023-09-04 LAB
ANION GAP SERPL CALCULATED.3IONS-SCNC: 13 MMOL/L (ref 3–16)
BASOPHILS # BLD: 0 K/UL (ref 0–0.2)
BASOPHILS NFR BLD: 0.1 %
BUN SERPL-MCNC: 37 MG/DL (ref 7–20)
CALCIUM SERPL-MCNC: 8.2 MG/DL (ref 8.3–10.6)
CHLORIDE SERPL-SCNC: 102 MMOL/L (ref 99–110)
CO2 SERPL-SCNC: 19 MMOL/L (ref 21–32)
CREAT SERPL-MCNC: 5.1 MG/DL (ref 0.6–1.1)
DEPRECATED RDW RBC AUTO: 14.2 % (ref 12.4–15.4)
EOSINOPHIL # BLD: 0 K/UL (ref 0–0.6)
EOSINOPHIL NFR BLD: 0.1 %
GFR SERPLBLD CREATININE-BSD FMLA CKD-EPI: 9 ML/MIN/{1.73_M2}
GLUCOSE SERPL-MCNC: 127 MG/DL (ref 70–99)
HCT VFR BLD AUTO: 27.6 % (ref 36–48)
HGB BLD-MCNC: 9.4 G/DL (ref 12–16)
LYMPHOCYTES # BLD: 0.6 K/UL (ref 1–5.1)
LYMPHOCYTES NFR BLD: 13.3 %
MCH RBC QN AUTO: 30.3 PG (ref 26–34)
MCHC RBC AUTO-ENTMCNC: 33.9 G/DL (ref 31–36)
MCV RBC AUTO: 89.5 FL (ref 80–100)
MONOCYTES # BLD: 0.1 K/UL (ref 0–1.3)
MONOCYTES NFR BLD: 2.4 %
NEUTROPHILS # BLD: 3.5 K/UL (ref 1.7–7.7)
NEUTROPHILS NFR BLD: 84.1 %
PLATELET # BLD AUTO: 118 K/UL (ref 135–450)
PMV BLD AUTO: 10.4 FL (ref 5–10.5)
POTASSIUM SERPL-SCNC: 5.2 MMOL/L (ref 3.5–5.1)
RBC # BLD AUTO: 3.08 M/UL (ref 4–5.2)
SODIUM SERPL-SCNC: 134 MMOL/L (ref 136–145)
WBC # BLD AUTO: 4.2 K/UL (ref 4–11)

## 2023-09-04 PROCEDURE — 2580000003 HC RX 258

## 2023-09-04 PROCEDURE — 99233 SBSQ HOSP IP/OBS HIGH 50: CPT | Performed by: INTERNAL MEDICINE

## 2023-09-04 PROCEDURE — 6360000002 HC RX W HCPCS

## 2023-09-04 PROCEDURE — 2580000003 HC RX 258: Performed by: INTERNAL MEDICINE

## 2023-09-04 PROCEDURE — 6370000000 HC RX 637 (ALT 250 FOR IP)

## 2023-09-04 PROCEDURE — 80048 BASIC METABOLIC PNL TOTAL CA: CPT

## 2023-09-04 PROCEDURE — 1200000000 HC SEMI PRIVATE

## 2023-09-04 PROCEDURE — 85025 COMPLETE CBC W/AUTO DIFF WBC: CPT

## 2023-09-04 PROCEDURE — 6370000000 HC RX 637 (ALT 250 FOR IP): Performed by: INTERNAL MEDICINE

## 2023-09-04 PROCEDURE — 36415 COLL VENOUS BLD VENIPUNCTURE: CPT

## 2023-09-04 RX ORDER — SODIUM CHLORIDE, SODIUM LACTATE, POTASSIUM CHLORIDE, CALCIUM CHLORIDE 600; 310; 30; 20 MG/100ML; MG/100ML; MG/100ML; MG/100ML
INJECTION, SOLUTION INTRAVENOUS CONTINUOUS
Status: DISCONTINUED | OUTPATIENT
Start: 2023-09-04 | End: 2023-09-06 | Stop reason: HOSPADM

## 2023-09-04 RX ADMIN — SODIUM CHLORIDE, PRESERVATIVE FREE 10 ML: 5 INJECTION INTRAVENOUS at 09:08

## 2023-09-04 RX ADMIN — HEPARIN SODIUM 5000 UNITS: 5000 INJECTION INTRAVENOUS; SUBCUTANEOUS at 05:21

## 2023-09-04 RX ADMIN — SODIUM CHLORIDE, POTASSIUM CHLORIDE, SODIUM LACTATE AND CALCIUM CHLORIDE: 600; 310; 30; 20 INJECTION, SOLUTION INTRAVENOUS at 11:09

## 2023-09-04 RX ADMIN — SODIUM ZIRCONIUM CYCLOSILICATE 5 G: 5 POWDER, FOR SUSPENSION ORAL at 09:26

## 2023-09-04 RX ADMIN — FERROUS SULFATE TAB 325 MG (65 MG ELEMENTAL FE) 325 MG: 325 (65 FE) TAB at 07:33

## 2023-09-04 RX ADMIN — OXYCODONE HYDROCHLORIDE AND ACETAMINOPHEN 1 TABLET: 5; 325 TABLET ORAL at 20:16

## 2023-09-04 RX ADMIN — SERTRALINE HYDROCHLORIDE 25 MG: 50 TABLET ORAL at 07:33

## 2023-09-04 RX ADMIN — SODIUM CHLORIDE, POTASSIUM CHLORIDE, SODIUM LACTATE AND CALCIUM CHLORIDE: 600; 310; 30; 20 INJECTION, SOLUTION INTRAVENOUS at 17:26

## 2023-09-04 RX ADMIN — PROPRANOLOL HYDROCHLORIDE 40 MG: 40 TABLET ORAL at 09:26

## 2023-09-04 RX ADMIN — OXYCODONE HYDROCHLORIDE AND ACETAMINOPHEN 1 TABLET: 5; 325 TABLET ORAL at 07:32

## 2023-09-04 RX ADMIN — METOPROLOL SUCCINATE 25 MG: 25 TABLET, EXTENDED RELEASE ORAL at 07:32

## 2023-09-04 RX ADMIN — SODIUM CHLORIDE, PRESERVATIVE FREE 10 ML: 5 INJECTION INTRAVENOUS at 20:19

## 2023-09-04 RX ADMIN — TAMSULOSIN HYDROCHLORIDE 0.4 MG: 0.4 CAPSULE ORAL at 20:16

## 2023-09-04 RX ADMIN — PROPRANOLOL HYDROCHLORIDE 40 MG: 40 TABLET ORAL at 20:16

## 2023-09-04 RX ADMIN — OXYCODONE HYDROCHLORIDE AND ACETAMINOPHEN 1 TABLET: 5; 325 TABLET ORAL at 02:51

## 2023-09-04 RX ADMIN — ONDANSETRON 4 MG: 4 TABLET, ORALLY DISINTEGRATING ORAL at 02:51

## 2023-09-04 RX ADMIN — SODIUM CHLORIDE: 9 INJECTION, SOLUTION INTRAVENOUS at 07:31

## 2023-09-04 RX ADMIN — OXYCODONE HYDROCHLORIDE AND ACETAMINOPHEN 1 TABLET: 5; 325 TABLET ORAL at 12:54

## 2023-09-04 RX ADMIN — HEPARIN SODIUM 5000 UNITS: 5000 INJECTION INTRAVENOUS; SUBCUTANEOUS at 12:54

## 2023-09-04 RX ADMIN — CEFTRIAXONE SODIUM 1000 MG: 1 INJECTION, POWDER, FOR SOLUTION INTRAMUSCULAR; INTRAVENOUS at 17:46

## 2023-09-04 ASSESSMENT — PAIN SCALES - GENERAL
PAINLEVEL_OUTOF10: 6
PAINLEVEL_OUTOF10: 8
PAINLEVEL_OUTOF10: 3
PAINLEVEL_OUTOF10: 3
PAINLEVEL_OUTOF10: 7
PAINLEVEL_OUTOF10: 7

## 2023-09-04 ASSESSMENT — PAIN DESCRIPTION - ORIENTATION
ORIENTATION: LOWER
ORIENTATION: LOWER;RIGHT;LEFT
ORIENTATION: LEFT

## 2023-09-04 ASSESSMENT — PAIN DESCRIPTION - LOCATION
LOCATION: ABDOMEN
LOCATION: ABDOMEN;BACK
LOCATION: ABDOMEN
LOCATION: ABDOMEN;BACK

## 2023-09-04 ASSESSMENT — PAIN DESCRIPTION - DESCRIPTORS
DESCRIPTORS: ACHING;DISCOMFORT
DESCRIPTORS: ACHING

## 2023-09-04 NOTE — FLOWSHEET NOTE
09/04/23 0654   Handoff   Communication Given Shift Handoff   Handoff Given To Pati Jackson RN   Handoff Received From Chris Mohan RN   Handoff Communication Face to Face   Time Handoff Given 5472   End of Shift Check Performed Yes     Report given to Pati Jackson RN. Pt in bed, call light within reach. Pt is stable, care is transferred.

## 2023-09-04 NOTE — PLAN OF CARE
Problem: Discharge Planning  Goal: Discharge to home or other facility with appropriate resources  9/3/2023 2014 by Kieran Grimes RN  Outcome: Progressing  9/3/2023 1841 by Sebastian Garcia RN  Outcome: Progressing     Problem: Pain  Goal: Verbalizes/displays adequate comfort level or baseline comfort level  9/3/2023 2014 by Kieran Grimes RN  Outcome: Progressing  9/3/2023 1841 by Sebastian Garcia RN  Outcome: Progressing     Problem: Safety - Adult  Goal: Free from fall injury  9/3/2023 2014 by Kieran Grimes RN  Outcome: Progressing  9/3/2023 1841 by Sebastian Garcia RN  Outcome: Progressing     Problem: ABCDS Injury Assessment  Goal: Absence of physical injury  9/3/2023 2014 by Kieran Grimes RN  Outcome: Progressing  9/3/2023 1841 by Sebastian Garcai RN  Outcome: Progressing

## 2023-09-04 NOTE — PLAN OF CARE
Problem: Discharge Planning  Goal: Discharge to home or other facility with appropriate resources  9/4/2023 0704 by Yulissa Luong RN  Outcome: Progressing  9/3/2023 2014 by Alvin Lang RN  Outcome: Progressing  9/3/2023 1841 by Yulissa Luong RN  Outcome: Progressing     Problem: Pain  Goal: Verbalizes/displays adequate comfort level or baseline comfort level  9/4/2023 0704 by Yulissa Luong RN  Outcome: Progressing  9/3/2023 2014 by Alvin Lang RN  Outcome: Progressing  9/3/2023 1841 by Yulissa Luong RN  Outcome: Progressing     Problem: Safety - Adult  Goal: Free from fall injury  9/4/2023 0704 by Yulissa Luong RN  Outcome: Progressing  9/3/2023 2014 by Alvin Lang RN  Outcome: Progressing  9/3/2023 1841 by Yulissa Luong RN  Outcome: Progressing     Problem: ABCDS Injury Assessment  Goal: Absence of physical injury  9/4/2023 0704 by Yulissa Luong RN  Outcome: Progressing  9/3/2023 2014 by lAvin Lang RN  Outcome: Progressing  9/3/2023 1841 by Yulissa Luong RN  Outcome: Progressing

## 2023-09-04 NOTE — CONSULTS
Patient:  Major Ordonez  YOB: 1965   CSN:  [id number]    Referring Provider:  No ref. provider found   Primary Care Provider:  Miguel Abreu MD       Urology Attending Consult Note     Reason for Consultation:  nephrolithiasis, flank pain    Chief Complaint: \"left flank pain, no urine\"    HPI:  Major Ordonez is a 63 y.o. female who presented with history of severe renal colic and concern for urinary retentionwhich prompted visit to the ER. CT showed a 8mm stone in the LEFT proximal ureter. and +nausea and vomiting. The patient is miserable and does not feel well. Pt has been anuric since this morning. Murguia with scant UOP.  Recent URS with Dr Shirlene Velazco, stent removed 9/1/23    Past Medical History:   Diagnosis Date    Anxiety     Depression     Headache(784.0)     Hepatitis C 08/21/2020    Hypertension        Past Surgical History:   Procedure Laterality Date    CYSTOSCOPY Left 01/11/2021    CYSTOSCOPY, DIAGNOSTIC LEFT URETEROSCOPY WITH HOLMIUM LASER LITHOTRIPSY, LEFT STENT EXCHANGE performed by Cristino Mojica MD at [address] [place] [address] Dr Left 08/20/2021    CYSTOSCOPY, LEFT RETROGRADE,  LEFT STENT REMOVAL performed by Sylvia Curtis MD at [address] [place] [address] Dr Lamb 06/24/2023    CYSTOSCOPY URETERAL STENT INSERTION performed by Cristino Mojica MD at [address] [place] [address] Dr Left 8/30/2023    CYSTOSCOPY, LEFT URETEROSCOPY WITH  HOLMIUM LASER LITHOTRIPSY AND LEFT URETERAL STENT EXCHANGE performed by Sylvia Curtis MD at [facility] / [address] [place] [address] Ambrose / Douglas Sang Left 08/20/2020    CYSTOSCOPY URETERAL STENT INSERTION performed by Cristino Mojica MD at [facility] / [address] [place] [address] / Douglas Sang Left 01/27/2021    CYSTOSCOPY, LEFT STENT REMOVAL performed by Cristino Mojica MD at [address]  08/30/2023    CYSTOSCOPY, LEFT URETEROSCOPY WITH POSSIBLE HOLMIUM LASER LITHOTRIPSY AND LEFT URETERAL STENT EXCHANGE
facility-administered medications on file prior to encounter. Current Outpatient Medications on File Prior to Encounter   Medication Sig Dispense Refill    lisinopril (PRINIVIL;ZESTRIL) 10 MG tablet Take 1 tablet by mouth daily      acetaminophen (TYLENOL) 500 MG tablet Take 1 tablet by mouth every 6 hours as needed for Pain      hydroCHLOROthiazide (HYDRODIURIL) 25 MG tablet Take 1 tablet by mouth daily      ciprofloxacin (CIPRO) 500 MG tablet Take 1 tablet by mouth 2 times daily for 7 days (Patient not taking: Reported on 9/3/2023) 14 tablet 0    propranolol (INDERAL) 40 MG tablet Take 1 tablet by mouth 2 times daily      tamsulosin (FLOMAX) 0.4 MG capsule Take 1 capsule by mouth nightly (Patient not taking: Reported on 9/3/2023) 15 capsule 0    metoprolol succinate (TOPROL XL) 25 MG extended release tablet Take 1 tablet by mouth daily 30 tablet 3    ondansetron (ZOFRAN) 4 MG tablet Take 1 tablet by mouth every 8 hours as needed for Nausea or Vomiting 20 tablet 0    ASPIRIN LOW DOSE 81 MG EC tablet Take 1 tablet by mouth daily      sertraline (ZOLOFT) 25 MG tablet       ferrous sulfate (IRON 325) 325 (65 Fe) MG tablet Take 1 tablet by mouth daily (with breakfast)      hydrOXYzine (ATARAX) 25 MG tablet Take 1 tablet by mouth 3 times daily as needed for Itching (Patient not taking: Reported on 8/30/2023)      vitamin D (CHOLECALCIFEROL) 25 MCG (1000 UT) TABS tablet Take 1 tablet by mouth once a week Saturday         Allergies:  Patient has no allergy information on record.     Social History:    Social History     Socioeconomic History    Marital status: Single     Spouse name: Not on file    Number of children: Not on file    Years of education: Not on file    Highest education level: Not on file   Occupational History    Not on file   Tobacco Use    Smoking status: Every Day     Packs/day: 0.50     Years: 39.00     Pack years: 19.50     Types: Cigarettes    Smokeless tobacco: Never    Tobacco comments:

## 2023-09-05 PROBLEM — R33.9 URINARY RETENTION: Status: ACTIVE | Noted: 2023-09-05

## 2023-09-05 LAB
ANION GAP SERPL CALCULATED.3IONS-SCNC: 9 MMOL/L (ref 3–16)
BASOPHILS # BLD: 0 K/UL (ref 0–0.2)
BASOPHILS NFR BLD: 0.1 %
BUN SERPL-MCNC: 46 MG/DL (ref 7–20)
CALCIUM SERPL-MCNC: 8.6 MG/DL (ref 8.3–10.6)
CHLORIDE SERPL-SCNC: 103 MMOL/L (ref 99–110)
CO2 SERPL-SCNC: 21 MMOL/L (ref 21–32)
CREAT SERPL-MCNC: 3.6 MG/DL (ref 0.6–1.1)
DEPRECATED RDW RBC AUTO: 14.4 % (ref 12.4–15.4)
EOSINOPHIL # BLD: 0 K/UL (ref 0–0.6)
EOSINOPHIL NFR BLD: 0 %
GFR SERPLBLD CREATININE-BSD FMLA CKD-EPI: 14 ML/MIN/{1.73_M2}
GLUCOSE SERPL-MCNC: 114 MG/DL (ref 70–99)
HCT VFR BLD AUTO: 27.1 % (ref 36–48)
HGB BLD-MCNC: 9.2 G/DL (ref 12–16)
LYMPHOCYTES # BLD: 0.8 K/UL (ref 1–5.1)
LYMPHOCYTES NFR BLD: 11.6 %
MCH RBC QN AUTO: 30.3 PG (ref 26–34)
MCHC RBC AUTO-ENTMCNC: 34.1 G/DL (ref 31–36)
MCV RBC AUTO: 88.9 FL (ref 80–100)
MONOCYTES # BLD: 0.5 K/UL (ref 0–1.3)
MONOCYTES NFR BLD: 7.6 %
NEUTROPHILS # BLD: 5.8 K/UL (ref 1.7–7.7)
NEUTROPHILS NFR BLD: 80.7 %
PLATELET # BLD AUTO: 151 K/UL (ref 135–450)
PMV BLD AUTO: 10.1 FL (ref 5–10.5)
POTASSIUM SERPL-SCNC: 4.5 MMOL/L (ref 3.5–5.1)
RBC # BLD AUTO: 3.04 M/UL (ref 4–5.2)
SODIUM SERPL-SCNC: 133 MMOL/L (ref 136–145)
WBC # BLD AUTO: 7.2 K/UL (ref 4–11)

## 2023-09-05 PROCEDURE — 1200000000 HC SEMI PRIVATE

## 2023-09-05 PROCEDURE — 2580000003 HC RX 258: Performed by: INTERNAL MEDICINE

## 2023-09-05 PROCEDURE — 36415 COLL VENOUS BLD VENIPUNCTURE: CPT

## 2023-09-05 PROCEDURE — 6360000002 HC RX W HCPCS

## 2023-09-05 PROCEDURE — 85025 COMPLETE CBC W/AUTO DIFF WBC: CPT

## 2023-09-05 PROCEDURE — 80048 BASIC METABOLIC PNL TOTAL CA: CPT

## 2023-09-05 PROCEDURE — 2580000003 HC RX 258

## 2023-09-05 PROCEDURE — 6370000000 HC RX 637 (ALT 250 FOR IP)

## 2023-09-05 PROCEDURE — 99232 SBSQ HOSP IP/OBS MODERATE 35: CPT | Performed by: INTERNAL MEDICINE

## 2023-09-05 RX ORDER — HYDROXYZINE HYDROCHLORIDE 10 MG/1
10 TABLET, FILM COATED ORAL 3 TIMES DAILY PRN
Status: DISCONTINUED | OUTPATIENT
Start: 2023-09-05 | End: 2023-09-06 | Stop reason: HOSPADM

## 2023-09-05 RX ADMIN — SERTRALINE HYDROCHLORIDE 25 MG: 50 TABLET ORAL at 09:35

## 2023-09-05 RX ADMIN — ONDANSETRON 4 MG: 4 TABLET, ORALLY DISINTEGRATING ORAL at 23:44

## 2023-09-05 RX ADMIN — SODIUM CHLORIDE, POTASSIUM CHLORIDE, SODIUM LACTATE AND CALCIUM CHLORIDE: 600; 310; 30; 20 INJECTION, SOLUTION INTRAVENOUS at 11:36

## 2023-09-05 RX ADMIN — SODIUM CHLORIDE, POTASSIUM CHLORIDE, SODIUM LACTATE AND CALCIUM CHLORIDE: 600; 310; 30; 20 INJECTION, SOLUTION INTRAVENOUS at 04:07

## 2023-09-05 RX ADMIN — HEPARIN SODIUM 5000 UNITS: 5000 INJECTION INTRAVENOUS; SUBCUTANEOUS at 20:39

## 2023-09-05 RX ADMIN — OXYCODONE HYDROCHLORIDE AND ACETAMINOPHEN 1 TABLET: 5; 325 TABLET ORAL at 23:44

## 2023-09-05 RX ADMIN — SODIUM CHLORIDE: 9 INJECTION, SOLUTION INTRAVENOUS at 17:42

## 2023-09-05 RX ADMIN — METOPROLOL SUCCINATE 25 MG: 25 TABLET, EXTENDED RELEASE ORAL at 09:35

## 2023-09-05 RX ADMIN — PROPRANOLOL HYDROCHLORIDE 40 MG: 40 TABLET ORAL at 20:38

## 2023-09-05 RX ADMIN — HEPARIN SODIUM 5000 UNITS: 5000 INJECTION INTRAVENOUS; SUBCUTANEOUS at 13:50

## 2023-09-05 RX ADMIN — CEFTRIAXONE SODIUM 1000 MG: 1 INJECTION, POWDER, FOR SOLUTION INTRAMUSCULAR; INTRAVENOUS at 17:43

## 2023-09-05 RX ADMIN — FERROUS SULFATE TAB 325 MG (65 MG ELEMENTAL FE) 325 MG: 325 (65 FE) TAB at 09:34

## 2023-09-05 RX ADMIN — OXYCODONE HYDROCHLORIDE AND ACETAMINOPHEN 1 TABLET: 5; 325 TABLET ORAL at 09:33

## 2023-09-05 RX ADMIN — PROPRANOLOL HYDROCHLORIDE 40 MG: 40 TABLET ORAL at 09:35

## 2023-09-05 RX ADMIN — OXYCODONE HYDROCHLORIDE AND ACETAMINOPHEN 1 TABLET: 5; 325 TABLET ORAL at 15:04

## 2023-09-05 RX ADMIN — SODIUM CHLORIDE, POTASSIUM CHLORIDE, SODIUM LACTATE AND CALCIUM CHLORIDE: 600; 310; 30; 20 INJECTION, SOLUTION INTRAVENOUS at 23:45

## 2023-09-05 RX ADMIN — TAMSULOSIN HYDROCHLORIDE 0.4 MG: 0.4 CAPSULE ORAL at 20:38

## 2023-09-05 RX ADMIN — ONDANSETRON 4 MG: 4 TABLET, ORALLY DISINTEGRATING ORAL at 08:44

## 2023-09-05 ASSESSMENT — PAIN DESCRIPTION - LOCATION
LOCATION: ABDOMEN;FLANK
LOCATION: ABDOMEN

## 2023-09-05 ASSESSMENT — PAIN DESCRIPTION - DESCRIPTORS
DESCRIPTORS: ACHING;DISCOMFORT
DESCRIPTORS: DULL;PRESSURE

## 2023-09-05 ASSESSMENT — PAIN SCALES - GENERAL
PAINLEVEL_OUTOF10: 7
PAINLEVEL_OUTOF10: 7
PAINLEVEL_OUTOF10: 3
PAINLEVEL_OUTOF10: 0
PAINLEVEL_OUTOF10: 3
PAINLEVEL_OUTOF10: 7
PAINLEVEL_OUTOF10: 3

## 2023-09-05 ASSESSMENT — PAIN DESCRIPTION - ORIENTATION: ORIENTATION: LEFT

## 2023-09-05 NOTE — CARE COORDINATION
Case Management Assessment  Initial Evaluation    Date/Time of Evaluation: 9/5/2023 10:54 AM  Assessment Completed by: Nilo Toro RN    If patient is discharged prior to next notation, then this note serves as note for discharge by case management. Patient Name: Arnulfo Lopez                   YOB: 1965  Diagnosis: Hydronephrosis with urinary obstruction due to ureteral calculus [N13.2]  Acute renal failure, unspecified acute renal failure type [facility]) [N17.9]                   Date / Time: 9/3/2023  9:03 AM    Patient Admission Status: Inpatient   Readmission Risk (Low < 19, Mod (19-27), High > 27): Readmission Risk Score: 23.8    Current PCP: Raj Allison MD  PCP verified by CM? Yes Rayshawn Carrera MD)    Chart Reviewed: Yes      History Provided by: Patient  Patient Orientation: Alert and Oriented    Patient Cognition: Alert    Hospitalization in the last 30 days (Readmission):  Yes    If yes, Readmission Assessment in CM Navigator will be completed. Advance Directives:      Code Status: Full Code   Patient's Primary Decision Maker is: Patient Declined (Legal Next of Kin Remains as Decision Maker)    Primary Decision Maker: Vista Ayaan - Aunt/Uncle - [phone]    Discharge Planning:    Patient lives with: Alone Type of Home: Apartment  Primary Care Giver: Self  Patient Support Systems include: Spouse/Significant Other   Current Financial resources: Medicaid  Current community resources: None  Current services prior to admission: Durable Medical Equipment            Current DME: Walker            Type of Home Care services:  None    ADLS  Prior functional level: Independent in ADLs/IADLs  Current functional level: Independent in ADLs/IADLs    PT AM-PAC:   /24  OT AM-PAC:   /24    Family can provide assistance at DC: Yes  Would you like Case Management to discuss the discharge plan with any other family members/significant others, and if so, who?  No  Plans to Return to Present Housing:

## 2023-09-05 NOTE — FLOWSHEET NOTE
09/05/23 [address] [place] Given To Glori Lesch, RN   Handoff Received From Brookie Romberg, RN   Handoff Communication Face to Face   Time Handoff Given 0715   End of Shift Check Performed Yes     Report given to Glori Lesch, [place]. Pt in bed sleeping. Call light within reach, pt is stable. Care is transferred.

## 2023-09-05 NOTE — PLAN OF CARE
Problem: Discharge Planning  Goal: Discharge to home or other facility with appropriate resources  9/4/2023 2014 by Joe Blevins RN  Outcome: Progressing  9/4/2023 0704 by Crow Cabrales RN  Outcome: Progressing     Problem: Pain  Goal: Verbalizes/displays adequate comfort level or baseline comfort level  9/4/2023 2014 by Joe Blevins RN  Outcome: Progressing  9/4/2023 0704 by Crow Cabrales RN  Outcome: Progressing     Problem: Safety - Adult  Goal: Free from fall injury  9/4/2023 2014 by Joe Blevins RN  Outcome: Progressing  9/4/2023 0704 by Crow Cabrales RN  Outcome: Progressing     Problem: ABCDS Injury Assessment  Goal: Absence of physical injury  9/4/2023 2014 by Joe Blevins RN  Outcome: Progressing  9/4/2023 0704 by Crow Cabrales RN  Outcome: Progressing

## 2023-09-06 VITALS
OXYGEN SATURATION: 92 % | WEIGHT: 186.1 LBS | TEMPERATURE: 97 F | RESPIRATION RATE: 16 BRPM | HEIGHT: 67 IN | BODY MASS INDEX: 29.21 KG/M2 | SYSTOLIC BLOOD PRESSURE: 173 MMHG | DIASTOLIC BLOOD PRESSURE: 92 MMHG | HEART RATE: 58 BPM

## 2023-09-06 LAB
ALBUMIN SERPL-MCNC: 2.5 G/DL (ref 3.4–5)
ANION GAP SERPL CALCULATED.3IONS-SCNC: 11 MMOL/L (ref 3–16)
BACTERIA UR CULT: NORMAL
BASOPHILS # BLD: 0 K/UL (ref 0–0.2)
BASOPHILS NFR BLD: 0.1 %
BUN SERPL-MCNC: 45 MG/DL (ref 7–20)
CALCIUM SERPL-MCNC: 8.6 MG/DL (ref 8.3–10.6)
CHLORIDE SERPL-SCNC: 107 MMOL/L (ref 99–110)
CO2 SERPL-SCNC: 23 MMOL/L (ref 21–32)
CREAT SERPL-MCNC: 2.7 MG/DL (ref 0.6–1.1)
DEPRECATED RDW RBC AUTO: 14.9 % (ref 12.4–15.4)
EOSINOPHIL # BLD: 0 K/UL (ref 0–0.6)
EOSINOPHIL NFR BLD: 0.2 %
GFR SERPLBLD CREATININE-BSD FMLA CKD-EPI: 20 ML/MIN/{1.73_M2}
GLUCOSE SERPL-MCNC: 85 MG/DL (ref 70–99)
HCT VFR BLD AUTO: 26.9 % (ref 36–48)
HGB BLD-MCNC: 9.2 G/DL (ref 12–16)
LYMPHOCYTES # BLD: 1.3 K/UL (ref 1–5.1)
LYMPHOCYTES NFR BLD: 28.2 %
MCH RBC QN AUTO: 30.7 PG (ref 26–34)
MCHC RBC AUTO-ENTMCNC: 34 G/DL (ref 31–36)
MCV RBC AUTO: 90.4 FL (ref 80–100)
MONOCYTES # BLD: 0.7 K/UL (ref 0–1.3)
MONOCYTES NFR BLD: 14.2 %
NEUTROPHILS # BLD: 2.7 K/UL (ref 1.7–7.7)
NEUTROPHILS NFR BLD: 57.3 %
PHOSPHATE SERPL-MCNC: 3.3 MG/DL (ref 2.5–4.9)
PLATELET # BLD AUTO: 170 K/UL (ref 135–450)
PMV BLD AUTO: 9.4 FL (ref 5–10.5)
POTASSIUM SERPL-SCNC: 4.1 MMOL/L (ref 3.5–5.1)
POTASSIUM SERPL-SCNC: 4.1 MMOL/L (ref 3.5–5.1)
RBC # BLD AUTO: 2.98 M/UL (ref 4–5.2)
SODIUM SERPL-SCNC: 141 MMOL/L (ref 136–145)
WBC # BLD AUTO: 4.8 K/UL (ref 4–11)

## 2023-09-06 PROCEDURE — 36415 COLL VENOUS BLD VENIPUNCTURE: CPT

## 2023-09-06 PROCEDURE — 85025 COMPLETE CBC W/AUTO DIFF WBC: CPT

## 2023-09-06 PROCEDURE — 80069 RENAL FUNCTION PANEL: CPT

## 2023-09-06 PROCEDURE — 6360000002 HC RX W HCPCS

## 2023-09-06 PROCEDURE — 99238 HOSP IP/OBS DSCHRG MGMT 30/<: CPT | Performed by: INTERNAL MEDICINE

## 2023-09-06 PROCEDURE — 6370000000 HC RX 637 (ALT 250 FOR IP)

## 2023-09-06 RX ORDER — TAMSULOSIN HYDROCHLORIDE 0.4 MG/1
0.4 CAPSULE ORAL NIGHTLY
Qty: 15 CAPSULE | Refills: 0 | Status: SHIPPED | OUTPATIENT
Start: 2023-09-06

## 2023-09-06 RX ADMIN — HEPARIN SODIUM 5000 UNITS: 5000 INJECTION INTRAVENOUS; SUBCUTANEOUS at 04:17

## 2023-09-06 RX ADMIN — FERROUS SULFATE TAB 325 MG (65 MG ELEMENTAL FE) 325 MG: 325 (65 FE) TAB at 10:39

## 2023-09-06 RX ADMIN — ASPIRIN 81 MG: 81 TABLET, COATED ORAL at 10:41

## 2023-09-06 RX ADMIN — ONDANSETRON 4 MG: 4 TABLET, ORALLY DISINTEGRATING ORAL at 09:14

## 2023-09-06 RX ADMIN — SERTRALINE HYDROCHLORIDE 25 MG: 50 TABLET ORAL at 10:41

## 2023-09-06 RX ADMIN — PROPRANOLOL HYDROCHLORIDE 40 MG: 40 TABLET ORAL at 10:41

## 2023-09-06 RX ADMIN — OXYCODONE HYDROCHLORIDE AND ACETAMINOPHEN 1 TABLET: 5; 325 TABLET ORAL at 10:39

## 2023-09-06 ASSESSMENT — PAIN SCALES - GENERAL: PAINLEVEL_OUTOF10: 7

## 2023-09-06 ASSESSMENT — PAIN DESCRIPTION - LOCATION: LOCATION: ABDOMEN

## 2023-09-06 NOTE — DISCHARGE INSTRUCTIONS
F/w urology in 1-2 weeks for procedure    F/w BMP with PCP in 1 week, can resume lisinopril and hctz at that time if creatinine back to normal

## 2023-09-06 NOTE — DISCHARGE SUMMARY
urinate at all and felt bloated. She states that she just wasn't feeling good overall and had a few episodes of vomiting as well. She denies SOB, CP, fever/chills, or dysuria. Physical Exam at Discharge:      General: middle aged female  Awake, alert and oriented. Appears to be not in any distress  Mucous Membranes:  Pink , anicteric  Neck: No JVD, no carotid bruit, no thyromegaly  Chest:  Clear to auscultation bilaterally, no added sounds  Cardiovascular:  RRR S1S2 heard, no murmurs or gallops  Abdomen:  Soft, undistended, no LLQ tenderness, no organomegaly, BS present  Extremities: No edema or cyanosis. Distal pulses well felt  Neurological : no focal deficits    Radiology (Please Review Full Report for Details)        FL RETROGRADE PYELOGRAM LEFT   Final Result   Intraprocedural fluoroscopic spot images as above. See separate procedure   report for more information. CT ABDOMEN PELVIS WO CONTRAST Additional Contrast? None   Final Result   8 mm obstructing calculus in the proximal left ureter causing moderate to   severe left hydroureteronephrosis. Trace left pleural effusion.               Urine cx- NG         Consults:    1. nephrology  2. urology                  Recent Labs     09/04/23 0421 09/05/23  0452 09/06/23  0502   WBC 4.2 7.2 4.8   HGB 9.4* 9.2* 9.2*   HCT 27.6* 27.1* 26.9*   MCV 89.5 88.9 90.4   * 151 170       Recent Labs     09/04/23  0421 09/05/23  0452 09/06/23  0502   * 133* 141   K 5.2* 4.5 4.1  4.1    103 107   CO2 19* 21 23   PHOS  --   --  3.3   BUN 37* 46* 45*   CREATININE 5.1* 3.6* 2.7*       CBC:  Lab Results   Component Value Date/Time    WBC 4.8 09/06/2023 05:02 AM    HGB 9.2 09/06/2023 05:02 AM    HCT 26.9 09/06/2023 05:02 AM    MCV 90.4 09/06/2023 05:02 AM     09/06/2023 05:02 AM    NEUTOPHILPCT 57.3 09/06/2023 05:02 AM    LYMPHOPCT 28.2 09/06/2023 05:02 AM    MONOPCT 14.2 09/06/2023 05:02 AM    BASOPCT 0.1 09/06/2023 05:02 AM

## 2023-09-06 NOTE — CARE COORDINATION
CASE MANAGEMENT DISCHARGE SUMMARY      Discharge to: Home    Transportation: Patient requesting cab home due to no family available to pick her up. Clinical Supervisor will be notified of need. Confirmed discharge plan with: IPTA no C needs     Patient: yes     RN, name: Hellen Hunt    Note: Discharging nurse to reconcile AVS, and place final copy with patient's discharge packet. RN to ensure that written prescriptions for  Level II medications are sent with patient to the facility as per protocol.   Jason Hdz RN

## 2023-09-06 NOTE — DISCHARGE INSTR - DIET
Good nutrition is important when healing from an illness, injury, or surgery. Follow any nutrition recommendations given to you during your hospital stay. If you were given an oral nutrition supplement while in the hospital, continue to take this supplement at home. You can take it with meals, in-between meals, and/or before bedtime. These supplements can be purchased at most local grocery stores, pharmacies, and chain [employer]-stores. If you have any questions about your diet or nutrition, call the hospital and ask for the dietitian.     General diet

## 2023-09-29 PROBLEM — N39.0 UTI (URINARY TRACT INFECTION): Status: RESOLVED | Noted: 2023-08-30 | Resolved: 2023-09-29

## 2023-10-01 ENCOUNTER — APPOINTMENT (OUTPATIENT)
Dept: CT IMAGING | Age: 58
DRG: 466 | End: 2023-10-01
Payer: COMMERCIAL

## 2023-10-01 ENCOUNTER — HOSPITAL ENCOUNTER (INPATIENT)
Age: 58
LOS: 2 days | Discharge: HOME | DRG: 466 | End: 2023-10-03
Attending: STUDENT IN AN ORGANIZED HEALTH CARE EDUCATION/TRAINING PROGRAM | Admitting: INTERNAL MEDICINE
Payer: COMMERCIAL

## 2023-10-01 DIAGNOSIS — R10.9 FLANK PAIN: Primary | ICD-10-CM

## 2023-10-01 DIAGNOSIS — N30.01 ACUTE CYSTITIS WITH HEMATURIA: ICD-10-CM

## 2023-10-01 DIAGNOSIS — N20.1 LEFT URETERAL STONE: ICD-10-CM

## 2023-10-01 DIAGNOSIS — T83.511A URINARY TRACT INFECTION ASSOCIATED WITH CATHETERIZATION OF URINARY TRACT, UNSPECIFIED INDWELLING URINARY CATHETER TYPE, INITIAL ENCOUNTER (HCC): ICD-10-CM

## 2023-10-01 DIAGNOSIS — B19.20 HEPATITIS C VIRUS INFECTION WITHOUT HEPATIC COMA, UNSPECIFIED CHRONICITY: ICD-10-CM

## 2023-10-01 DIAGNOSIS — N39.0 URINARY TRACT INFECTION ASSOCIATED WITH CATHETERIZATION OF URINARY TRACT, UNSPECIFIED INDWELLING URINARY CATHETER TYPE, INITIAL ENCOUNTER (HCC): ICD-10-CM

## 2023-10-01 LAB
ALBUMIN SERPL-MCNC: 4 G/DL (ref 3.4–5)
ALBUMIN/GLOB SERPL: 1.1 {RATIO} (ref 1.1–2.2)
ALP SERPL-CCNC: 95 U/L (ref 40–129)
ALT SERPL-CCNC: 14 U/L (ref 10–40)
ANION GAP SERPL CALCULATED.3IONS-SCNC: 11 MMOL/L (ref 3–16)
AST SERPL-CCNC: <5 U/L (ref 15–37)
BACTERIA URNS QL MICRO: ABNORMAL /HPF
BASOPHILS # BLD: 0.1 K/UL (ref 0–0.2)
BASOPHILS NFR BLD: 0.8 %
BILIRUB SERPL-MCNC: 0.5 MG/DL (ref 0–1)
BILIRUB UR QL STRIP.AUTO: NEGATIVE
BUN SERPL-MCNC: 23 MG/DL (ref 7–20)
CALCIUM SERPL-MCNC: 9.6 MG/DL (ref 8.3–10.6)
CHLORIDE SERPL-SCNC: 102 MMOL/L (ref 99–110)
CLARITY UR: ABNORMAL
CO2 SERPL-SCNC: 22 MMOL/L (ref 21–32)
COLOR UR: YELLOW
CREAT SERPL-MCNC: 1.7 MG/DL (ref 0.6–1.1)
DEPRECATED RDW RBC AUTO: 14.5 % (ref 12.4–15.4)
EOSINOPHIL # BLD: 0.1 K/UL (ref 0–0.6)
EOSINOPHIL NFR BLD: 1.7 %
EPI CELLS #/AREA URNS HPF: ABNORMAL /HPF (ref 0–5)
GFR SERPLBLD CREATININE-BSD FMLA CKD-EPI: 35 ML/MIN/{1.73_M2}
GLUCOSE SERPL-MCNC: 148 MG/DL (ref 70–99)
GLUCOSE UR STRIP.AUTO-MCNC: NEGATIVE MG/DL
HCT VFR BLD AUTO: 37.8 % (ref 36–48)
HGB BLD-MCNC: 12.8 G/DL (ref 12–16)
HGB UR QL STRIP.AUTO: ABNORMAL
KETONES UR STRIP.AUTO-MCNC: NEGATIVE MG/DL
LEUKOCYTE ESTERASE UR QL STRIP.AUTO: ABNORMAL
LIPASE SERPL-CCNC: 47 U/L (ref 13–60)
LYMPHOCYTES # BLD: 2.5 K/UL (ref 1–5.1)
LYMPHOCYTES NFR BLD: 31 %
MCH RBC QN AUTO: 29.9 PG (ref 26–34)
MCHC RBC AUTO-ENTMCNC: 33.9 G/DL (ref 31–36)
MCV RBC AUTO: 88.2 FL (ref 80–100)
MONOCYTES # BLD: 0.9 K/UL (ref 0–1.3)
MONOCYTES NFR BLD: 10.9 %
MUCOUS THREADS #/AREA URNS LPF: ABNORMAL /LPF
NEUTROPHILS # BLD: 4.5 K/UL (ref 1.7–7.7)
NEUTROPHILS NFR BLD: 55.6 %
NITRITE UR QL STRIP.AUTO: POSITIVE
PH UR STRIP.AUTO: 6 [PH] (ref 5–8)
PLATELET # BLD AUTO: 198 K/UL (ref 135–450)
PMV BLD AUTO: 10.8 FL (ref 5–10.5)
POTASSIUM SERPL-SCNC: 4.6 MMOL/L (ref 3.5–5.1)
PROT SERPL-MCNC: 7.7 G/DL (ref 6.4–8.2)
PROT UR STRIP.AUTO-MCNC: 30 MG/DL
RBC # BLD AUTO: 4.28 M/UL (ref 4–5.2)
RBC #/AREA URNS HPF: ABNORMAL /HPF (ref 0–4)
SODIUM SERPL-SCNC: 135 MMOL/L (ref 136–145)
SP GR UR STRIP.AUTO: 1.02 (ref 1–1.03)
UA COMPLETE W REFLEX CULTURE PNL UR: YES
UA DIPSTICK W REFLEX MICRO PNL UR: YES
URN SPEC COLLECT METH UR: ABNORMAL
UROBILINOGEN UR STRIP-ACNC: 0.2 E.U./DL
WBC # BLD AUTO: 8.1 K/UL (ref 4–11)
WBC #/AREA URNS HPF: ABNORMAL /HPF (ref 0–5)

## 2023-10-01 PROCEDURE — 80053 COMPREHEN METABOLIC PANEL: CPT

## 2023-10-01 PROCEDURE — 6370000000 HC RX 637 (ALT 250 FOR IP): Performed by: INTERNAL MEDICINE

## 2023-10-01 PROCEDURE — 96375 TX/PRO/DX INJ NEW DRUG ADDON: CPT

## 2023-10-01 PROCEDURE — 96365 THER/PROPH/DIAG IV INF INIT: CPT

## 2023-10-01 PROCEDURE — 96361 HYDRATE IV INFUSION ADD-ON: CPT

## 2023-10-01 PROCEDURE — 2580000003 HC RX 258: Performed by: INTERNAL MEDICINE

## 2023-10-01 PROCEDURE — 87086 URINE CULTURE/COLONY COUNT: CPT

## 2023-10-01 PROCEDURE — 99285 EMERGENCY DEPT VISIT HI MDM: CPT

## 2023-10-01 PROCEDURE — 2580000003 HC RX 258: Performed by: STUDENT IN AN ORGANIZED HEALTH CARE EDUCATION/TRAINING PROGRAM

## 2023-10-01 PROCEDURE — 36415 COLL VENOUS BLD VENIPUNCTURE: CPT

## 2023-10-01 PROCEDURE — 85025 COMPLETE CBC W/AUTO DIFF WBC: CPT

## 2023-10-01 PROCEDURE — 74176 CT ABD & PELVIS W/O CONTRAST: CPT

## 2023-10-01 PROCEDURE — 81001 URINALYSIS AUTO W/SCOPE: CPT

## 2023-10-01 PROCEDURE — 87186 SC STD MICRODIL/AGAR DIL: CPT

## 2023-10-01 PROCEDURE — 6360000002 HC RX W HCPCS: Performed by: STUDENT IN AN ORGANIZED HEALTH CARE EDUCATION/TRAINING PROGRAM

## 2023-10-01 PROCEDURE — 87088 URINE BACTERIA CULTURE: CPT

## 2023-10-01 PROCEDURE — 2580000003 HC RX 258: Performed by: PHYSICIAN ASSISTANT

## 2023-10-01 PROCEDURE — 83690 ASSAY OF LIPASE: CPT

## 2023-10-01 PROCEDURE — G0378 HOSPITAL OBSERVATION PER HR: HCPCS

## 2023-10-01 PROCEDURE — 6360000002 HC RX W HCPCS: Performed by: PHYSICIAN ASSISTANT

## 2023-10-01 RX ORDER — ONDANSETRON 2 MG/ML
4 INJECTION INTRAMUSCULAR; INTRAVENOUS EVERY 6 HOURS PRN
Status: DISCONTINUED | OUTPATIENT
Start: 2023-10-01 | End: 2023-10-03 | Stop reason: HOSPADM

## 2023-10-01 RX ORDER — ENOXAPARIN SODIUM 100 MG/ML
40 INJECTION SUBCUTANEOUS DAILY
Status: DISCONTINUED | OUTPATIENT
Start: 2023-10-02 | End: 2023-10-02

## 2023-10-01 RX ORDER — ACETAMINOPHEN 325 MG/1
650 TABLET ORAL EVERY 6 HOURS PRN
Status: DISCONTINUED | OUTPATIENT
Start: 2023-10-01 | End: 2023-10-03 | Stop reason: HOSPADM

## 2023-10-01 RX ORDER — HYDROCHLOROTHIAZIDE 25 MG/1
25 TABLET ORAL PRN
COMMUNITY

## 2023-10-01 RX ORDER — TAMSULOSIN HYDROCHLORIDE 0.4 MG/1
0.4 CAPSULE ORAL NIGHTLY
Status: DISCONTINUED | OUTPATIENT
Start: 2023-10-01 | End: 2023-10-03 | Stop reason: HOSPADM

## 2023-10-01 RX ORDER — PROPRANOLOL HYDROCHLORIDE 40 MG/1
40 TABLET ORAL 2 TIMES DAILY
Status: DISCONTINUED | OUTPATIENT
Start: 2023-10-01 | End: 2023-10-03 | Stop reason: HOSPADM

## 2023-10-01 RX ORDER — ACETAMINOPHEN 650 MG/1
650 SUPPOSITORY RECTAL EVERY 6 HOURS PRN
Status: DISCONTINUED | OUTPATIENT
Start: 2023-10-01 | End: 2023-10-03 | Stop reason: HOSPADM

## 2023-10-01 RX ORDER — SODIUM CHLORIDE 0.9 % (FLUSH) 0.9 %
5-40 SYRINGE (ML) INJECTION PRN
Status: DISCONTINUED | OUTPATIENT
Start: 2023-10-01 | End: 2023-10-03 | Stop reason: HOSPADM

## 2023-10-01 RX ORDER — SODIUM CHLORIDE 9 MG/ML
INJECTION, SOLUTION INTRAVENOUS PRN
Status: DISCONTINUED | OUTPATIENT
Start: 2023-10-01 | End: 2023-10-03 | Stop reason: HOSPADM

## 2023-10-01 RX ORDER — METOPROLOL SUCCINATE 25 MG/1
25 TABLET, EXTENDED RELEASE ORAL DAILY
Status: DISCONTINUED | OUTPATIENT
Start: 2023-10-02 | End: 2023-10-01

## 2023-10-01 RX ORDER — SODIUM CHLORIDE 0.9 % (FLUSH) 0.9 %
5-40 SYRINGE (ML) INJECTION EVERY 12 HOURS SCHEDULED
Status: DISCONTINUED | OUTPATIENT
Start: 2023-10-01 | End: 2023-10-03 | Stop reason: HOSPADM

## 2023-10-01 RX ORDER — ONDANSETRON 4 MG/1
4 TABLET, ORALLY DISINTEGRATING ORAL EVERY 8 HOURS PRN
Status: DISCONTINUED | OUTPATIENT
Start: 2023-10-01 | End: 2023-10-03 | Stop reason: HOSPADM

## 2023-10-01 RX ORDER — LISINOPRIL 10 MG/1
10 TABLET ORAL DAILY
COMMUNITY

## 2023-10-01 RX ORDER — HYDROXYZINE HYDROCHLORIDE 25 MG/1
25 TABLET, FILM COATED ORAL DAILY PRN
COMMUNITY

## 2023-10-01 RX ORDER — KETOROLAC TROMETHAMINE 30 MG/ML
15 INJECTION, SOLUTION INTRAMUSCULAR; INTRAVENOUS ONCE
Status: COMPLETED | OUTPATIENT
Start: 2023-10-01 | End: 2023-10-01

## 2023-10-01 RX ORDER — SODIUM CHLORIDE 9 MG/ML
1000 INJECTION, SOLUTION INTRAVENOUS CONTINUOUS
Status: DISCONTINUED | OUTPATIENT
Start: 2023-10-01 | End: 2023-10-02

## 2023-10-01 RX ORDER — POLYETHYLENE GLYCOL 3350 17 G/17G
17 POWDER, FOR SOLUTION ORAL DAILY PRN
Status: DISCONTINUED | OUTPATIENT
Start: 2023-10-01 | End: 2023-10-03 | Stop reason: HOSPADM

## 2023-10-01 RX ADMIN — PROPRANOLOL HYDROCHLORIDE 40 MG: 40 TABLET ORAL at 21:37

## 2023-10-01 RX ADMIN — TAMSULOSIN HYDROCHLORIDE 0.4 MG: 0.4 CAPSULE ORAL at 21:37

## 2023-10-01 RX ADMIN — CEFTRIAXONE 1000 MG: 1 INJECTION, POWDER, FOR SOLUTION INTRAMUSCULAR; INTRAVENOUS at 19:11

## 2023-10-01 RX ADMIN — SODIUM CHLORIDE 1000 ML: 9 INJECTION, SOLUTION INTRAVENOUS at 17:44

## 2023-10-01 RX ADMIN — KETOROLAC TROMETHAMINE 15 MG: 30 INJECTION, SOLUTION INTRAMUSCULAR at 17:43

## 2023-10-01 RX ADMIN — SODIUM CHLORIDE, PRESERVATIVE FREE 10 ML: 5 INJECTION INTRAVENOUS at 21:38

## 2023-10-01 RX ADMIN — ACETAMINOPHEN 650 MG: 325 TABLET ORAL at 21:47

## 2023-10-01 RX ADMIN — CEFTRIAXONE SODIUM 1000 MG: 1 INJECTION, POWDER, FOR SOLUTION INTRAMUSCULAR; INTRAVENOUS at 19:54

## 2023-10-01 ASSESSMENT — ENCOUNTER SYMPTOMS
SINUS PAIN: 0
BACK PAIN: 0
SHORTNESS OF BREATH: 0
EYE DISCHARGE: 0
APNEA: 0
EYE ITCHING: 0
SINUS PRESSURE: 0

## 2023-10-01 ASSESSMENT — PAIN DESCRIPTION - LOCATION
LOCATION: GENERALIZED
LOCATION: FLANK

## 2023-10-01 ASSESSMENT — PAIN SCALES - GENERAL
PAINLEVEL_OUTOF10: 6
PAINLEVEL_OUTOF10: 2

## 2023-10-01 ASSESSMENT — PAIN DESCRIPTION - ORIENTATION: ORIENTATION: LEFT

## 2023-10-01 ASSESSMENT — PAIN - FUNCTIONAL ASSESSMENT: PAIN_FUNCTIONAL_ASSESSMENT: 0-10

## 2023-10-01 NOTE — H&P
V2.0  History and Physical      Name:  Barbra Reyes /Age/Sex: 1965  (63 y.o. female)   MRN & CSN:  [id number] & [id number] Encounter Date/Time: 10/1/2023 7:56 PM EDT   Location:   PCP: Ondina Thomas MD       Hospital Day: 1    Assessment and Plan:     Hospital Problems             Last Modified POA    * (Principal) Complicated urinary tract infection 10/1/2023 Yes     Abnormal UA concern for complicated urine tract infection  Left-sided flank pain possibly due to underlying infection versus stent in place  Recurrent nephrolithiasis history of calcium oxalate and calcium phosphate stones  Chronic kidney disease stage IIIb, history of solitary left kidney    Plan  Patient admitted to hospital under observation, urology plans on doing stent removal on Monday versus Tuesday. Since patient has never had 24 urine study this is recommended with recurrent kidney stones  She is very high risk of progressing her chronically disease she has solitary kidneys  Discussed with patient about low-sodium diet  Hold aspirin, continue beta-blocker, continue Zoloft, continue Flomax. UA concerning for infection in the setting of stent we will continue Rocephin  Follow-up urine culture      Disposition:   Current Living situation: Home alone,   Expected disposition back home once neurological intervention completed    Diet ADULT DIET; Regular;  Low Sodium (2 gm)   DVT Prophylaxis [x] Lovenox, []  Heparin, [] SCDs, [] Ambulation,  [] Eliquis, [] Xarelto, [] Coumadin   Code Status Full Code   Surrogate Decision Maker/ POA N/a     Personally reviewed Lab Studies and Imaging   Discussed management of the case with ER physician, decision to admit      History from:     patient, electronic medical record    History of Present Illness:     Chief Complaint:   Barbra Reyes is a 63 y.o. female   - with hx of solitary left kidney, history of left ureteral stone s/p cystoscopy and stent in place, chronic kidney disease due to solitary

## 2023-10-01 NOTE — PLAN OF CARE
[facility] [place] brief [place]  Consult received. Case reviewed with ER physician    Full note to follow.     PCP: Arlene Alvarez MD    Thanks  Alonzo Clemente MD

## 2023-10-02 ENCOUNTER — ANESTHESIA EVENT (OUTPATIENT)
Dept: OPERATING ROOM | Age: 58
DRG: 466 | End: 2023-10-02
Payer: COMMERCIAL

## 2023-10-02 PROBLEM — R10.9 FLANK PAIN: Status: ACTIVE | Noted: 2023-10-02

## 2023-10-02 PROBLEM — E87.6 HYPOKALEMIA: Status: ACTIVE | Noted: 2023-10-02

## 2023-10-02 LAB
ANION GAP SERPL CALCULATED.3IONS-SCNC: 9 MMOL/L (ref 3–16)
BASOPHILS # BLD: 0 K/UL (ref 0–0.2)
BASOPHILS NFR BLD: 0.7 %
BUN SERPL-MCNC: 23 MG/DL (ref 7–20)
CALCIUM SERPL-MCNC: 8.8 MG/DL (ref 8.3–10.6)
CHLORIDE SERPL-SCNC: 104 MMOL/L (ref 99–110)
CO2 SERPL-SCNC: 22 MMOL/L (ref 21–32)
CREAT SERPL-MCNC: 1.9 MG/DL (ref 0.6–1.1)
DEPRECATED RDW RBC AUTO: 14.9 % (ref 12.4–15.4)
EOSINOPHIL # BLD: 0.1 K/UL (ref 0–0.6)
EOSINOPHIL NFR BLD: 3.4 %
GFR SERPLBLD CREATININE-BSD FMLA CKD-EPI: 30 ML/MIN/{1.73_M2}
GLUCOSE SERPL-MCNC: 89 MG/DL (ref 70–99)
HCT VFR BLD AUTO: 32 % (ref 36–48)
HGB BLD-MCNC: 10.8 G/DL (ref 12–16)
LYMPHOCYTES # BLD: 2 K/UL (ref 1–5.1)
LYMPHOCYTES NFR BLD: 45.5 %
MAGNESIUM SERPL-MCNC: 2 MG/DL (ref 1.8–2.4)
MCH RBC QN AUTO: 30.2 PG (ref 26–34)
MCHC RBC AUTO-ENTMCNC: 33.7 G/DL (ref 31–36)
MCV RBC AUTO: 89.6 FL (ref 80–100)
MONOCYTES # BLD: 0.6 K/UL (ref 0–1.3)
MONOCYTES NFR BLD: 12.8 %
NEUTROPHILS # BLD: 1.6 K/UL (ref 1.7–7.7)
NEUTROPHILS NFR BLD: 37.6 %
PLATELET # BLD AUTO: 117 K/UL (ref 135–450)
PMV BLD AUTO: 10 FL (ref 5–10.5)
POTASSIUM SERPL-SCNC: 3.4 MMOL/L (ref 3.5–5.1)
RBC # BLD AUTO: 3.57 M/UL (ref 4–5.2)
SODIUM SERPL-SCNC: 135 MMOL/L (ref 136–145)
WBC # BLD AUTO: 4.4 K/UL (ref 4–11)

## 2023-10-02 PROCEDURE — 6360000002 HC RX W HCPCS: Performed by: INTERNAL MEDICINE

## 2023-10-02 PROCEDURE — 6370000000 HC RX 637 (ALT 250 FOR IP): Performed by: INTERNAL MEDICINE

## 2023-10-02 PROCEDURE — 36415 COLL VENOUS BLD VENIPUNCTURE: CPT

## 2023-10-02 PROCEDURE — 6360000002 HC RX W HCPCS

## 2023-10-02 PROCEDURE — 2580000003 HC RX 258

## 2023-10-02 PROCEDURE — 2580000003 HC RX 258: Performed by: INTERNAL MEDICINE

## 2023-10-02 PROCEDURE — 99232 SBSQ HOSP IP/OBS MODERATE 35: CPT

## 2023-10-02 PROCEDURE — 83735 ASSAY OF MAGNESIUM: CPT

## 2023-10-02 PROCEDURE — 1200000000 HC SEMI PRIVATE

## 2023-10-02 PROCEDURE — 85025 COMPLETE CBC W/AUTO DIFF WBC: CPT

## 2023-10-02 PROCEDURE — 80048 BASIC METABOLIC PNL TOTAL CA: CPT

## 2023-10-02 RX ORDER — FERROUS SULFATE 325(65) MG
325 TABLET ORAL
Status: DISCONTINUED | OUTPATIENT
Start: 2023-10-03 | End: 2023-10-03 | Stop reason: HOSPADM

## 2023-10-02 RX ORDER — LISINOPRIL 10 MG/1
10 TABLET ORAL DAILY
Status: DISCONTINUED | OUTPATIENT
Start: 2023-10-02 | End: 2023-10-03 | Stop reason: HOSPADM

## 2023-10-02 RX ORDER — SODIUM CHLORIDE 9 MG/ML
1000 INJECTION, SOLUTION INTRAVENOUS CONTINUOUS
Status: DISCONTINUED | OUTPATIENT
Start: 2023-10-02 | End: 2023-10-03 | Stop reason: HOSPADM

## 2023-10-02 RX ORDER — POTASSIUM CHLORIDE 7.45 MG/ML
10 INJECTION INTRAVENOUS ONCE
Status: DISCONTINUED | OUTPATIENT
Start: 2023-10-02 | End: 2023-10-02

## 2023-10-02 RX ORDER — LEVOFLOXACIN 500 MG/1
500 TABLET, FILM COATED ORAL ONCE
Status: COMPLETED | OUTPATIENT
Start: 2023-10-03 | End: 2023-10-03

## 2023-10-02 RX ORDER — HEPARIN SODIUM 5000 [USP'U]/ML
5000 INJECTION, SOLUTION INTRAVENOUS; SUBCUTANEOUS EVERY 8 HOURS SCHEDULED
Status: DISCONTINUED | OUTPATIENT
Start: 2023-10-02 | End: 2023-10-03 | Stop reason: HOSPADM

## 2023-10-02 RX ADMIN — SODIUM CHLORIDE 1000 ML: 9 INJECTION, SOLUTION INTRAVENOUS at 14:19

## 2023-10-02 RX ADMIN — SODIUM CHLORIDE, PRESERVATIVE FREE 10 ML: 5 INJECTION INTRAVENOUS at 20:32

## 2023-10-02 RX ADMIN — HEPARIN SODIUM 5000 UNITS: 5000 INJECTION INTRAVENOUS; SUBCUTANEOUS at 14:16

## 2023-10-02 RX ADMIN — SERTRALINE HYDROCHLORIDE 25 MG: 50 TABLET ORAL at 09:13

## 2023-10-02 RX ADMIN — ACETAMINOPHEN 650 MG: 325 TABLET ORAL at 11:51

## 2023-10-02 RX ADMIN — ONDANSETRON 4 MG: 4 TABLET, ORALLY DISINTEGRATING ORAL at 06:28

## 2023-10-02 RX ADMIN — SODIUM CHLORIDE 1000 ML: 9 INJECTION, SOLUTION INTRAVENOUS at 04:01

## 2023-10-02 RX ADMIN — CEFTRIAXONE 1000 MG: 1 INJECTION, POWDER, FOR SOLUTION INTRAMUSCULAR; INTRAVENOUS at 17:30

## 2023-10-02 RX ADMIN — ACETAMINOPHEN 650 MG: 325 TABLET ORAL at 17:48

## 2023-10-02 RX ADMIN — SODIUM CHLORIDE 1000 ML: 9 INJECTION, SOLUTION INTRAVENOUS at 12:05

## 2023-10-02 RX ADMIN — ACETAMINOPHEN 650 MG: 325 TABLET ORAL at 04:05

## 2023-10-02 RX ADMIN — TAMSULOSIN HYDROCHLORIDE 0.4 MG: 0.4 CAPSULE ORAL at 20:32

## 2023-10-02 RX ADMIN — HEPARIN SODIUM 5000 UNITS: 5000 INJECTION INTRAVENOUS; SUBCUTANEOUS at 20:31

## 2023-10-02 RX ADMIN — PROPRANOLOL HYDROCHLORIDE 40 MG: 40 TABLET ORAL at 09:13

## 2023-10-02 RX ADMIN — SODIUM CHLORIDE, PRESERVATIVE FREE 10 ML: 5 INJECTION INTRAVENOUS at 09:14

## 2023-10-02 ASSESSMENT — PAIN - FUNCTIONAL ASSESSMENT: PAIN_FUNCTIONAL_ASSESSMENT: ACTIVITIES ARE NOT PREVENTED

## 2023-10-02 ASSESSMENT — PAIN SCALES - GENERAL
PAINLEVEL_OUTOF10: 6
PAINLEVEL_OUTOF10: 4
PAINLEVEL_OUTOF10: 0
PAINLEVEL_OUTOF10: 4
PAINLEVEL_OUTOF10: 4
PAINLEVEL_OUTOF10: 6
PAINLEVEL_OUTOF10: 5
PAINLEVEL_OUTOF10: 6

## 2023-10-02 ASSESSMENT — PAIN DESCRIPTION - DESCRIPTORS
DESCRIPTORS: ACHING

## 2023-10-02 ASSESSMENT — PAIN DESCRIPTION - ORIENTATION
ORIENTATION: LEFT
ORIENTATION: LEFT
ORIENTATION: LOWER
ORIENTATION: LEFT;RIGHT

## 2023-10-02 ASSESSMENT — PAIN DESCRIPTION - LOCATION
LOCATION: BACK;ABDOMEN
LOCATION: ABDOMEN
LOCATION: ABDOMEN;FLANK
LOCATION: FLANK

## 2023-10-02 NOTE — CONSULTS
Urology Consult Note      Reason for Consultation: retained stent     Chief Complaint: \"left flank pain\"  HPI:  Shey Ta is a 63 y.o. female with solitary left kidney with renal/ureteral stones and currently with left ureteral stent in place. She has transportation issues and follow up for final uro treatments have been challenging. She has had left flank pain from her ureteral stent and returned to ER for eval.  No fevers or leucocytosis. Left ureteral stent in place on CT.   Admitted for further care      Past Medical History:   Diagnosis Date    Anxiety     Headache(784.0)     Hepatitis C 08/21/2020    Hypertension        Past Surgical History:   Procedure Laterality Date    CYSTOSCOPY Left 01/11/2021    CYSTOSCOPY, DIAGNOSTIC LEFT URETEROSCOPY WITH HOLMIUM LASER LITHOTRIPSY, LEFT STENT EXCHANGE performed by Barak Washington MD at [address] [place] [address] Dr Left 08/20/2021    CYSTOSCOPY, LEFT RETROGRADE,  LEFT STENT REMOVAL performed by Lissy Sykes MD at [address] [place] [address] Dr Lamb 06/24/2023    CYSTOSCOPY URETERAL STENT INSERTION performed by Barak Washington MD at [address] [place] [address] Dr Left 08/30/2023    CYSTOSCOPY, LEFT URETEROSCOPY WITH  HOLMIUM LASER LITHOTRIPSY AND LEFT URETERAL STENT EXCHANGE performed by Lissy Sykes MD at [address] [place] [address]   09/03/2023    CYSTOSCOPY N/A 9/3/2023    CYSTOSCOPY, LEFT RETROGRADE PYELOGRAM, LEFT URETERAL STENT INSERTION performed by Franca Li MD at [address] [place] [address] / [address] [place] [address] / STONE Left 08/20/2020    CYSTOSCOPY URETERAL STENT INSERTION performed by Barak Washington MD at [facility] / [address] [place] [address] / Mellisa Law Left 01/27/2021    CYSTOSCOPY, LEFT STENT REMOVAL performed by Barak Washington MD at [address]  08/30/2023    CYSTOSCOPY, LEFT URETEROSCOPY WITH POSSIBLE HOLMIUM LASER LITHOTRIPSY AND LEFT URETERAL STENT EXCHANGE - Left    IR EMBOLIZATION

## 2023-10-03 ENCOUNTER — APPOINTMENT (OUTPATIENT)
Dept: GENERAL RADIOLOGY | Age: 58
DRG: 466 | End: 2023-10-03
Payer: COMMERCIAL

## 2023-10-03 ENCOUNTER — ANESTHESIA (OUTPATIENT)
Dept: OPERATING ROOM | Age: 58
DRG: 466 | End: 2023-10-03
Payer: COMMERCIAL

## 2023-10-03 VITALS
DIASTOLIC BLOOD PRESSURE: 75 MMHG | SYSTOLIC BLOOD PRESSURE: 172 MMHG | HEIGHT: 67 IN | WEIGHT: 171 LBS | TEMPERATURE: 98 F | RESPIRATION RATE: 16 BRPM | HEART RATE: 62 BPM | BODY MASS INDEX: 26.84 KG/M2 | OXYGEN SATURATION: 100 %

## 2023-10-03 PROBLEM — T83.511A CATHETER-ASSOCIATED URINARY TRACT INFECTION (HCC): Status: ACTIVE | Noted: 2023-10-01

## 2023-10-03 LAB
ANION GAP SERPL CALCULATED.3IONS-SCNC: 8 MMOL/L (ref 3–16)
BACTERIA UR CULT: ABNORMAL
BASOPHILS # BLD: 0 K/UL (ref 0–0.2)
BASOPHILS NFR BLD: 0.6 %
BUN SERPL-MCNC: 19 MG/DL (ref 7–20)
CALCIUM SERPL-MCNC: 8.6 MG/DL (ref 8.3–10.6)
CHLORIDE SERPL-SCNC: 111 MMOL/L (ref 99–110)
CO2 SERPL-SCNC: 21 MMOL/L (ref 21–32)
CREAT SERPL-MCNC: 1.7 MG/DL (ref 0.6–1.1)
DEPRECATED RDW RBC AUTO: 14.4 % (ref 12.4–15.4)
EOSINOPHIL # BLD: 0.2 K/UL (ref 0–0.6)
EOSINOPHIL NFR BLD: 5 %
GFR SERPLBLD CREATININE-BSD FMLA CKD-EPI: 35 ML/MIN/{1.73_M2}
GLUCOSE SERPL-MCNC: 100 MG/DL (ref 70–99)
HCT VFR BLD AUTO: 31.7 % (ref 36–48)
HGB BLD-MCNC: 10.6 G/DL (ref 12–16)
LYMPHOCYTES # BLD: 1.5 K/UL (ref 1–5.1)
LYMPHOCYTES NFR BLD: 43 %
MCH RBC QN AUTO: 30.1 PG (ref 26–34)
MCHC RBC AUTO-ENTMCNC: 33.5 G/DL (ref 31–36)
MCV RBC AUTO: 89.9 FL (ref 80–100)
MONOCYTES # BLD: 0.4 K/UL (ref 0–1.3)
MONOCYTES NFR BLD: 11.6 %
NEUTROPHILS # BLD: 1.4 K/UL (ref 1.7–7.7)
NEUTROPHILS NFR BLD: 39.8 %
ORGANISM: ABNORMAL
PLATELET # BLD AUTO: 105 K/UL (ref 135–450)
PMV BLD AUTO: 10.8 FL (ref 5–10.5)
POTASSIUM SERPL-SCNC: 4.1 MMOL/L (ref 3.5–5.1)
RBC # BLD AUTO: 3.53 M/UL (ref 4–5.2)
SODIUM SERPL-SCNC: 140 MMOL/L (ref 136–145)
WBC # BLD AUTO: 3.5 K/UL (ref 4–11)

## 2023-10-03 PROCEDURE — 2580000003 HC RX 258: Performed by: UROLOGY

## 2023-10-03 PROCEDURE — 6370000000 HC RX 637 (ALT 250 FOR IP)

## 2023-10-03 PROCEDURE — 7100000000 HC PACU RECOVERY - FIRST 15 MIN: Performed by: UROLOGY

## 2023-10-03 PROCEDURE — BT1F1ZZ FLUOROSCOPY OF LEFT KIDNEY, URETER AND BLADDER USING LOW OSMOLAR CONTRAST: ICD-10-PCS | Performed by: UROLOGY

## 2023-10-03 PROCEDURE — 6370000000 HC RX 637 (ALT 250 FOR IP): Performed by: INTERNAL MEDICINE

## 2023-10-03 PROCEDURE — C2617 STENT, NON-COR, TEM W/O DEL: HCPCS | Performed by: UROLOGY

## 2023-10-03 PROCEDURE — 6360000004 HC RX CONTRAST MEDICATION: Performed by: UROLOGY

## 2023-10-03 PROCEDURE — 0T778DZ DILATION OF LEFT URETER WITH INTRALUMINAL DEVICE, VIA NATURAL OR ARTIFICIAL OPENING ENDOSCOPIC: ICD-10-PCS | Performed by: UROLOGY

## 2023-10-03 PROCEDURE — 85025 COMPLETE CBC W/AUTO DIFF WBC: CPT

## 2023-10-03 PROCEDURE — 2709999900 HC NON-CHARGEABLE SUPPLY: Performed by: UROLOGY

## 2023-10-03 PROCEDURE — 3600000014 HC SURGERY LEVEL 4 ADDTL 15MIN: Performed by: UROLOGY

## 2023-10-03 PROCEDURE — 80048 BASIC METABOLIC PNL TOTAL CA: CPT

## 2023-10-03 PROCEDURE — 6360000002 HC RX W HCPCS: Performed by: INTERNAL MEDICINE

## 2023-10-03 PROCEDURE — 0TP98DZ REMOVAL OF INTRALUMINAL DEVICE FROM URETER, VIA NATURAL OR ARTIFICIAL OPENING ENDOSCOPIC: ICD-10-PCS | Performed by: UROLOGY

## 2023-10-03 PROCEDURE — 36415 COLL VENOUS BLD VENIPUNCTURE: CPT

## 2023-10-03 PROCEDURE — 7100000001 HC PACU RECOVERY - ADDTL 15 MIN: Performed by: UROLOGY

## 2023-10-03 PROCEDURE — 88305 TISSUE EXAM BY PATHOLOGIST: CPT

## 2023-10-03 PROCEDURE — 74420 UROGRAPHY RTRGR +-KUB: CPT

## 2023-10-03 PROCEDURE — 3700000001 HC ADD 15 MINUTES (ANESTHESIA): Performed by: UROLOGY

## 2023-10-03 PROCEDURE — 2720000010 HC SURG SUPPLY STERILE: Performed by: UROLOGY

## 2023-10-03 PROCEDURE — 2580000003 HC RX 258: Performed by: NURSE ANESTHETIST, CERTIFIED REGISTERED

## 2023-10-03 PROCEDURE — 99238 HOSP IP/OBS DSCHRG MGMT 30/<: CPT | Performed by: INTERNAL MEDICINE

## 2023-10-03 PROCEDURE — 3700000000 HC ANESTHESIA ATTENDED CARE: Performed by: UROLOGY

## 2023-10-03 PROCEDURE — 2580000003 HC RX 258

## 2023-10-03 PROCEDURE — 2500000003 HC RX 250 WO HCPCS: Performed by: NURSE ANESTHETIST, CERTIFIED REGISTERED

## 2023-10-03 PROCEDURE — 6360000002 HC RX W HCPCS: Performed by: NURSE ANESTHETIST, CERTIFIED REGISTERED

## 2023-10-03 PROCEDURE — 6370000000 HC RX 637 (ALT 250 FOR IP): Performed by: UROLOGY

## 2023-10-03 PROCEDURE — 3600000004 HC SURGERY LEVEL 4 BASE: Performed by: UROLOGY

## 2023-10-03 PROCEDURE — 0TB48ZX EXCISION OF LEFT KIDNEY PELVIS, VIA NATURAL OR ARTIFICIAL OPENING ENDOSCOPIC, DIAGNOSTIC: ICD-10-PCS | Performed by: UROLOGY

## 2023-10-03 PROCEDURE — C1769 GUIDE WIRE: HCPCS | Performed by: UROLOGY

## 2023-10-03 PROCEDURE — 2580000003 HC RX 258: Performed by: INTERNAL MEDICINE

## 2023-10-03 DEVICE — URETERAL STENT
Type: IMPLANTABLE DEVICE | Site: URETER | Status: FUNCTIONAL
Brand: CONTOUR™

## 2023-10-03 RX ORDER — ONDANSETRON 2 MG/ML
INJECTION INTRAMUSCULAR; INTRAVENOUS PRN
Status: DISCONTINUED | OUTPATIENT
Start: 2023-10-03 | End: 2023-10-03 | Stop reason: SDUPTHER

## 2023-10-03 RX ORDER — LABETALOL HYDROCHLORIDE 5 MG/ML
10 INJECTION, SOLUTION INTRAVENOUS
Status: DISCONTINUED | OUTPATIENT
Start: 2023-10-03 | End: 2023-10-03 | Stop reason: HOSPADM

## 2023-10-03 RX ORDER — PROPOFOL 10 MG/ML
INJECTION, EMULSION INTRAVENOUS PRN
Status: DISCONTINUED | OUTPATIENT
Start: 2023-10-03 | End: 2023-10-03 | Stop reason: SDUPTHER

## 2023-10-03 RX ORDER — KETOROLAC TROMETHAMINE 30 MG/ML
INJECTION, SOLUTION INTRAMUSCULAR; INTRAVENOUS PRN
Status: DISCONTINUED | OUTPATIENT
Start: 2023-10-03 | End: 2023-10-03 | Stop reason: SDUPTHER

## 2023-10-03 RX ORDER — SODIUM CHLORIDE 9 MG/ML
INJECTION, SOLUTION INTRAVENOUS PRN
Status: DISCONTINUED | OUTPATIENT
Start: 2023-10-03 | End: 2023-10-03 | Stop reason: HOSPADM

## 2023-10-03 RX ORDER — MAGNESIUM HYDROXIDE 1200 MG/15ML
LIQUID ORAL PRN
Status: DISCONTINUED | OUTPATIENT
Start: 2023-10-03 | End: 2023-10-03 | Stop reason: ALTCHOICE

## 2023-10-03 RX ORDER — CIPROFLOXACIN 500 MG/1
500 TABLET, FILM COATED ORAL DAILY
Qty: 7 TABLET | Refills: 0 | Status: SHIPPED | OUTPATIENT
Start: 2023-10-03 | End: 2023-10-10

## 2023-10-03 RX ORDER — SODIUM CHLORIDE 0.9 % (FLUSH) 0.9 %
5-40 SYRINGE (ML) INJECTION PRN
Status: DISCONTINUED | OUTPATIENT
Start: 2023-10-03 | End: 2023-10-03 | Stop reason: HOSPADM

## 2023-10-03 RX ORDER — OXYCODONE HYDROCHLORIDE 5 MG/1
10 TABLET ORAL PRN
Status: DISCONTINUED | OUTPATIENT
Start: 2023-10-03 | End: 2023-10-03 | Stop reason: HOSPADM

## 2023-10-03 RX ORDER — ONDANSETRON 2 MG/ML
4 INJECTION INTRAMUSCULAR; INTRAVENOUS
Status: DISCONTINUED | OUTPATIENT
Start: 2023-10-03 | End: 2023-10-03 | Stop reason: HOSPADM

## 2023-10-03 RX ORDER — ROCURONIUM BROMIDE 10 MG/ML
INJECTION, SOLUTION INTRAVENOUS PRN
Status: DISCONTINUED | OUTPATIENT
Start: 2023-10-03 | End: 2023-10-03 | Stop reason: SDUPTHER

## 2023-10-03 RX ORDER — LIDOCAINE HYDROCHLORIDE 20 MG/ML
INJECTION, SOLUTION INFILTRATION; PERINEURAL PRN
Status: DISCONTINUED | OUTPATIENT
Start: 2023-10-03 | End: 2023-10-03 | Stop reason: SDUPTHER

## 2023-10-03 RX ORDER — FENTANYL CITRATE 50 UG/ML
INJECTION, SOLUTION INTRAMUSCULAR; INTRAVENOUS PRN
Status: DISCONTINUED | OUTPATIENT
Start: 2023-10-03 | End: 2023-10-03 | Stop reason: SDUPTHER

## 2023-10-03 RX ORDER — DEXAMETHASONE SODIUM PHOSPHATE 4 MG/ML
INJECTION, SOLUTION INTRA-ARTICULAR; INTRALESIONAL; INTRAMUSCULAR; INTRAVENOUS; SOFT TISSUE PRN
Status: DISCONTINUED | OUTPATIENT
Start: 2023-10-03 | End: 2023-10-03 | Stop reason: SDUPTHER

## 2023-10-03 RX ORDER — SODIUM CHLORIDE, SODIUM LACTATE, POTASSIUM CHLORIDE, CALCIUM CHLORIDE 600; 310; 30; 20 MG/100ML; MG/100ML; MG/100ML; MG/100ML
INJECTION, SOLUTION INTRAVENOUS CONTINUOUS PRN
Status: DISCONTINUED | OUTPATIENT
Start: 2023-10-03 | End: 2023-10-03 | Stop reason: SDUPTHER

## 2023-10-03 RX ORDER — OXYCODONE HYDROCHLORIDE 5 MG/1
5 TABLET ORAL PRN
Status: DISCONTINUED | OUTPATIENT
Start: 2023-10-03 | End: 2023-10-03 | Stop reason: HOSPADM

## 2023-10-03 RX ORDER — DIPHENHYDRAMINE HYDROCHLORIDE 50 MG/ML
12.5 INJECTION INTRAMUSCULAR; INTRAVENOUS
Status: DISCONTINUED | OUTPATIENT
Start: 2023-10-03 | End: 2023-10-03 | Stop reason: HOSPADM

## 2023-10-03 RX ORDER — SODIUM CHLORIDE 0.9 % (FLUSH) 0.9 %
5-40 SYRINGE (ML) INJECTION EVERY 12 HOURS SCHEDULED
Status: DISCONTINUED | OUTPATIENT
Start: 2023-10-03 | End: 2023-10-03 | Stop reason: HOSPADM

## 2023-10-03 RX ORDER — ONDANSETRON 4 MG/1
4 TABLET, FILM COATED ORAL EVERY 8 HOURS PRN
Qty: 10 TABLET | Refills: 0 | Status: SHIPPED | OUTPATIENT
Start: 2023-10-03

## 2023-10-03 RX ORDER — CEPHALEXIN 500 MG/1
500 CAPSULE ORAL 3 TIMES DAILY
Qty: 21 CAPSULE | Refills: 0 | Status: SHIPPED | OUTPATIENT
Start: 2023-10-04 | End: 2023-10-11

## 2023-10-03 RX ORDER — SODIUM CHLORIDE, SODIUM LACTATE, POTASSIUM CHLORIDE, CALCIUM CHLORIDE 600; 310; 30; 20 MG/100ML; MG/100ML; MG/100ML; MG/100ML
INJECTION, SOLUTION INTRAVENOUS CONTINUOUS
Status: DISCONTINUED | OUTPATIENT
Start: 2023-10-03 | End: 2023-10-03 | Stop reason: HOSPADM

## 2023-10-03 RX ORDER — FAMOTIDINE 10 MG/ML
20 INJECTION, SOLUTION INTRAVENOUS ONCE
Status: DISCONTINUED | OUTPATIENT
Start: 2023-10-03 | End: 2023-10-03 | Stop reason: HOSPADM

## 2023-10-03 RX ORDER — OXYCODONE HYDROCHLORIDE 5 MG/1
5 TABLET ORAL EVERY 8 HOURS PRN
Qty: 9 TABLET | Refills: 0 | Status: SHIPPED | OUTPATIENT
Start: 2023-10-03 | End: 2023-10-06

## 2023-10-03 RX ORDER — OXYCODONE HYDROCHLORIDE 5 MG/1
5 TABLET ORAL EVERY 6 HOURS PRN
Status: DISCONTINUED | OUTPATIENT
Start: 2023-10-03 | End: 2023-10-03 | Stop reason: HOSPADM

## 2023-10-03 RX ADMIN — OXYCODONE HYDROCHLORIDE 5 MG: 5 TABLET ORAL at 11:14

## 2023-10-03 RX ADMIN — ONDANSETRON 4 MG: 2 INJECTION INTRAMUSCULAR; INTRAVENOUS at 02:05

## 2023-10-03 RX ADMIN — ONDANSETRON HYDROCHLORIDE 4 MG: 2 INJECTION, SOLUTION INTRAMUSCULAR; INTRAVENOUS at 07:54

## 2023-10-03 RX ADMIN — SERTRALINE HYDROCHLORIDE 25 MG: 50 TABLET ORAL at 11:13

## 2023-10-03 RX ADMIN — FENTANYL CITRATE 50 MCG: 50 INJECTION INTRAMUSCULAR; INTRAVENOUS at 07:43

## 2023-10-03 RX ADMIN — SODIUM CHLORIDE, PRESERVATIVE FREE 10 ML: 5 INJECTION INTRAVENOUS at 11:02

## 2023-10-03 RX ADMIN — PROPRANOLOL HYDROCHLORIDE 40 MG: 40 TABLET ORAL at 11:14

## 2023-10-03 RX ADMIN — KETOROLAC TROMETHAMINE 30 MG: 30 INJECTION, SOLUTION INTRAMUSCULAR at 08:30

## 2023-10-03 RX ADMIN — SODIUM CHLORIDE 1000 ML: 9 INJECTION, SOLUTION INTRAVENOUS at 11:01

## 2023-10-03 RX ADMIN — SODIUM CHLORIDE, POTASSIUM CHLORIDE, SODIUM LACTATE AND CALCIUM CHLORIDE: 600; 310; 30; 20 INJECTION, SOLUTION INTRAVENOUS at 07:39

## 2023-10-03 RX ADMIN — PROPOFOL 150 MG: 10 INJECTION, EMULSION INTRAVENOUS at 07:43

## 2023-10-03 RX ADMIN — LIDOCAINE HYDROCHLORIDE 60 MG: 20 INJECTION, SOLUTION INFILTRATION; PERINEURAL at 07:43

## 2023-10-03 RX ADMIN — LISINOPRIL 10 MG: 10 TABLET ORAL at 11:14

## 2023-10-03 RX ADMIN — ROCURONIUM BROMIDE 50 MG: 10 INJECTION, SOLUTION INTRAVENOUS at 07:43

## 2023-10-03 RX ADMIN — DEXAMETHASONE SODIUM PHOSPHATE 8 MG: 4 INJECTION, SOLUTION INTRAMUSCULAR; INTRAVENOUS at 07:54

## 2023-10-03 RX ADMIN — FENTANYL CITRATE 50 MCG: 50 INJECTION INTRAMUSCULAR; INTRAVENOUS at 07:46

## 2023-10-03 RX ADMIN — SUGAMMADEX 200 MG: 100 INJECTION, SOLUTION INTRAVENOUS at 08:34

## 2023-10-03 RX ADMIN — ACETAMINOPHEN 650 MG: 325 TABLET ORAL at 02:04

## 2023-10-03 RX ADMIN — LEVOFLOXACIN 500 MG: 500 TABLET, FILM COATED ORAL at 06:12

## 2023-10-03 ASSESSMENT — PAIN SCALES - WONG BAKER
WONGBAKER_NUMERICALRESPONSE: 0

## 2023-10-03 ASSESSMENT — PAIN SCALES - GENERAL
PAINLEVEL_OUTOF10: 0
PAINLEVEL_OUTOF10: 7
PAINLEVEL_OUTOF10: 6
PAINLEVEL_OUTOF10: 0
PAINLEVEL_OUTOF10: 3

## 2023-10-03 ASSESSMENT — PAIN - FUNCTIONAL ASSESSMENT
PAIN_FUNCTIONAL_ASSESSMENT: ACTIVITIES ARE NOT PREVENTED
PAIN_FUNCTIONAL_ASSESSMENT: 0-10

## 2023-10-03 ASSESSMENT — PAIN DESCRIPTION - LOCATION
LOCATION: ABDOMEN
LOCATION: ABDOMEN

## 2023-10-03 ASSESSMENT — PAIN DESCRIPTION - ORIENTATION
ORIENTATION: LOWER
ORIENTATION: LEFT

## 2023-10-03 ASSESSMENT — PAIN DESCRIPTION - PAIN TYPE
TYPE: ACUTE PAIN
TYPE: ACUTE PAIN

## 2023-10-03 ASSESSMENT — PAIN DESCRIPTION - DESCRIPTORS
DESCRIPTORS: ACHING
DESCRIPTORS: NAGGING

## 2023-10-03 ASSESSMENT — LIFESTYLE VARIABLES: SMOKING_STATUS: 1

## 2023-10-03 ASSESSMENT — PAIN DESCRIPTION - ONSET: ONSET: ON-GOING

## 2023-10-03 ASSESSMENT — PAIN DESCRIPTION - FREQUENCY: FREQUENCY: INTERMITTENT

## 2023-10-03 NOTE — ADT AUTH CERT
Utilization Reviews       10/2 -   Last Updated by Susan Garcia RN on 10/2/2023 1420     Review Status Created By   In Primary Susan Garcia RN       Review Type Associated Date   -- 10/2/2023      Criteria Review   DATE: 10/2 - CD2        RELEVANT BASELINES: (lab values, vitals, o2 amount/delivery, etc.)  Solitary kidney -  Severely  atrophic right kidney     PERTINENT UPDATES:  Continues to have flank pain  Uro consult - plan for OR tomorrow  IV ABX  IVF  Lisinopril held  Lovenox changed to Hep     VITALS:  T-97.6  P-47  R-20  Bp-119/57  X5nhl-46% on ra        ABNL/PERTINENT LABS/RADIOLOGY/DIAGNOSTIC STUDIES:    Latest Reference Range & Units 10/02/23 06:55   Sodium 136 - 145 mmol/L 135 (L)   Potassium 3.5 - 5.1 mmol/L 3.4 (L)   BUN,BUNPL 7 - 20 mg/dL 23 (H)   Creatinine 0.6 - 1.1 mg/dL 1.9 (H)   Est, Glom Filt Rate >60  30 ! RBC 4.00 - 5.20 M/uL 3.57 (L)   Hemoglobin Quant 12.0 - 16.0 g/dL 10.8 (L)   Hematocrit 36.0 - 48.0 % 32.0 (L)   Platelet Count 906 - 450 K/uL 117 (L)   Neutrophils Absolute 1.7 - 7.7 K/uL 1.6 (L)         PHYSICAL EXAM:  Flank pain present      MD CONSULTS/ASSESSMENT AND PLAN:  **INT MED NOTE**   #Acute cystitis with hematuria  #Left ureteral stone   #hx solitary left kidney   - urine culture pending   - continue Rocephin  - Urology consulted  - NPO at midnight for cysto tomorrow with laser lithotripsy, stone basket extraction, stent exchange   - IVFs  - prn pain medicine, patient only wanting to take Tylenol      #Hypokalemia  - replete prn   - monitor      #Hyponatremia   - IVF, monitor as hx of CHF  - hold hctz, resume as able      #Bradycardia, asymptomatic  - HRs down to high 40s overnight  - hold parameters placed on Inderal  - monitor      **Urology**   LEFT ureteral stone  Solitary left kidney   Pyuria  - UA findings could be related to her indwelling stent.   Abx per IM and culture pending  - She will be scheduled for cysto with LEFT URS, laser lithotripsy, stone basket

## 2023-10-03 NOTE — PLAN OF CARE
Problem: Discharge Planning  Goal: Discharge to home or other facility with appropriate resources  10/2/2023 2333 by Faby Centeno RN  Outcome: Progressing  10/2/2023 1549 by Linda Calabrese RN  Outcome: Progressing     Problem: Pain  Goal: Verbalizes/displays adequate comfort level or baseline comfort level  10/2/2023 2333 by Faby Centeno RN  Outcome: Progressing  10/2/2023 1549 by Linda Calabrese RN  Outcome: Progressing

## 2023-10-03 NOTE — ACP (ADVANCE CARE PLANNING)
Advance Care Planning     General Advance Care Planning (ACP) Conversation    Date of Conversation: 10/1/2023  Conducted with: Patient with Decision Making Capacity    Healthcare Decision Maker:    Primary Decision Maker: Hannah Ball - Aunt/Uncle - [phone]    Secondary Decision Maker: Magnolia Ibarra - [phone]  Click here to complete Healthcare Decision Makers including selection of the Healthcare Decision Maker Relationship (ie \"Primary\"). Today we documented Decision Maker(s) consistent with Legal Next of Kin hierarchy.     Content/Action Overview:  DECLINED ACP Conversation - will revisit periodically  Reviewed DNR/DNI and patient elects Full Code (Attempt Resuscitation)        Length of Voluntary ACP Conversation in minutes:  <16 minutes (Non-Billable)    Nirmala Metcalf RN

## 2023-10-03 NOTE — ANESTHESIA POSTPROCEDURE EVALUATION
Department of Anesthesiology  Postprocedure Note    Patient: Major Ordonez  MRN: [id number]  YOB: 1965  Date of evaluation: 10/3/2023      Procedure Summary     Date: 10/03/23 Room / Location: [address] [place] [address] / [facility]    Anesthesia Start: 2107 Anesthesia Stop: 1437    Procedure: CYSTOSCOPY, LEFT URETEROSCOPY WITH  HOLMIUM LASER LITHOTRIPSY, STENT PLACEMENT (Left: Bladder) Diagnosis:       Left ureteral stone      (Left ureteral stone [N20.1])    Surgeons: Sylvia Curtis MD Responsible Provider: Farida Hernandez MD    Anesthesia Type: general ASA Status: 3          Anesthesia Type: No value filed. Denise Phase I: Denise Score: 10    Denise Phase II:        Anesthesia Post Evaluation    Patient location during evaluation: PACU  Patient participation: complete - patient participated  Level of consciousness: awake and alert  Airway patency: patent  Nausea & Vomiting: no nausea and no vomiting  Complications: no  Cardiovascular status: blood pressure returned to baseline  Respiratory status: acceptable  Hydration status: euvolemic  Comments: VSS on transfer to phase 2 recovery. No anesthetic complications.   Pain management: adequate

## 2023-10-03 NOTE — BRIEF OP NOTE
tube clipped to bed;Catheter secured to thigh; Tamper seal intact; Bag below bladder;Bag not on floor; Lack of dependent loop in tubing;Drainage bag less than half full 09/01/23 0852   Status Draining;Patent 09/01/23 0852   Output (mL) 700 mL 09/01/23 0533       [REMOVED] Urinary Catheter 09/03/23 [place] (Removed)   $ Urethral catheter insertion $ Not inserted for procedure 09/03/23 1150   Catheter Indications Urinary retention (acute or chronic), continuous bladder irrigation or bladder outlet obstruction 09/05/23 1145   Site Assessment [place] 09/05/23 1145   Urine Color Yellow 09/05/23 1145   Urine Appearance Clear 09/05/23 1145   Collection Container Standard 09/05/23 1145   Securement Method Securing device (Describe) 09/05/23 1145   Catheter Care  Perineal wipes 09/05/23 0944   Catheter Best Practices  Drainage tube clipped to bed;Catheter secured to thigh; Tamper seal intact; Bag below bladder;Bag not on floor; Lack of dependent loop in tubing;Drainage bag less than half full 09/05/23 1145   Status Draining 09/05/23 1145   Output (mL) 200 mL 09/05/23 1200       Findings:   See detailed dictated operative notes for more procedural details      Soft fibrinous stone material pulverized and extracted ,, we looked in multiple calyx, and the original scan some of the stones could be parenchymal    Some irregular appearing areas in the bottom of the left renal pelvis we tried a biopsy that it was somewhat difficult but we did send tissue to the lab for analysis    Follow-up renal ultrasound in 1 to 2 months with urine cytology pending biopsy    Continue the antibiotics until the ureteral stent is removed.   Remove the stent on Monday morning next week      Dict#: [id number]  Cipro added to abx    Electronically signed by Ray Holly MD on 10/3/2023 at 9:44 AM

## 2023-10-03 NOTE — CARE COORDINATION
Case Management Assessment  Initial Evaluation and discharge note    Date/Time of Evaluation: 10/3/2023 2:22 PM  Assessment Completed by: Melina Beckwith RN    If patient is discharged prior to next notation, then this note serves as note for discharge by case management. Patient Name: Sade Harding                   YOB: 1965  Diagnosis: Complicated urinary tract infection [N39.0]  Flank pain [R10.9]  Acute cystitis with hematuria [N30.01]  Hepatitis C virus infection without hepatic coma, unspecified chronicity [B19.20]  Urinary tract infection associated with catheterization of urinary tract, unspecified indwelling urinary catheter type, initial encounter ([address]) [P63.647C, N39.0]                   Date / Time: 10/1/2023  5:09 PM    Patient Admission Status: Inpatient   Readmission Risk (Low < 19, Mod (19-27), High > 27): Readmission Risk Score: 21    Current PCP: Shine Armstrong MD  PCP verified by CM? Yes Mi Espinal)    Chart Reviewed: Yes      History Provided by: Patient  Patient Orientation: Alert and Oriented    Patient Cognition: Alert    Hospitalization in the last 30 days (Readmission):  Yes    If yes, Readmission Assessment in CM Navigator will be completed. Advance Directives:      Code Status: Full Code   Patient's Primary Decision Maker is: Patient Declined (Legal Next of Kin Remains as Decision Maker)    Primary Decision Maker: Karonjayson Ewing - Aunt/Uncle - [phone]    Secondary Decision Maker: Kelechi El - [phone]    Discharge Planning:    Patient lives with: Alone Type of Home: Apartment  Primary Care Giver: Self  Patient Support Systems include: Family Members   Current Financial resources: Medicaid ([address])  Current community resources: None  Current services prior to admission: None            Current DME: [facility]            Type of Home Care services:  None    ADLS  Prior functional level: Independent in ADLs/IADLs  Current functional level:  Independent in

## 2023-10-04 NOTE — OP NOTE
usual sterile fashion. Scope was advanced  transurethrally. We grasped the old stent. We pulled it to the meatus  and advanced a stiff Glidewire through and up to the kidney. We then  advanced the introducer through the ureteral access sheath, and the mid  upper ureter was injected with contrast to opacify the kidney. Some  mild hydronephrosis. No calyx seen. We then deployed the wire under  fluoroscopy to the kidney. I backed off, the introducer then deployed,  the 12/14 ureteral access sheath with introducer into the upper ureter. We then deployed the flexible ureteroscope through the sheath to the  kidney. We encountered multiple small, kind of soft and fibrinous  stones. We did deploy a laser as one of the areas seemed harder. We  used dust setting on laser to break up the stone. We then deployed a  basket. We retracted about 4 or 5 fibrinous infectious type stones and  pulled those off and then some small calcium deposits and we extracted  all visible debris. We inspected the upper inner pole and lower pole  area. No obvious large pieces were seen. I did notice some papillary  type erythematous area in the renal pelvis on the left side, kind of  near the floor. It looked more suspicious than typical edema from the  stent. So I thought it was best to try to biopsy this area. I used a Zero Tip basket to extract some tissues from his renal pelvis  area and I put it on Telfa. There was a small amount of tissue somewhat  difficult to biopsy, but I was able to send off some tissue to lab. So  after the ureteral biopsy and the stone extraction, we then deployed  more contrast to the kidney and confirmed that our safety wire was back  up in the upper pole. We backed up the ureteral access sheath and the  ureteroscope and inspecting lower ureter on the way out, it worked fine. We then back looked the wire through the cystoscope, deployed a 7-[place]  x 24 cm stent.   A curl was seen in the

## 2023-10-04 NOTE — DISCHARGE SUMMARY
KEFLEX  Take 1 capsule by mouth 3 times daily for 7 days     ciprofloxacin 500 MG tablet  Commonly known as: CIPRO  Take 1 tablet by mouth daily for 7 days Until your kidney drainage tube is removed     oxyCODONE 5 MG immediate release tablet  Commonly known as: ROXICODONE  Take 1 tablet by mouth every 8 hours as needed for Pain for up to 3 days.  Max Daily Amount: 15 mg            CONTINUE taking these medications      acetaminophen 500 MG tablet  Commonly known as: TYLENOL     Aspirin Low Dose 81 MG EC tablet  Generic drug: aspirin     ferrous sulfate 325 (65 Fe) MG tablet  Commonly known as: IRON 325     hydroCHLOROthiazide 25 MG tablet  Commonly known as: HYDRODIURIL     hydrOXYzine HCl 25 MG tablet  Commonly known as: ATARAX     lisinopril 10 MG tablet  Commonly known as: PRINIVIL;ZESTRIL     ondansetron 4 MG tablet  Commonly known as: Zofran  Take 1 tablet by mouth every 8 hours as needed for Nausea or Vomiting     propranolol 40 MG tablet  Commonly known as: INDERAL     sertraline 25 MG tablet  Commonly known as: ZOLOFT     vitamin D 25 MCG (1000 UT) Tabs tablet  Commonly known as: CHOLECALCIFEROL            STOP taking these medications      metoprolol succinate 25 MG extended release tablet  Commonly known as: TOPROL XL     tamsulosin 0.4 MG capsule  Commonly known as: FLOMAX               Where to Get Your Medications        These medications were sent to [address] [facility] [id number] [facility], [address] [facility], [facility] [address] - P [phone] - F [phone]  [address] [place] [address] [address], [address] [place] [address]      Phone: [phone]   ciprofloxacin 500 MG tablet       These medications were sent to [address], [address]  [address] [place] [address] & [address] [address]      Phone: [phone]   cephALEXin 500 MG capsule  ondansetron 4 MG tablet  oxyCODONE 5 MG immediate release tablet         Discharged in stable condition to

## 2023-10-06 NOTE — ED PROVIDER NOTES
Emergency Department Attending Provider Note  Location: [facility]  10/1/2023     Patient Identification  Kirsty Bustamante is a 63 y.o. female      Kirsty Bustamante was evaluated in the Emergency Department for generalized abdominal pain and flank pain. Although initial history and physical exam information was obtained by ADY/NPP/MD/ (who also dictated a record of this visit), I personally saw the patient and performed a substantive portion of the visit including all aspects of the medical decision making. Patient seen and evaluated. Relevant records reviewed. Patient is a 60-year-old female with history of obstructive uropathy with prior stent placement who presents for generalized abdominal pain that radiates from the right to the left side. She reports feeling nauseous but denies any vomiting. She denies any chest pain or shortness of breath. Patient presented hypertensive but vitals otherwise normal.  On exam she had dry mucous membranes. She had no reproducible CVA or abdominal tenderness. Her CBC showed a normal white blood cell count and no evidence of anemia. CMP showing hyperglycemia to 148, pseudohyponatremia, stable renal function with a BUN of 23, creatinine of 1.7 and GFR of 35.  UA does suggest underlying infection as well as blood. We obtained a CT abdomen pelvis which showed no hydronephrosis and a stable left ureteral stent. We consulted with Zacarias Salazar who recommended that patient be admitted to remove the stent. She was diagnosed with a complex UTI. Patient was amenable with admission. I, Dr. Ronel Holt am the primary clinician of record. I personally saw the patient and independently provided 0 minutes of non-concurrent critical care out of the total shared critical care time provided.     This chart was generated in part by using Dragon Dictation system and may contain errors related to that system including errors in grammar, punctuation, and spelling,

## 2024-05-14 NOTE — PROGRESS NOTES
1.  Do not eat or drink anything after 12 midnight prior to surgery. This includes no water, chewing gum or mints. 2.  Take the following pills with a small sip of water on the morning of surgery . 3. Aspirin, Ibuprofen, Advil, Naproxen, Vitamin E and other Anti-inflammatory products should be stopped for 5 days before surgery or as directed by your physician. 4.  Check with your doctor regarding stopping Plavix, Coumadin, Lovenox, Fragmin or other blood thinners. 5.  Do not smoke and do not drink alcoholic beverages 24 hours prior to surgery. This includes NA Beer. 6.  You may brush your teeth and gargle the morning of surgery. DO NOT SWALLOW WATER.  7.  You MUST make arrangements for a responsible adult to take you home after your surgery. You will not be allowed to leave alone or drive yourself home. It is strongly suggested someone stay with you the first 24 hours. Your surgery will be cancelled if you do not have a ride home. 8.  A parent/legal guardian must accompany a child scheduled for surgery and plan to stay at the hospital until the child is discharged. Please do not bring other children with you. 9.  Please wear simple, loose fitting clothing to the hospital.  Louvella Castleman not bring valuables ( money, credit cards, checkbooks, etc.)  Do not wear any makeup (including no eye makeup) or nail polish on your fingers or toes. 10.  Do not wear any jewelry or piercing on the day of surgery. All body piercing jewelry must be removed. 11.  If you have dentures, they will be removed before going to the OR; we will provide you a container. If you wear contact lenses or glasses, they will be removed; please bring a case for them. 12.  Please see your family doctor/pediatrician for a history & physical and/or concerning medications. Bring any test results/reports from your physician's office the day of surgery. 15.  Remember to bring Blood Bank Bracelet to the hospital on the day of surgery.   14.  If you have a Living Will and Durable Power of  for Healthcare, please bring in a copy. 13.  Notify your Surgeon if you develop any illness between now and surgery time; cough, cold, fever, sore throat, nausea, vomiting, etc.  Please notify your surgeon if you experience dizziness, shortness of breath or blurred vision between now and the time of your surgery. 16.  DO NOT shave your operative site 96 hours (4 days) prior to surgery. For face and neck surgery, men may use an electric razor 48 hours (2 days) prior to surgery. 17. Shower the night before surgery and the morning of surgery with  an antibacterial soap   or  Chlorhexidine gluconate (for total joint replacement). To provide excellent care, visitors will be limited to two in a room at any given time. Please no children under the age of 15 in the surgical department. 09-May-2024 11:58

## (undated) DEVICE — INVIEW CLEAR LEGGINGS: Brand: CONVERTORS

## (undated) DEVICE — GAUZE,SPONGE,4"X4",8PLY,STRL,LF,10/TRAY: Brand: MEDLINE

## (undated) DEVICE — CATHETER URETH 18FR BLLN 5CC STD LTX 2 W F TWO OPP DRNGE

## (undated) DEVICE — BAG URIN STRL FOR URO CTCH SYS

## (undated) DEVICE — GLOVE ORANGE PI 7   MSG9070

## (undated) DEVICE — GLOVE ORANGE PI 7 1/2   MSG9075

## (undated) DEVICE — HIGH PRESSURE NEPHROSTOMY BALLOON CATHETER: Brand: NEPHROMAX

## (undated) DEVICE — SUTURE PERMA-HAND SZ 2-0 L30IN NONABSORBABLE BLK L26MM SH K833H

## (undated) DEVICE — SYSTEM PMP VAC SYR 10CC CONT FLO SGL ACT 1 W VLV SAPS

## (undated) DEVICE — FLEXIVA  PULSE  AND  FLEXIVA  PULSE  TRACTIP  LASER  FIBERS  ARE  HIGH  POWER  SINGLE-USE FIBER: Brand: FLEXIVA PULSE ID

## (undated) DEVICE — NITINOL STONE RETRIEVAL BASKET: Brand: ZERO TIP

## (undated) DEVICE — CANNULA NSL 13FT TUBE AD ETCO2 DIV SAMP M

## (undated) DEVICE — GOWN SIRUS NONREIN XL W/TWL: Brand: MEDLINE INDUSTRIES, INC.

## (undated) DEVICE — SYRINGE BLB 50CC IRRIG PLIABLE FNGR FLNG GRAD FLSK DISP

## (undated) DEVICE — Z CONVERTED USE 2271043 CONTAINER SPEC COLL 4OZ SCR ON LID PEEL PCH

## (undated) DEVICE — SOLUTION IV IRRIG 500ML 0.9% SODIUM CHL 2F7123

## (undated) DEVICE — SOLUTION IRRIG 2000ML 0.9% SOD CHL USP UROMATIC PLAS CONT

## (undated) DEVICE — Z DUP USE 2522782 SOLUTION IRRIG 1000ML STRL H2O PLAS CONTAINER UROMATIC

## (undated) DEVICE — CATHETER,URETHRAL,REDRUBBER,STRL,18FR: Brand: MEDLINE

## (undated) DEVICE — BASIC CYSTO I-LF: Brand: MEDLINE INDUSTRIES, INC.

## (undated) DEVICE — PREP SOL PVP IODINE 4%  4 OZ/BTL

## (undated) DEVICE — BAG  LEG  EX-TUBING  18IN  MEDIUM  20OZ

## (undated) DEVICE — KIT PRB L440MM DIA3.9MM BLU SWISS LITHOCLAST TRIL

## (undated) DEVICE — Z DUP USE 2139333 GUIDEWIRE UROLOGICAL STR STD 0.035 IN X150 CM REG ZIPWIRE LF

## (undated) DEVICE — CIRCUIT ANES L72IN 3L BACT AND VIR FLTR EL CONN SGL LIMB

## (undated) DEVICE — Z INACTIVATING USE 2488003 GUIDEWIRE ENDO L150CM DIA0.035IN STR TIP (QTY = EACH)

## (undated) DEVICE — BOWL MED L 32OZ PLAS W/ MOLD GRAD EZ OPN PEEL PCH

## (undated) DEVICE — GOWN SIRUS NONREIN LG W/TWL: Brand: MEDLINE INDUSTRIES, INC.

## (undated) DEVICE — DBD-PACK,CYSTOSCOPY,PK VI,AURORA: Brand: MEDLINE

## (undated) DEVICE — INTENDED FOR TISSUE SEPARATION, AND OTHER PROCEDURES THAT REQUIRE A SHARP SURGICAL BLADE TO PUNCTURE OR CUT.: Brand: BARD-PARKER ® CARBON RIB-BACK BLADES

## (undated) DEVICE — DILATOR SINGLE STEP 612 FR 24CM DIAMETER

## (undated) DEVICE — SOLUTION IRRIGATION STRL H2O 1000 ML UROMATIC CONTAINER

## (undated) DEVICE — URETERAL STENT
Type: IMPLANTABLE DEVICE | Site: URETER | Status: NON-FUNCTIONAL
Brand: CONTOUR™
Removed: 2023-06-24

## (undated) DEVICE — Device: Brand: MEDEX

## (undated) DEVICE — CATHETER URETH 16FR 5CC BLLN SIL ELASTMR F 2 W

## (undated) DEVICE — TUBING, SUCTION, 3/16" X 10', STRAIGHT: Brand: MEDLINE

## (undated) DEVICE — SYRINGE MED 10ML LUERLOCK TIP W/O SFTY DISP

## (undated) DEVICE — CYSTO/BLADDER IRRIGATION SET, REGULATING CLAMP

## (undated) DEVICE — MEDI-VAC NON-CONDUCTIVE SUCTION TUBING: Brand: CARDINAL HEALTH

## (undated) DEVICE — Y-TYPE TUR/BLADDER IRRIGATION SET, REGULATING CLAMP

## (undated) DEVICE — OPEN-END FLEXI-TIP URETERAL CATHETER: Brand: FLEXI-TIP

## (undated) DEVICE — SYRINGE MED 30ML STD CLR PLAS LUERLOCK TIP N CTRL DISP

## (undated) DEVICE — GLOVE ORANGE PI 8   MSG9080

## (undated) DEVICE — CATCHER STONE TBNG ADPT SWISS LITHOCLAST

## (undated) DEVICE — GUIDEWIRE ENDOSCP L150CM DIA0.035IN TIP 3CM PTFE NIT

## (undated) DEVICE — STONE RETRIEVAL BASKET: Brand: SEGURA HEMISPHERE

## (undated) DEVICE — SHEATH URO 11 13FRX38CM UROPS

## (undated) DEVICE — CHLORAPREP 26ML ORANGE

## (undated) DEVICE — OCCLUSION BALLOON CATHETER: Brand: OCCLUDER

## (undated) DEVICE — SHEATH URET ACCS DIL 6 12FR OD14FR ID12FR L38CM PTFE LN

## (undated) DEVICE — CRADLE POS PRONE 24 X 5 X 3 IN ARM N COMPR NO CVR FOAM DISP

## (undated) DEVICE — GUIDEWIRE VASC NITINOL HYDROPHIL STR 3 CM 0.035 INX150 CM STD NIT ZIPWIRE

## (undated) DEVICE — FIBER LASER 365 MH 100 W FLEXSHIELD OUTPT TIP ETFE SIL

## (undated) DEVICE — DRAPE,NEPHROSCOPY,STERILE: Brand: MEDLINE

## (undated) DEVICE — CATHETER URET 11/13 FRX24 CM UROPS AS

## (undated) DEVICE — 20 ML SYRINGE LUER-LOCK TIP: Brand: MONOJECT

## (undated) DEVICE — BAG DRNGE 2000ML ROUNDED TEARDROP W/ ANTIREFLX CHMBR DISP

## (undated) DEVICE — CATHETER URETH 20FR 30CC BLLN SIL ELASTMR F 2 W

## (undated) DEVICE — DRAINBAG,ANTI-REFLUX TOWER,L/F,2000ML,LL: Brand: MEDLINE

## (undated) DEVICE — GUIDEWIRE VASC STR 3 CM 0.035 INX150 CM STD NIT ZIPWIRE

## (undated) DEVICE — PERC NCIRCLE, NITINOL TIPLESS STONE EXTRACTOR: Brand: PERC NCIRCLE

## (undated) DEVICE — SHEET,DRAPE,53X77,STERILE: Brand: MEDLINE

## (undated) DEVICE — SOLUTION IRRIG 3000ML 0.9% SOD CHL USP UROMATIC PLAS CONT

## (undated) DEVICE — DRAPE,U/ SHT,SPLIT,PLAS,STERIL: Brand: MEDLINE

## (undated) DEVICE — URETERAL STENT
Type: IMPLANTABLE DEVICE | Site: URETER | Status: NON-FUNCTIONAL
Brand: CONTOUR™
Removed: 2020-08-20

## (undated) DEVICE — PAD DRESSING TELFA OUCHLESS NONADH 3X8IN

## (undated) DEVICE — SINGLE ACTION PUMPING SYSTEM

## (undated) DEVICE — CATHETER URET 5FR L70CM TIP 8FR OPN END CONE TIP INJ HUB

## (undated) DEVICE — MINOR SET UP: Brand: MEDLINE INDUSTRIES, INC.

## (undated) DEVICE — 1016 S-DRAPE IRRIG POUCH 10/BOX: Brand: STERI-DRAPE™

## (undated) DEVICE — URETERAL STENT
Type: IMPLANTABLE DEVICE | Site: URETER | Status: NON-FUNCTIONAL
Brand: CONTOUR™